# Patient Record
Sex: MALE | Race: WHITE | Employment: OTHER | URBAN - METROPOLITAN AREA
[De-identification: names, ages, dates, MRNs, and addresses within clinical notes are randomized per-mention and may not be internally consistent; named-entity substitution may affect disease eponyms.]

---

## 2018-03-13 ENCOUNTER — TELEPHONE (OUTPATIENT)
Dept: CARDIOLOGY CLINIC | Age: 73
End: 2018-03-13

## 2018-03-14 RX ORDER — METOPROLOL SUCCINATE 25 MG/1
25 TABLET, EXTENDED RELEASE ORAL DAILY
COMMUNITY
End: 2018-03-14 | Stop reason: SDUPTHER

## 2018-03-14 RX ORDER — METOPROLOL SUCCINATE 25 MG/1
25 TABLET, EXTENDED RELEASE ORAL DAILY
Qty: 30 TABLET | Refills: 11 | Status: SHIPPED | OUTPATIENT
Start: 2018-03-14 | End: 2018-03-15 | Stop reason: SDUPTHER

## 2018-03-15 ENCOUNTER — TELEPHONE (OUTPATIENT)
Dept: CARDIOLOGY CLINIC | Age: 73
End: 2018-03-15

## 2018-03-15 ENCOUNTER — OFFICE VISIT (OUTPATIENT)
Dept: CARDIOLOGY CLINIC | Age: 73
End: 2018-03-15
Payer: MEDICARE

## 2018-03-15 VITALS
RESPIRATION RATE: 16 BRPM | HEIGHT: 69 IN | HEART RATE: 74 BPM | DIASTOLIC BLOOD PRESSURE: 78 MMHG | WEIGHT: 189 LBS | BODY MASS INDEX: 27.99 KG/M2 | SYSTOLIC BLOOD PRESSURE: 142 MMHG

## 2018-03-15 DIAGNOSIS — I25.10 CORONARY ARTERY DISEASE INVOLVING NATIVE CORONARY ARTERY OF NATIVE HEART WITHOUT ANGINA PECTORIS: Primary | ICD-10-CM

## 2018-03-15 PROCEDURE — G8599 NO ASA/ANTIPLAT THER USE RNG: HCPCS | Performed by: INTERNAL MEDICINE

## 2018-03-15 PROCEDURE — 99214 OFFICE O/P EST MOD 30 MIN: CPT | Performed by: INTERNAL MEDICINE

## 2018-03-15 PROCEDURE — G8419 CALC BMI OUT NRM PARAM NOF/U: HCPCS | Performed by: INTERNAL MEDICINE

## 2018-03-15 PROCEDURE — 4040F PNEUMOC VAC/ADMIN/RCVD: CPT | Performed by: INTERNAL MEDICINE

## 2018-03-15 PROCEDURE — 1123F ACP DISCUSS/DSCN MKR DOCD: CPT | Performed by: INTERNAL MEDICINE

## 2018-03-15 PROCEDURE — 4004F PT TOBACCO SCREEN RCVD TLK: CPT | Performed by: INTERNAL MEDICINE

## 2018-03-15 PROCEDURE — G8427 DOCREV CUR MEDS BY ELIG CLIN: HCPCS | Performed by: INTERNAL MEDICINE

## 2018-03-15 PROCEDURE — 3017F COLORECTAL CA SCREEN DOC REV: CPT | Performed by: INTERNAL MEDICINE

## 2018-03-15 PROCEDURE — G8484 FLU IMMUNIZE NO ADMIN: HCPCS | Performed by: INTERNAL MEDICINE

## 2018-03-15 PROCEDURE — 93000 ELECTROCARDIOGRAM COMPLETE: CPT | Performed by: INTERNAL MEDICINE

## 2018-03-15 RX ORDER — MAG HYDROX/ALUMINUM HYD/SIMETH 400-400-40
SUSPENSION, ORAL (FINAL DOSE FORM) ORAL
COMMUNITY

## 2018-03-15 RX ORDER — METOPROLOL SUCCINATE 25 MG/1
37.5 TABLET, EXTENDED RELEASE ORAL DAILY
Qty: 135 TABLET | Refills: 3 | Status: SHIPPED | OUTPATIENT
Start: 2018-03-15 | End: 2019-04-10 | Stop reason: SDUPTHER

## 2018-03-15 NOTE — PROGRESS NOTES
Timoteo Lakhani  1945  Date of Service: 3/15/2018    Patient Active Problem List    Diagnosis Date Noted    Hypertension     Hypercholesteremia     Mitral regurgitation      Overview Note:     mild to moderate      Ischemic cardiomyopathy      Overview Note:     EF 35 - 40%    Seen by Dr. Holli Brown. Defers ICD.  Aortic regurgitation      Overview Note:     trace      CAD (coronary artery disease) 12/12/2012     Overview Note:     12-13-12 anterior STEMI  POBO LAD  CABG [place]-LAD   Free EL-diag   SVG-PLB   SVG-OM      Chest pain 12/12/2012       Social History     Social History    Marital status: Single     Spouse name: N/A    Number of children: N/A    Years of education: N/A     Social History Main Topics    Smoking status: Current Some Day Smoker     Types: Cigarettes, Pipe    Smokeless tobacco: Never Used      Comment: quit cigarettes 35yrs ago    Alcohol use No    Drug use: No    Sexual activity: Not Asked     Other Topics Concern    None     Social History Narrative    None       Current Outpatient Prescriptions   Medication Sig Dispense Refill    Saw [place] 450 MG CAPS Take by mouth      metoprolol succinate (TOPROL XL) 25 MG extended release tablet Take 1.5 tablets by mouth daily 135 tablet 3    losartan (COZAAR) 25 MG tablet take 1 tablet by mouth once daily 90 tablet 3    UNABLE TO FIND Cider vinegar      Flaxseed, Linseed, (FLAX SEED OIL) 1000 MG CAPS Take 4,800 mg by mouth 2 times daily       Multiple Vitamins-Minerals (CENTRUM SILVER ULTRA MENS PO) Take 1 tablet by mouth daily.  aspirin 81 MG chewable tablet Take 1 tablet by mouth daily. 30 tablet 0     No current facility-administered medications for this visit. No Known Allergies    Chief Complaint:  Timoteo Lakhani is here today for follow up and management/recomendations for CAD, ICM, HTN, AR, MR.      History of Present Illness: Timoteo Lakhani states that He does house work, yard work, goes up the stairs & goes shopping. He denies any chest discomfort, FOWLER, orthopnea/PND, or lower extremity edema. He denies any palpitations or presyncopal symptoms. He states that he occasionally sees floaters & spots. He states that this has been for years. REVIEW OF SYSTEMS:  As above. Patient does not complain of any fever, chills, nausea, vomiting or diarrhea. No focal, motor or neurological deficits. No changes in hearing, bowel or bladder habits. He is not known to have a history of thyroid problems. No recent nose bleeds. PHYSICAL EXAM:  Vitals:    03/15/18 0815 03/15/18 0823   BP: (!) 142/78 (!) 142/78   Pulse: 74    Resp: 16    Weight: 189 lb (85.7 kg)    Height: 5' 9\" (1.753 m)        GENERAL:  He is alert and oriented x 3, communicates well, in no distress. NECK:  No masses, trachea is mid position. Supple, full ROM, no JVD or bruits. No palpable thyromegaly or lymphadenopathy. HEART:  Regular rate and rhythm. Normal S1 and S2. There is an S4 gallop and a I/VI systolic murmur. LUNGS:  Clear to auscultation bilaterally. No use of accessory muscles. symmetrical excursion. ABDOMEN:  Soft, non-tender. Normal bowel sounds. EXTREMITIES:  Full ROM x 4. Trace to mild bilateral lower extremity edema. Good distal pulses. EYES:  Extraocular muscles intact. PERRL. Normal lids & conjunctiva. ENT:  Nares are clear & not bleeding. Moist mucosa. Normal lips formation. No external masses   NEURO: no tremors, full ROM x 4, EOMI. SKIN:  Warm, dry and intact. Normal turgor. EKG: Sinus rhythm, 74 bpm, LAFB, nonspecific ST - T wave changes. Poor r-wave progression. 3 PVC's. Assessment:   1. Coronary artery disease as outlined above. Clinically symptoms of recurring ischemia at this time. 2. Cardiomyopathy as outlined above. Clinically euvolemic and no decompensation at this time. 3. Trace AR  4. Mild to moderate MR  5. Hypertension, not well controled. 6. Hypercholesterolemia        Recommendations:  1. He is following the cholesterol with Dr. Jennifer Arellano. He still refuses a statin. 2. He continues to defer an ICD  3. I will increase his Toprol to 37.5 daily  4. I recommended to him that he does f/u with others for the visual changes      Thank you for allowing me to participate in your patient's care.       [address], [address] [facility], [place]  Interventional Cardiology

## 2018-03-15 NOTE — TELEPHONE ENCOUNTER
Per Dr. Halley Cassidy, notify PCP that patient has complaints of seeing floaters and spots. Zohaib Heath at Dr. Isidro Ramsey office notified.

## 2018-04-10 ENCOUNTER — TELEPHONE (OUTPATIENT)
Dept: CARDIOLOGY CLINIC | Age: 73
End: 2018-04-10

## 2018-09-27 ENCOUNTER — OFFICE VISIT (OUTPATIENT)
Dept: CARDIOLOGY CLINIC | Age: 73
End: 2018-09-27
Payer: MEDICARE

## 2018-09-27 VITALS
RESPIRATION RATE: 16 BRPM | WEIGHT: 177.9 LBS | BODY MASS INDEX: 26.35 KG/M2 | SYSTOLIC BLOOD PRESSURE: 128 MMHG | HEART RATE: 69 BPM | DIASTOLIC BLOOD PRESSURE: 60 MMHG | HEIGHT: 69 IN

## 2018-09-27 DIAGNOSIS — I25.119 CORONARY ARTERY DISEASE INVOLVING NATIVE HEART WITH ANGINA PECTORIS, UNSPECIFIED VESSEL OR LESION TYPE (HCC): Primary | ICD-10-CM

## 2018-09-27 PROCEDURE — G8599 NO ASA/ANTIPLAT THER USE RNG: HCPCS | Performed by: INTERNAL MEDICINE

## 2018-09-27 PROCEDURE — G8419 CALC BMI OUT NRM PARAM NOF/U: HCPCS | Performed by: INTERNAL MEDICINE

## 2018-09-27 PROCEDURE — G8427 DOCREV CUR MEDS BY ELIG CLIN: HCPCS | Performed by: INTERNAL MEDICINE

## 2018-09-27 PROCEDURE — 99213 OFFICE O/P EST LOW 20 MIN: CPT | Performed by: INTERNAL MEDICINE

## 2018-09-27 PROCEDURE — 1123F ACP DISCUSS/DSCN MKR DOCD: CPT | Performed by: INTERNAL MEDICINE

## 2018-09-27 PROCEDURE — 3017F COLORECTAL CA SCREEN DOC REV: CPT | Performed by: INTERNAL MEDICINE

## 2018-09-27 PROCEDURE — 93000 ELECTROCARDIOGRAM COMPLETE: CPT | Performed by: INTERNAL MEDICINE

## 2018-09-27 PROCEDURE — 1101F PT FALLS ASSESS-DOCD LE1/YR: CPT | Performed by: INTERNAL MEDICINE

## 2018-09-27 PROCEDURE — 4040F PNEUMOC VAC/ADMIN/RCVD: CPT | Performed by: INTERNAL MEDICINE

## 2018-09-27 PROCEDURE — 4004F PT TOBACCO SCREEN RCVD TLK: CPT | Performed by: INTERNAL MEDICINE

## 2018-09-27 NOTE — PROGRESS NOTES
Kamryn Dennison  1945  Date of Service: 9/27/2018    Patient Active Problem List    Diagnosis Date Noted    Hypertension     Hypercholesteremia     Mitral regurgitation      Overview Note:     mild to moderate      Ischemic cardiomyopathy      Overview Note:     EF 35 - 40%    Seen by Dr. Alton Burns. Defers ICD.  Aortic regurgitation      Overview Note:     trace      CAD (coronary artery disease) 12/12/2012     Overview Note:     12-13-12 anterior STEMI  POBO LAD  CABG [place]-LAD   Free EL-diag   SVG-PLB   SVG-OM      Chest pain 12/12/2012       Social History     Social History    Marital status: Single     Spouse name: N/A    Number of children: N/A    Years of education: N/A     Social History Main Topics    Smoking status: Current Some Day Smoker     Types: Cigarettes, Pipe    Smokeless tobacco: Never Used      Comment: quit cigarettes 35yrs ago    Alcohol use No    Drug use: No    Sexual activity: Not Asked     Other Topics Concern    None     Social History Narrative    None       Current Outpatient Prescriptions   Medication Sig Dispense Refill    Saw [place] 450 MG CAPS Take by mouth      metoprolol succinate (TOPROL XL) 25 MG extended release tablet Take 1.5 tablets by mouth daily (Patient taking differently: Take 37.5 mg by mouth daily ) 135 tablet 3    losartan (COZAAR) 25 MG tablet take 1 tablet by mouth once daily 90 tablet 3    UNABLE TO FIND Cider vinegar      Flaxseed, Linseed, (FLAX SEED OIL) 1000 MG CAPS Take 4,800 mg by mouth 2 times daily       Multiple Vitamins-Minerals (CENTRUM SILVER ULTRA MENS PO) Take 1 tablet by mouth daily.  aspirin 81 MG chewable tablet Take 1 tablet by mouth daily. 30 tablet 0     No current facility-administered medications for this visit. No Known Allergies    Chief Complaint:  Kamryn Dennison is here today for follow up and management/recomendations for CAD, ICM, HTN, AR, MR.      History of Present Illness: Jarad Leonardo Lisa states that He does house work, yard work, goes up the stairs & goes shopping. He denies any chest discomfort, dyspnea on exertion, orthopnea/PND, or lower extremity edema. He denies any palpitations or presyncopal symptoms. REVIEW OF SYSTEMS:  As above. Patient does not complain of any fever, chills, nausea, vomiting or diarrhea. No focal, motor or neurological deficits. No changes in hearing, bowel or bladder habits. He is not known to have a history of thyroid problems. No recent nose bleeds. PHYSICAL EXAM:  Vitals:    09/27/18 1014 09/27/18 1028   BP: 138/78 128/60   Pulse: 69    Resp: 16    Weight: 177 lb 14.4 oz (80.7 kg)    Height: 5' 9\" (1.753 m)        GENERAL:  He is alert and oriented x 3, communicates well, in no distress. NECK:  No masses, trachea is mid position. Supple, full ROM, no JVD or bruits. No palpable thyromegaly or lymphadenopathy. HEART:  Regular rate and rhythm. Normal S1 and S2. There is an S4 gallop and a I/VI systolic murmur. LUNGS:  Clear to auscultation bilaterally. No use of accessory muscles. symmetrical excursion. ABDOMEN:  Soft, non-tender. Normal bowel sounds. EXTREMITIES:  Full ROM x 4. Trace to mild bilateral lower extremity edema. Good distal pulses. EYES:  Extraocular muscles intact. PERRL. Normal lids & conjunctiva. ENT:  Nares are clear & not bleeding. Moist mucosa. Normal lips formation. No external masses   NEURO: no tremors, full ROM x 4, EOMI. SKIN:  Warm, dry and intact. Normal turgor. EKG: Sinus rhythm, 69 bpm, LAFB, nonspecific ST - T wave changes. Poor r-wave progression. Incomplete right bundle-branch block. Assessment:   1. Coronary artery disease as outlined above. Clinically symptoms of recurring ischemia at this time. 2. Cardiomyopathy as outlined above. Clinically euvolemic and no decompensation at this time. 3. Trace AR  4. Mild to moderate MR  5. Hypertension.   He states that at home his

## 2018-10-18 ENCOUNTER — APPOINTMENT (OUTPATIENT)
Dept: CT IMAGING | Age: 73
DRG: 291 | End: 2018-10-18
Payer: MEDICARE

## 2018-10-18 ENCOUNTER — HOSPITAL ENCOUNTER (OUTPATIENT)
Age: 73
Discharge: HOME | End: 2018-10-18
Payer: MEDICARE

## 2018-10-18 ENCOUNTER — APPOINTMENT (OUTPATIENT)
Dept: GENERAL RADIOLOGY | Age: 73
DRG: 291 | End: 2018-10-18
Payer: MEDICARE

## 2018-10-18 ENCOUNTER — HOSPITAL ENCOUNTER (INPATIENT)
Age: 73
LOS: 1 days | Discharge: HOME | DRG: 291 | End: 2018-10-19
Attending: FAMILY MEDICINE | Admitting: INTERNAL MEDICINE
Payer: MEDICARE

## 2018-10-18 DIAGNOSIS — I50.9 ACUTE CONGESTIVE HEART FAILURE, UNSPECIFIED HEART FAILURE TYPE (HCC): ICD-10-CM

## 2018-10-18 DIAGNOSIS — I44.7 LBBB (LEFT BUNDLE BRANCH BLOCK): ICD-10-CM

## 2018-10-18 DIAGNOSIS — R06.03 RESPIRATORY DISTRESS: ICD-10-CM

## 2018-10-18 DIAGNOSIS — J81.0 ACUTE PULMONARY EDEMA (HCC): Primary | ICD-10-CM

## 2018-10-18 PROBLEM — J96.00 ACUTE RESPIRATORY FAILURE (HCC): Status: ACTIVE | Noted: 2018-10-18

## 2018-10-18 PROBLEM — I50.23 ACUTE ON CHRONIC SYSTOLIC CONGESTIVE HEART FAILURE (HCC): Chronic | Status: ACTIVE | Noted: 2018-10-18

## 2018-10-18 PROBLEM — J96.90 RESPIRATORY FAILURE (HCC): Status: ACTIVE | Noted: 2018-10-18

## 2018-10-18 PROBLEM — E78.5 HYPERLIPIDEMIA LDL GOAL <100: Chronic | Status: ACTIVE | Noted: 2018-10-18

## 2018-10-18 PROBLEM — J96.90 RESPIRATORY FAILURE (HCC): Status: RESOLVED | Noted: 2018-10-18 | Resolved: 2018-10-18

## 2018-10-18 PROBLEM — I10 ESSENTIAL HYPERTENSION: Chronic | Status: ACTIVE | Noted: 2018-10-18

## 2018-10-18 LAB
ALBUMIN SERPL-MCNC: 4.7 G/DL (ref 3.5–5.2)
ALP BLD-CCNC: 64 U/L (ref 40–129)
ALT SERPL-CCNC: 11 U/L (ref 0–40)
ANION GAP SERPL CALCULATED.3IONS-SCNC: 18 MMOL/L (ref 7–16)
AST SERPL-CCNC: 21 U/L (ref 0–39)
BASOPHILS ABSOLUTE: 0.04 E9/L (ref 0–0.2)
BASOPHILS RELATIVE PERCENT: 0.3 % (ref 0–2)
BILIRUB SERPL-MCNC: 0.8 MG/DL (ref 0–1.2)
BUN BLDV-MCNC: 21 MG/DL (ref 8–23)
CALCIUM SERPL-MCNC: 9.3 MG/DL (ref 8.6–10.2)
CHLORIDE BLD-SCNC: 100 MMOL/L (ref 98–107)
CO2: 21 MMOL/L (ref 22–29)
CREAT SERPL-MCNC: 1 MG/DL (ref 0.7–1.2)
D DIMER: 522 NG/ML DDU
EOSINOPHILS ABSOLUTE: 0.28 E9/L (ref 0.05–0.5)
EOSINOPHILS RELATIVE PERCENT: 2.3 % (ref 0–6)
GFR AFRICAN AMERICAN: >60
GFR NON-AFRICAN AMERICAN: >60 ML/MIN/1.73
GLUCOSE BLD-MCNC: 148 MG/DL (ref 74–109)
HCT VFR BLD CALC: 41.3 % (ref 37–54)
HEMOGLOBIN: 14.1 G/DL (ref 12.5–16.5)
IMMATURE GRANULOCYTES #: 0.05 E9/L
IMMATURE GRANULOCYTES %: 0.4 % (ref 0–5)
LYMPHOCYTES ABSOLUTE: 1.79 E9/L (ref 1.5–4)
LYMPHOCYTES RELATIVE PERCENT: 14.8 % (ref 20–42)
MCH RBC QN AUTO: 34.2 PG (ref 26–35)
MCHC RBC AUTO-ENTMCNC: 34.1 % (ref 32–34.5)
MCV RBC AUTO: 100.2 FL (ref 80–99.9)
MONOCYTES ABSOLUTE: 0.86 E9/L (ref 0.1–0.95)
MONOCYTES RELATIVE PERCENT: 7.1 % (ref 2–12)
NEUTROPHILS ABSOLUTE: 9.11 E9/L (ref 1.8–7.3)
NEUTROPHILS RELATIVE PERCENT: 75.1 % (ref 43–80)
PDW BLD-RTO: 13.1 FL (ref 11.5–15)
PLATELET # BLD: 238 E9/L (ref 130–450)
PMV BLD AUTO: 10.4 FL (ref 7–12)
POTASSIUM SERPL-SCNC: 4.5 MMOL/L (ref 3.5–5)
PRO-BNP: 2656 PG/ML (ref 0–125)
RBC # BLD: 4.12 E12/L (ref 3.8–5.8)
SODIUM BLD-SCNC: 139 MMOL/L (ref 132–146)
TOTAL PROTEIN: 7.9 G/DL (ref 6.4–8.3)
TROPONIN: <0.01 NG/ML (ref 0–0.03)
URIC ACID, SERUM: 5.7 MG/DL (ref 3.4–7)
WBC # BLD: 12.1 E9/L (ref 4.5–11.5)

## 2018-10-18 PROCEDURE — 71045 X-RAY EXAM CHEST 1 VIEW: CPT

## 2018-10-18 PROCEDURE — 2060000000 HC ICU INTERMEDIATE R&B

## 2018-10-18 PROCEDURE — 84550 ASSAY OF BLOOD/URIC ACID: CPT

## 2018-10-18 PROCEDURE — 6360000002 HC RX W HCPCS: Performed by: FAMILY MEDICINE

## 2018-10-18 PROCEDURE — 6360000002 HC RX W HCPCS: Performed by: INTERNAL MEDICINE

## 2018-10-18 PROCEDURE — 94760 N-INVAS EAR/PLS OXIMETRY 1: CPT

## 2018-10-18 PROCEDURE — A0426 ALS 1: HCPCS

## 2018-10-18 PROCEDURE — 6370000000 HC RX 637 (ALT 250 FOR IP): Performed by: FAMILY MEDICINE

## 2018-10-18 PROCEDURE — 71275 CT ANGIOGRAPHY CHEST: CPT

## 2018-10-18 PROCEDURE — 85025 COMPLETE CBC W/AUTO DIFF WBC: CPT

## 2018-10-18 PROCEDURE — 36415 COLL VENOUS BLD VENIPUNCTURE: CPT

## 2018-10-18 PROCEDURE — APPSS60 APP SPLIT SHARED TIME 46-60 MINUTES: Performed by: CLINICAL NURSE SPECIALIST

## 2018-10-18 PROCEDURE — 2580000003 HC RX 258: Performed by: INTERNAL MEDICINE

## 2018-10-18 PROCEDURE — 83880 ASSAY OF NATRIURETIC PEPTIDE: CPT

## 2018-10-18 PROCEDURE — A0425 GROUND MILEAGE: HCPCS

## 2018-10-18 PROCEDURE — 6370000000 HC RX 637 (ALT 250 FOR IP): Performed by: INTERNAL MEDICINE

## 2018-10-18 PROCEDURE — 96374 THER/PROPH/DIAG INJ IV PUSH: CPT

## 2018-10-18 PROCEDURE — 99223 1ST HOSP IP/OBS HIGH 75: CPT | Performed by: INTERNAL MEDICINE

## 2018-10-18 PROCEDURE — 99285 EMERGENCY DEPT VISIT HI MDM: CPT

## 2018-10-18 PROCEDURE — 84484 ASSAY OF TROPONIN QUANT: CPT

## 2018-10-18 PROCEDURE — 80053 COMPREHEN METABOLIC PANEL: CPT

## 2018-10-18 PROCEDURE — 85378 FIBRIN DEGRADE SEMIQUANT: CPT

## 2018-10-18 PROCEDURE — 6360000004 HC RX CONTRAST MEDICATION: Performed by: RADIOLOGY

## 2018-10-18 PROCEDURE — 2700000000 HC OXYGEN THERAPY PER DAY

## 2018-10-18 RX ORDER — FUROSEMIDE 10 MG/ML
40 INJECTION INTRAMUSCULAR; INTRAVENOUS 2 TIMES DAILY
Status: DISCONTINUED | OUTPATIENT
Start: 2018-10-18 | End: 2018-10-19

## 2018-10-18 RX ORDER — METOPROLOL SUCCINATE 25 MG/1
37.5 TABLET, EXTENDED RELEASE ORAL DAILY
Status: DISCONTINUED | OUTPATIENT
Start: 2018-10-18 | End: 2018-10-19 | Stop reason: HOSPADM

## 2018-10-18 RX ORDER — IPRATROPIUM BROMIDE AND ALBUTEROL SULFATE 2.5; .5 MG/3ML; MG/3ML
1 SOLUTION RESPIRATORY (INHALATION) ONCE
Status: COMPLETED | OUTPATIENT
Start: 2018-10-18 | End: 2018-10-18

## 2018-10-18 RX ORDER — LOSARTAN POTASSIUM 25 MG/1
25 TABLET ORAL DAILY
Status: DISCONTINUED | OUTPATIENT
Start: 2018-10-18 | End: 2018-10-19 | Stop reason: HOSPADM

## 2018-10-18 RX ORDER — SODIUM CHLORIDE 0.9 % (FLUSH) 0.9 %
10 SYRINGE (ML) INJECTION PRN
Status: DISCONTINUED | OUTPATIENT
Start: 2018-10-18 | End: 2018-10-19 | Stop reason: HOSPADM

## 2018-10-18 RX ORDER — ASPIRIN 81 MG/1
81 TABLET, CHEWABLE ORAL DAILY
Status: DISCONTINUED | OUTPATIENT
Start: 2018-10-18 | End: 2018-10-19 | Stop reason: HOSPADM

## 2018-10-18 RX ORDER — FUROSEMIDE 10 MG/ML
40 INJECTION INTRAMUSCULAR; INTRAVENOUS ONCE
Status: COMPLETED | OUTPATIENT
Start: 2018-10-18 | End: 2018-10-18

## 2018-10-18 RX ORDER — ONDANSETRON 2 MG/ML
4 INJECTION INTRAMUSCULAR; INTRAVENOUS EVERY 6 HOURS PRN
Status: DISCONTINUED | OUTPATIENT
Start: 2018-10-18 | End: 2018-10-19 | Stop reason: HOSPADM

## 2018-10-18 RX ORDER — SODIUM CHLORIDE 0.9 % (FLUSH) 0.9 %
10 SYRINGE (ML) INJECTION EVERY 12 HOURS SCHEDULED
Status: DISCONTINUED | OUTPATIENT
Start: 2018-10-18 | End: 2018-10-19 | Stop reason: HOSPADM

## 2018-10-18 RX ORDER — TERBINAFINE HYDROCHLORIDE 250 MG/1
250 TABLET ORAL DAILY
COMMUNITY
End: 2019-10-02

## 2018-10-18 RX ADMIN — Medication 10 ML: at 10:42

## 2018-10-18 RX ADMIN — FUROSEMIDE 40 MG: 10 INJECTION, SOLUTION INTRAMUSCULAR; INTRAVENOUS at 05:39

## 2018-10-18 RX ADMIN — LOSARTAN POTASSIUM 25 MG: 25 TABLET, FILM COATED ORAL at 20:17

## 2018-10-18 RX ADMIN — METOPROLOL SUCCINATE 37.5 MG: 25 TABLET, EXTENDED RELEASE ORAL at 20:17

## 2018-10-18 RX ADMIN — IPRATROPIUM BROMIDE AND ALBUTEROL SULFATE 1 AMPULE: .5; 3 SOLUTION RESPIRATORY (INHALATION) at 05:35

## 2018-10-18 RX ADMIN — ASPIRIN 81 MG CHEWABLE TABLET 81 MG: 81 TABLET CHEWABLE at 10:41

## 2018-10-18 RX ADMIN — IPRATROPIUM BROMIDE AND ALBUTEROL SULFATE 1 AMPULE: .5; 3 SOLUTION RESPIRATORY (INHALATION) at 04:49

## 2018-10-18 RX ADMIN — IOPAMIDOL 100 ML: 755 INJECTION, SOLUTION INTRAVENOUS at 06:00

## 2018-10-18 RX ADMIN — FUROSEMIDE 40 MG: 10 INJECTION, SOLUTION INTRAMUSCULAR; INTRAVENOUS at 20:17

## 2018-10-18 RX ADMIN — Medication 10 ML: at 20:18

## 2018-10-18 ASSESSMENT — PAIN SCALES - GENERAL
PAINLEVEL_OUTOF10: 0

## 2018-10-18 ASSESSMENT — ENCOUNTER SYMPTOMS
SHORTNESS OF BREATH: 1
WHEEZING: 0

## 2018-10-18 NOTE — ED PROVIDER NOTES
hospital encounter of 10/18/18   Troponin   Result Value Ref Range    Troponin <0.01 0.00 - 0.03 ng/mL   CBC auto differential   Result Value Ref Range    WBC 12.1 (H) 4.5 - 11.5 E9/L    RBC 4.12 3.80 - 5.80 E12/L    Hemoglobin 14.1 12.5 - 16.5 g/dL    Hematocrit 41.3 37.0 - 54.0 %    .2 (H) 80.0 - 99.9 fL    MCH 34.2 26.0 - 35.0 pg    MCHC 34.1 32.0 - 34.5 %    RDW 13.1 11.5 - 15.0 fL    Platelets 555 033 - 467 E9/L    MPV 10.4 7.0 - 12.0 fL    Neutrophils % 75.1 43.0 - 80.0 %    Immature Granulocytes % 0.4 0.0 - 5.0 %    Lymphocytes % 14.8 (L) 20.0 - 42.0 %    Monocytes % 7.1 2.0 - 12.0 %    Eosinophils % 2.3 0.0 - 6.0 %    Basophils % 0.3 0.0 - 2.0 %    Neutrophils # 9.11 (H) 1.80 - 7.30 E9/L    Immature Granulocytes # 0.05 E9/L    Lymphocytes # 1.79 1.50 - 4.00 E9/L    Monocytes # 0.86 0.10 - 0.95 E9/L    Eosinophils # 0.28 0.05 - 0.50 E9/L    Basophils # 0.04 0.00 - 0.20 E9/L   Comprehensive metabolic panel   Result Value Ref Range    Sodium 139 132 - 146 mmol/L    Potassium 4.5 3.5 - 5.0 mmol/L    Chloride 100 98 - 107 mmol/L    CO2 21 (L) 22 - 29 mmol/L    Anion Gap 18 (H) 7 - 16 mmol/L    Glucose 148 (H) 74 - 109 mg/dL    BUN 21 8 - 23 mg/dL    CREATININE 1.0 0.7 - 1.2 mg/dL    GFR Non-African American >60 >=60 mL/min/1.73    GFR African American >60     Calcium 9.3 8.6 - 10.2 mg/dL    Total Protein 7.9 6.4 - 8.3 g/dL    Alb 4.7 3.5 - 5.2 g/dL    Total Bilirubin 0.8 0.0 - 1.2 mg/dL    Alkaline Phosphatase 64 40 - 129 U/L    ALT 11 0 - 40 U/L    AST 21 0 - 39 U/L   Uric acid   Result Value Ref Range    Uric Acid, Serum 5.7 3.4 - 7.0 mg/dL   Brain Natriuretic Peptide   Result Value Ref Range    Pro-BNP 2,656 (H) 0 - 125 pg/mL   D-Dimer, Quantitative   Result Value Ref Range    D-Dimer, Quant 522 ng/mL DDU   EKG 12 Lead   Result Value Ref Range    Ventricular Rate 90 BPM    Atrial Rate 90 BPM    P-R Interval 202 ms    QRS Duration 116 ms    Q-T Interval 408 ms    QTc Calculation (Bazett) 499 ms    P [facility] [place] admission    --------------------------------- ADDITIONAL PROVIDER NOTES ---------------------------------     1. Acute pulmonary edema (HCC)    2. LBBB (left bundle branch block)    3. Acute congestive heart failure, unspecified heart failure type ([address]    4. Respiratory distress          This patient's ED course included: a personal history and physicial examination, re-evaluation prior to disposition, IV medications, cardiac monitoring and continuous pulse oximetry    This patient has remained hemodynamically stable, improved and been closely monitored during their ED course.     Critical Care: none       Daija Hutchison DO  10/18/18 [id number]

## 2018-10-19 VITALS
DIASTOLIC BLOOD PRESSURE: 56 MMHG | RESPIRATION RATE: 23 BRPM | OXYGEN SATURATION: 95 % | HEART RATE: 56 BPM | HEIGHT: 69 IN | BODY MASS INDEX: 25.39 KG/M2 | SYSTOLIC BLOOD PRESSURE: 122 MMHG | WEIGHT: 171.4 LBS | TEMPERATURE: 97.9 F

## 2018-10-19 PROBLEM — J96.90 RESPIRATORY FAILURE (HCC): Status: RESOLVED | Noted: 2018-10-18 | Resolved: 2018-10-19

## 2018-10-19 LAB
ALBUMIN SERPL-MCNC: 4 G/DL (ref 3.5–5.2)
ALP BLD-CCNC: 60 U/L (ref 40–129)
ALT SERPL-CCNC: 15 U/L (ref 0–40)
ANION GAP SERPL CALCULATED.3IONS-SCNC: 16 MMOL/L (ref 7–16)
AST SERPL-CCNC: 22 U/L (ref 0–39)
BASOPHILS ABSOLUTE: 0.03 E9/L (ref 0–0.2)
BASOPHILS RELATIVE PERCENT: 0.4 % (ref 0–2)
BILIRUB SERPL-MCNC: 0.6 MG/DL (ref 0–1.2)
BUN BLDV-MCNC: 23 MG/DL (ref 8–23)
CALCIUM SERPL-MCNC: 8.9 MG/DL (ref 8.6–10.2)
CHLORIDE BLD-SCNC: 101 MMOL/L (ref 98–107)
CO2: 22 MMOL/L (ref 22–29)
CREAT SERPL-MCNC: 1.1 MG/DL (ref 0.7–1.2)
EOSINOPHILS ABSOLUTE: 0.38 E9/L (ref 0.05–0.5)
EOSINOPHILS RELATIVE PERCENT: 5.3 % (ref 0–6)
GFR AFRICAN AMERICAN: >60
GFR NON-AFRICAN AMERICAN: >60 ML/MIN/1.73
GLUCOSE BLD-MCNC: 98 MG/DL (ref 74–109)
HCT VFR BLD CALC: 36.6 % (ref 37–54)
HEMOGLOBIN: 13 G/DL (ref 12.5–16.5)
IMMATURE GRANULOCYTES #: 0.02 E9/L
IMMATURE GRANULOCYTES %: 0.3 % (ref 0–5)
LV EF: 25 %
LVEF MODALITY: NORMAL
LYMPHOCYTES ABSOLUTE: 2.07 E9/L (ref 1.5–4)
LYMPHOCYTES RELATIVE PERCENT: 28.8 % (ref 20–42)
MAGNESIUM: 2.2 MG/DL (ref 1.6–2.6)
MCH RBC QN AUTO: 35 PG (ref 26–35)
MCHC RBC AUTO-ENTMCNC: 35.5 % (ref 32–34.5)
MCV RBC AUTO: 98.7 FL (ref 80–99.9)
MONOCYTES ABSOLUTE: 0.82 E9/L (ref 0.1–0.95)
MONOCYTES RELATIVE PERCENT: 11.4 % (ref 2–12)
NEUTROPHILS ABSOLUTE: 3.86 E9/L (ref 1.8–7.3)
NEUTROPHILS RELATIVE PERCENT: 53.8 % (ref 43–80)
PDW BLD-RTO: 13.1 FL (ref 11.5–15)
PLATELET # BLD: 225 E9/L (ref 130–450)
PMV BLD AUTO: 11 FL (ref 7–12)
POTASSIUM SERPL-SCNC: 4 MMOL/L (ref 3.5–5)
PRO-BNP: 1922 PG/ML (ref 0–125)
RBC # BLD: 3.71 E12/L (ref 3.8–5.8)
SODIUM BLD-SCNC: 139 MMOL/L (ref 132–146)
TOTAL PROTEIN: 7 G/DL (ref 6.4–8.3)
WBC # BLD: 7.2 E9/L (ref 4.5–11.5)

## 2018-10-19 PROCEDURE — 6370000000 HC RX 637 (ALT 250 FOR IP): Performed by: INTERNAL MEDICINE

## 2018-10-19 PROCEDURE — 85025 COMPLETE CBC W/AUTO DIFF WBC: CPT

## 2018-10-19 PROCEDURE — 6360000002 HC RX W HCPCS: Performed by: INTERNAL MEDICINE

## 2018-10-19 PROCEDURE — 99232 SBSQ HOSP IP/OBS MODERATE 35: CPT | Performed by: INTERNAL MEDICINE

## 2018-10-19 PROCEDURE — 83735 ASSAY OF MAGNESIUM: CPT

## 2018-10-19 PROCEDURE — 83880 ASSAY OF NATRIURETIC PEPTIDE: CPT

## 2018-10-19 PROCEDURE — 2580000003 HC RX 258: Performed by: INTERNAL MEDICINE

## 2018-10-19 PROCEDURE — 36415 COLL VENOUS BLD VENIPUNCTURE: CPT

## 2018-10-19 PROCEDURE — 93306 TTE W/DOPPLER COMPLETE: CPT

## 2018-10-19 PROCEDURE — 80053 COMPREHEN METABOLIC PANEL: CPT

## 2018-10-19 RX ORDER — FUROSEMIDE 40 MG/1
40 TABLET ORAL DAILY
Status: DISCONTINUED | OUTPATIENT
Start: 2018-10-19 | End: 2018-10-19 | Stop reason: HOSPADM

## 2018-10-19 RX ORDER — SPIRONOLACTONE 25 MG/1
25 TABLET ORAL DAILY
Qty: 30 TABLET | Refills: 0 | Status: SHIPPED | OUTPATIENT
Start: 2018-10-19 | End: 2018-10-29

## 2018-10-19 RX ORDER — SPIRONOLACTONE 25 MG/1
25 TABLET ORAL DAILY
Status: DISCONTINUED | OUTPATIENT
Start: 2018-10-19 | End: 2018-10-19 | Stop reason: HOSPADM

## 2018-10-19 RX ORDER — FUROSEMIDE 40 MG/1
40 TABLET ORAL DAILY
Qty: 30 TABLET | Refills: 0 | Status: SHIPPED | OUTPATIENT
Start: 2018-10-20 | End: 2018-10-29 | Stop reason: SDUPTHER

## 2018-10-19 RX ADMIN — FUROSEMIDE 40 MG: 10 INJECTION, SOLUTION INTRAMUSCULAR; INTRAVENOUS at 07:44

## 2018-10-19 RX ADMIN — ASPIRIN 81 MG CHEWABLE TABLET 81 MG: 81 TABLET CHEWABLE at 07:44

## 2018-10-19 RX ADMIN — Medication 10 ML: at 07:44

## 2018-10-19 ASSESSMENT — PAIN SCALES - GENERAL
PAINLEVEL_OUTOF10: 0

## 2018-10-19 NOTE — PROGRESS NOTES
Physical Therapy    Facility/Department: [address] PICU  Initial Assessment    NAME: Jo Campos  : 1945  MRN: [id number]    Date of Service: 10/19/2018    Physical therapy orders received/treatment attempted and chart review completed. Patient observed up ad dolly on the unit with steady gait. Patient with no PT needs. Will discontinue PT orders. Thank you for the opportunity to assist in the care of this patient.     Genet Cleaning, PT, DPT  License [id number]
Subjective: The patient is awake and alert. No problems overnight. Denies chest pain, angina, and dyspnea. Denies abdominal pain. Tolerating diet. No nausea or vomiting. Objective:    BP (!) 122/56   Pulse 56   Temp 97.9 °F (36.6 °C) (Oral)   Resp 23   Ht 5' 9\" (1.753 m)   Wt 171 lb 6.4 oz (77.7 kg)   SpO2 95%   BMI 25.31 kg/m²     Current medications that patient is taking have been reviewed. Heart:  RRR, no murmurs, gallops, or rubs.   Lungs:  CTA bilaterally, no wheeze, rales or rhonchi  Abd: bowel sounds present, nontender, nondistended, no masses  Extrem:  No clubbing, cyanosis, or edema    CBC with Differential:    Lab Results   Component Value Date    WBC 7.2 10/19/2018    RBC 3.71 10/19/2018    HGB 13.0 10/19/2018    HCT 36.6 10/19/2018     10/19/2018    MCV 98.7 10/19/2018    MCH 35.0 10/19/2018    MCHC 35.5 10/19/2018    RDW 13.1 10/19/2018    SEGSPCT 77 12/15/2012    LYMPHOPCT 28.8 10/19/2018    MONOPCT 11.4 10/19/2018    BASOPCT 0.4 10/19/2018    MONOSABS 0.82 10/19/2018    LYMPHSABS 2.07 10/19/2018    EOSABS 0.38 10/19/2018    BASOSABS 0.03 10/19/2018     CMP:    Lab Results   Component Value Date     10/19/2018    K 4.0 10/19/2018     10/19/2018    CO2 22 10/19/2018    BUN 23 10/19/2018    CREATININE 1.1 10/19/2018    GFRAA >60 10/19/2018    LABGLOM >60 10/19/2018    GLUCOSE 98 10/19/2018    PROT 7.0 10/19/2018    LABALBU 4.0 10/19/2018    CALCIUM 8.9 10/19/2018    BILITOT 0.6 10/19/2018    ALKPHOS 60 10/19/2018    AST 22 10/19/2018    ALT 15 10/19/2018     BMP:    Lab Results   Component Value Date     10/19/2018    K 4.0 10/19/2018     10/19/2018    CO2 22 10/19/2018    BUN 23 10/19/2018    LABALBU 4.0 10/19/2018    CREATININE 1.1 10/19/2018    CALCIUM 8.9 10/19/2018    GFRAA >60 10/19/2018    LABGLOM >60 10/19/2018    GLUCOSE 98 10/19/2018     Magnesium:    Lab Results   Component Value Date    MG 2.2 10/19/2018     Phosphorus:  No results found
hepatosplenomegaly or splenomegaly; no abdominal bruit / pulsation  MS: Good muscle strength and tone. Not atrophy or abnormal movements. : deferred. SKIN: Warm and dry no statis dermatitis or ulcers. NEURO / PSYCH: Oriented to person, place and time. Speech clear and appropriate. Follows all commands. Pleasant affect. Monitor: SR      Lab Review       Last 3 Troponin:    Lab Results   Component Value Date    TROPONINI <0.01 10/18/2018    TROPONINI <0.01 10/18/2018    TROPONINI <0.01 10/18/2018        Recent Labs      10/18/18   0400  10/19/18   0448   WBC  12.1*  7.2   HGB  14.1  13.0   HCT  41.3  36.6*   PLT  238  225       Recent Labs      10/18/18   0400  10/19/18   0448   NA  139  139   K  4.5  4.0   CL  100  101   CO2  21*  22   BUN  21  23   CREATININE  1.0  1.1       Recent Labs      10/19/18   0448   AST  22   ALT  15   ALKPHOS  60     Cardiac history:  A. Anterior STEMI 12/12/2012 s/p balloon angioplasty to the LAD 12/2012  B. S/p CABG 12/13/2012: LIMA to LAD, Free EL to diagonal, SVG to posterolateral branch, SVG to OM  C. Echocardigoram 12/13/2012: Mild anteroseptal apical hypokineiss. Mildly reduced LV systolic function. Calculated EF 47.4%  C.  Echocardiogram 3/2013: EF 35%. LVDD 6 cm. Stage II diastolic dysfunction. Anteroseptal hypokinesis. Left atrium mildly dilated. RVSP 39 mm Hg. Mild to moderate MR. Trace AI. Trace TR.  D. MUGA scan 9/2013: EF 29%. Septal and anterior akinesis. Abnormal resting LV diastolic function. Assessment:  1. Presented with shortness of breath and lower extremity edema. Clinical picture consistent with acute on chronic systolic heart failure. An IV diuresis. Improved. Wants to go home. 2. CAD status post CABG 2012: On aspirin and beta blocker. 3. LV dysfunction from an ischemic myopathy. Last EF 35%. On Toprol and Cozaar. Will add Aldactone. Was not on PO diuretics at home.    4. Valvular heart disease: Valvular heart disease: Last echo in 2013 showed

## 2018-10-19 NOTE — CARE COORDINATION
SOCIAL WORK / DISCHARGE PLANNING:  Chart reviewed, per RN no discharge needs identified or requested.  Pt has own transport arranged after 3pm.                       Electronically signed by NIKHIL Dennis on 10/19/2018 at 3:34 PM

## 2018-10-19 NOTE — DISCHARGE SUMMARY
tolerated  Diet: cardiac diet    Follow up with dr Blue Walden in 1 week. Follow up with dr Tj Fitch in 2-3 weeks.       Note that over 30 minutes was spent in preparing discharge papers, discussing discharge with patient, medication review, etc.    Signed:  Angie Hightower MD  10/19/2018  11:33 AM

## 2018-10-22 LAB
EKG ATRIAL RATE: 90 BPM
EKG P AXIS: 67 DEGREES
EKG P-R INTERVAL: 202 MS
EKG Q-T INTERVAL: 408 MS
EKG QRS DURATION: 116 MS
EKG QTC CALCULATION (BAZETT): 499 MS
EKG R AXIS: -52 DEGREES
EKG T AXIS: 94 DEGREES
EKG VENTRICULAR RATE: 90 BPM

## 2018-10-25 ENCOUNTER — TELEPHONE (OUTPATIENT)
Dept: CARDIOLOGY CLINIC | Age: 73
End: 2018-10-25

## 2018-10-29 ENCOUNTER — OFFICE VISIT (OUTPATIENT)
Dept: CARDIOLOGY CLINIC | Age: 73
End: 2018-10-29
Payer: MEDICARE

## 2018-10-29 VITALS
DIASTOLIC BLOOD PRESSURE: 62 MMHG | HEART RATE: 55 BPM | HEIGHT: 69 IN | SYSTOLIC BLOOD PRESSURE: 114 MMHG | BODY MASS INDEX: 26.35 KG/M2 | WEIGHT: 177.9 LBS | RESPIRATION RATE: 16 BRPM

## 2018-10-29 DIAGNOSIS — I50.22 CHRONIC HFREF (HEART FAILURE WITH REDUCED EJECTION FRACTION) (HCC): ICD-10-CM

## 2018-10-29 DIAGNOSIS — I38 VHD (VALVULAR HEART DISEASE): ICD-10-CM

## 2018-10-29 DIAGNOSIS — I10 ESSENTIAL HYPERTENSION: Chronic | ICD-10-CM

## 2018-10-29 DIAGNOSIS — I25.119 CORONARY ARTERY DISEASE INVOLVING NATIVE HEART WITH ANGINA PECTORIS, UNSPECIFIED VESSEL OR LESION TYPE (HCC): Primary | ICD-10-CM

## 2018-10-29 PROCEDURE — G8427 DOCREV CUR MEDS BY ELIG CLIN: HCPCS | Performed by: PHYSICIAN ASSISTANT

## 2018-10-29 PROCEDURE — G8419 CALC BMI OUT NRM PARAM NOF/U: HCPCS | Performed by: PHYSICIAN ASSISTANT

## 2018-10-29 PROCEDURE — 1111F DSCHRG MED/CURRENT MED MERGE: CPT | Performed by: PHYSICIAN ASSISTANT

## 2018-10-29 PROCEDURE — 3017F COLORECTAL CA SCREEN DOC REV: CPT | Performed by: PHYSICIAN ASSISTANT

## 2018-10-29 PROCEDURE — 4040F PNEUMOC VAC/ADMIN/RCVD: CPT | Performed by: PHYSICIAN ASSISTANT

## 2018-10-29 PROCEDURE — G8484 FLU IMMUNIZE NO ADMIN: HCPCS | Performed by: PHYSICIAN ASSISTANT

## 2018-10-29 PROCEDURE — 1123F ACP DISCUSS/DSCN MKR DOCD: CPT | Performed by: PHYSICIAN ASSISTANT

## 2018-10-29 PROCEDURE — 4004F PT TOBACCO SCREEN RCVD TLK: CPT | Performed by: PHYSICIAN ASSISTANT

## 2018-10-29 PROCEDURE — G8598 ASA/ANTIPLAT THER USED: HCPCS | Performed by: PHYSICIAN ASSISTANT

## 2018-10-29 PROCEDURE — 93000 ELECTROCARDIOGRAM COMPLETE: CPT | Performed by: INTERNAL MEDICINE

## 2018-10-29 PROCEDURE — 1101F PT FALLS ASSESS-DOCD LE1/YR: CPT | Performed by: PHYSICIAN ASSISTANT

## 2018-10-29 PROCEDURE — 99214 OFFICE O/P EST MOD 30 MIN: CPT | Performed by: PHYSICIAN ASSISTANT

## 2018-10-29 RX ORDER — FUROSEMIDE 40 MG/1
40 TABLET ORAL DAILY
Qty: 30 TABLET | Refills: 5 | Status: SHIPPED | OUTPATIENT
Start: 2018-10-29 | End: 2019-10-02

## 2018-10-29 NOTE — PATIENT INSTRUCTIONS
by other doctors and any over-the-counter medicines, vitamins, or supplements you take. Take this list with you when you go to any doctor. · Take your medicines at the same time every day. It may help you to post a list of all the medicines you take every day and what time of day you take them. · Make taking your medicine as simple as you can. Plan times to take your medicines when you are doing other things, such as eating a meal or getting ready for bed. This will make it easier to remember to take your medicines. · Get organized. Use helpful tools, such as daily or weekly pill containers. When should you call for help? Call 911 if you have symptoms of sudden heart failure such as:    · You have severe trouble breathing.     · You cough up pink, foamy mucus.     · You have a new irregular or rapid heartbeat.    Call your doctor now or seek immediate medical care if:    · You have new or increased shortness of breath.     · You are dizzy or lightheaded, or you feel like you may faint.     · You have sudden weight gain, such as more than 2 to 3 pounds in a day or 5 pounds in a week. (Your doctor may suggest a different range of weight gain.)     · You have increased swelling in your legs, ankles, or feet.     · You are suddenly so tired or weak that you cannot do your usual activities.    Watch closely for changes in your health, and be sure to contact your doctor if you develop new symptoms. Where can you learn more? Go to https://[employer] [place]. org and sign in to your [employer] account. Enter D911 in the [facility] box to learn more about \"Avoiding Triggers With Heart Failure: Care Instructions. \"     If you do not have an account, please click on the \"Sign Up Now\" link. Current as of: December 6, 2017  Content Version: 11.7  © 9936-7953 [employer]. Care instructions adapted under license by [facility] ([facility]).  If you have questions about a medical condition or this

## 2018-10-29 NOTE — PROGRESS NOTES
[facility]        Dear Dr. Neil Webster MD,    We had the pleasure of seeing Heather Coy in the [facility] office. His primary cardiologist is Dr. Shannan Kenney. Chief Complaint: HFU for CAD, HFrEF, ICM, HTN, VHD  Chief Complaint   Patient presents with    Follow-Up from Hospital    Congestive Heart Failure    Coronary Artery Disease    Hypertension       Date of Service: 10/29/2018    HISTORY OF PRESENT ILLNESS:    Heather Coy is a pleasant 68 y.o. male with a past medical history below. Patient had hospitalization 10/18/18 for A/C HFrEF. He was not on a home diuretic at that time. Echo 10/19/18 noted EF 25%, stage 2 DD, mod MR, mild PH, mild AI. Patient improved with IV diuresis. He was initiated on Lasix 40 mg po daily and Aldactone 25 mg po daily upon discharge. Per patient, he has not taken Aldactone since discharge as he feels it is \"too much for him. \"    Since discharge, he has been compliant on his other medications. He has been able to ambulate and do ADLs without any exertional chest pain, FOWLER, palpitations, or dizziness. He denies any leg edema, orthopnea, or PND. His weights have remained stable. Echo was reviewed in detail with patient and he still declines LifeVest and ICD.        ALLERGIES:  No Known Allergies    PROBLEM LIST:   Patient Active Problem List    Diagnosis Date Noted    Acute respiratory failure ([facility] [address] 10/18/2018    Hyperlipidemia LDL goal <100 10/18/2018    Acute on chronic systolic congestive heart failure ([facility] [address] 10/18/2018    Essential hypertension 10/18/2018    Mitral regurgitation      Overview Note:     mild to moderate      Aortic regurgitation      Overview Note:     trace      CAD (coronary artery disease) 12/12/2012     Overview Note:     12-13-12 anterior STEMI  POBO LAD  CABG [place]-LAD   Free EL-diag   SVG-PLB   SVG-OM         PAST MEDICAL HISTORY:       Diagnosis Date    Acute on chronic systolic congestive heart apical   segments.   Severely reduced left ventricular systolic dysfunction.   There is doppler evidence of stage II diastolic dysfunction.   The left atrium is severely dilated.   Moderate mitral regurgitation is present.   The aortic valve appears mildly sclerotic.   Mild aortic regurgitation is noted.   Physiologic and/or trace tricuspid regurgitation.   Mild pulmonary hypertension.     EKG: SB rate 55, nonspecific st/t wave changes, QTc 450 (no acute change from EKG 10/18/18)      ASSESSMENT/PLAN:     1. HFrEF- compensated on exam presently. 2. CAD (CABG 12/12)- denies any anginal equivalent complaints  3. ICM (EF 25% on Echo 10/19/18)- he declines LifeVest and ICD despite recommendations  4. VHD (Echo 10/19/18 noting mod MR, mild AI, trace TR, mild PH)  5. HTN- controlled    - Encouraged low sodium diet, monitoring of daily weights, monitoring fluid intake  - Echocardiogram results reviewed with patient in detail. He declines ICD and LifeVest at this time. He understands risks without  - continue Cozaar, Toprol XL, Lasix  - he refuses Aldactone at this time  - continue ASA. He refuses statins due to \"swelling they cause. \"  - monitor lytes, creatinine (has labs at pcp's scheduled for 11/1/18)  - he will return to see Dr. Bridger Ji in 2 months or sooner if any issues arise               Thank you for entrusting us to participate in [facility]. If you have any questions, please don't hesitate to call us at [facility].     Sincerely,    Estevan Arvizu PA-C  [facility]

## 2019-01-07 ENCOUNTER — OFFICE VISIT (OUTPATIENT)
Dept: CARDIOLOGY CLINIC | Age: 74
End: 2019-01-07
Payer: MEDICARE

## 2019-01-07 VITALS
HEIGHT: 68 IN | DIASTOLIC BLOOD PRESSURE: 78 MMHG | RESPIRATION RATE: 16 BRPM | HEART RATE: 67 BPM | SYSTOLIC BLOOD PRESSURE: 118 MMHG | BODY MASS INDEX: 27.58 KG/M2 | WEIGHT: 182 LBS

## 2019-01-07 DIAGNOSIS — I25.119 CORONARY ARTERY DISEASE INVOLVING NATIVE HEART WITH ANGINA PECTORIS, UNSPECIFIED VESSEL OR LESION TYPE (HCC): Primary | ICD-10-CM

## 2019-01-07 PROCEDURE — 4004F PT TOBACCO SCREEN RCVD TLK: CPT | Performed by: INTERNAL MEDICINE

## 2019-01-07 PROCEDURE — 1123F ACP DISCUSS/DSCN MKR DOCD: CPT | Performed by: INTERNAL MEDICINE

## 2019-01-07 PROCEDURE — 4040F PNEUMOC VAC/ADMIN/RCVD: CPT | Performed by: INTERNAL MEDICINE

## 2019-01-07 PROCEDURE — 1101F PT FALLS ASSESS-DOCD LE1/YR: CPT | Performed by: INTERNAL MEDICINE

## 2019-01-07 PROCEDURE — 93000 ELECTROCARDIOGRAM COMPLETE: CPT | Performed by: INTERNAL MEDICINE

## 2019-01-07 PROCEDURE — G8599 NO ASA/ANTIPLAT THER USE RNG: HCPCS | Performed by: INTERNAL MEDICINE

## 2019-01-07 PROCEDURE — G8419 CALC BMI OUT NRM PARAM NOF/U: HCPCS | Performed by: INTERNAL MEDICINE

## 2019-01-07 PROCEDURE — 3017F COLORECTAL CA SCREEN DOC REV: CPT | Performed by: INTERNAL MEDICINE

## 2019-01-07 PROCEDURE — 99214 OFFICE O/P EST MOD 30 MIN: CPT | Performed by: INTERNAL MEDICINE

## 2019-01-07 PROCEDURE — G8484 FLU IMMUNIZE NO ADMIN: HCPCS | Performed by: INTERNAL MEDICINE

## 2019-01-07 PROCEDURE — G8427 DOCREV CUR MEDS BY ELIG CLIN: HCPCS | Performed by: INTERNAL MEDICINE

## 2019-01-07 RX ORDER — SPIRONOLACTONE 25 MG
12.5 TABLET ORAL DAILY
Qty: 45 TABLET | Refills: 3
Start: 2019-01-07 | End: 2019-01-08 | Stop reason: SDUPTHER

## 2019-01-08 RX ORDER — SPIRONOLACTONE 25 MG/1
12.5 TABLET ORAL DAILY
Qty: 45 TABLET | Refills: 3 | Status: SHIPPED | OUTPATIENT
Start: 2019-01-08 | End: 2019-04-29

## 2019-01-09 ENCOUNTER — HOSPITAL ENCOUNTER (OUTPATIENT)
Age: 74
Discharge: HOME | End: 2019-01-09
Payer: MEDICARE

## 2019-01-09 DIAGNOSIS — I25.119 CORONARY ARTERY DISEASE INVOLVING NATIVE HEART WITH ANGINA PECTORIS, UNSPECIFIED VESSEL OR LESION TYPE (HCC): ICD-10-CM

## 2019-01-09 LAB
ANION GAP SERPL CALCULATED.3IONS-SCNC: 14 MMOL/L (ref 7–16)
BUN BLDV-MCNC: 26 MG/DL (ref 8–23)
CALCIUM SERPL-MCNC: 9.6 MG/DL (ref 8.6–10.2)
CHLORIDE BLD-SCNC: 100 MMOL/L (ref 98–107)
CO2: 24 MMOL/L (ref 22–29)
CREAT SERPL-MCNC: 1 MG/DL (ref 0.7–1.2)
GFR AFRICAN AMERICAN: >60
GFR NON-AFRICAN AMERICAN: >60 ML/MIN/1.73
GLUCOSE BLD-MCNC: 93 MG/DL (ref 74–99)
POTASSIUM SERPL-SCNC: 4.4 MMOL/L (ref 3.5–5)
SODIUM BLD-SCNC: 138 MMOL/L (ref 132–146)

## 2019-01-09 PROCEDURE — 36415 COLL VENOUS BLD VENIPUNCTURE: CPT

## 2019-01-09 PROCEDURE — 80048 BASIC METABOLIC PNL TOTAL CA: CPT

## 2019-01-11 ENCOUNTER — HOSPITAL ENCOUNTER (OUTPATIENT)
Age: 74
Discharge: HOME | End: 2019-01-11
Payer: MEDICARE

## 2019-01-11 DIAGNOSIS — I25.119 CORONARY ARTERY DISEASE INVOLVING NATIVE HEART WITH ANGINA PECTORIS, UNSPECIFIED VESSEL OR LESION TYPE (HCC): ICD-10-CM

## 2019-01-11 LAB
ANION GAP SERPL CALCULATED.3IONS-SCNC: 13 MMOL/L (ref 7–16)
BUN BLDV-MCNC: 35 MG/DL (ref 8–23)
CALCIUM SERPL-MCNC: 9.7 MG/DL (ref 8.6–10.2)
CHLORIDE BLD-SCNC: 99 MMOL/L (ref 98–107)
CO2: 27 MMOL/L (ref 22–29)
CREAT SERPL-MCNC: 1.1 MG/DL (ref 0.7–1.2)
GFR AFRICAN AMERICAN: >60
GFR NON-AFRICAN AMERICAN: >60 ML/MIN/1.73
GLUCOSE BLD-MCNC: 95 MG/DL (ref 74–99)
POTASSIUM SERPL-SCNC: 4.4 MMOL/L (ref 3.5–5)
SODIUM BLD-SCNC: 139 MMOL/L (ref 132–146)

## 2019-01-11 PROCEDURE — 80048 BASIC METABOLIC PNL TOTAL CA: CPT

## 2019-01-11 PROCEDURE — 36415 COLL VENOUS BLD VENIPUNCTURE: CPT

## 2019-01-14 ENCOUNTER — HOSPITAL ENCOUNTER (OUTPATIENT)
Age: 74
Discharge: HOME | End: 2019-01-14
Payer: MEDICARE

## 2019-01-14 DIAGNOSIS — I25.119 CORONARY ARTERY DISEASE INVOLVING NATIVE HEART WITH ANGINA PECTORIS, UNSPECIFIED VESSEL OR LESION TYPE (HCC): ICD-10-CM

## 2019-01-14 LAB
ANION GAP SERPL CALCULATED.3IONS-SCNC: 14 MMOL/L (ref 7–16)
BUN BLDV-MCNC: 30 MG/DL (ref 8–23)
CALCIUM SERPL-MCNC: 9.5 MG/DL (ref 8.6–10.2)
CHLORIDE BLD-SCNC: 98 MMOL/L (ref 98–107)
CO2: 26 MMOL/L (ref 22–29)
CREAT SERPL-MCNC: 1.1 MG/DL (ref 0.7–1.2)
GFR AFRICAN AMERICAN: >60
GFR NON-AFRICAN AMERICAN: >60 ML/MIN/1.73
GLUCOSE BLD-MCNC: 109 MG/DL (ref 74–99)
POTASSIUM SERPL-SCNC: 4 MMOL/L (ref 3.5–5)
SODIUM BLD-SCNC: 138 MMOL/L (ref 132–146)

## 2019-01-14 PROCEDURE — 80048 BASIC METABOLIC PNL TOTAL CA: CPT

## 2019-01-14 PROCEDURE — 36415 COLL VENOUS BLD VENIPUNCTURE: CPT

## 2019-03-04 ENCOUNTER — TELEPHONE (OUTPATIENT)
Dept: CARDIOLOGY CLINIC | Age: 74
End: 2019-03-04

## 2019-04-10 RX ORDER — METOPROLOL SUCCINATE 25 MG/1
37.5 TABLET, EXTENDED RELEASE ORAL DAILY
Qty: 45 TABLET | Refills: 11 | Status: SHIPPED | OUTPATIENT
Start: 2019-04-10 | End: 2019-10-02

## 2019-04-29 ENCOUNTER — OFFICE VISIT (OUTPATIENT)
Dept: CARDIOLOGY CLINIC | Age: 74
End: 2019-04-29
Payer: MEDICARE

## 2019-04-29 VITALS
DIASTOLIC BLOOD PRESSURE: 82 MMHG | HEIGHT: 69 IN | RESPIRATION RATE: 16 BRPM | WEIGHT: 182.7 LBS | SYSTOLIC BLOOD PRESSURE: 118 MMHG | HEART RATE: 66 BPM | BODY MASS INDEX: 27.06 KG/M2

## 2019-04-29 DIAGNOSIS — I34.0 NON-RHEUMATIC MITRAL REGURGITATION: ICD-10-CM

## 2019-04-29 DIAGNOSIS — I42.8 NICM (NONISCHEMIC CARDIOMYOPATHY) (HCC): ICD-10-CM

## 2019-04-29 DIAGNOSIS — I10 ESSENTIAL HYPERTENSION: Primary | Chronic | ICD-10-CM

## 2019-04-29 PROBLEM — I50.23 ACUTE ON CHRONIC SYSTOLIC CONGESTIVE HEART FAILURE (HCC): Chronic | Status: RESOLVED | Noted: 2018-10-18 | Resolved: 2019-04-29

## 2019-04-29 PROCEDURE — G8419 CALC BMI OUT NRM PARAM NOF/U: HCPCS | Performed by: INTERNAL MEDICINE

## 2019-04-29 PROCEDURE — 1123F ACP DISCUSS/DSCN MKR DOCD: CPT | Performed by: INTERNAL MEDICINE

## 2019-04-29 PROCEDURE — 4040F PNEUMOC VAC/ADMIN/RCVD: CPT | Performed by: INTERNAL MEDICINE

## 2019-04-29 PROCEDURE — G8599 NO ASA/ANTIPLAT THER USE RNG: HCPCS | Performed by: INTERNAL MEDICINE

## 2019-04-29 PROCEDURE — 93000 ELECTROCARDIOGRAM COMPLETE: CPT | Performed by: INTERNAL MEDICINE

## 2019-04-29 PROCEDURE — G8427 DOCREV CUR MEDS BY ELIG CLIN: HCPCS | Performed by: INTERNAL MEDICINE

## 2019-04-29 PROCEDURE — 3017F COLORECTAL CA SCREEN DOC REV: CPT | Performed by: INTERNAL MEDICINE

## 2019-04-29 PROCEDURE — 4004F PT TOBACCO SCREEN RCVD TLK: CPT | Performed by: INTERNAL MEDICINE

## 2019-04-29 PROCEDURE — 99214 OFFICE O/P EST MOD 30 MIN: CPT | Performed by: INTERNAL MEDICINE

## 2019-04-29 RX ORDER — SPIRONOLACTONE 25 MG/1
25 TABLET ORAL DAILY
Qty: 90 TABLET | Refills: 3 | Status: SHIPPED
Start: 2019-04-29 | End: 2020-05-19 | Stop reason: SDUPTHER

## 2019-04-29 NOTE — PROGRESS NOTES
Gregorio Vila  1945  Date of Service: 4/29/2019    Patient Active Problem List    Diagnosis Date Noted    NICM (nonischemic cardiomyopathy) ([facility] [address] [address] 04/29/2019     Overview Note:     Echo 10-18  EF 25%    He refuses an ICD      Acute respiratory failure ([facility] [address] 10/18/2018    Hyperlipidemia LDL goal <100 10/18/2018    Essential hypertension 10/18/2018    Mitral regurgitation      Overview Note:     moderate      Aortic regurgitation      Overview Note:     trace      CAD (coronary artery disease) 12/12/2012     Overview Note:     12-13-12 anterior STEMI  POBO LAD  CABG [place]-LAD   Free EL-diag   SVG-PLB   SVG-OM         Social History     Socioeconomic History    Marital status: Single     Spouse name: None    Number of children: None    Years of education: None    Highest education level: None   Occupational History    None   Social Needs    Financial resource strain: None    Food insecurity:     Worry: None     Inability: None    Transportation needs:     Medical: None     Non-medical: None   Tobacco Use    Smoking status: Current Some Day Smoker     Types: Pipe    Smokeless tobacco: Never Used    Tobacco comment: quit cigarettes 35yrs ago   Substance and Sexual Activity    Alcohol use: No    Drug use: No    Sexual activity: None   Lifestyle    Physical activity:     Days per week: None     Minutes per session: None    Stress: None   Relationships    Social connections:     Talks on phone: None     Gets together: None     Attends [employer] service: None     Active member of club or organization: None     Attends meetings of clubs or organizations: None     Relationship status: None    Intimate partner violence:     Fear of current or ex partner: None     Emotionally abused: None     Physically abused: None     Forced sexual activity: None   Other Topics Concern    None   Social History Narrative    None       Current Outpatient Medications   Medication Sig Dispense Refill    spironolactone (ALDACTONE) 25 MG tablet Take 1 tablet by mouth daily 90 tablet 3    metoprolol succinate (TOPROL XL) 25 MG extended release tablet Take 1.5 tablets by mouth daily 45 tablet 11    losartan (COZAAR) 25 MG tablet take 1 tablet by mouth once daily 90 tablet 3    Saw Palmetto 450 MG CAPS Take by mouth      Flaxseed, Linseed, (FLAX SEED OIL) 1000 MG CAPS Take 4,800 mg by mouth 2 times daily       Multiple Vitamins-Minerals (CENTRUM SILVER ULTRA MENS PO) Take 1 tablet by mouth daily.  aspirin 81 MG chewable tablet Take 1 tablet by mouth daily. 30 tablet 0    furosemide (LASIX) 40 MG tablet Take 1 tablet by mouth daily 30 tablet 5    terbinafine (LAMISIL) 250 MG tablet Take 250 mg by mouth daily       No current facility-administered medications for this visit. No Known Allergies    Chief Complaint:  Cammie Rosas is here today for follow up and management/recomendations for CAD, ICM, HTN, AR, MR. History of Present Illness: Cammie Rosas states that He does house work, goes up the stairs & goes shopping. He denies any chest discomfort, dyspnea on exertion, orthopnea/PND, or lower extremity edema. He denies any palpitations or presyncopal symptoms. He denies orthopnea/PND. He states that he stopped his Lasix on his own because it was \"ruling his life\". He states that he has not had any recurring lower extremity edema since then. REVIEW OF SYSTEMS:  As above. Patient does not complain of any fever, chills, nausea, vomiting or diarrhea. No focal, motor or neurological deficits. No changes in hearing, bowel or bladder habits. He is not known to have a history of thyroid problems. No recent nose bleeds. PHYSICAL EXAM:  Vitals:    04/29/19 1111   BP: 118/82   Pulse: 66   Resp: 16   Weight: 182 lb 11.2 oz (82.9 kg)   Height: 5' 9\" (1.753 m)       GENERAL:  He is alert and oriented x 3, communicates well, in no distress. NECK:  No masses, trachea is mid position.   Supple, full ROM, no JVD or bruits. No palpable thyromegaly or lymphadenopathy. HEART:  Regular rate and rhythm. Normal S1 and S2. There is an S4 gallop and a I/VI systolic murmur. LUNGS:  Clear to auscultation bilaterally. No use of accessory muscles. symmetrical excursion. ABDOMEN:  Soft, non-tender. Normal bowel sounds. EXTREMITIES:  Full ROM x 4. Trace bilateral lower extremity edema. Good distal pulses. EYES:  Extraocular muscles intact. PERRL. Normal lids & conjunctiva. ENT:  Nares are clear & not bleeding. Moist mucosa. Normal lips formation. No external masses   NEURO: no tremors, full ROM x 4, EOMI. SKIN:  Warm, dry and intact. Normal turgor. EKG: Sinus rhythm, 66 bpm, LAFB, nonspecific ST - T wave changes. Poor r-wave progression. Incomplete right bundle-branch block. Assessment:   1. Coronary artery disease as outlined above. Clinically symptoms of recurring ischemia at this time. 2. Cardiomyopathy as outlined above. Well compensated today. 3. Trace AR  4. Mild to moderate MR.  moderate on echo 10-18. 5. Hypertension. 6. Hypercholesterolemia        Recommendations:  1. He is following the cholesterol with Dr. Ayaka Kat. He still refuses a statin. 2. He continues to defer an ICD  3. Increase the Aldactone to 25 mg daily. 4. BMPs QOD for 2 draws. Thank you for allowing me to participate in your patient's care.       [address] [facility]  Interventional Cardiology

## 2019-05-01 ENCOUNTER — HOSPITAL ENCOUNTER (OUTPATIENT)
Age: 74
Discharge: HOME | End: 2019-05-01
Payer: MEDICARE

## 2019-05-01 DIAGNOSIS — I10 ESSENTIAL HYPERTENSION: Chronic | ICD-10-CM

## 2019-05-01 LAB
ANION GAP SERPL CALCULATED.3IONS-SCNC: 14 MMOL/L (ref 7–16)
BUN BLDV-MCNC: 19 MG/DL (ref 8–23)
CALCIUM SERPL-MCNC: 9.3 MG/DL (ref 8.6–10.2)
CHLORIDE BLD-SCNC: 103 MMOL/L (ref 98–107)
CO2: 23 MMOL/L (ref 22–29)
CREAT SERPL-MCNC: 1 MG/DL (ref 0.7–1.2)
GFR AFRICAN AMERICAN: >60
GFR NON-AFRICAN AMERICAN: >60 ML/MIN/1.73
GLUCOSE BLD-MCNC: 96 MG/DL (ref 74–99)
POTASSIUM SERPL-SCNC: 4.3 MMOL/L (ref 3.5–5)
SODIUM BLD-SCNC: 140 MMOL/L (ref 132–146)

## 2019-05-01 PROCEDURE — 80048 BASIC METABOLIC PNL TOTAL CA: CPT

## 2019-05-01 PROCEDURE — 36415 COLL VENOUS BLD VENIPUNCTURE: CPT

## 2019-05-03 ENCOUNTER — HOSPITAL ENCOUNTER (OUTPATIENT)
Age: 74
Discharge: HOME | End: 2019-05-03
Payer: MEDICARE

## 2019-05-03 DIAGNOSIS — I10 ESSENTIAL HYPERTENSION: Chronic | ICD-10-CM

## 2019-05-03 LAB
ANION GAP SERPL CALCULATED.3IONS-SCNC: 13 MMOL/L (ref 7–16)
BUN BLDV-MCNC: 20 MG/DL (ref 8–23)
CALCIUM SERPL-MCNC: 9.2 MG/DL (ref 8.6–10.2)
CHLORIDE BLD-SCNC: 103 MMOL/L (ref 98–107)
CO2: 23 MMOL/L (ref 22–29)
CREAT SERPL-MCNC: 1 MG/DL (ref 0.7–1.2)
GFR AFRICAN AMERICAN: >60
GFR NON-AFRICAN AMERICAN: >60 ML/MIN/1.73
GLUCOSE BLD-MCNC: 103 MG/DL (ref 74–99)
POTASSIUM SERPL-SCNC: 4.3 MMOL/L (ref 3.5–5)
SODIUM BLD-SCNC: 139 MMOL/L (ref 132–146)

## 2019-05-03 PROCEDURE — 36415 COLL VENOUS BLD VENIPUNCTURE: CPT

## 2019-05-03 PROCEDURE — 80048 BASIC METABOLIC PNL TOTAL CA: CPT

## 2019-10-02 ENCOUNTER — TELEPHONE (OUTPATIENT)
Dept: CARDIOLOGY CLINIC | Age: 74
End: 2019-10-02

## 2019-10-02 ENCOUNTER — OFFICE VISIT (OUTPATIENT)
Dept: CARDIOLOGY CLINIC | Age: 74
End: 2019-10-02
Payer: MEDICARE

## 2019-10-02 ENCOUNTER — HOSPITAL ENCOUNTER (OUTPATIENT)
Age: 74
Discharge: HOME | End: 2019-10-02
Payer: MEDICARE

## 2019-10-02 VITALS
WEIGHT: 179.8 LBS | BODY MASS INDEX: 26.63 KG/M2 | SYSTOLIC BLOOD PRESSURE: 122 MMHG | HEIGHT: 69 IN | RESPIRATION RATE: 18 BRPM | HEART RATE: 63 BPM | DIASTOLIC BLOOD PRESSURE: 72 MMHG

## 2019-10-02 DIAGNOSIS — I10 ESSENTIAL HYPERTENSION: Chronic | ICD-10-CM

## 2019-10-02 DIAGNOSIS — I25.119 CORONARY ARTERY DISEASE INVOLVING NATIVE HEART WITH ANGINA PECTORIS, UNSPECIFIED VESSEL OR LESION TYPE (HCC): Primary | ICD-10-CM

## 2019-10-02 LAB
ANION GAP SERPL CALCULATED.3IONS-SCNC: 13 MMOL/L (ref 7–16)
BUN BLDV-MCNC: 25 MG/DL (ref 8–23)
CALCIUM SERPL-MCNC: 9.8 MG/DL (ref 8.6–10.2)
CHLORIDE BLD-SCNC: 101 MMOL/L (ref 98–107)
CO2: 24 MMOL/L (ref 22–29)
CREAT SERPL-MCNC: 1.1 MG/DL (ref 0.7–1.2)
GFR AFRICAN AMERICAN: >60
GFR NON-AFRICAN AMERICAN: >60 ML/MIN/1.73
GLUCOSE BLD-MCNC: 98 MG/DL (ref 74–99)
POTASSIUM SERPL-SCNC: 4.4 MMOL/L (ref 3.5–5)
SODIUM BLD-SCNC: 138 MMOL/L (ref 132–146)

## 2019-10-02 PROCEDURE — G8427 DOCREV CUR MEDS BY ELIG CLIN: HCPCS | Performed by: INTERNAL MEDICINE

## 2019-10-02 PROCEDURE — 3017F COLORECTAL CA SCREEN DOC REV: CPT | Performed by: INTERNAL MEDICINE

## 2019-10-02 PROCEDURE — 4004F PT TOBACCO SCREEN RCVD TLK: CPT | Performed by: INTERNAL MEDICINE

## 2019-10-02 PROCEDURE — 99214 OFFICE O/P EST MOD 30 MIN: CPT | Performed by: INTERNAL MEDICINE

## 2019-10-02 PROCEDURE — 4040F PNEUMOC VAC/ADMIN/RCVD: CPT | Performed by: INTERNAL MEDICINE

## 2019-10-02 PROCEDURE — 36415 COLL VENOUS BLD VENIPUNCTURE: CPT

## 2019-10-02 PROCEDURE — G8419 CALC BMI OUT NRM PARAM NOF/U: HCPCS | Performed by: INTERNAL MEDICINE

## 2019-10-02 PROCEDURE — 80048 BASIC METABOLIC PNL TOTAL CA: CPT

## 2019-10-02 PROCEDURE — 93000 ELECTROCARDIOGRAM COMPLETE: CPT | Performed by: INTERNAL MEDICINE

## 2019-10-02 PROCEDURE — G8484 FLU IMMUNIZE NO ADMIN: HCPCS | Performed by: INTERNAL MEDICINE

## 2019-10-02 PROCEDURE — G8599 NO ASA/ANTIPLAT THER USE RNG: HCPCS | Performed by: INTERNAL MEDICINE

## 2019-10-02 PROCEDURE — 1123F ACP DISCUSS/DSCN MKR DOCD: CPT | Performed by: INTERNAL MEDICINE

## 2019-10-02 RX ORDER — METOPROLOL SUCCINATE 50 MG/1
50 TABLET, EXTENDED RELEASE ORAL DAILY
Qty: 90 TABLET | Refills: 3 | Status: SHIPPED
Start: 2019-10-02 | End: 2020-11-12

## 2019-10-02 RX ORDER — LOSARTAN POTASSIUM 25 MG/1
25 TABLET ORAL DAILY
Qty: 90 TABLET | Refills: 3 | Status: SHIPPED
Start: 2019-10-02 | End: 2020-11-05 | Stop reason: DRUGHIGH

## 2019-12-26 ENCOUNTER — HOSPITAL ENCOUNTER (OUTPATIENT)
Age: 74
Discharge: HOME | End: 2019-12-28
Payer: MEDICARE

## 2019-12-26 ENCOUNTER — HOSPITAL ENCOUNTER (OUTPATIENT)
Dept: GENERAL RADIOLOGY | Age: 74
Discharge: HOME | End: 2019-12-28
Payer: MEDICARE

## 2019-12-26 DIAGNOSIS — R05.9 COUGH: ICD-10-CM

## 2019-12-26 PROCEDURE — 71046 X-RAY EXAM CHEST 2 VIEWS: CPT

## 2020-03-04 ENCOUNTER — HOSPITAL ENCOUNTER (OUTPATIENT)
Dept: GENERAL RADIOLOGY | Age: 75
Discharge: HOME | End: 2020-03-06
Payer: MEDICARE

## 2020-03-04 ENCOUNTER — HOSPITAL ENCOUNTER (OUTPATIENT)
Age: 75
Discharge: HOME | End: 2020-03-06
Payer: MEDICARE

## 2020-03-04 PROCEDURE — 71046 X-RAY EXAM CHEST 2 VIEWS: CPT

## 2020-05-19 RX ORDER — SPIRONOLACTONE 25 MG/1
25 TABLET ORAL DAILY
Qty: 90 TABLET | Refills: 3 | Status: SHIPPED
Start: 2020-05-19 | End: 2021-08-10 | Stop reason: SDUPTHER

## 2020-07-01 ENCOUNTER — APPOINTMENT (OUTPATIENT)
Dept: CT IMAGING | Age: 75
End: 2020-07-01
Payer: MEDICARE

## 2020-07-01 ENCOUNTER — APPOINTMENT (OUTPATIENT)
Dept: GENERAL RADIOLOGY | Age: 75
End: 2020-07-01
Payer: MEDICARE

## 2020-07-01 ENCOUNTER — HOSPITAL ENCOUNTER (INPATIENT)
Age: 75
LOS: 2 days | Discharge: HOME | DRG: 062 | End: 2020-07-03
Attending: INTERNAL MEDICINE | Admitting: INTERNAL MEDICINE
Payer: MEDICARE

## 2020-07-01 ENCOUNTER — HOSPITAL ENCOUNTER (EMERGENCY)
Age: 75
Discharge: ANOTHER HOSPITAL | End: 2020-07-01
Attending: EMERGENCY MEDICINE
Payer: MEDICARE

## 2020-07-01 VITALS
WEIGHT: 168 LBS | HEART RATE: 54 BPM | TEMPERATURE: 97.9 F | RESPIRATION RATE: 16 BRPM | SYSTOLIC BLOOD PRESSURE: 109 MMHG | HEIGHT: 69 IN | BODY MASS INDEX: 24.88 KG/M2 | DIASTOLIC BLOOD PRESSURE: 59 MMHG | OXYGEN SATURATION: 94 %

## 2020-07-01 PROBLEM — I63.9 ACUTE CVA (CEREBROVASCULAR ACCIDENT) (HCC): Status: ACTIVE | Noted: 2020-07-01

## 2020-07-01 LAB
ALBUMIN SERPL-MCNC: 4.6 G/DL (ref 3.5–5.2)
ALP BLD-CCNC: 56 U/L (ref 40–129)
ALT SERPL-CCNC: 15 U/L (ref 0–40)
ANION GAP SERPL CALCULATED.3IONS-SCNC: 11 MMOL/L (ref 7–16)
APTT: 28.4 SEC (ref 24.5–35.1)
AST SERPL-CCNC: 22 U/L (ref 0–39)
BACTERIA: ABNORMAL /HPF
BASOPHILS ABSOLUTE: 0.05 E9/L (ref 0–0.2)
BASOPHILS RELATIVE PERCENT: 0.7 % (ref 0–2)
BILIRUB SERPL-MCNC: 0.4 MG/DL (ref 0–1.2)
BILIRUBIN URINE: NEGATIVE
BLOOD, URINE: ABNORMAL
BUN BLDV-MCNC: 24 MG/DL (ref 8–23)
CALCIUM SERPL-MCNC: 9.3 MG/DL (ref 8.6–10.2)
CHLORIDE BLD-SCNC: 100 MMOL/L (ref 98–107)
CHP ED QC CHECK: NORMAL
CLARITY: CLEAR
CO2: 23 MMOL/L (ref 22–29)
COLOR: YELLOW
CREAT SERPL-MCNC: 1.1 MG/DL (ref 0.7–1.2)
EKG ATRIAL RATE: 53 BPM
EKG P AXIS: 52 DEGREES
EKG P-R INTERVAL: 222 MS
EKG Q-T INTERVAL: 476 MS
EKG QRS DURATION: 128 MS
EKG QTC CALCULATION (BAZETT): 446 MS
EKG R AXIS: -67 DEGREES
EKG T AXIS: 137 DEGREES
EKG VENTRICULAR RATE: 53 BPM
EOSINOPHILS ABSOLUTE: 0.51 E9/L (ref 0.05–0.5)
EOSINOPHILS RELATIVE PERCENT: 7.4 % (ref 0–6)
GFR AFRICAN AMERICAN: >60
GFR NON-AFRICAN AMERICAN: >60 ML/MIN/1.73
GLUCOSE BLD-MCNC: 116 MG/DL
GLUCOSE BLD-MCNC: 119 MG/DL (ref 74–99)
GLUCOSE URINE: NEGATIVE MG/DL
HCT VFR BLD CALC: 40.4 % (ref 37–54)
HEMOGLOBIN: 14.2 G/DL (ref 12.5–16.5)
IMMATURE GRANULOCYTES #: 0.02 E9/L
IMMATURE GRANULOCYTES %: 0.3 % (ref 0–5)
INR BLD: 1
KETONES, URINE: NEGATIVE MG/DL
LEUKOCYTE ESTERASE, URINE: NEGATIVE
LYMPHOCYTES ABSOLUTE: 2.06 E9/L (ref 1.5–4)
LYMPHOCYTES RELATIVE PERCENT: 29.9 % (ref 20–42)
MCH RBC QN AUTO: 35.8 PG (ref 26–35)
MCHC RBC AUTO-ENTMCNC: 35.1 % (ref 32–34.5)
MCV RBC AUTO: 101.8 FL (ref 80–99.9)
METER GLUCOSE: 116 MG/DL (ref 74–99)
MONOCYTES ABSOLUTE: 0.7 E9/L (ref 0.1–0.95)
MONOCYTES RELATIVE PERCENT: 10.2 % (ref 2–12)
NEUTROPHILS ABSOLUTE: 3.55 E9/L (ref 1.8–7.3)
NEUTROPHILS RELATIVE PERCENT: 51.5 % (ref 43–80)
NITRITE, URINE: NEGATIVE
PDW BLD-RTO: 12.4 FL (ref 11.5–15)
PH UA: 8 (ref 5–9)
PLATELET # BLD: 239 E9/L (ref 130–450)
PMV BLD AUTO: 10.6 FL (ref 7–12)
POTASSIUM SERPL-SCNC: 4.1 MMOL/L (ref 3.5–5)
PROTEIN UA: ABNORMAL MG/DL
PROTHROMBIN TIME: 11 SEC (ref 9.3–12.4)
RBC # BLD: 3.97 E12/L (ref 3.8–5.8)
RBC UA: ABNORMAL /HPF (ref 0–2)
SODIUM BLD-SCNC: 134 MMOL/L (ref 132–146)
SPECIFIC GRAVITY UA: 1.01 (ref 1–1.03)
TOTAL PROTEIN: 8 G/DL (ref 6.4–8.3)
TROPONIN: <0.01 NG/ML (ref 0–0.03)
UROBILINOGEN, URINE: 0.2 E.U./DL
WBC # BLD: 6.9 E9/L (ref 4.5–11.5)
WBC UA: ABNORMAL /HPF (ref 0–5)

## 2020-07-01 PROCEDURE — 6370000000 HC RX 637 (ALT 250 FOR IP): Performed by: PHYSICIAN ASSISTANT

## 2020-07-01 PROCEDURE — 85610 PROTHROMBIN TIME: CPT

## 2020-07-01 PROCEDURE — 70450 CT HEAD/BRAIN W/O DYE: CPT

## 2020-07-01 PROCEDURE — 93005 ELECTROCARDIOGRAM TRACING: CPT | Performed by: STUDENT IN AN ORGANIZED HEALTH CARE EDUCATION/TRAINING PROGRAM

## 2020-07-01 PROCEDURE — 71045 X-RAY EXAM CHEST 1 VIEW: CPT

## 2020-07-01 PROCEDURE — 70498 CT ANGIOGRAPHY NECK: CPT

## 2020-07-01 PROCEDURE — 70496 CT ANGIOGRAPHY HEAD: CPT

## 2020-07-01 PROCEDURE — 99291 CRITICAL CARE FIRST HOUR: CPT | Performed by: PSYCHIATRY & NEUROLOGY

## 2020-07-01 PROCEDURE — 84484 ASSAY OF TROPONIN QUANT: CPT

## 2020-07-01 PROCEDURE — 81001 URINALYSIS AUTO W/SCOPE: CPT

## 2020-07-01 PROCEDURE — 99291 CRITICAL CARE FIRST HOUR: CPT

## 2020-07-01 PROCEDURE — 2580000003 HC RX 258: Performed by: NURSE PRACTITIONER

## 2020-07-01 PROCEDURE — 0042T CT BRAIN PERFUSION: CPT

## 2020-07-01 PROCEDURE — 6360000004 HC RX CONTRAST MEDICATION: Performed by: RADIOLOGY

## 2020-07-01 PROCEDURE — 37195 THROMBOLYTIC THERAPY STROKE: CPT

## 2020-07-01 PROCEDURE — 2000000000 HC ICU R&B

## 2020-07-01 PROCEDURE — 80053 COMPREHEN METABOLIC PANEL: CPT

## 2020-07-01 PROCEDURE — 85730 THROMBOPLASTIN TIME PARTIAL: CPT

## 2020-07-01 PROCEDURE — 6360000002 HC RX W HCPCS: Performed by: STUDENT IN AN ORGANIZED HEALTH CARE EDUCATION/TRAINING PROGRAM

## 2020-07-01 PROCEDURE — 93010 ELECTROCARDIOGRAM REPORT: CPT | Performed by: INTERNAL MEDICINE

## 2020-07-01 PROCEDURE — 6370000000 HC RX 637 (ALT 250 FOR IP): Performed by: NURSE PRACTITIONER

## 2020-07-01 PROCEDURE — 82962 GLUCOSE BLOOD TEST: CPT

## 2020-07-01 PROCEDURE — 3E03317 INTRODUCTION OF OTHER THROMBOLYTIC INTO PERIPHERAL VEIN, PERCUTANEOUS APPROACH: ICD-10-PCS | Performed by: INTERNAL MEDICINE

## 2020-07-01 PROCEDURE — 85025 COMPLETE CBC W/AUTO DIFF WBC: CPT

## 2020-07-01 PROCEDURE — 96365 THER/PROPH/DIAG IV INF INIT: CPT

## 2020-07-01 PROCEDURE — 2580000003 HC RX 258: Performed by: STUDENT IN AN ORGANIZED HEALTH CARE EDUCATION/TRAINING PROGRAM

## 2020-07-01 RX ORDER — PROMETHAZINE HYDROCHLORIDE 25 MG/1
12.5 TABLET ORAL EVERY 6 HOURS PRN
Status: CANCELLED | OUTPATIENT
Start: 2020-07-01

## 2020-07-01 RX ORDER — ACETAMINOPHEN 325 MG/1
650 TABLET ORAL EVERY 6 HOURS PRN
Status: CANCELLED | OUTPATIENT
Start: 2020-07-01

## 2020-07-01 RX ORDER — ACETAMINOPHEN 650 MG/1
650 SUPPOSITORY RECTAL EVERY 6 HOURS PRN
Status: DISCONTINUED | OUTPATIENT
Start: 2020-07-01 | End: 2020-07-03 | Stop reason: HOSPADM

## 2020-07-01 RX ORDER — SODIUM CHLORIDE 0.9 % (FLUSH) 0.9 %
10 SYRINGE (ML) INJECTION EVERY 12 HOURS SCHEDULED
Status: DISCONTINUED | OUTPATIENT
Start: 2020-07-01 | End: 2020-07-03 | Stop reason: HOSPADM

## 2020-07-01 RX ORDER — SODIUM CHLORIDE 0.9 % (FLUSH) 0.9 %
10 SYRINGE (ML) INJECTION PRN
Status: DISCONTINUED | OUTPATIENT
Start: 2020-07-01 | End: 2020-07-01 | Stop reason: HOSPADM

## 2020-07-01 RX ORDER — SPIRONOLACTONE 25 MG/1
25 TABLET ORAL DAILY
Status: DISCONTINUED | OUTPATIENT
Start: 2020-07-01 | End: 2020-07-03 | Stop reason: HOSPADM

## 2020-07-01 RX ORDER — PROMETHAZINE HYDROCHLORIDE 25 MG/1
12.5 TABLET ORAL EVERY 6 HOURS PRN
Status: DISCONTINUED | OUTPATIENT
Start: 2020-07-01 | End: 2020-07-01

## 2020-07-01 RX ORDER — DEXTROSE MONOHYDRATE 25 G/50ML
12.5 INJECTION, SOLUTION INTRAVENOUS
Status: ACTIVE | OUTPATIENT
Start: 2020-07-01 | End: 2020-07-01

## 2020-07-01 RX ORDER — POTASSIUM CHLORIDE 7.45 MG/ML
10 INJECTION INTRAVENOUS PRN
Status: DISCONTINUED | OUTPATIENT
Start: 2020-07-01 | End: 2020-07-03 | Stop reason: HOSPADM

## 2020-07-01 RX ORDER — POLYETHYLENE GLYCOL 3350 17 G/17G
17 POWDER, FOR SOLUTION ORAL DAILY PRN
Status: DISCONTINUED | OUTPATIENT
Start: 2020-07-01 | End: 2020-07-03 | Stop reason: HOSPADM

## 2020-07-01 RX ORDER — POTASSIUM CHLORIDE 7.45 MG/ML
10 INJECTION INTRAVENOUS PRN
Status: CANCELLED | OUTPATIENT
Start: 2020-07-01

## 2020-07-01 RX ORDER — M-VIT,TX,IRON,MINS/CALC/FOLIC 27MG-0.4MG
1 TABLET ORAL DAILY
Status: DISCONTINUED | OUTPATIENT
Start: 2020-07-01 | End: 2020-07-03 | Stop reason: HOSPADM

## 2020-07-01 RX ORDER — DEXTROSE MONOHYDRATE 25 G/50ML
12.5 INJECTION, SOLUTION INTRAVENOUS
Status: DISCONTINUED | OUTPATIENT
Start: 2020-07-01 | End: 2020-07-01 | Stop reason: HOSPADM

## 2020-07-01 RX ORDER — ATORVASTATIN CALCIUM 40 MG/1
40 TABLET, FILM COATED ORAL NIGHTLY
Status: DISCONTINUED | OUTPATIENT
Start: 2020-07-01 | End: 2020-07-03 | Stop reason: HOSPADM

## 2020-07-01 RX ORDER — ASPIRIN 81 MG/1
81 TABLET, CHEWABLE ORAL DAILY
Status: CANCELLED | OUTPATIENT
Start: 2020-07-02

## 2020-07-01 RX ORDER — LABETALOL HYDROCHLORIDE 5 MG/ML
10 INJECTION, SOLUTION INTRAVENOUS EVERY 10 MIN PRN
Status: DISCONTINUED | OUTPATIENT
Start: 2020-07-01 | End: 2020-07-02

## 2020-07-01 RX ORDER — ONDANSETRON 2 MG/ML
4 INJECTION INTRAMUSCULAR; INTRAVENOUS EVERY 6 HOURS PRN
Status: CANCELLED | OUTPATIENT
Start: 2020-07-01

## 2020-07-01 RX ORDER — SODIUM CHLORIDE 0.9 % (FLUSH) 0.9 %
10 SYRINGE (ML) INJECTION EVERY 12 HOURS SCHEDULED
Status: DISCONTINUED | OUTPATIENT
Start: 2020-07-01 | End: 2020-07-01 | Stop reason: HOSPADM

## 2020-07-01 RX ORDER — SODIUM CHLORIDE 0.9 % (FLUSH) 0.9 %
10 SYRINGE (ML) INJECTION PRN
Status: DISCONTINUED | OUTPATIENT
Start: 2020-07-01 | End: 2020-07-03 | Stop reason: HOSPADM

## 2020-07-01 RX ORDER — LOSARTAN POTASSIUM 25 MG/1
25 TABLET ORAL DAILY
Status: CANCELLED | OUTPATIENT
Start: 2020-07-01

## 2020-07-01 RX ORDER — POTASSIUM CHLORIDE 20 MEQ/1
40 TABLET, EXTENDED RELEASE ORAL PRN
Status: CANCELLED | OUTPATIENT
Start: 2020-07-01

## 2020-07-01 RX ORDER — LOSARTAN POTASSIUM 25 MG/1
25 TABLET ORAL DAILY
Status: DISCONTINUED | OUTPATIENT
Start: 2020-07-01 | End: 2020-07-03 | Stop reason: HOSPADM

## 2020-07-01 RX ORDER — METOPROLOL SUCCINATE 50 MG/1
50 TABLET, EXTENDED RELEASE ORAL DAILY
Status: DISCONTINUED | OUTPATIENT
Start: 2020-07-01 | End: 2020-07-03 | Stop reason: HOSPADM

## 2020-07-01 RX ORDER — SODIUM CHLORIDE 0.9 % (FLUSH) 0.9 %
10 SYRINGE (ML) INJECTION EVERY 12 HOURS SCHEDULED
Status: CANCELLED | OUTPATIENT
Start: 2020-07-01

## 2020-07-01 RX ORDER — SODIUM CHLORIDE 0.9 % (FLUSH) 0.9 %
10 SYRINGE (ML) INJECTION PRN
Status: CANCELLED | OUTPATIENT
Start: 2020-07-01

## 2020-07-01 RX ORDER — DEXTROSE MONOHYDRATE 25 G/50ML
12.5 INJECTION, SOLUTION INTRAVENOUS
Status: CANCELLED | OUTPATIENT
Start: 2020-07-01 | End: 2020-07-01

## 2020-07-01 RX ORDER — SPIRONOLACTONE 25 MG/1
25 TABLET ORAL DAILY
Status: CANCELLED | OUTPATIENT
Start: 2020-07-01

## 2020-07-01 RX ORDER — SODIUM CHLORIDE 0.9 % (FLUSH) 0.9 %
10 SYRINGE (ML) INJECTION ONCE
Status: DISCONTINUED | OUTPATIENT
Start: 2020-07-01 | End: 2020-07-01 | Stop reason: HOSPADM

## 2020-07-01 RX ORDER — HYDRALAZINE HYDROCHLORIDE 20 MG/ML
10 INJECTION INTRAMUSCULAR; INTRAVENOUS EVERY 10 MIN PRN
Status: DISCONTINUED | OUTPATIENT
Start: 2020-07-01 | End: 2020-07-02

## 2020-07-01 RX ORDER — ONDANSETRON 2 MG/ML
4 INJECTION INTRAMUSCULAR; INTRAVENOUS EVERY 6 HOURS PRN
Status: DISCONTINUED | OUTPATIENT
Start: 2020-07-01 | End: 2020-07-03 | Stop reason: HOSPADM

## 2020-07-01 RX ORDER — ACETAMINOPHEN 650 MG/1
650 SUPPOSITORY RECTAL EVERY 6 HOURS PRN
Status: CANCELLED | OUTPATIENT
Start: 2020-07-01

## 2020-07-01 RX ORDER — 0.9 % SODIUM CHLORIDE 0.9 %
50 INTRAVENOUS SOLUTION INTRAVENOUS ONCE
Status: COMPLETED | OUTPATIENT
Start: 2020-07-01 | End: 2020-07-01

## 2020-07-01 RX ORDER — ASPIRIN 81 MG/1
81 TABLET, CHEWABLE ORAL DAILY
Status: DISCONTINUED | OUTPATIENT
Start: 2020-07-02 | End: 2020-07-03 | Stop reason: HOSPADM

## 2020-07-01 RX ORDER — METOPROLOL SUCCINATE 25 MG/1
50 TABLET, EXTENDED RELEASE ORAL DAILY
Status: CANCELLED | OUTPATIENT
Start: 2020-07-01

## 2020-07-01 RX ORDER — POTASSIUM CHLORIDE 20 MEQ/1
40 TABLET, EXTENDED RELEASE ORAL PRN
Status: DISCONTINUED | OUTPATIENT
Start: 2020-07-01 | End: 2020-07-03 | Stop reason: HOSPADM

## 2020-07-01 RX ORDER — ACETAMINOPHEN 325 MG/1
650 TABLET ORAL EVERY 6 HOURS PRN
Status: DISCONTINUED | OUTPATIENT
Start: 2020-07-01 | End: 2020-07-03 | Stop reason: HOSPADM

## 2020-07-01 RX ORDER — ATORVASTATIN CALCIUM 40 MG/1
40 TABLET, FILM COATED ORAL NIGHTLY
Status: CANCELLED | OUTPATIENT
Start: 2020-07-01

## 2020-07-01 RX ADMIN — IOPAMIDOL 100 ML: 755 INJECTION, SOLUTION INTRAVENOUS at 07:20

## 2020-07-01 RX ADMIN — ALTEPLASE 61.7 MG: KIT at 07:44

## 2020-07-01 RX ADMIN — SODIUM CHLORIDE, PRESERVATIVE FREE 10 ML: 5 INJECTION INTRAVENOUS at 20:57

## 2020-07-01 RX ADMIN — ATORVASTATIN CALCIUM 40 MG: 40 TABLET, FILM COATED ORAL at 20:56

## 2020-07-01 RX ADMIN — SODIUM CHLORIDE 50 ML: 9 INJECTION, SOLUTION INTRAVENOUS at 08:45

## 2020-07-01 RX ADMIN — MULTIPLE VITAMINS W/ MINERALS TAB 1 TABLET: TAB at 18:05

## 2020-07-01 RX ADMIN — ALTEPLASE 6.9 MG: KIT at 07:45

## 2020-07-01 ASSESSMENT — PAIN SCALES - GENERAL
PAINLEVEL_OUTOF10: 0

## 2020-07-01 NOTE — PROGRESS NOTES
[facility]  Critical Care Consult 7/1/2020      Date of Admission: 7/1    CC: RLE weakness s/p tPA    HPI: 77 yea old male with a PMH of MI, HTN, CHF, CAd presented to [facility] ED with RLE weakness and slurred speech. Workup did not demonstrate hemorrhage and patient was administered tPA, he was transferred to NSICU for further care. Problem List:   Patient Active Problem List   Diagnosis    CAD (coronary artery disease)    Aortic regurgitation    Mitral regurgitation    Acute respiratory failure (HCC)    Hyperlipidemia LDL goal <100    Essential hypertension    [facility]nonischemic [facility]) ([facility]    Acute CVA (cerebrovascular accident) [facility])       Surgical/Interventional Procedures:   7/1: tPA. 0700    PMH:    has a past medical history of Acute on chronic systolic congestive heart failure ([facility] Aortic regurgitation, Arthritis, CAD (coronary artery disease), Hypercholesteremia, Hypertension, Ischemic cardiomyopathy, Mitral regurgitation, and STEMI (ST elevation myocardial infarction) ([facility] PSH:    has a past surgical history that includes Tonsillectomy; Coronary angioplasty (12/12/12); ECHO Compl W Dop Color Flow (12/12/2012); Diagnostic Cardiac Cath Lab Procedure (12/12/12); and Coronary artery bypass graft (12/13/12). Home Medications:   Prior to Admission medications    Medication Sig Start Date End Date Taking?  Authorizing Provider   Cyanocobalamin (B-12 PO) Take by mouth    Historical Provider, MD   FOLIC ACID PO Take by mouth    Historical Provider, MD   spironolactone (ALDACTONE) 25 MG tablet Take 1 tablet by mouth daily 5/19/20   Roma Second, DO   metoprolol succinate (TOPROL XL) 50 MG extended release tablet Take 1 tablet by mouth daily 10/2/19   Roma Second, DO   losartan (COZAAR) 25 MG tablet Take 1 tablet by mouth daily 10/2/19   Roma Second, DO   Saw Palmetto 450 MG CAPS Take by mouth    Historical Provider, MD   Flaxseed Linseed, (FLAX SEED OIL) 1000 MG CAPS Take 4,800 mg by mouth 2 times daily     Historical Provider, MD   Multiple Vitamins-Minerals (CENTRUM SILVER ULTRA MENS PO) Take 1 tablet by mouth daily. Historical Provider, MD   aspirin 81 MG chewable tablet Take 1 tablet by mouth daily. 12/17/12   Amish Oviedo DO       Allergies:   No Known Allergies    Social History:  Social History     Socioeconomic History    Marital status: Single     Spouse name: Not on file    Number of children: Not on file    Years of education: Not on file    Highest education level: Not on file   Occupational History    Not on file   Social Needs    Financial resource strain: Not on file    Food insecurity     Worry: Not on file     Inability: Not on file    Transportation needs     Medical: Not on file     Non-medical: Not on file   Tobacco Use    Smoking status: Current Some Day Smoker     Types: Pipe    Smokeless tobacco: Never Used    Tobacco comment: quit cigarettes 35yrs ago   Substance and Sexual Activity    Alcohol use: No    Drug use: No    Sexual activity: Not on file   Lifestyle    Physical activity     Days per week: Not on file     Minutes per session: Not on file    Stress: Not on file   Relationships    Social connections     Talks on phone: Not on file     Gets together: Not on file     Attends Druze service: Not on file     Active member of club or organization: Not on file     Attends meetings of clubs or organizations: Not on file     Relationship status: Not on file    Intimate partner violence     Fear of current or ex partner: Not on file     Emotionally abused: Not on file     Physically abused: Not on file     Forced sexual activity: Not on file   Other Topics Concern    Not on file   Social History Narrative    Not on file       Family History:   No family history on file.     VITAL SIGNS:   BP (!) 140/73   Pulse 56   Temp 99.1 °F (37.3 °C) (Oral)   Resp 25   Wt 182 lb 12.2 oz (82.9 kg)   SpO2 96%   BMI answered for patient  Code status:  Full    Disposition:  NSICU      Electronically signed by YANETH Corea CNP on 7/1/2020 at 4:41 PM

## 2020-07-01 NOTE — PROGRESS NOTES
Called the [address] @ [phone] for update on bed assignment. Spoke with Ana, at 11:38, no bed was assigned. Placed on hold, while Main is being contacted. Jamal Velez (from [facility]) stated that waiting for some people to transfer out to get a bed for patient. Informed nurse and Dr. Katie Rahman.

## 2020-07-01 NOTE — ED NOTES
Pt ate is his lunch and stated he was happy with the meatloaf, mash potatoes and green beans. It was his 3 favorite things. Pt states he feels great.      Alessio Gipson RN  07/01/20 0229

## 2020-07-01 NOTE — ED NOTES
RN called 's CVICU spoke with Karen and gave report. Another RN called pt daughter and family notified where pt is going to. Pt is aware of this.  Pt is enroute to [facility] [address] [place] [address]  07/01/20 0641

## 2020-07-01 NOTE — ED PROVIDER NOTES
Department of Emergency Medicine   ED  Provider Note  Admit Date/RoomTime: 7/1/2020  6:16 AM  ED Room: BUD/BUD        7/1/20  6:43 AM EDT    Stroke Alert called: 643    HISTORY OF PRESENT ILLNESS:  (Nurses Notes Reviewed)    Chief Complaint:   Cerebrovascular Accident (No Hx; Pt C/O Slurred Speech; Tingling on R Side of Body)      Source of history provided by:  patient and relative(s). History/Exam Limitations: none. Mary Summers is a 77 y.o. old male presenting to the emergency department by private vehicle, with sudden onset of right lower extremity weakness and change in speech, which began 0600. Last known well time: 2300 yesterday. The episode occurred at home. Since recognized the symptoms have been persistent. He has no neurologic history. He has stroke risk factors of: hyperlipidemia, hypertension and smoking. There have been associated symptoms of none. There has been no history of recent trauma. Code Status on file: Full Code. NIH Stroke Scale at time of initial evaluation:   NIH Stroke Scale/Score at time of initial evaluation:  1A: Level of Consciousness 0 - alert; keenly responsive   1B: Ask Month and Age 0 - answers both questions correctly   1C:  Tell Patient To Open and Close Eyes, then Hand  Squeeze 0 - performs both tasks correctly   2: Test Horizontal Extraocular Movements 0 - normal   3: Test Visual Fields 0 - no visual loss   4: Test Facial Palsy 1-right lower facial droop   5A: Test Left Arm Motor Drift 0 - no drift, limb holds 90 (or 45) degrees for full 10 seconds   5B: Test Right Arm Motor Drift 0 - no drift, limb holds 90 (or 45) degrees for full 10 seconds   6A: Test Left Leg Motor Drift 0 - no drift; leg holds 30 degree position for full 5 seconds   6B: Test Right Leg Motor Drift 2 - some effort against gravity; leg falls to bed by 5 seconds but has some effort against gravity   7: Test Limb Ataxia   (FNF/Heel-Shin) 0 - absent   8: Test Sensation 0 - normal; no sensory loss   9: Test Language/Aphasia 0 - no aphasia, normal   10: Test Dysarthria 1 - mild to moderate, patient slurs at least some words and at worst, can be understood with some difficulty   11: Test Extinction/Inattention 0 - no abnormality   Total Score: 4   7/1/20 at 6:44 AM EDT. tPA Criteria*  Inclusion criteria:  - Ischemic stroke onset within 3 hours of drug administration  - Age 25 or older  - No hemorrhage or non-stroke cause of deficit on CT  - Measurable deficit on NIH Stroke Scale     Exclusion criteria: If the patient. ...  - has minor or improving symptoms  - had seizure at onset of stroke  - has had another stroke or serious head trauma within the last 3 months  - has had major surgery within the last 14 days  - has known history of intracranial hemorrhage  - has sustained systolic blood pressure >916 mmHg  - has sustained diastolic blood pressure >825 mmHg  - requires aggressive treatment is necessary to lower their blood pressure  - has symptoms suggestive of subarachnoid hemorrhage  - has had GI or urinary tract hemorrhage within the last 21 days  - has had an arterial puncture at a non-compressible site within the last 7 days  - received heparin within the last 48 hours and has an elevated PTT  - has a prothrombin time (PT) >15 seconds  - has a platelet count <143,909 uL  - serum blood glucose is <50 mg/dL or >400 mg/dL     Relative Contraindications:  - NIH Stroke score >22  - Patient's CT shows evidence of large MCA territory infarction (>1/3 the MCA territory)     *[facility] Policy Paper    Acute CVA Core Measures:      - t-PA Eligibility: IV t-PA was Administered-Total dose IV tPA is 0.9 mg/kg (maximum 90 mg). Given 10% over one minute, then remaining 90% given as infusion over 60 minutes. Patient's weight is 77kg  T-PA 10% bolus dose is 6.9mg  T-PA 90% bolus dose is 62.4mg   - The case has been discussed with Dr. Lana Nassar, they agree this patient is eligible for t-PA.            Past Medical History:  has a past medical history of Acute on chronic systolic congestive heart failure ([facility] Aortic regurgitation, Arthritis, CAD (coronary artery disease), Hypercholesteremia, Hypertension, Ischemic cardiomyopathy, Mitral regurgitation, and STEMI (ST elevation myocardial infarction) ([facility] Past Surgical History:  has a past surgical history that includes Tonsillectomy; Coronary angioplasty (12/12/12); ECHO Compl W Dop Color Flow (12/12/2012); Diagnostic Cardiac Cath Lab Procedure (12/12/12); and Coronary artery bypass graft (12/13/12). Social History:  reports that he has been smoking pipe. He has never used smokeless tobacco. He reports that he does not drink alcohol or use drugs. Prior Functional Status(Modified Polina Scale):  0=No symptoms at all    Family History: family history is not on file. The patients home medications have been reviewed. Prior to Admission medications    Medication Sig Start Date End Date Taking? Authorizing Provider   Cyanocobalamin (B-12 PO) Take by mouth   Yes Historical Provider, MD   FOLIC ACID PO Take by mouth   Yes Historical Provider, MD   spironolactone (ALDACTONE) 25 MG tablet Take 1 tablet by mouth daily 5/19/20  Yes Kevin Dinero DO   metoprolol succinate (TOPROL XL) 50 MG extended release tablet Take 1 tablet by mouth daily 10/2/19  Yes Kevin Dinero DO   losartan (COZAAR) 25 MG tablet Take 1 tablet by mouth daily 10/2/19  Yes Kevin Dinero DO   Saw [place] 450 MG CAPS Take by mouth   Yes Historical Provider, MD   Flaxseed, Linseed, (FLAX SEED OIL) 1000 MG CAPS Take 4,800 mg by mouth 2 times daily    Yes Historical Provider, MD   Multiple Vitamins-Minerals (CENTRUM SILVER ULTRA MENS PO) Take 1 tablet by mouth daily. Yes Historical Provider, MD   aspirin 81 MG chewable tablet Take 1 tablet by mouth daily. 12/17/12  Yes Alex Rodriguez DO       Allergies: Patient has no known allergies.        Review of Systems:   Pertinent positives and negatives are stated within HPI, all other systems reviewed and are negative.    ---------------------------------------------------PHYSICAL EXAM--------------------------------------    Constitutional/General: Alert and oriented x3, well appearing, non toxic in NAD  Head: Normocephalic and atraumatic  Eyes: PERRL, EOMI  Mouth: Oropharynx clear, handling secretions, no trismus. Right facial droop  Neck: Supple, full ROM, non tender to palpation in the midline, no stridor, no crepitus, no meningeal signs  Pulmonary: Lungs clear to auscultation bilaterally, no wheezes, rales, or rhonchi. Not in respiratory distress  Cardiovascular:  Regular rate. Regular rhythm. No murmurs, gallops, or rubs. 2+ distal pulses  Chest: no chest wall tenderness  Abdomen: Soft. Non tender. Non distended. +BS. No rebound, guarding, or rigidity. No pulsatile masses appreciated. Musculoskeletal: Moves all extremities x 4. Warm and well perfused, no clubbing, cyanosis, or edema. Capillary refill <3 seconds  Skin: warm and dry. No rashes. Neurologic: GCS 15, CN 2-12 grossly intact, no focal deficits, symmetric strength 5/5 in the upper and lower extremities bilaterally. Speech slightly off baseline. Normal finger to nose. Please also see NIH stroke scale.   Psych: Normal Affect      -------------------------------------------------- RESULTS -------------------------------------------------  All laboratory and imaging studies have been reviewed by myself    LABS:  Results for orders placed or performed during the hospital encounter of 07/01/20   CBC Auto Differential   Result Value Ref Range    WBC 6.9 4.5 - 11.5 E9/L    RBC 3.97 3.80 - 5.80 E12/L    Hemoglobin 14.2 12.5 - 16.5 g/dL    Hematocrit 40.4 37.0 - 54.0 %    .8 (H) 80.0 - 99.9 fL    MCH 35.8 (H) 26.0 - 35.0 pg    MCHC 35.1 (H) 32.0 - 34.5 %    RDW 12.4 11.5 - 15.0 fL    Platelets 246 660 - 807 E9/L    MPV 10.6 7.0 - 12.0 fL    Neutrophils % 51.5 43.0 - 80.0 % Immature Granulocytes % 0.3 0.0 - 5.0 %    Lymphocytes % 29.9 20.0 - 42.0 %    Monocytes % 10.2 2.0 - 12.0 %    Eosinophils % 7.4 (H) 0.0 - 6.0 %    Basophils % 0.7 0.0 - 2.0 %    Neutrophils Absolute 3.55 1.80 - 7.30 E9/L    Immature Granulocytes # 0.02 E9/L    Lymphocytes Absolute 2.06 1.50 - 4.00 E9/L    Monocytes Absolute 0.70 0.10 - 0.95 E9/L    Eosinophils Absolute 0.51 (H) 0.05 - 0.50 E9/L    Basophils Absolute 0.05 0.00 - 0.20 E9/L   Comprehensive Metabolic Panel   Result Value Ref Range    Sodium 134 132 - 146 mmol/L    Potassium 4.1 3.5 - 5.0 mmol/L    Chloride 100 98 - 107 mmol/L    CO2 23 22 - 29 mmol/L    Anion Gap 11 7 - 16 mmol/L    Glucose 119 (H) 74 - 99 mg/dL    BUN 24 (H) 8 - 23 mg/dL    CREATININE 1.1 0.7 - 1.2 mg/dL    GFR Non-African American >60 >=60 mL/min/1.73    GFR African American >60     Calcium 9.3 8.6 - 10.2 mg/dL    Total Protein 8.0 6.4 - 8.3 g/dL    Alb 4.6 3.5 - 5.2 g/dL    Total Bilirubin 0.4 0.0 - 1.2 mg/dL    Alkaline Phosphatase 56 40 - 129 U/L    ALT 15 0 - 40 U/L    AST 22 0 - 39 U/L   Troponin   Result Value Ref Range    Troponin <0.01 0.00 - 0.03 ng/mL   Protime-INR   Result Value Ref Range    Protime 11.0 9.3 - 12.4 sec    INR 1.0    APTT   Result Value Ref Range    aPTT 28.4 24.5 - 35.1 sec   Urinalysis   Result Value Ref Range    Color, UA Yellow Straw/Yellow    Clarity, UA Clear Clear    Glucose, Ur Negative Negative mg/dL    Bilirubin Urine Negative Negative    Ketones, Urine Negative Negative mg/dL    Specific Gravity, UA 1.010 1.005 - 1.030    Blood, Urine SMALL (A) Negative    pH, UA 8.0 5.0 - 9.0    Protein, UA TRACE Negative mg/dL    Urobilinogen, Urine 0.2 <2.0 E.U./dL    Nitrite, Urine Negative Negative    Leukocyte Esterase, Urine Negative Negative   Microscopic Urinalysis   Result Value Ref Range    WBC, UA 0-1 0 - 5 /HPF    RBC, UA 5-10 (A) 0 - 2 /HPF    Bacteria, UA NONE SEEN None Seen /HPF   POCT Glucose   Result Value Ref Range    Glucose 116 mg/dL    QC OK? OK    POCT Glucose   Result Value Ref Range    Meter Glucose 116 (H) 74 - 99 mg/dL   EKG 12 Lead   Result Value Ref Range    Ventricular Rate 53 BPM    Atrial Rate 53 BPM    P-R Interval 222 ms    QRS Duration 128 ms    Q-T Interval 476 ms    QTc Calculation (Bazett) 446 ms    P Axis 52 degrees    R Axis -67 degrees    T Axis 137 degrees       RADIOLOGY:  Interpreted by Radiologist.  CTA Head W Contrast   Final Result   ALERT:  THIS IS AN ABNORMAL REPORT      1. 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 .    2. Dolichoectasia of the basilar artery. 3. The perfusion study fails to demonstrate a cord infarct or ischemic   penumbra. CTA Neck W Contrast   Final Result   ALERT:  THIS IS AN ABNORMAL REPORT      1. 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 .    2. Dolichoectasia of the basilar artery. 3. The perfusion study fails to demonstrate a cord infarct or ischemic   penumbra. CT BRAIN PERFUSION   Final Result   ALERT:  THIS IS AN ABNORMAL REPORT      1. 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 .    2. Dolichoectasia of the basilar artery. 3. The perfusion study fails to demonstrate a cord infarct or ischemic   penumbra. XR CHEST PORTABLE   Final Result   No interval change               CT Head WO Contrast   Final Result   No definite acute process is identified             EKG Interpretation  Interpreted by emergency department physician.      Rhythm: sinus bradycardia and 1 degree AV block  Rate: bradycardia  Axis: left  Conduction: 1st degree AV block  ST Segments: no acute change  T Waves: non specific changes    Clinical Impression: 1st degree AV block  Comparison to Patient To Open and Close Eyes, then Hand  Squeeze 0 - performs both tasks correctly  2: Test Horizontal Extraocular Movements 0 - normal  3: Test Visual Fields 0 - no visual loss  4: Test Facial Palsy 1 - minor paralysis (flattened nasolabial fold, asymmetric on smiling)  5A: Test Left Arm Motor Drift 0 - no drift, limb holds 90 (or 45) degrees for full 10 seconds  5B: Test Right Arm Motor Drift 0 - no drift, limb holds 90 (or 45) degrees for full 10 seconds  6A: Test Left Leg Motor Drift 0 - no drift; leg holds 30 degree position for full 5 seconds  6B: Test Right Leg Motor Drift 0 - no drift; leg holds 30 degree position for full 5 seconds  7: Test Limb Ataxia   (FNF/Heel-Shin) 0 - absent  8: Test Sensation 0 - normal; no sensory loss  9: Test Language/Aphasia 0 - no aphasia, normal  10: Test Dysarthria 0 - normal  11: Test Extinction/Inattention 0 - no abnormality  Total Score: 1  7/1/20 at 9:15 AM EDT. States right hand numbness now    [WL]      ED Course User Index  [WL] Jenn Hightower DO     Patient presents to the ED for evaluation. Work-up was performed with concerns for but not limited to acute CVA. Patient was outside the typical window for TPA but per tele-stroke recommendations patient fell under the window to receive TPA. After discussing the risks and benefits with the patient the patient was agreeable and wanted TPA to be given. He did have resolution of some of his symptoms while here in the ED. Patient requires continued workup and management of their symptoms and will be transferred to [facility] further evaluation and treatment and to be evaluated by neurology. Re-Evaluations:             0725 Re-evaluation. Patients symptoms show no change, right facial droop noticed.     This patient's ED course included: a personal history and physicial examination, re-evaluation prior to disposition, multiple bedside re-evaluations, IV medications, cardiac monitoring, continuous pulse oximetry, complex medical decision making and emergency management and a personal history and physicial eaxmination    This patient has remained hemodynamically stable during their ED course. Critical Care: 30        Counseling: The emergency provider has spoken with the patient and family member and discussed todays presentation, condition, results, treatment options and risk/benefits of thrombolytic therapy, in addition to providing specific details for the plan of care and counseling regarding the diagnosis and prognosis. Questions are answered at this time and they are agreeable with the plan. Was IV tPa Delay/Not Given: Yes  IV tPA therapy given but delayed(>60 minutes from ED arrival to IV tPA administration time) due to:  uncertain diagnosis requiring additional diagnostic testing       --------------------------------- IMPRESSION AND DISPOSITION ---------------------------------    IMPRESSION  1. Stroke-like symptoms    2. First degree AV block        DISPOSITION  Disposition: Transfer to [facility]  Patient condition is serious               Waqas Najera DO  Resident  07/01/20 5053      ATTENDING PROVIDER ATTESTATION:     Kathleen Mendoza presented to the emergency department for evaluation of Cerebrovascular Accident (No Hx; Pt C/O Slurred Speech; Tingling on R Side of Body)   and was initially evaluated by the Medical Resident. See Original ED Note for H&P and ED course above. I have reviewed and discussed the case, including pertinent history (medical, surgical, family and social) and exam findings with the Medical Resident assigned to Kathleen Mendoza. I have personally performed and/or participated in the history, exam, medical decision making, and procedures and agree with all pertinent clinical information.    Please note that the withdrawal or failure to initiate urgent interventions for this patient would likely result in a life threatening deterioration or permanent disability. Accordingly this patient received 30 minutes of critical care time, excluding separately billable procedures. I have reviewed my findings and recommendations with the assigned Medical Resident, Deana Shannon and members of family present at the time of disposition. My findings/plan: The primary encounter diagnosis was Stroke-like symptoms. A diagnosis of First degree AV block was also pertinent to this visit.   Discharge Medication List as of 7/1/2020  3:39 PM        MD Onur Kumar MD  10/12/20 3724

## 2020-07-01 NOTE — PROGRESS NOTES
Called the [address] @ [phone] for update on bed assignment. Spoke with Lani Watts, still waiting on a bed. Informed charge nurse and Dr. Winter Nguyen.

## 2020-07-01 NOTE — PROGRESS NOTES
Called the [address] @ [phone] for update on return call. Spoke with Tal Steele, contacted [facility], spoke with [facility] hospitals line, waiting on call back.

## 2020-07-01 NOTE — VIRTUAL HEALTH
Consults  Patient Location:  [address] [facility] Emergency Department    Provider Location (City/State): [place], [place]    This virtual visit was conducted via interactive/real-time audio/video. [facility] [facility]rology Consult for  [address] Stroke Alert through [address] @ 6:53  7/1/2020 6:57 AM  Pt Name: Leela Guadalupe  MRN: [id number]  YOB: 1945  Date of evaluation: 7/1/2020  Primary Care Physician: Yazan Edmonds MD  Reason for Evaluation: Stroke Evaluation with Discussion with Ed or primary team with Telemedicine and stroke evaluation with Review of imaging and labs    Leela Guadalupe is a 77 y.o. male with CAD, CHF, AR, HTN on aspirin. He had an MI in 2012. Today, he woke up at around 4am, noticed that he couldn't move his right leg well and he called his daughter right away, and he noticed that his speech was slurred and he couldn't speak well. He went to bed at 11pm and was normal.    Daughter is at bedside confirmed his story. Allergies  has No Known Allergies. Medications  Prior to Admission medications    Medication Sig Start Date End Date Taking?  Authorizing Provider   Cyanocobalamin (B-12 PO) Take by mouth   Yes Historical Provider, MD   FOLIC ACID PO Take by mouth   Yes Historical Provider, MD   spironolactone (ALDACTONE) 25 MG tablet Take 1 tablet by mouth daily 5/19/20  Yes Scotty Brown, DO   metoprolol succinate (TOPROL XL) 50 MG extended release tablet Take 1 tablet by mouth daily 10/2/19  Yes Scotty Brown, DO   losartan (COZAAR) 25 MG tablet Take 1 tablet by mouth daily 10/2/19  Yes Scotyt Brown, DO   Saw [place] 450 MG CAPS Take by mouth   Yes Historical Provider, MD   Flaxseed, Linseed, (FLAX SEED OIL) 1000 MG CAPS Take 4,800 mg by mouth 2 times daily    Yes Historical Provider, MD   Multiple Vitamins-Minerals (CENTRUM SILVER ULTRA MENS PO) Take 1 tablet by mouth daily. Yes Historical Provider, MD   aspirin 81 MG chewable tablet Take 1 tablet by mouth daily. 12/17/12  Yes Kobe Bell, DO    Scheduled Meds:  Continuous Infusions:  PRN Meds:.  Past Medical History   has a past medical history of Acute on chronic systolic congestive heart failure [facility] Aortic regurgitation, Arthritis, CAD (coronary artery disease), Hypercholesteremia, Hypertension, Ischemic cardiomyopathy, Mitral regurgitation, and STEMI (ST elevation myocardial infarction) ([facility]   Social History  Social History     Socioeconomic History    Marital status: Single     Spouse name: Not on file    Number of children: Not on file    Years of education: Not on file    Highest education level: Not on file   Occupational History    Not on file   Social Needs    Financial resource strain: Not on file    Food insecurity     Worry: Not on file     Inability: Not on file    Transportation needs     Medical: Not on file     Non-medical: Not on file   Tobacco Use    Smoking status: Current Some Day Smoker     Types: Pipe    Smokeless tobacco: Never Used    Tobacco comment: quit cigarettes 35yrs ago   Substance and Sexual Activity    Alcohol use: No    Drug use: No    Sexual activity: Not on file   Lifestyle    Physical activity     Days per week: Not on file     Minutes per session: Not on file    Stress: Not on file   Relationships    Social connections     Talks on phone: Not on file     Gets together: Not on file     Attends Gnosticist service: Not on file     Active member of club or organization: Not on file     Attends meetings of clubs or organizations: Not on file     Relationship status: Not on file    Intimate partner violence     Fear of current or ex partner: Not on file     Emotionally abused: Not on file     Physically abused: Not on file     Forced sexual activity: Not on file   Other Topics Concern    Not on file   Social History Narrative    Not on file     Family and necessary medical care.   Time spent examining patient, reviewing the images personally, reviewing the chart, perform high complexity decision making and speaking with the nursing staff regarding recommendations        Miguel Echavarria MD   Stroke, Neurocritical Care And/or [address] [place] [address] Stroke [address]  Electronically signed 7/1/2020 at 6:57 AM

## 2020-07-01 NOTE — PROGRESS NOTES
Dr. Cali Yip requested for patient to be transferred to [facility] the [address] @ [phone]. Patient Diagnosis: Stroke. LKW: 0581 (6/30/20)  NIH: 4.  Called the [address] @ [phone] for update on update on return calls. Spoke with Fredis Hampton, waiting on the nurses supervisor to see who admits, waiting Dr. Perico Zimmerman to return call. Dr. Lisa Pruitt spoke with [facility] (for Dr. Debbie Bradshaw) @ 1263.

## 2020-07-01 NOTE — ED NOTES
Pt asked for lunch tray. RN spoke with doctor. RN ordered hot lunch tray for pt. Pt asked how much longer before he gets transferred. RN asked unit clerk. Unit clerk going to check on it.      Annie Jain RN  07/01/20 9018

## 2020-07-02 ENCOUNTER — APPOINTMENT (OUTPATIENT)
Dept: CT IMAGING | Age: 75
DRG: 062 | End: 2020-07-02
Attending: INTERNAL MEDICINE
Payer: MEDICARE

## 2020-07-02 ENCOUNTER — APPOINTMENT (OUTPATIENT)
Dept: MRI IMAGING | Age: 75
DRG: 062 | End: 2020-07-02
Attending: INTERNAL MEDICINE
Payer: MEDICARE

## 2020-07-02 LAB
ANION GAP SERPL CALCULATED.3IONS-SCNC: 12 MMOL/L (ref 7–16)
BUN BLDV-MCNC: 26 MG/DL (ref 8–23)
CALCIUM SERPL-MCNC: 8.9 MG/DL (ref 8.6–10.2)
CHLORIDE BLD-SCNC: 100 MMOL/L (ref 98–107)
CHOLESTEROL, TOTAL: 196 MG/DL (ref 0–199)
CO2: 24 MMOL/L (ref 22–29)
CREAT SERPL-MCNC: 1.1 MG/DL (ref 0.7–1.2)
GFR AFRICAN AMERICAN: >60
GFR NON-AFRICAN AMERICAN: >60 ML/MIN/1.73
GLUCOSE BLD-MCNC: 100 MG/DL (ref 74–99)
HBA1C MFR BLD: 6 % (ref 4–5.6)
HCT VFR BLD CALC: 39.6 % (ref 37–54)
HDLC SERPL-MCNC: 33 MG/DL
HEMOGLOBIN: 13.6 G/DL (ref 12.5–16.5)
LDL CHOLESTEROL CALCULATED: 135 MG/DL (ref 0–99)
MAGNESIUM: 2.1 MG/DL (ref 1.6–2.6)
MCH RBC QN AUTO: 35 PG (ref 26–35)
MCHC RBC AUTO-ENTMCNC: 34.3 % (ref 32–34.5)
MCV RBC AUTO: 101.8 FL (ref 80–99.9)
PDW BLD-RTO: 12.5 FL (ref 11.5–15)
PHOSPHORUS: 3.9 MG/DL (ref 2.5–4.5)
PLATELET # BLD: 209 E9/L (ref 130–450)
PMV BLD AUTO: 10.4 FL (ref 7–12)
POTASSIUM REFLEX MAGNESIUM: 4.2 MMOL/L (ref 3.5–5)
RBC # BLD: 3.89 E12/L (ref 3.8–5.8)
SODIUM BLD-SCNC: 136 MMOL/L (ref 132–146)
TRIGL SERPL-MCNC: 142 MG/DL (ref 0–149)
VLDLC SERPL CALC-MCNC: 28 MG/DL
WBC # BLD: 8.4 E9/L (ref 4.5–11.5)

## 2020-07-02 PROCEDURE — 97161 PT EVAL LOW COMPLEX 20 MIN: CPT

## 2020-07-02 PROCEDURE — 70551 MRI BRAIN STEM W/O DYE: CPT

## 2020-07-02 PROCEDURE — 70450 CT HEAD/BRAIN W/O DYE: CPT

## 2020-07-02 PROCEDURE — 2580000003 HC RX 258: Performed by: NURSE PRACTITIONER

## 2020-07-02 PROCEDURE — 99221 1ST HOSP IP/OBS SF/LOW 40: CPT | Performed by: PSYCHIATRY & NEUROLOGY

## 2020-07-02 PROCEDURE — 80048 BASIC METABOLIC PNL TOTAL CA: CPT

## 2020-07-02 PROCEDURE — 2580000003 HC RX 258: Performed by: PHYSICIAN ASSISTANT

## 2020-07-02 PROCEDURE — 6370000000 HC RX 637 (ALT 250 FOR IP): Performed by: PSYCHIATRY & NEUROLOGY

## 2020-07-02 PROCEDURE — 6370000000 HC RX 637 (ALT 250 FOR IP): Performed by: NURSE PRACTITIONER

## 2020-07-02 PROCEDURE — 83036 HEMOGLOBIN GLYCOSYLATED A1C: CPT

## 2020-07-02 PROCEDURE — 80061 LIPID PANEL: CPT

## 2020-07-02 PROCEDURE — 84100 ASSAY OF PHOSPHORUS: CPT

## 2020-07-02 PROCEDURE — 36415 COLL VENOUS BLD VENIPUNCTURE: CPT

## 2020-07-02 PROCEDURE — 97530 THERAPEUTIC ACTIVITIES: CPT

## 2020-07-02 PROCEDURE — 2060000000 HC ICU INTERMEDIATE R&B

## 2020-07-02 PROCEDURE — 83735 ASSAY OF MAGNESIUM: CPT

## 2020-07-02 PROCEDURE — 85027 COMPLETE CBC AUTOMATED: CPT

## 2020-07-02 PROCEDURE — 97165 OT EVAL LOW COMPLEX 30 MIN: CPT

## 2020-07-02 RX ORDER — CLOPIDOGREL BISULFATE 75 MG/1
75 TABLET ORAL DAILY
Status: DISCONTINUED | OUTPATIENT
Start: 2020-07-02 | End: 2020-07-03 | Stop reason: HOSPADM

## 2020-07-02 RX ADMIN — MULTIPLE VITAMINS W/ MINERALS TAB 1 TABLET: TAB at 08:39

## 2020-07-02 RX ADMIN — ATORVASTATIN CALCIUM 40 MG: 40 TABLET, FILM COATED ORAL at 20:15

## 2020-07-02 RX ADMIN — SODIUM CHLORIDE, PRESERVATIVE FREE 10 ML: 5 INJECTION INTRAVENOUS at 08:40

## 2020-07-02 RX ADMIN — CLOPIDOGREL 75 MG: 75 TABLET, FILM COATED ORAL at 13:09

## 2020-07-02 RX ADMIN — SODIUM CHLORIDE, PRESERVATIVE FREE 10 ML: 5 INJECTION INTRAVENOUS at 20:17

## 2020-07-02 RX ADMIN — SODIUM CHLORIDE, PRESERVATIVE FREE 10 ML: 5 INJECTION INTRAVENOUS at 08:39

## 2020-07-02 ASSESSMENT — PAIN SCALES - GENERAL
PAINLEVEL_OUTOF10: 0

## 2020-07-02 NOTE — PROGRESS NOTES
OCCUPATIONAL THERAPY INITIAL EVALUATION      Date:2020  Patient Name: Fatmata Lopez  MRN: [id number]  : 1945  Room: [address] [place], [place] [address]    Referring Provider: YANETH Harmon CNP    Evaluating OT: Forrest Barreto OTR/L [id number]      Modified Garfield Scale   Score     Description  0             No symptoms  1             No significant disability despite symptoms  2             Slight disability; able to look after own affairs  3             Moderate disability; able to ambulate without assist/ requires assist with ADLs  4             Moderate/Severe disability;requires assist to ambulate/assist with ADLs  5             Severe disability;bedridden/incontinent   6               Score:   2      AM-PAC Daily Activity Raw Score:     Recommended Adaptive Equipment: shower seat as needed for balance; energy conservation        Diagnosis: Ischemic CVA; s/p tPA    Reason for admission: sudden onset of R LE weakness; changes in speech     Pertinent Medical History:  CAD, HTN, STEMI    Precautions:  Falls      Home Living: Pt lives alone  in a story home with 1+1 through garage [address]   Bed/bath on first floor. Flight with rail to basement. Bathroom setup: tub/shower first floor; pt uses laundry in basement  Equipment owned:no DME    Prior Level of Function: IND with ADLs;  IND with IADLs. No device for ambulation. Driving: yes  Occupation: retired    Pain Level: pt c/o no pain  this session      Cognition: A&O: 4/4    Follows 1-2 step commands appropriately.     Memory: good   Comprehension good   Problem solving: good   Judgement/safety: fair+/good               Communication skills: WFL           Vision: WFL; reports no vision changes               Glasses:WFL                                                   Hearing: WFL     RASS: 0  CAM-ICU: (NT) Delirium    UE Assessment:  Hand Dominance: Right [x]  Left []     ROM Strength   RUE  WFL 4/5   LUE WFL 4/5     Sensation: No c/o numbness or tingling in functional transfer training including postural cues, safety  to assist with balance and fall prevention; breathing techniques, pacing, work simplification strategies & recommended bathroom DME reviewed for safety and energy conservation during self care tasks and activities of daily living. Comments: OK from RN to see patient. Upon arrival, patient supine in bed; agreeable to session. Pt reporting symptoms resolved. Pt demo fair tolerance with good understanding of education/techniques. At end of session, patient left seated up in chair. Call light within reach, all lines and tubes intact. Pt instructed on use of call light for assistance and fall prevention. Line management and environmental modifications made prior to and end of session to ensure patient safety and to increase efficiency of session. Skilled monitoring of HR, O2 saturation, blood pressure and patient's response to activity performed throughout session. Overall, pt presents with decreased activity tolerance, dynamic balance, functional mobility limiting completion of ADLs and safety. Pt can benefit from continued skilled OT to increase safety and functional independence.      Evaluation Complexity: Low    Assessment of current deficits   Functional mobility [x]  ADLs [x] Strength [x]  Cognition []  Functional transfers  [x] IADLs [x] Safety Awareness [x]  Endurance [x]  Fine Motor Coordination [] Balance [x] Vision/perception [] Sensation []   Gross Motor Coordination [] ROM [] Delirium []                  Communication []    Plan of Care: 1-5 tx/wk PRN   ADL retraining [x]   Equipment needs [x]   Neuromuscular re-education [] Energy Conservation Techniques [x]  Functional Transfer training [x] Patient and/or Family Education [x]  Functional Mobility training [x]  Environmental Modifications [x]  Cognitive re-training []   Compensatory techniques for ADLs [x]  Splinting Needs []   Positioning to improve overall function [x]   Therapeutic Activity [x]                       Therapeutic Exercise  [x]  Visual/Perceptual: []    Delirium prevention/treatment  [x]   Other:  []    Rehab Potential: Good for established goals    Patient / Family Goal: to return to PLOF    Patient and/or family were instructed/educated on diagnosis, prognosis/goals and plan of care. Pt demonstrated G understanding. [] Malnutrition indicators have been identified and nursing has been notified to ensure a dietitian consult is ordered. Low Evaluation +   Time BW:0185              Time Out: 0940                 Treatment Charges: Mins Units   Ther Ex  05629     Manual Therapy 44634     Thera Activities 09306     ADL/Home Mgt 89210     Neuro Re-ed 25442     Orthotic manage/training  46025     Non-Billable Time     Total Timed Treatment        Evaluation time includes thorough review of current medical information, gathering information on past medical history/social history and prior level of function, completion of standardized testing/informal observation of tasks, assessment of data and development of POC/Goals.      Faith Guadarrama, OTR/L [id number]

## 2020-07-02 NOTE — PROGRESS NOTES
issues  · Endocrine: No acute issues. Monitor BS. · MSK: No acute issues. PT/OT  · Heme: tPA induced coagulopathy. TPA protocol    Bowel regime: Glycolax, MOM  Pain control/Sedation: Tylenol  DVT prophylaxis: SCDs.   Start Lovenox  GI prophylaxis: Diet  Mouth/Eye care: as needed  Candelaria: None  Family update: Questions answered for patient  Code status:  Full    Disposition:  Anticipate transfer       Electronically signed by YANETH Blake CNP on 7/2/2020 at 8:06 AM

## 2020-07-02 NOTE — PROGRESS NOTES
understanding Pt demonstrated skill Pt requires further education in this area   x x x     ASSESSMENT:    Comments:  RN reported pt was stable for session. Pt was supine in bed upon arrival, agreeable to initial evaluation. Pt did not exhibit any strength or coordination benefits with testing. Pt ambulated with mild unsteadiness but no LOB noted. Pt was returned to chair with all needs met and call light in reach. Will add to gait team.     Treatment:  Patient practiced and was instructed in the following treatment:     Bed mobility training - pt given verbal cues to facilitate proper sequencing and safety during supine>sit to complete task    Sitting EOB for >5 minutes for upright tolerance, postural awareness and BLE ROM   Transfer training - pt was given verbal cues to facilitate proper hand placement, technique and safety during sit to stand and stand to sit to complete task.  Gait training- pt was given verbal cues to facilitate safety during ambulation to complete task. Pt's/ family goals   1. Return home    Patient and or family understand(s) diagnosis, prognosis, and plan of care. Yes    PLAN:    PT care will be provided in accordance with the objectives noted above. Exercises and functional mobility practice will be used as well as appropriate assistive devices or modalities to obtain goals. Patient and family education will also be administered as needed. Frequency of treatments: 2-5x/week x 1-2 weeks. Time in  0925  Time out  0940    Total Treatment Time  10 minutes     Evaluation Time includes thorough review of current medical information, gathering information on past medical history/social history and prior level of function, completion of standardized testing/informal observation of tasks, assessment of data and education on plan of care and goals.     CPT codes:  [x] Low Complexity PT evaluation 57040  [] Moderate Complexity PT evaluation 97447  [] High Complexity PT evaluation [id number]  [] PT Re-evaluation 76699  [] Gait training 78725 - minutes  [] Manual therapy 54749 - minutes  [x] Therapeutic activities 60359 10 minutes  [] Therapeutic exercises 17986 - minutes  [] Neuromuscular reeducation 91352 - minutes     Mich Marie, PT, DPT  [id number]

## 2020-07-02 NOTE — PROGRESS NOTES
Report called to [place]. Patient to transfer to [facility]. Transport requested.

## 2020-07-02 NOTE — H&P
Patrice Matos M.D. History and Physical      CHIEF COMPLAINT:  Right sided flaciddity     Reason for Admission: as above with acute ischemic stroke     History Obtained From:pt/ EMR     HISTORY OF PRESENT ILLNESS:      The patient is a 77 y.o. male of Cheri Becerra MD with significant past medical history of cad, htn , hld, CMP ,  who presents with c/o right sided weakness of upper and lower extremity as well as some speech difficulty  He came in to hospital within an hour of symptom onset. He was administered tPA and had rapid resolution of stroke symptoms. He denies any acute complaints on my eval, full strength has returned in right arm and leg. Denies any speech deficits . Able to provide full hx w/ fluent speech . 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 .           Past Medical History:        Diagnosis Date    Acute on chronic systolic congestive heart failure ([facility] [address] 10/18/2018    Aortic regurgitation     trace    Arthritis     CAD (coronary artery disease)     Hypercholesteremia     Hypertension     Ischemic cardiomyopathy     Mitral regurgitation     mild to moderate    STEMI (ST elevation myocardial infarction) ([facility] [address] 12/12/12    anteriolateral     Past Surgical History:        Procedure Laterality Date    CORONARY ANGIOPLASTY  12/12/12    Balloon only to LAD    CORONARY ARTERY BYPASS GRAFT  12/13/12    cabg x 4- Dr. Cristina Khanna LAB PROCEDURE  12/12/12    [facility] cath lab- Dr. Clementina Marie  12/12/2012         TONSILLECTOMY           Medications Prior to Admission:    Medications Prior to Admission: Cyanocobalamin (B-12 PO), Take by mouth  FOLIC ACID PO, Take by mouth  spironolactone (ALDACTONE) 25 MG tablet, Take 1 tablet by mouth daily  metoprolol succinate (TOPROL XL) 50 MG extended release tablet, Take 1 tablet by mouth daily  losartan (COZAAR) 25 MG tablet, Take 1 tablet by mouth daily  Saw [place] 450 MG CAPS, Take by mouth  Flaxseed, Linseed, (FLAX SEED OIL) 1000 MG CAPS, Take 4,800 mg by mouth 2 times daily   Multiple Vitamins-Minerals (CENTRUM SILVER ULTRA MENS PO), Take 1 tablet by mouth daily. aspirin 81 MG chewable tablet, Take 1 tablet by mouth daily. Allergies:  Patient has no known allergies. Social History:   TOBACCO:   reports that he has been smoking pipe. He has never used smokeless tobacco.  ETOH:   reports no history of alcohol use. MARITAL STATUS:    OCCUPATION:      Family History:   No family history on file. REVIEW OF SYSTEMS:    General ROS: positive for  - fatigue, right sided flaccidity   Hematological and Lymphatic ROS: negative  Endocrine ROS: negative  Respiratory ROS: no cough, shortness of breath, or wheezing  Cardiovascular ROS: no chest pain or dyspnea on exertion  Gastrointestinal ROS: no abdominal pain, change in bowel habits, or black or bloody stools  Genito-Urinary ROS: no dysuria, trouble voiding, or hematuria  Neurological ROS: no TIA or stroke symptoms  negative    Vitals:  BP (!) 132/109   Pulse 64   Temp 98.1 °F (36.7 °C) (Oral)   Resp 17   Ht 5' 9\" (1.753 m)   Wt 182 lb 12.2 oz (82.9 kg)   SpO2 99%   BMI 26.99 kg/m²     PHYSICAL EXAM:  General:  Awake, alert, oriented X 3. Well developed, well nourished, well groomed. No apparent distress. HEENT:  Normocephalic, atraumatic. Pupils equal, round, reactive to light. No scleral icterus. No conjunctival injection. Normal lips, teeth, and gums. No nasal discharge.   Neck:  Supple  Heart:  RRR, no murmurs, gallops, rubs, carotid upstroke normal, no carotid bruits  Lungs:  CTA bilaterally, bilat symmetrical expansion, no wheeze, rales, or rhonchi  Abdomen: Bowel sounds present, soft, nontender, no masses, no organomegaly, no peritoneal signs  Extremities:  No clubbing, cyanosis, or edema  Skin:  Warm and dry, no open lesions or rash  Neuro:  Cranial nerves 2-12 intact, no focal deficits  Breast: deferred  Rectal: deferred  Genitalia:  deferred      DATA:     Recent Labs     07/01/20  0710 07/02/20  0455   WBC 6.9 8.4   HGB 14.2 13.6    209     Recent Labs     07/01/20  0710 07/02/20  0455    136   K 4.1 4.2   BUN 24* 26*   CREATININE 1.1 1.1     Recent Labs     07/01/20  0710   PROT 8.0   INR 1.0     Recent Labs     07/01/20  0710   AST 22   ALT 15   ALKPHOS 56   BILITOT 0.4     No results for input(s): BNP in the last 72 hours. Recent Labs     07/01/20  0710   TROPONINI <0.01       ASSESSMENT:      Active Problems:    Acute CVA (cerebrovascular accident) [facility])  Resolved Problems:    * No resolved hospital problems.  *    htn  hld  Cad  cmp  PLAN:    Admitted for acute CVA s/p tPA   AS, Statin , bp control  Lipid control w statin   Pt eval   Neuro eval  Discussed risk factor control   Ok for dc today if ok with neurology , once able to start antiplatelet    dVT Proph  PT/OT  Dc plan          Aruna Browne MD  7/2/2020  9:08 AM

## 2020-07-02 NOTE — PLAN OF CARE
Problem: Falls - Risk of:  Goal: Will remain free from falls  Description: Will remain free from falls  7/2/2020 0736 by Tiffanie Zambrano RN  Outcome: Met This Shift  7/1/2020 2117 by Marilou Leiva RN  Outcome: Met This Shift  7/1/2020 2115 by Marilou Leiva RN  Outcome: Met This Shift  7/1/2020 1742 by Tiffanie Zambrano RN  Outcome: Met This Shift  Goal: Absence of physical injury  Description: Absence of physical injury  7/2/2020 0736 by Tiffanie Zambrano RN  Outcome: Met This Shift  7/1/2020 2117 by Marilou Leiva RN  Outcome: Met This Shift  7/1/2020 2115 by Marilou Leiva RN  Outcome: Met This Shift  7/1/2020 1742 by Tiffanie Zambrano RN  Outcome: Met This Shift     Problem: Pain:  Goal: Pain level will decrease  Description: Pain level will decrease  7/2/2020 0736 by Tiffanie Zambrano RN  Outcome: Met This Shift  7/1/2020 2117 by Marilou Leiva RN  Outcome: Met This Shift  7/1/2020 2115 by Marilou Leiva RN  Outcome: Met This Shift  Goal: Control of acute pain  Description: Control of acute pain  7/2/2020 0736 by Tiffanie Zambrano RN  Outcome: Met This Shift  7/1/2020 2117 by Marilou Leiva RN  Outcome: Met This Shift  7/1/2020 2115 by Marilou Leiva RN  Outcome: Met This Shift  Goal: Control of chronic pain  Description: Control of chronic pain  7/2/2020 0736 by Tiffanie Zambrano RN  Outcome: Met This Shift  7/1/2020 2117 by Marilou Leiva RN  Outcome: Met This Shift  7/1/2020 2115 by Marilou Leiva RN  Outcome: Met This Shift

## 2020-07-02 NOTE — PLAN OF CARE
Problem: Falls - Risk of:  Goal: Will remain free from falls  Description: Will remain free from falls  7/2/2020 1105 by Alberto Reed RN  Outcome: Met This Shift  7/2/2020 0736 by Alexis Dean RN  Outcome: Met This Shift  7/1/2020 2117 by Jenn Heath RN  Outcome: Met This Shift  7/1/2020 2115 by Jenn Heath RN  Outcome: Met This Shift  Goal: Absence of physical injury  Description: Absence of physical injury  7/2/2020 1105 by Alberto Reed RN  Outcome: Met This Shift  7/2/2020 0736 by Alexis Dean RN  Outcome: Met This Shift  7/1/2020 2117 by Jenn Heath RN  Outcome: Met This Shift  7/1/2020 2115 by Jenn Heath RN  Outcome: Met This Shift     Problem: Pain:  Description: Pain management should include both nonpharmacologic and pharmacologic interventions.   Goal: Pain level will decrease  Description: Pain level will decrease  7/2/2020 1105 by Alberto Reed RN  Outcome: Met This Shift  7/2/2020 0736 by Alexis Dean RN  Outcome: Met This Shift  7/1/2020 2117 by Jenn Heath RN  Outcome: Met This Shift  7/1/2020 2115 by Jenn Heath RN  Outcome: Met This Shift  Goal: Control of acute pain  Description: Control of acute pain  7/2/2020 1105 by Alberto Reed RN  Outcome: Met This Shift  7/2/2020 0736 by Alexis Dean RN  Outcome: Met This Shift  7/1/2020 2117 by Jenn Heath RN  Outcome: Met This Shift  7/1/2020 2115 by Jenn Heath RN  Outcome: Met This Shift  Goal: Control of chronic pain  Description: Control of chronic pain  7/2/2020 1105 by Alberto Reed RN  Outcome: Met This Shift  7/2/2020 0736 by Alexis Dean RN  Outcome: Met This Shift  7/1/2020 2117 by Jenn Heath RN  Outcome: Met This Shift  7/1/2020 2115 by Jenn Heath RN  Outcome: Met This Shift     Problem: Skin Integrity:  Goal: Will show no infection signs and symptoms  Description: Will show no infection

## 2020-07-02 NOTE — PROGRESS NOTES
Patient transferred to [facility] with one bag of belongings, on telemetry pack and room air with transporter x2.

## 2020-07-02 NOTE — PROGRESS NOTES
Spoke with RN, Helga Meza, and informed her that we will be performing pt's ultrasound tomorrow (7/2).

## 2020-07-02 NOTE — CONSULTS
Department of Internal Medicine  Neurology Consult Note      Reason for Consult:  Ischemic stroke  Requesting Physician:  YANETH Hyde    CHIEF COMPLAINT:  Sudden onset weakness of right upper and lower extremity and difficulty in speech, beginning at 0600 7/1/2020     History Obtained From:  patient    HISTORY OF PRESENT ILLNESS:  Briefly Stephany Krishnan is 77 y.o. male with history of HTN, HLD, smoker, STEMI, ischemic cardiomyopathy, HFrEF, on aspirin. Was admitted on 7/1/2020 with above complaints. Per EMR patient woke up at around 4am 7/1/2020 and found that his right leg didn't feel normal; he went to bathroom but then couldn't move his right leg well and slumped on his recliner. He called his daughter right away, and he noticed that his speech was slurred and he couldn't speak well. His last well known time was 11pm 6/30/2020. In the ED NIH stroke scale score of 4 for right lower facial droop, dysarthria, right leg weakness. CT head negative for hemorrhage. CT brain perfusion / CTA head/neck showed:   1. 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 .   2. Dolichoectasia of the basilar artery. 3. The perfusion study fails to demonstrate a cord infarct or ischemic penumbra. Telestroke was consulted. Patient given full dose alteplase at 0744 morning of 7/1/2020. CT head wo constrast repeated this morning 7/2/2020 showed no hemorrhagic conversion.               Past Medical History:        Diagnosis Date    Acute on chronic systolic congestive heart failure ([address] 10/18/2018    Aortic regurgitation     trace    Arthritis     CAD (coronary artery disease)     Hypercholesteremia     Hypertension     Ischemic cardiomyopathy     Mitral regurgitation     mild to moderate    STEMI (ST elevation myocardial infarction) ([address] 12/12/12    anteriolateral     Past Surgical History:        Procedure Laterality Date    CORONARY ANGIOPLASTY  12/12/12    Balloon only to LAD    CORONARY ARTERY BYPASS GRAFT  12/13/12    cabg x 4- Dr. Lorri Shi LAB PROCEDURE  12/12/12    [facility] cath lab- Dr. Carreon Bounds  12/12/2012         TONSILLECTOMY       Current Medications:    Current Facility-Administered Medications: acetaminophen (TYLENOL) tablet 650 mg, 650 mg, Oral, Q6H PRN **OR** acetaminophen (TYLENOL) suppository 650 mg, 650 mg, Rectal, Q6H PRN  magnesium hydroxide (MILK OF MAGNESIA) 400 MG/5ML suspension 30 mL, 30 mL, Oral, Daily PRN  potassium chloride (KLOR-CON M) extended release tablet 40 mEq, 40 mEq, Oral, PRN **OR** potassium bicarb-citric acid (EFFER-K) effervescent tablet 40 mEq, 40 mEq, Oral, PRN **OR** potassium chloride 10 mEq/100 mL IVPB (Peripheral Line), 10 mEq, Intravenous, PRN  sodium chloride flush 0.9 % injection 10 mL, 10 mL, Intravenous, 2 times per day  sodium chloride flush 0.9 % injection 10 mL, 10 mL, Intravenous, PRN  atorvastatin (LIPITOR) tablet 40 mg, 40 mg, Oral, Nightly  [Held by provider] enoxaparin (LOVENOX) injection 40 mg, 40 mg, Subcutaneous, Daily  [DISCONTINUED] promethazine (PHENERGAN) tablet 12.5 mg, 12.5 mg, Oral, Q6H PRN **OR** ondansetron (ZOFRAN) injection 4 mg, 4 mg, Intravenous, Q6H PRN  [Held by provider] aspirin chewable tablet 81 mg, 81 mg, Oral, Daily  [Held by provider] losartan (COZAAR) tablet 25 mg, 25 mg, Oral, Daily  metoprolol succinate (TOPROL XL) extended release tablet 50 mg, 50 mg, Oral, Daily  perflutren lipid microspheres (DEFINITY) injection 1.65 mg, 1.5 mL, Intravenous, ONCE PRN  [Held by provider] spironolactone (ALDACTONE) tablet 25 mg, 25 mg, Oral, Daily  sodium chloride flush 0.9 % injection 10 mL, 10 mL, Intravenous, 2 times per day  sodium chloride flush 0.9 % injection 10 mL, 10 mL, Intravenous, PRN  polyethylene glycol (GLYCOLAX) packet 17 g, 17 g, Oral, Daily PRN  hydrALAZINE (APRESOLINE) injection 10 mg, 10 mg, Intravenous, Q10 Min PRN  labetalol (NORMODYNE;TRANDATE) injection 10 mg, 10 mg, Intravenous, Q10 Min PRN  therapeutic multivitamin-minerals 1 tablet, 1 tablet, Oral, Daily  Allergies:  Patient has no known allergies. Social History:    Social History     Socioeconomic History    Marital status: Single     Spouse name: Not on file    Number of children: Not on file    Years of education: Not on file    Highest education level: Not on file   Occupational History    Not on file   Social Needs    Financial resource strain: Not on file    Food insecurity     Worry: Not on file     Inability: Not on file    Transportation needs     Medical: Not on file     Non-medical: Not on file   Tobacco Use    Smoking status: Current Some Day Smoker     Types: Pipe    Smokeless tobacco: Never Used    Tobacco comment: quit cigarettes 35yrs ago   Substance and Sexual Activity    Alcohol use: No    Drug use: No    Sexual activity: Not on file   Lifestyle    Physical activity     Days per week: Not on file     Minutes per session: Not on file    Stress: Not on file   Relationships    Social connections     Talks on phone: Not on file     Gets together: Not on file     Attends Restoration service: Not on file     Active member of club or organization: Not on file     Attends meetings of clubs or organizations: Not on file     Relationship status: Not on file    Intimate partner violence     Fear of current or ex partner: Not on file     Emotionally abused: Not on file     Physically abused: Not on file     Forced sexual activity: Not on file   Other Topics Concern    Not on file   Social History Narrative    Not on file       Family History:   No family history on file.     REVIEW OF SYSTEMS:    CONSTITUTIONAL:  negative  EYES:  negative  HEENT:  negative  RESPIRATORY:  negative  CARDIOVASCULAR:  negative  GASTROINTESTINAL:  negative  GENITOURINARY:  negative  INTEGUMENT/BREAST: Results   Component Value Date     07/02/2020    K 4.2 07/02/2020     07/02/2020    CO2 24 07/02/2020    BUN 26 07/02/2020     HFP:    Lab Results   Component Value Date    PROT 8.0 07/01/2020     FLP:    Lab Results   Component Value Date    CHOL 196 07/02/2020    TRIG 142 07/02/2020    HDL 33 07/02/2020         EKG: normal sinus rhythm, unchanged from previous tracings. RADIOLOGY REVIEW:       Ct Head Without Contrast  Result Date: 7/2/2020  No interval change     Ct Head Wo Contrast  Result Date: 7/1/2020  No definite acute process is identified     Xr Chest Portable  Result Date: 7/1/2020  No interval change     Cta Neck W Contrast  Result Date: 7/1/2020  1. 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 . 2. Dolichoectasia of the basilar artery. 3. The perfusion study fails to demonstrate a cord infarct or ischemic penumbra. Ct Brain Perfusion  Result Date: 7/1/2020  1. 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 . 2. Dolichoectasia of the basilar artery. 3. The perfusion study fails to demonstrate a cord infarct or ischemic penumbra. Cta Head W Contrast  Result Date: 7/1/2020   1. 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 .   2. Dolichoectasia of the basilar artery. 3. The perfusion study fails to demonstrate a cord infarct or ischemic penumbra. IMPRESSION/RECOMMENDATIONS:      1. Ischemic stroke, thrombotic vs ischemic, s/p tPA, deficits resolved  2. Hx of CAD s/p CABG on Aspirin  3.  Hx of HTN, HLD      PLAN:    · MRI wo contrast  · Start Clopidogrel 75mg OD  · Risk factor control   -Continue atorvastatin 40mg HS  -continue metoprolol 50mg OD          To discus with      Electronically signed by Ilsa Jacinto MD on 7/2/2020 at 9:43 AM

## 2020-07-03 VITALS
HEART RATE: 86 BPM | RESPIRATION RATE: 18 BRPM | HEIGHT: 69 IN | TEMPERATURE: 97.2 F | DIASTOLIC BLOOD PRESSURE: 70 MMHG | WEIGHT: 182 LBS | BODY MASS INDEX: 26.96 KG/M2 | SYSTOLIC BLOOD PRESSURE: 149 MMHG | OXYGEN SATURATION: 97 %

## 2020-07-03 LAB
ANION GAP SERPL CALCULATED.3IONS-SCNC: 13 MMOL/L (ref 7–16)
BUN BLDV-MCNC: 27 MG/DL (ref 8–23)
CALCIUM SERPL-MCNC: 9.2 MG/DL (ref 8.6–10.2)
CHLORIDE BLD-SCNC: 101 MMOL/L (ref 98–107)
CO2: 23 MMOL/L (ref 22–29)
CREAT SERPL-MCNC: 1 MG/DL (ref 0.7–1.2)
GFR AFRICAN AMERICAN: >60
GFR NON-AFRICAN AMERICAN: >60 ML/MIN/1.73
GLUCOSE BLD-MCNC: 100 MG/DL (ref 74–99)
MAGNESIUM: 2 MG/DL (ref 1.6–2.6)
PHOSPHORUS: 3.6 MG/DL (ref 2.5–4.5)
POTASSIUM REFLEX MAGNESIUM: 4.3 MMOL/L (ref 3.5–5)
SODIUM BLD-SCNC: 137 MMOL/L (ref 132–146)

## 2020-07-03 PROCEDURE — 80048 BASIC METABOLIC PNL TOTAL CA: CPT

## 2020-07-03 PROCEDURE — 2580000003 HC RX 258: Performed by: NURSE PRACTITIONER

## 2020-07-03 PROCEDURE — 36415 COLL VENOUS BLD VENIPUNCTURE: CPT

## 2020-07-03 PROCEDURE — 6370000000 HC RX 637 (ALT 250 FOR IP): Performed by: NURSE PRACTITIONER

## 2020-07-03 PROCEDURE — 6370000000 HC RX 637 (ALT 250 FOR IP): Performed by: PSYCHIATRY & NEUROLOGY

## 2020-07-03 PROCEDURE — 83735 ASSAY OF MAGNESIUM: CPT

## 2020-07-03 PROCEDURE — 84100 ASSAY OF PHOSPHORUS: CPT

## 2020-07-03 PROCEDURE — 99232 SBSQ HOSP IP/OBS MODERATE 35: CPT | Performed by: NURSE PRACTITIONER

## 2020-07-03 RX ORDER — ATORVASTATIN CALCIUM 40 MG/1
40 TABLET, FILM COATED ORAL NIGHTLY
Qty: 30 TABLET | Refills: 3 | Status: SHIPPED | OUTPATIENT
Start: 2020-07-03

## 2020-07-03 RX ORDER — ASPIRIN 81 MG/1
81 TABLET, CHEWABLE ORAL DAILY
Qty: 30 TABLET | Refills: 3 | Status: SHIPPED | OUTPATIENT
Start: 2020-07-03

## 2020-07-03 RX ORDER — CLOPIDOGREL BISULFATE 75 MG/1
75 TABLET ORAL DAILY
Qty: 30 TABLET | Refills: 0 | Status: SHIPPED | OUTPATIENT
Start: 2020-07-04 | End: 2020-07-31 | Stop reason: ALTCHOICE

## 2020-07-03 RX ADMIN — MULTIPLE VITAMINS W/ MINERALS TAB 1 TABLET: TAB at 08:26

## 2020-07-03 RX ADMIN — CLOPIDOGREL 75 MG: 75 TABLET, FILM COATED ORAL at 08:26

## 2020-07-03 RX ADMIN — SODIUM CHLORIDE, PRESERVATIVE FREE 10 ML: 5 INJECTION INTRAVENOUS at 08:27

## 2020-07-03 RX ADMIN — METOPROLOL SUCCINATE 50 MG: 50 TABLET, EXTENDED RELEASE ORAL at 08:31

## 2020-07-03 RX ADMIN — SODIUM CHLORIDE, PRESERVATIVE FREE 10 ML: 5 INJECTION INTRAVENOUS at 08:26

## 2020-07-03 ASSESSMENT — PAIN SCALES - GENERAL: PAINLEVEL_OUTOF10: 0

## 2020-07-03 NOTE — DISCHARGE SUMMARY
Physician Discharge Summary     Patient ID:  Kathleen Mendoza  [id number]  68 y.o.  1945    Admit date: 7/1/2020    Discharge date and time: 7/3/2020    Admission Diagnoses:   Patient Active Problem List   Diagnosis    CAD (coronary artery disease)    Aortic regurgitation    Mitral regurgitation    Acute respiratory failure (HCC)    Hyperlipidemia LDL goal <100    Essential hypertension    NICM [facility]ischemic cardiomyopathy) ([facility]    Acute CVA (cerebrovascular accident) [facility])       Discharge Diagnoses: Stroke symptoms    Consults: Neurology,    Procedures: IV TPA    Hospital Course:    The patient is a 77 y.o. male of Fang Louise MD with significant past medical history of cad, htn , hld, CMP ,  who presents with c/o right sided weakness of upper and lower extremity as well as some speech difficulty  He came in to hospital within an hour of symptom onset. He was administered tPA and had rapid resolution of stroke symptoms. He denies any acute complaints on my eval, full strength has returned in right arm and leg. Denies any speech deficits . Able to provide full hx w/ fluent speech . 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 .      Patient is essentially back to his baseline.   No acute complaints  Aspirin/Plavix at the discretion of neurology  High intensity statin  Blood pressure control-resume BP meds as at home  Okay for discharge from medicine  Follow-up with PCP in 1 week and neurology as indicated by the  Recent Labs     07/01/20  0710 07/02/20  0455   WBC 6.9 8.4   HGB 14.2 13.6   HCT 40.4 39.6    209       Recent Labs     07/01/20  0710 07/02/20  0455 07/03/20  0541    136 137   K 4.1 4.2 4.3    100 101   CO2 23 24 23   BUN 24* 26* 27*   CREATININE 1.1 1.1 1.0   CALCIUM 9.3 8.9 9.2       Ct Head Without Contrast    Result Date: 2020  Patient MRN:  [id number] : 1945 Age: 77 years Gender: Male Order Date:  2020 7:00 AM EXAM: CT HEAD WO CONTRAST Technique: Low-dose CT  acquisition technique included one of following options; 1 . Automated exposure control, 2. Adjustment of MA and or KV according to patient's size. 3. Use of interactive reconstruction. Dosage: 1230 mGy-cm. INDICATION:  tpa Obtain 24 hours after administration of tPA COMPARISON: 2020 FINDINGS:  There is a small old area of infarction in the right cerebellar hemisphere. There is mild generalized atrophy. There is mild chronic ischemic/degenerative changes in the white matter. There is no mass effect, edema or hemorrhage. No definite area of acute infarct is noted. No interval change     Ct Head Wo Contrast    Result Date: 2020  Patient MRN:  [id number] : 1945 Age: 77 years Gender: Male Order Date:  2020 6:45 AM EXAM: CT HEAD WO CONTRAST Technique: Low-dose CT  acquisition technique included one of following options; 1 . Automated exposure control, 2. Adjustment of MA and or KV according to patient's size. 3. Use of interactive reconstruction. Dosage: 823 mGy-cm. INDICATION:  stroke stroke COMPARISON: None FINDINGS:  There is mild chronic ischemic/degenerative changes in the white matter. There is no mass effect, edema or hemorrhage. There is mild generalized atrophy. Skull and paranasal sinuses are normal.    No definite acute process is identified     Xr Chest Portable    Result Date: 2020  Patient MRN:  [id number] : 1945 Age: 77 years Gender: Male Order Date:  2020 6:45 AM EXAM: XR CHEST PORTABLE INDICATION:  weakness, Possible Stroke weakness, Possible Stroke COMPARISON: 3/4/2020 FINDINGS:  Heart size is borderline prominent. There is tortuosity aorta. There has been sternotomy.  There are no acute infiltrates or effusions. No interval change     Cta Neck W Contrast    Result Date: 2020  Patient MRN:  [id number] : 1945 Age: 77 years Gender: Male Order Date:  2020 6:45 AM EXAM: CTA NECK W CONTRAST, CTA HEAD W CONTRAST, CT BRAIN PERFUSION NUMBER OF IMAGES:  [id number] INDICATION: Acute right sided weakness and slurred speech. Suspected stroke COMPARISON: None Technique: Low-dose CT  acquisition technique included one of following options; 1 . Automated exposure control, 2. Adjustment of MA and or KV according to patient's size or 3. Use of iterative reconstruction. Contiguous spiral images were obtained in the axial plane, prior to and following the administration of intravenous contrast using CT angiographic protocol. Sagittal and coronal images were reconstructed from the axial plane acquisition. Additional 3-D and MIP reconstructions were presented to aid in the interpretation of this study. Images were obtained from the aortic arch cranially including the brain. A cerebral perfusion study was performed as well. Contrast dose: 100 ml. Isovue-370 intravenously injected. Findings:\  Cerebral perfusion study: No evidence of core infarct or ischemic penumbra. CTA NECK: Mild calcific plaque at the aortic arch and great vessels. There is approximately 60% narrowing at the origin of the right vertebral artery. The left vertebral artery is occluded just above the skull base, distal to the origin of the left posterior inferior cerebellar artery. . The right common carotid artery is widely patent. There is calcified plaque at the right carotid bifurcation with less than 50% narrowing of the right internal carotid artery. There is wide patency of the left common carotid artery with mild plaque at the left carotid bifurcation and less than 50% narrowing of the left internal carotid artery. There is emphysema at the lung apices. The larynx and thyroid gland are unremarkable.  No clearly pathological cervical adenopathy is present. CTA brain: There is dolichoectasia of the basilar artery. The anterior, middle and posterior cerebral arteries appear widely patent with no evidence of large vessel occlusion. The posterior inferior and superior cerebellar arteries both appear widely patent. There is no evidence of vascular malformation. ALERT:  THIS IS AN ABNORMAL REPORT 1. 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 . 2. Dolichoectasia of the basilar artery. 3. The perfusion study fails to demonstrate a cord infarct or ischemic penumbra. Ct Brain Perfusion    Result Date: 2020  Patient MRN:  [id number] : 1945 Age: 77 years Gender: Male Order Date:  2020 6:45 AM EXAM: CTA NECK W CONTRAST, CTA HEAD W CONTRAST, CT BRAIN PERFUSION NUMBER OF IMAGES:  [id number] INDICATION: Acute right sided weakness and slurred speech. Suspected stroke COMPARISON: None Technique: Low-dose CT  acquisition technique included one of following options; 1 . Automated exposure control, 2. Adjustment of MA and or KV according to patient's size or 3. Use of iterative reconstruction. Contiguous spiral images were obtained in the axial plane, prior to and following the administration of intravenous contrast using CT angiographic protocol. Sagittal and coronal images were reconstructed from the axial plane acquisition. Additional 3-D and MIP reconstructions were presented to aid in the interpretation of this study. Images were obtained from the aortic arch cranially including the brain. A cerebral perfusion study was performed as well. Contrast dose: 100 ml. Isovue-370 intravenously injected. Findings:\  Cerebral perfusion study: No evidence of core infarct or ischemic penumbra. CTA NECK: Mild calcific plaque at the aortic arch and great vessels. There is approximately 60% narrowing at the origin of the right vertebral artery.  The left vertebral artery is occluded just above the skull base, distal to the origin of the left posterior inferior cerebellar artery. . The right common carotid artery is widely patent. There is calcified plaque at the right carotid bifurcation with less than 50% narrowing of the right internal carotid artery. There is wide patency of the left common carotid artery with mild plaque at the left carotid bifurcation and less than 50% narrowing of the left internal carotid artery. There is emphysema at the lung apices. The larynx and thyroid gland are unremarkable. No clearly pathological cervical adenopathy is present. CTA brain: There is dolichoectasia of the basilar artery. The anterior, middle and posterior cerebral arteries appear widely patent with no evidence of large vessel occlusion. The posterior inferior and superior cerebellar arteries both appear widely patent. There is no evidence of vascular malformation. ALERT:  THIS IS AN ABNORMAL REPORT 1. 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 . 2. Dolichoectasia of the basilar artery. 3. The perfusion study fails to demonstrate a cord infarct or ischemic penumbra. Cta Head W Contrast    Result Date: 2020  Patient MRN:  [id number] : 1945 Age: 77 years Gender: Male Order Date:  2020 6:45 AM EXAM: CTA NECK W CONTRAST, CTA HEAD W CONTRAST, CT BRAIN PERFUSION NUMBER OF IMAGES:  [id number] INDICATION: Acute right sided weakness and slurred speech. Suspected stroke COMPARISON: None Technique: Low-dose CT  acquisition technique included one of following options; 1 . Automated exposure control, 2. Adjustment of MA and or KV according to patient's size or 3. Use of iterative reconstruction. Contiguous spiral images were obtained in the axial plane, prior to and following the administration of intravenous contrast using CT angiographic protocol.  Sagittal and coronal images were reconstructed from the axial plane acquisition. Additional 3-D and MIP reconstructions were presented to aid in the interpretation of this study. Images were obtained from the aortic arch cranially including the brain. A cerebral perfusion study was performed as well. Contrast dose: 100 ml. Isovue-370 intravenously injected. Findings:\  Cerebral perfusion study: No evidence of core infarct or ischemic penumbra. CTA NECK: Mild calcific plaque at the aortic arch and great vessels. There is approximately 60% narrowing at the origin of the right vertebral artery. The left vertebral artery is occluded just above the skull base, distal to the origin of the left posterior inferior cerebellar artery. . The right common carotid artery is widely patent. There is calcified plaque at the right carotid bifurcation with less than 50% narrowing of the right internal carotid artery. There is wide patency of the left common carotid artery with mild plaque at the left carotid bifurcation and less than 50% narrowing of the left internal carotid artery. There is emphysema at the lung apices. The larynx and thyroid gland are unremarkable. No clearly pathological cervical adenopathy is present. CTA brain: There is dolichoectasia of the basilar artery. The anterior, middle and posterior cerebral arteries appear widely patent with no evidence of large vessel occlusion. The posterior inferior and superior cerebellar arteries both appear widely patent. There is no evidence of vascular malformation. ALERT:  THIS IS AN ABNORMAL REPORT 1. 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 . 2. Dolichoectasia of the basilar artery. 3. The perfusion study fails to demonstrate a cord infarct or ischemic penumbra.      Mri Brain Wo Contrast    Result Date: 2020  Patient MRN:  [id number] : 1945 Age: 77 years Gender: Male Order Date:  2020 4:15 PM EXAM: MRI BRAIN WO CONTRAST NUMBER OF IMAGES:  [id number] INDICATION:  cva cva COMPARISON: CT scan dated 7/2/2020 Total sequences obtained: 10 The ventricles are prominent. The gyri and sulci appear  prominent. The white matter appears  prominent. There is no evidence for hemorrhage. There is no mass effect identified. There is no mass identified. There is evidence for a lacunar infarct likely old. Diffuse atrophy likely age related Findings compatible with small vessel ischemic changes. Findings compatible with a lacunar infarct, likely old. Discharge Exam:    HEENT: NCAT,  PERRLA, No JVD  Heart:  RRR, no murmurs, gallops, or rubs.   Lungs:  CTA bilaterally, no wheeze, rales or rhonchi  Abd: bowel sounds present, nontender, nondistended, no masses  Extrem:  No clubbing, cyanosis, or edema    Disposition: home     Patient Condition at Discharge: Stable    Patient Instructions:      Medication List      START taking these medications    atorvastatin 40 MG tablet  Commonly known as:  LIPITOR  Take 1 tablet by mouth nightly        CONTINUE taking these medications    B-12 PO     CENTRUM SILVER ULTRA MENS PO     Flax Seed Oil 7963 MG Caps     FOLIC ACID PO     losartan 25 MG tablet  Commonly known as:  COZAAR  Take 1 tablet by mouth daily     metoprolol succinate 50 MG extended release tablet  Commonly known as:  TOPROL XL  Take 1 tablet by mouth daily     Saw Palmetto 450 MG Caps     spironolactone 25 MG tablet  Commonly known as:  Aldactone  Take 1 tablet by mouth daily        STOP taking these medications    aspirin 81 MG chewable tablet           Where to Get Your Medications      These medications were sent to [address] [facility] [address]    Phone:  [phone]   · atorvastatin 40 MG tablet       Activity: activity as tolerated  Diet: low fat, low cholesterol diet    Pt has been advised

## 2020-07-03 NOTE — PROGRESS NOTES
Sg Myers is a 77 y.o. 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 Discussed MRI brain results and ECHO needed. Pt adamant to want to go home and will follow up with his cardiologist regarding ECHO. He was advised he will need to follow up with [facility] as well. He has no complaints and is back to his baseline with no deficits. No chest pain or palpitations  No coughing or wheezing    No vertigo, lightheadedness or loss of consciousness  No falls, tripping or stumbling  No incontinence of bowels or bladder  No itching or bruising appreciated  No numbness, tingling or focal arm/leg weakness    ROS otherwise negative      Objective:     BP (!) 149/70   Pulse 86   Temp 97.2 °F (36.2 °C) (Tympanic)   Resp 18   Ht 5' 9\" (1.753 m)   Wt 182 lb (82.6 kg)   SpO2 97%   BMI 26.88 kg/m²     General appearance: alert, appears stated age, cooperative and no distress  Head: normocephalic, without obvious abnormality, atraumatic  Eyes: conjunctivae/corneas clear  Neck: no adenopathy, no carotid bruit, supple, symmetrical, trachea midline and thyroid not enlarged, symmetric  Lungs: clear to auscultation bilaterally  Heart: regular rate and rhythm, S1, S2 normal   Abdomen: soft, non-tender; bowel sounds normal  Extremities:  normal, atraumatic, no cyanosis or edema  Pulses: 2+ and symmetric  Skin: color, texture, turgor normal---no rashes or lesions      Mental Status: Alert and oriented to self, time and place.   Follows all commands    Appropriate attention/concentration  Intact fundus of knowledge  Repetition intact  Intact memories      Speech: No dysarthria  Language: No aphasia    Cranial Nerves:  I: smell NA   II: visual acuity  NA   II: visual fields Full to confrontation   II: pupils KALEN   III,VII: ptosis None   III,IV,VI: extraocular muscles  Full ROM   V: mastication Normal   V: facial light touch sensation  Normal   V,VII: corneal reflex     VII: facial muscle function - upper  Normal   VII: facial muscle function - lower Normal   VIII: hearing Normal   IX: soft palate elevation  Normal   IX,X: gag reflex    XI: trapezius strength  5/5   XI: sternocleidomastoid strength 5/5   XI: neck extension strength  5/5   XII: tongue strength  Normal     Motor:  5/5 throughout  Normal bulk and tone  No drift   No abnormal movements    Sensory:  LT normal    Coordination:   FN, FFM and ANGELA normal  HS normal    Gait:  Normal    DTR:   1+ throughout     No Babinskis  No Davidson's    No other pathological reflexes  Laboratory/Radiology:     CBC with Differential:    Lab Results   Component Value Date    WBC 8.4 07/02/2020    RBC 3.89 07/02/2020    HGB 13.6 07/02/2020    HCT 39.6 07/02/2020     07/02/2020    .8 07/02/2020    MCH 35.0 07/02/2020    MCHC 34.3 07/02/2020    RDW 12.5 07/02/2020    SEGSPCT 77 12/15/2012    LYMPHOPCT 29.9 07/01/2020    MONOPCT 10.2 07/01/2020    BASOPCT 0.7 07/01/2020    MONOSABS 0.70 07/01/2020    LYMPHSABS 2.06 07/01/2020    EOSABS 0.51 07/01/2020    BASOSABS 0.05 07/01/2020     CMP:    Lab Results   Component Value Date     07/03/2020    K 4.3 07/03/2020     07/03/2020    CO2 23 07/03/2020    BUN 27 07/03/2020    CREATININE 1.0 07/03/2020    GFRAA >60 07/03/2020    LABGLOM >60 07/03/2020    GLUCOSE 100 07/03/2020    PROT 8.0 07/01/2020    LABALBU 4.6 07/01/2020    CALCIUM 9.2 07/03/2020    BILITOT 0.4 07/01/2020    ALKPHOS 56 07/01/2020    AST 22 07/01/2020    ALT 15 07/01/2020     Hepatic Function Panel:

## 2020-07-03 NOTE — PLAN OF CARE
Problem: Falls - Risk of:  Goal: Will remain free from falls  Description: Will remain free from falls  7/2/2020 2328 by Mary Gonzalez RN  Outcome: Met This Shift     Problem: Falls - Risk of:  Goal: Absence of physical injury  Description: Absence of physical injury  7/2/2020 2328 by Mary Gonzalez RN  Outcome: Met This Shift     Problem: Skin Integrity:  Goal: Will show no infection signs and symptoms  Description: Will show no infection signs and symptoms  7/2/2020 2328 by Mary Gonzalez RN  Outcome: Met This Shift     Problem: Skin Integrity:  Goal: Absence of new skin breakdown  Description: Absence of new skin breakdown  7/2/2020 2328 by Mary Gonzalez RN  Outcome: Met This Shift

## 2020-07-05 ENCOUNTER — APPOINTMENT (OUTPATIENT)
Dept: GENERAL RADIOLOGY | Age: 75
End: 2020-07-05
Payer: MEDICARE

## 2020-07-05 ENCOUNTER — HOSPITAL ENCOUNTER (OUTPATIENT)
Age: 75
Setting detail: OBSERVATION
Discharge: HOME | End: 2020-07-06
Attending: EMERGENCY MEDICINE | Admitting: INTERNAL MEDICINE
Payer: MEDICARE

## 2020-07-05 ENCOUNTER — APPOINTMENT (OUTPATIENT)
Dept: CT IMAGING | Age: 75
End: 2020-07-05
Payer: MEDICARE

## 2020-07-05 PROBLEM — Z91.14 NONCOMPLIANCE WITH MEDICATIONS: Status: ACTIVE | Noted: 2020-07-05

## 2020-07-05 PROBLEM — G45.9 TIA (TRANSIENT ISCHEMIC ATTACK): Status: ACTIVE | Noted: 2020-07-05

## 2020-07-05 PROBLEM — Z91.148 NONCOMPLIANCE WITH MEDICATIONS: Status: ACTIVE | Noted: 2020-07-05

## 2020-07-05 LAB
ALBUMIN SERPL-MCNC: 4.4 G/DL (ref 3.5–5.2)
ALP BLD-CCNC: 51 U/L (ref 40–129)
ALT SERPL-CCNC: 21 U/L (ref 0–40)
ANION GAP SERPL CALCULATED.3IONS-SCNC: 14 MMOL/L (ref 7–16)
AST SERPL-CCNC: 42 U/L (ref 0–39)
BASOPHILS ABSOLUTE: 0.03 E9/L (ref 0–0.2)
BASOPHILS RELATIVE PERCENT: 0.4 % (ref 0–2)
BILIRUB SERPL-MCNC: 0.6 MG/DL (ref 0–1.2)
BUN BLDV-MCNC: 21 MG/DL (ref 8–23)
CALCIUM SERPL-MCNC: 9.2 MG/DL (ref 8.6–10.2)
CHLORIDE BLD-SCNC: 98 MMOL/L (ref 98–107)
CO2: 21 MMOL/L (ref 22–29)
CREAT SERPL-MCNC: 1 MG/DL (ref 0.7–1.2)
EKG ATRIAL RATE: 56 BPM
EKG P AXIS: 6 DEGREES
EKG P-R INTERVAL: 218 MS
EKG Q-T INTERVAL: 456 MS
EKG QRS DURATION: 114 MS
EKG QTC CALCULATION (BAZETT): 440 MS
EKG R AXIS: -71 DEGREES
EKG T AXIS: 113 DEGREES
EKG VENTRICULAR RATE: 56 BPM
EOSINOPHILS ABSOLUTE: 0.34 E9/L (ref 0.05–0.5)
EOSINOPHILS RELATIVE PERCENT: 4.8 % (ref 0–6)
GFR AFRICAN AMERICAN: >60
GFR NON-AFRICAN AMERICAN: >60 ML/MIN/1.73
GLUCOSE BLD-MCNC: 102 MG/DL (ref 74–99)
HCT VFR BLD CALC: 39.1 % (ref 37–54)
HEMOGLOBIN: 13.5 G/DL (ref 12.5–16.5)
IMMATURE GRANULOCYTES #: 0.02 E9/L
IMMATURE GRANULOCYTES %: 0.3 % (ref 0–5)
INR BLD: 1
LYMPHOCYTES ABSOLUTE: 1.64 E9/L (ref 1.5–4)
LYMPHOCYTES RELATIVE PERCENT: 23.1 % (ref 20–42)
MCH RBC QN AUTO: 35.1 PG (ref 26–35)
MCHC RBC AUTO-ENTMCNC: 34.5 % (ref 32–34.5)
MCV RBC AUTO: 101.6 FL (ref 80–99.9)
METER GLUCOSE: 109 MG/DL (ref 74–99)
MONOCYTES ABSOLUTE: 0.56 E9/L (ref 0.1–0.95)
MONOCYTES RELATIVE PERCENT: 7.9 % (ref 2–12)
NEUTROPHILS ABSOLUTE: 4.51 E9/L (ref 1.8–7.3)
NEUTROPHILS RELATIVE PERCENT: 63.5 % (ref 43–80)
PDW BLD-RTO: 12.3 FL (ref 11.5–15)
PLATELET # BLD: 228 E9/L (ref 130–450)
PMV BLD AUTO: 10.6 FL (ref 7–12)
POTASSIUM REFLEX MAGNESIUM: 5.6 MMOL/L (ref 3.5–5)
PROTHROMBIN TIME: 11.8 SEC (ref 9.3–12.4)
RBC # BLD: 3.85 E12/L (ref 3.8–5.8)
SODIUM BLD-SCNC: 133 MMOL/L (ref 132–146)
TOTAL PROTEIN: 7.9 G/DL (ref 6.4–8.3)
WBC # BLD: 7.1 E9/L (ref 4.5–11.5)

## 2020-07-05 PROCEDURE — 85610 PROTHROMBIN TIME: CPT

## 2020-07-05 PROCEDURE — 71045 X-RAY EXAM CHEST 1 VIEW: CPT

## 2020-07-05 PROCEDURE — 82962 GLUCOSE BLOOD TEST: CPT

## 2020-07-05 PROCEDURE — 6370000000 HC RX 637 (ALT 250 FOR IP): Performed by: INTERNAL MEDICINE

## 2020-07-05 PROCEDURE — 6370000000 HC RX 637 (ALT 250 FOR IP): Performed by: STUDENT IN AN ORGANIZED HEALTH CARE EDUCATION/TRAINING PROGRAM

## 2020-07-05 PROCEDURE — 99285 EMERGENCY DEPT VISIT HI MDM: CPT

## 2020-07-05 PROCEDURE — G0378 HOSPITAL OBSERVATION PER HR: HCPCS

## 2020-07-05 PROCEDURE — 85025 COMPLETE CBC W/AUTO DIFF WBC: CPT

## 2020-07-05 PROCEDURE — 80053 COMPREHEN METABOLIC PANEL: CPT

## 2020-07-05 PROCEDURE — 93010 ELECTROCARDIOGRAM REPORT: CPT | Performed by: INTERNAL MEDICINE

## 2020-07-05 PROCEDURE — 2580000003 HC RX 258: Performed by: INTERNAL MEDICINE

## 2020-07-05 PROCEDURE — 70450 CT HEAD/BRAIN W/O DYE: CPT

## 2020-07-05 PROCEDURE — 93005 ELECTROCARDIOGRAM TRACING: CPT | Performed by: STUDENT IN AN ORGANIZED HEALTH CARE EDUCATION/TRAINING PROGRAM

## 2020-07-05 RX ORDER — ATORVASTATIN CALCIUM 40 MG/1
40 TABLET, FILM COATED ORAL NIGHTLY
Status: DISCONTINUED | OUTPATIENT
Start: 2020-07-05 | End: 2020-07-06 | Stop reason: HOSPADM

## 2020-07-05 RX ORDER — PROMETHAZINE HYDROCHLORIDE 25 MG/1
12.5 TABLET ORAL EVERY 6 HOURS PRN
Status: DISCONTINUED | OUTPATIENT
Start: 2020-07-05 | End: 2020-07-06 | Stop reason: HOSPADM

## 2020-07-05 RX ORDER — DIMENHYDRINATE 50 MG
4800 TABLET ORAL 2 TIMES DAILY
Status: DISCONTINUED | OUTPATIENT
Start: 2020-07-05 | End: 2020-07-05 | Stop reason: CLARIF

## 2020-07-05 RX ORDER — ONDANSETRON 2 MG/ML
4 INJECTION INTRAMUSCULAR; INTRAVENOUS EVERY 6 HOURS PRN
Status: DISCONTINUED | OUTPATIENT
Start: 2020-07-05 | End: 2020-07-06 | Stop reason: HOSPADM

## 2020-07-05 RX ORDER — CLOPIDOGREL BISULFATE 75 MG/1
75 TABLET ORAL DAILY
Status: DISCONTINUED | OUTPATIENT
Start: 2020-07-05 | End: 2020-07-06 | Stop reason: HOSPADM

## 2020-07-05 RX ORDER — SODIUM CHLORIDE 0.9 % (FLUSH) 0.9 %
10 SYRINGE (ML) INJECTION EVERY 12 HOURS SCHEDULED
Status: DISCONTINUED | OUTPATIENT
Start: 2020-07-05 | End: 2020-07-06 | Stop reason: HOSPADM

## 2020-07-05 RX ORDER — ASPIRIN 81 MG/1
81 TABLET ORAL DAILY
Status: DISCONTINUED | OUTPATIENT
Start: 2020-07-06 | End: 2020-07-06 | Stop reason: HOSPADM

## 2020-07-05 RX ORDER — SPIRONOLACTONE 25 MG/1
25 TABLET ORAL DAILY
Status: DISCONTINUED | OUTPATIENT
Start: 2020-07-05 | End: 2020-07-06 | Stop reason: HOSPADM

## 2020-07-05 RX ORDER — ASPIRIN 300 MG/1
300 SUPPOSITORY RECTAL DAILY
Status: DISCONTINUED | OUTPATIENT
Start: 2020-07-06 | End: 2020-07-06 | Stop reason: HOSPADM

## 2020-07-05 RX ORDER — SODIUM CHLORIDE 0.9 % (FLUSH) 0.9 %
10 SYRINGE (ML) INJECTION PRN
Status: DISCONTINUED | OUTPATIENT
Start: 2020-07-05 | End: 2020-07-06 | Stop reason: HOSPADM

## 2020-07-05 RX ORDER — CLOPIDOGREL BISULFATE 75 MG/1
75 TABLET ORAL ONCE
Status: COMPLETED | OUTPATIENT
Start: 2020-07-05 | End: 2020-07-05

## 2020-07-05 RX ORDER — ASPIRIN 81 MG/1
324 TABLET, CHEWABLE ORAL ONCE
Status: COMPLETED | OUTPATIENT
Start: 2020-07-05 | End: 2020-07-05

## 2020-07-05 RX ORDER — METOPROLOL SUCCINATE 50 MG/1
50 TABLET, EXTENDED RELEASE ORAL DAILY
Status: DISCONTINUED | OUTPATIENT
Start: 2020-07-05 | End: 2020-07-06 | Stop reason: HOSPADM

## 2020-07-05 RX ORDER — POLYETHYLENE GLYCOL 3350 17 G/17G
17 POWDER, FOR SOLUTION ORAL DAILY PRN
Status: DISCONTINUED | OUTPATIENT
Start: 2020-07-05 | End: 2020-07-06 | Stop reason: HOSPADM

## 2020-07-05 RX ORDER — LOSARTAN POTASSIUM 25 MG/1
25 TABLET ORAL DAILY
Status: DISCONTINUED | OUTPATIENT
Start: 2020-07-05 | End: 2020-07-06 | Stop reason: HOSPADM

## 2020-07-05 RX ADMIN — SODIUM CHLORIDE, PRESERVATIVE FREE 10 ML: 5 INJECTION INTRAVENOUS at 21:14

## 2020-07-05 RX ADMIN — ASPIRIN 324 MG: 81 TABLET, CHEWABLE ORAL at 13:51

## 2020-07-05 RX ADMIN — ATORVASTATIN CALCIUM 40 MG: 40 TABLET, FILM COATED ORAL at 21:14

## 2020-07-05 RX ADMIN — CLOPIDOGREL BISULFATE 75 MG: 75 TABLET ORAL at 14:14

## 2020-07-05 ASSESSMENT — ENCOUNTER SYMPTOMS
ABDOMINAL PAIN: 0
CONSTIPATION: 0
BACK PAIN: 0
COUGH: 0
NAUSEA: 0
DIARRHEA: 0
VOMITING: 0
SORE THROAT: 0
BLOOD IN STOOL: 0
RHINORRHEA: 0
SHORTNESS OF BREATH: 0

## 2020-07-05 ASSESSMENT — PAIN SCALES - GENERAL
PAINLEVEL_OUTOF10: 0
PAINLEVEL_OUTOF10: 0

## 2020-07-05 NOTE — PROGRESS NOTES
Pharmacy Note    Terry Thomas was ordered flax seed oil capsules. As per the [address] [place] [address], herbals and certain dietary supplements will be discontinued.   The herbal or dietary supplement may be continued after discharge from the hospital.    Noe Wooten, PharmD  07/05/20 3:53 PM

## 2020-07-05 NOTE — ED PROVIDER NOTES
Ashley Best is a 77 y.o. male with a PMHx significant for HLD, CAD, HTN, CVA given tPA 7 days ago who presents for evaluation of right arm and leg tingling, beginning prior to arrival.  The complaint has been persistent, moderate in severity, and worsened by nothing. The patient states that this morning at 0930 he started to cook when he was noticing tingling in his right arm and leg. Patient notes this then caused him to have some weakness and concerned him. Patient notes that since he was discharged he did not get his medications filled as he wanted to talk to his PCP first.     The history is provided by the patient and medical records. Review of Systems   Constitutional: Negative for chills and fever. HENT: Negative for postnasal drip, rhinorrhea and sore throat. Eyes: Negative for visual disturbance. Respiratory: Negative for cough and shortness of breath. Cardiovascular: Negative for chest pain. Gastrointestinal: Negative for abdominal pain, blood in stool, constipation, diarrhea, nausea and vomiting. Genitourinary: Negative for difficulty urinating, dysuria and urgency. Musculoskeletal: Negative for back pain. Skin: Negative for rash. Neurological: Positive for weakness. Negative for dizziness, numbness and headaches. Tingling          Physical Exam  Vitals signs and nursing note reviewed. Constitutional:       General: He is not in acute distress. Appearance: Normal appearance. He is well-developed. He is not ill-appearing. HENT:      Head: Normocephalic and atraumatic. Right Ear: External ear normal.      Left Ear: External ear normal.   Eyes:      General:         Right eye: No discharge. Left eye: No discharge. Extraocular Movements: Extraocular movements intact. Conjunctiva/sclera: Conjunctivae normal.   Neck:      Musculoskeletal: Normal range of motion and neck supple.    Cardiovascular:      Rate and Rhythm: Normal rate and regular rhythm. Heart sounds: Normal heart sounds. No murmur. Pulmonary:      Effort: Pulmonary effort is normal. No respiratory distress. Breath sounds: Normal breath sounds. No stridor. No wheezing. Abdominal:      General: Bowel sounds are normal. There is no distension. Palpations: Abdomen is soft. There is no mass. Tenderness: There is no abdominal tenderness. Skin:     General: Skin is warm and dry. Neurological:      General: No focal deficit present. Mental Status: He is alert and oriented to person, place, and time. Cranial Nerves: No cranial nerve deficit. Motor: No weakness. Coordination: Coordination normal.          Procedures     [facility]     ED Course as of Jul 05 1529   [place] Jul 05, 2020   1351 Went to reevaluate the patient. He is lying in bed in no acute distress. States that he is at this point time feeling symptomatic once again. [BB]   1528 Spoke with Dr. Tyree Lei, discussed the patient. He will admit the patient for observation. [BB]      ED Course User Index  [BB] Mittie Litten, DO      NIH Stroke Scale/Score at time of initial evaluation:  1A: Level of Consciousness 0 - alert; keenly responsive   1B: Ask Month and Age 0 - answers both questions correctly   1C:  Tell Patient To Open and Close Eyes, then Hand  Squeeze 0 - performs both tasks correctly   2: Test Horizontal Extraocular Movements 0 - normal   3: Test Visual Fields 0 - no visual loss   4: Test Facial Palsy 0 - normal symmetric movement   5A: Test Left Arm Motor Drift 0 - no drift, limb holds 90 (or 45) degrees for full 10 seconds   5B: Test Right Arm Motor Drift 0 - no drift, limb holds 90 (or 45) degrees for full 10 seconds   6A: Test Left Leg Motor Drift 0 - no drift; leg holds 30 degree position for full 5 seconds   6B: Test Right Leg Motor Drift 0 - no drift; leg holds 30 degree position for full 5 seconds   7: Test Limb Ataxia   (FNF/Heel-Shin) 0 - absent   8: Test Sensation 0 - normal; no sensory loss   9: Test Language/Aphasia 0 - no aphasia, normal   10: Test Dysarthria 0 - normal   11: Test Extinction/Inattention 0 - no abnormality   Total 0     Leela Guadalupe presents to the ED for evaluation of right UE and LE tingling/numbness. Patient with recent diagnosis of CVA and was given tPA 4 days ago. Differential diagnoses included but not limited to stroke, intracranial bleed, electrolyte abnormality. Workup in the ED revealed negative CT head, labs near normal limits. Patient symptomatic again on repeat examination. He had not filled his medications as he was told to. Patient was given aspirin and plavix for their symptoms. Patient requires continued workup and management of their symptoms and will be admitted to the hospital for further evaluation and treatment.      --------------------------------------------- PAST HISTORY ---------------------------------------------  Past Medical History:  has a past medical history of Acute on chronic systolic congestive heart failure ([facility] Aortic regurgitation, Arthritis, CAD (coronary artery disease), Hypercholesteremia, Hypertension, Ischemic cardiomyopathy, Mitral regurgitation, and STEMI (ST elevation myocardial infarction) ([facility] Past Surgical History:  has a past surgical history that includes Tonsillectomy; Coronary angioplasty (12/12/12); ECHO Compl W Dop Color Flow (12/12/2012); Diagnostic Cardiac Cath Lab Procedure (12/12/12); and Coronary artery bypass graft (12/13/12). Social History:  reports that he has been smoking pipe. He has never used smokeless tobacco. He reports that he does not drink alcohol or use drugs. Family History: family history is not on file. The patients home medications have been reviewed. Allergies: Patient has no known allergies.     -------------------------------------------------- RESULTS -------------------------------------------------    LABS:  Results for orders placed or performed during the hospital encounter of 07/05/20   CBC Auto Differential   Result Value Ref Range    WBC 7.1 4.5 - 11.5 E9/L    RBC 3.85 3.80 - 5.80 E12/L    Hemoglobin 13.5 12.5 - 16.5 g/dL    Hematocrit 39.1 37.0 - 54.0 %    .6 (H) 80.0 - 99.9 fL    MCH 35.1 (H) 26.0 - 35.0 pg    MCHC 34.5 32.0 - 34.5 %    RDW 12.3 11.5 - 15.0 fL    Platelets 562 485 - 818 E9/L    MPV 10.6 7.0 - 12.0 fL    Neutrophils % 63.5 43.0 - 80.0 %    Immature Granulocytes % 0.3 0.0 - 5.0 %    Lymphocytes % 23.1 20.0 - 42.0 %    Monocytes % 7.9 2.0 - 12.0 %    Eosinophils % 4.8 0.0 - 6.0 %    Basophils % 0.4 0.0 - 2.0 %    Neutrophils Absolute 4.51 1.80 - 7.30 E9/L    Immature Granulocytes # 0.02 E9/L    Lymphocytes Absolute 1.64 1.50 - 4.00 E9/L    Monocytes Absolute 0.56 0.10 - 0.95 E9/L    Eosinophils Absolute 0.34 0.05 - 0.50 E9/L    Basophils Absolute 0.03 0.00 - 0.20 E9/L   Comprehensive Metabolic Panel w/ Reflex to MG   Result Value Ref Range    Sodium 133 132 - 146 mmol/L    Potassium reflex Magnesium 5.6 (H) 3.5 - 5.0 mmol/L    Chloride 98 98 - 107 mmol/L    CO2 21 (L) 22 - 29 mmol/L    Anion Gap 14 7 - 16 mmol/L    Glucose 102 (H) 74 - 99 mg/dL    BUN 21 8 - 23 mg/dL    CREATININE 1.0 0.7 - 1.2 mg/dL    GFR Non-African American >60 >=60 mL/min/1.73    GFR African American >60     Calcium 9.2 8.6 - 10.2 mg/dL    Total Protein 7.9 6.4 - 8.3 g/dL    Alb 4.4 3.5 - 5.2 g/dL    Total Bilirubin 0.6 0.0 - 1.2 mg/dL    Alkaline Phosphatase 51 40 - 129 U/L    ALT 21 0 - 40 U/L    AST 42 (H) 0 - 39 U/L   Protime-INR   Result Value Ref Range    Protime 11.8 9.3 - 12.4 sec    INR 1.0    POCT Glucose   Result Value Ref Range    Meter Glucose 109 (H) 74 - 99 mg/dL   EKG 12 Lead   Result Value Ref Range    Ventricular Rate 56 BPM    Atrial Rate 56 BPM    P-R Interval 218 ms    QRS Duration 114 ms    Q-T Interval 456 ms    QTc Calculation (Bazett) 440 ms    P Axis 6 degrees    R Axis -71 degrees    T Axis 113 degrees       RADIOLOGY:  CT Head WO Contrast Final Result      No acute intracranial abnormality. XR CHEST PORTABLE   Final Result   Mild CHF without edema or focal infiltrates. EKG:  This EKG is signed and interpreted by me. Rate: 56  Rhythm: Sinus  Interpretation: no acute changes and non-specific EKG; No ST-Elevations or Depressions  Comparison: non-specific changes compared to previous EKG 07/01/2020    ------------------------- NURSING NOTES AND VITALS REVIEWED ---------------------------  Date / Time Roomed:  7/5/2020 11:00 AM  ED Bed Assignment:  01/01    The nursing notes within the ED encounter and vital signs as below have been reviewed. Patient Vitals for the past 24 hrs:   BP Temp Temp src Pulse Resp SpO2 Height Weight   07/05/20 1416 131/73 97.1 °F (36.2 °C) -- 52 24 99 % -- --   07/05/20 1103 (!) 140/66 97.1 °F (36.2 °C) -- 60 17 98 % 5' 9\" (1.753 m) 166 lb (75.3 kg)   07/05/20 1059 -- 97.1 °F (36.2 °C) Tympanic -- -- -- -- --       Oxygen Saturation Interpretation: Normal    ------------------------------------------ PROGRESS NOTES ------------------------------------------  Counseling:  I have spoken with the patient and discussed todays results, in addition to providing specific details for the plan of care and counseling regarding the diagnosis and prognosis. Their questions are answered at this time and they are agreeable with the plan of admission.    --------------------------------- ADDITIONAL PROVIDER NOTES ---------------------------------  Consultations:  Spoke with Dr. Ese Goodman. Discussed case. They will admit the patient. This patient's ED course included: a personal history and physicial examination, re-evaluation prior to disposition, multiple bedside re-evaluations, continuous pulse oximetry and complex medical decision making and emergency management    This patient has remained hemodynamically stable during their ED course. Diagnosis:  1. Numbness    2.  History of CVA (cerebrovascular accident) Disposition:  Patient's disposition: Admit to telemetry  Patient's condition is stable.              Trudy Strong DO  Resident  07/05/20 7194

## 2020-07-06 VITALS
HEIGHT: 69 IN | DIASTOLIC BLOOD PRESSURE: 63 MMHG | OXYGEN SATURATION: 96 % | BODY MASS INDEX: 25.77 KG/M2 | HEART RATE: 58 BPM | RESPIRATION RATE: 16 BRPM | TEMPERATURE: 97.2 F | SYSTOLIC BLOOD PRESSURE: 116 MMHG | WEIGHT: 174 LBS

## 2020-07-06 LAB
ANION GAP SERPL CALCULATED.3IONS-SCNC: 12 MMOL/L (ref 7–16)
BUN BLDV-MCNC: 21 MG/DL (ref 8–23)
CALCIUM SERPL-MCNC: 9 MG/DL (ref 8.6–10.2)
CHLORIDE BLD-SCNC: 102 MMOL/L (ref 98–107)
CHOLESTEROL, TOTAL: 169 MG/DL (ref 0–199)
CO2: 23 MMOL/L (ref 22–29)
CREAT SERPL-MCNC: 1 MG/DL (ref 0.7–1.2)
GFR AFRICAN AMERICAN: >60
GFR NON-AFRICAN AMERICAN: >60 ML/MIN/1.73
GLUCOSE BLD-MCNC: 93 MG/DL (ref 74–99)
HBA1C MFR BLD: 5.8 % (ref 4–5.6)
HCT VFR BLD CALC: 39.8 % (ref 37–54)
HDLC SERPL-MCNC: 32 MG/DL
HEMOGLOBIN: 13.8 G/DL (ref 12.5–16.5)
LDL CHOLESTEROL CALCULATED: 115 MG/DL (ref 0–99)
MCH RBC QN AUTO: 35.6 PG (ref 26–35)
MCHC RBC AUTO-ENTMCNC: 34.7 % (ref 32–34.5)
MCV RBC AUTO: 102.6 FL (ref 80–99.9)
PDW BLD-RTO: 12.4 FL (ref 11.5–15)
PLATELET # BLD: 217 E9/L (ref 130–450)
PMV BLD AUTO: 10.8 FL (ref 7–12)
POTASSIUM REFLEX MAGNESIUM: 4 MMOL/L (ref 3.5–5)
RBC # BLD: 3.88 E12/L (ref 3.8–5.8)
SODIUM BLD-SCNC: 137 MMOL/L (ref 132–146)
TRIGL SERPL-MCNC: 111 MG/DL (ref 0–149)
VLDLC SERPL CALC-MCNC: 22 MG/DL
WBC # BLD: 7 E9/L (ref 4.5–11.5)

## 2020-07-06 PROCEDURE — 97165 OT EVAL LOW COMPLEX 30 MIN: CPT

## 2020-07-06 PROCEDURE — G0378 HOSPITAL OBSERVATION PER HR: HCPCS

## 2020-07-06 PROCEDURE — 80048 BASIC METABOLIC PNL TOTAL CA: CPT

## 2020-07-06 PROCEDURE — 6370000000 HC RX 637 (ALT 250 FOR IP): Performed by: INTERNAL MEDICINE

## 2020-07-06 PROCEDURE — 2580000003 HC RX 258: Performed by: INTERNAL MEDICINE

## 2020-07-06 PROCEDURE — 97162 PT EVAL MOD COMPLEX 30 MIN: CPT

## 2020-07-06 PROCEDURE — 85027 COMPLETE CBC AUTOMATED: CPT

## 2020-07-06 PROCEDURE — 97530 THERAPEUTIC ACTIVITIES: CPT

## 2020-07-06 PROCEDURE — 83036 HEMOGLOBIN GLYCOSYLATED A1C: CPT

## 2020-07-06 PROCEDURE — 80061 LIPID PANEL: CPT

## 2020-07-06 PROCEDURE — 36415 COLL VENOUS BLD VENIPUNCTURE: CPT

## 2020-07-06 RX ADMIN — METOPROLOL SUCCINATE 50 MG: 50 TABLET, EXTENDED RELEASE ORAL at 09:28

## 2020-07-06 RX ADMIN — SODIUM CHLORIDE, PRESERVATIVE FREE 10 ML: 5 INJECTION INTRAVENOUS at 09:30

## 2020-07-06 RX ADMIN — LOSARTAN POTASSIUM 25 MG: 25 TABLET, FILM COATED ORAL at 09:28

## 2020-07-06 RX ADMIN — SPIRONOLACTONE 25 MG: 25 TABLET ORAL at 09:28

## 2020-07-06 RX ADMIN — ASPIRIN 81 MG: 81 TABLET, COATED ORAL at 09:28

## 2020-07-06 RX ADMIN — ATORVASTATIN CALCIUM 40 MG: 40 TABLET, FILM COATED ORAL at 20:22

## 2020-07-06 RX ADMIN — CLOPIDOGREL BISULFATE 75 MG: 75 TABLET ORAL at 09:28

## 2020-07-06 NOTE — H&P
Adolfo Forbes M.D. History and Physical      CHIEF COMPLAINT:  ***    Reason for Admission:  ***    History Obtained From:  {HISTORY SOURCE:[id number]::\"patient\"}    HISTORY OF PRESENT ILLNESS:      The patient is a 77 y.o. male of Yareli Rivera MD with significant past medical history of *** who presents with ***    Past Medical History:        Diagnosis Date    Acute on chronic systolic congestive heart failure ([facility] 10/18/2018    Aortic regurgitation     trace    Arthritis     CAD (coronary artery disease)     Hypercholesteremia     Hypertension     Ischemic cardiomyopathy     Mitral regurgitation     mild to moderate    STEMI (ST elevation myocardial infarction) ([facility] 12/12/12    anteriolateral     Past Surgical History:        Procedure Laterality Date    CORONARY ANGIOPLASTY  12/12/12    Balloon only to LAD    CORONARY ARTERY BYPASS GRAFT  12/13/12    cabg x 4- Dr. Greg Steinberg CATH LAB PROCEDURE  12/12/12    [facility] cath lab- Dr. Gillian Whitley  12/12/2012         TONSILLECTOMY           Medications Prior to Admission:    Medications Prior to Admission: atorvastatin (LIPITOR) 40 MG tablet, Take 1 tablet by mouth nightly  aspirin 81 MG chewable tablet, Take 1 tablet by mouth daily  clopidogrel (PLAVIX) 75 MG tablet, Take 1 tablet by mouth daily  Cyanocobalamin (B-12 PO), Take by mouth  FOLIC ACID PO, Take by mouth  spironolactone (ALDACTONE) 25 MG tablet, Take 1 tablet by mouth daily  metoprolol succinate (TOPROL XL) 50 MG extended release tablet, Take 1 tablet by mouth daily  losartan (COZAAR) 25 MG tablet, Take 1 tablet by mouth daily  Saw [place] 450 MG CAPS, Take by mouth  Flaxseed, Linseed, (FLAX SEED OIL) 1000 MG CAPS, Take 4,800 mg by mouth 2 times daily   Multiple Vitamins-Minerals (CENTRUM SILVER ULTRA MENS PO), Take 1 tablet by mouth daily. Allergies:  Patient has no known allergies.     Social History: TOBACCO:   reports that he has been smoking pipe. He has never used smokeless tobacco.  ETOH:   reports no history of alcohol use. MARITAL STATUS:    OCCUPATION:      Family History:   No family history on file. REVIEW OF SYSTEMS:    General ROS: {rosgen:[id number]}  Hematological and Lymphatic ROS: negative  Endocrine ROS: negative  Respiratory ROS: no cough, shortness of breath, or wheezing  Cardiovascular ROS: no chest pain or dyspnea on exertion  Gastrointestinal ROS: no abdominal pain, change in bowel habits, or black or bloody stools  Genito-Urinary ROS: no dysuria, trouble voiding, or hematuria  Neurological ROS: no TIA or stroke symptoms  negative    Vitals:  /64   Pulse 55   Temp 98.5 °F (36.9 °C) (Temporal)   Resp 16   Ht 5' 9\" (1.753 m)   Wt 174 lb (78.9 kg)   SpO2 98%   BMI 25.70 kg/m²     PHYSICAL EXAM:  General:  Awake, alert, oriented X 3. Well developed, well nourished, well groomed. No apparent distress. HEENT:  Normocephalic, atraumatic. Pupils equal, round, reactive to light. No scleral icterus. No conjunctival injection. Normal lips, teeth, and gums. No nasal discharge. Neck:  Supple  Heart:  RRR, no murmurs, gallops, rubs, carotid upstroke normal, no carotid bruits  Lungs:  CTA bilaterally, bilat symmetrical expansion, no wheeze, rales, or rhonchi  Abdomen:   Bowel sounds present, soft, nontender, no masses, no organomegaly, no peritoneal signs  Extremities:  No clubbing, cyanosis, or edema  Skin:  Warm and dry, no open lesions or rash  Neuro:  Cranial nerves 2-12 intact, no focal deficits  Breast: deferred  Rectal: deferred  Genitalia:  deferred      DATA:     Recent Labs     07/05/20  1213 07/06/20  0357   WBC 7.1 7.0   HGB 13.5 13.8    217     Recent Labs     07/05/20  1213 07/06/20  0357    137   K 5.6* 4.0   BUN 21 21   CREATININE 1.0 1.0     Recent Labs     07/05/20  1213   PROT 7.9   INR 1.0     Recent Labs     07/05/20  1213   AST 42*   ALT 21

## 2020-07-06 NOTE — PROGRESS NOTES
Pt demonstrated skill Pt requires further education in this area   Yes  With cueing Yes     ASSESSMENT:    Comments:   Pt was supine in bed upon room entry, agreeable to PT evaluation. Pt ambulates with normal gait speed and mild unsteadiness. Pt reported he had difficulty clearing R foot when ambulating. Pt negotiated stairs with reciprocal step pattern when descending and ascending. Pt tripped when ascending stairs when R toes did not clear step. Pt was instructed to use nonreciprocal step pattern in the future. Pt ambulated back to room and returned to bed. Pt was left supine in bed with all needs met at conclusion of session. Treatment:  Patient practiced and was instructed in the following treatment:     Therapeutic activities: Pt completed all therapeutic activities noted above. Pt was cued for safety when ambulating in hallway. Pt was cued to use nonreciprocal step pattern on stairs. Pt's/family goals:   1. To return home. Patient and or family understand(s) diagnosis, prognosis, and plan of care. Yes. PLAN:    PT care will be provided in accordance with the objectives noted above. Exercises and functional mobility practice will be used as well as appropriate assistive devices or modalities to obtain goals. Patient and family education will also be administered as needed. Frequency of treatments: 2-5x/week x 2-3 days. Time in: 1615  Time out: 1635    Total Treatment Time 10 minutes     Evaluation Time includes thorough review of current medical information, gathering information on past medical history/social history and prior level of function, completion of standardized testing/informal observation of tasks, assessment of data and education on plan of care and goals.     CPT codes:  [] Low Complexity PT evaluation 48764  [x] Moderate Complexity PT evaluation 96516  [] High Complexity PT evaluation 02341  [] PT Re-evaluation 43684  [] Gait training 76943 0 minutes  [] Manual therapy [id number] 0 minutes  [x] Therapeutic activities 03974 10 minutes  [] Therapeutic exercises 23025 0 minutes  [] Neuromuscular reeducation 80158 0 minutes     Mendel Pinto, PT, DPT  [id number]

## 2020-07-06 NOTE — H&P
Subjective:  Full h/p deferred as pt was just discharged 48 hours ago and now presents again due to noncompliance with medications. He indicates he was not able to get his aspirin Plavix and statin after he got home and was doing great. He then started having numbness and tingling's of right upper extremity right lower extremity and right side of the face. He therefore came back into the ER. The patient is awake and alert. No acute events overnight. Denies chest pain, angina, SOB     Objective:    /65   Pulse 65   Temp 98.4 °F (36.9 °C) (Temporal)   Resp 16   Ht 5' 9\" (1.753 m)   Wt 174 lb (78.9 kg)   SpO2 96%   BMI 25.70 kg/m²     In: 720 [P.O.:720]  Out: 250     HEENT: NCAT,  PERRLA, No JVD  Heart:  RRR, no murmurs, gallops, or rubs.   Lungs:  CTA bilaterally, no wheeze, rales or rhonchi  Abd: bowel sounds present, nontender, nondistended, no masses  Extrem:  No clubbing, cyanosis, or edema  Neurological exam- no motor neurological deficits intact strength bilateral upper and lower extremities, cranial nerves II through XII intact  Recent Labs     07/05/20  1213 07/06/20  0357   WBC 7.1 7.0   HGB 13.5 13.8   HCT 39.1 39.8    217       Recent Labs     07/05/20  1213 07/06/20  0357    137   K 5.6* 4.0   CL 98 102   CO2 21* 23   BUN 21 21   CREATININE 1.0 1.0   CALCIUM 9.2 9.0       Assessment:    Patient Active Problem List   Diagnosis    CAD (coronary artery disease)    Aortic regurgitation    Mitral regurgitation    Acute respiratory failure (HCC)    Hyperlipidemia LDL goal <100    Essential hypertension    [facility]ischemic cardiomyopathy) ([facility]    Acute CVA (cerebrovascular accident) ([facility]    Noncompliance with medications    TIA (transient ischemic attack)       Plan:    Admitted to telemetry for evaluation of recurrent symptoms secondary to not taking medications as ordered on previous admission  Discussed the importance of medication compliance in order to prevent an ischemic event  He voices understanding  Resume aspirin, Plavix, statin  Await PT evaluation recommendations on home versus rehab    Okay for discharge from medicine standpoint  DVT Prophylaxis   PT/OT  Discharge planning       All consultants notes reviewed    Nikky Ames MD  9:43 AM  7/6/2020

## 2020-07-06 NOTE — CARE COORDINATION
7/6 Transition of Care: Patient is alert and oriented. He lives alone in a one story home with some steps from the garage. He also has steps to the basement. There are rails. The bathroom are on the first floor. He does not use assistive devices. He does drive the car. He as support from his ex wife and children. He states he did not fill his scripts from his prior admission as he wanted to see his pcp first due to the cost of the plavix. He states he was able to get the script thru [facility] and it is being filled now. His pcp is Dr Cyril Licona and his pharmacy is [facility] in [place]. Will follow for further information.   [phone]

## 2020-07-06 NOTE — DISCHARGE SUMMARY
Admitted less than 48 hrs   See h/p   Encouraged pt to  his meds and take them   Risk factor mods discussed

## 2020-07-06 NOTE — PROGRESS NOTES
by Assist   Independent    Bathing Stand by Assist  Independent    Toileting Stand by Assist   Independent    Bed Mobility  Supine to sit: Supervision  Sit to supine: supervision   Supine to sit: Independent   Sit to supine: Independent    Functional Transfers Stand by Assist   Independent    Functional Mobility Stand by Assist w/ no AD (mildly unsteady)  Modified [place]    Balance Sitting:     Static: Sup. Dynamic:SBA  Standing: w/ no AD    Static: SBA    Dynamic: SBA     Activity Tolerance Fair+  good   Visual/  Perceptual Glasses: readers    Pt reports no c/o with vision                  Hand dominance: R     Strength ROM Additional Info:    RUE  4/5  WFL good  and wfl FMC/dexterity noted during ADL tasks       LUE 4/5  WFL good  and wfl FMC/dexterity noted during ADL tasks         Hearing: [employer] [facility]   Sensation:  Pt c/o numbness/tingling in R arm/leg (sensation intact)  Tone: WFL   Edema: none noted            Treatment: Upon arrival, patient lying in bed and agreeable to OT session at this time. RN approved. Therapist facilitated bed mobility, functional transfers (EOB, sit<>stand, commode, sit<>stand), standing tolerance tasks and functional mobility task with no AD(to/from bathroom). Therapist facilitated self-care retraining: UB/LB self-care tasks(educated pt on cross-over leg technique to increase independence during ADLs/safety), simulated toileting task(in bathroom) and standing grooming task(washed face/hands at sink) while educating pt on modified techniques, posture, safety and energy conservation techniques. Skilled monitoring of HR, O2 sats and pts response to treatment. At end of session, patient lying in bed per pt's request with call light and phone within reach, all lines and tubes intact. Comments:  Overall pt demonstrated mild decreased independence and safety during completion of ADL/functional transfers/mobility tasks.  Pt demonstrating fair+ understanding of education/techniques, requiring additional training / education. Pt would benefit from continued skilled OT to increase functional independence and quality of life. Eval Complexity: Low    (Evaluation time includes thorough review of current medical information, gathering information on past medical history/social history and prior level of function, completion of standardized testing/informal observation of tasks, assessment of data, and development of POC/Goals.)      Assessment of current deficits   Functional mobility [x]  ADLs [x] Strength [x]  Cognition []  Functional transfers  [x] IADLs [x] Safety Awareness [x]  Endurance [x]  Fine Motor Coordination [] Balance [x] Vision/perception [] Sensation []   Gross Motor Coordination [] ROM [] Delirium []                  Motor Control []    Plan of Care (5-7 days):   ADL retraining [x]   Equipment needs [x]   Neuromuscular re-education [x] Energy Conservation Techniques [x]  Functional Transfer training [x] Patient and/or Family Education [x]  Functional Mobility training [x]  Environmental Modifications [x]  Cognitive re-training []   Compensatory techniques for ADLs [x]  Splinting Needs []   Positioning to improve overall function [x]   Therapeutic Activity [x]  Therapeutic Exercise  [x]  Visual/Perceptual: []    Delirium prevention/treatment  []   Other:  []    Rehab Potential: Good for established goals     Patient / Family Goal: Not stated     Patient and/or family were instructed diagnosis, prognosis/goals and plan of care. Demonstrated good understanding.       Low Evaluation +   Treatment Time In: 9:10            Treatment Time Out: 9:15              Treatment Charges: Mins Units   Ther Ex  13924     Manual Therapy [id number]     Thera Activities 16148     ADL/Home Mgt [id number]    Neuro Re-ed 64742     Group Therapy      Orthotic manage/training  18552     Non-Billable Time     Total Timed Treatment [address], OTR/L #[id number]

## 2020-07-09 ENCOUNTER — TELEPHONE (OUTPATIENT)
Dept: NEUROLOGY | Age: 75
End: 2020-07-09

## 2020-07-09 NOTE — TELEPHONE ENCOUNTER
Referral for [facility] faxed to office. Lm for patient to call office to schedule an appointment.

## 2020-07-15 ENCOUNTER — HOSPITAL ENCOUNTER (OUTPATIENT)
Age: 75
Discharge: HOME | End: 2020-07-15
Payer: MEDICARE

## 2020-07-31 ENCOUNTER — OFFICE VISIT (OUTPATIENT)
Dept: NEUROLOGY | Age: 75
End: 2020-07-31
Payer: MEDICARE

## 2020-07-31 VITALS
SYSTOLIC BLOOD PRESSURE: 119 MMHG | HEIGHT: 69 IN | RESPIRATION RATE: 18 BRPM | HEART RATE: 69 BPM | DIASTOLIC BLOOD PRESSURE: 59 MMHG | WEIGHT: 166 LBS | BODY MASS INDEX: 24.59 KG/M2 | TEMPERATURE: 97.5 F | OXYGEN SATURATION: 98 %

## 2020-07-31 PROCEDURE — G8427 DOCREV CUR MEDS BY ELIG CLIN: HCPCS | Performed by: NURSE PRACTITIONER

## 2020-07-31 PROCEDURE — 4040F PNEUMOC VAC/ADMIN/RCVD: CPT | Performed by: NURSE PRACTITIONER

## 2020-07-31 PROCEDURE — 3017F COLORECTAL CA SCREEN DOC REV: CPT | Performed by: NURSE PRACTITIONER

## 2020-07-31 PROCEDURE — 4004F PT TOBACCO SCREEN RCVD TLK: CPT | Performed by: NURSE PRACTITIONER

## 2020-07-31 PROCEDURE — G8420 CALC BMI NORM PARAMETERS: HCPCS | Performed by: NURSE PRACTITIONER

## 2020-07-31 PROCEDURE — 99215 OFFICE O/P EST HI 40 MIN: CPT | Performed by: NURSE PRACTITIONER

## 2020-07-31 PROCEDURE — 1111F DSCHRG MED/CURRENT MED MERGE: CPT | Performed by: NURSE PRACTITIONER

## 2020-07-31 PROCEDURE — 1123F ACP DISCUSS/DSCN MKR DOCD: CPT | Performed by: NURSE PRACTITIONER

## 2020-07-31 NOTE — PROGRESS NOTES
difficulties    ROS otherwise negative    Objective:     BP (!) 119/59 (Site: Left Upper Arm, Position: Sitting, Cuff Size: Medium Adult)   Pulse 69   Temp 97.5 °F (36.4 °C) (Oral)   Resp 18   Ht 5' 9\" (1.753 m)   Wt 166 lb (75.3 kg)   SpO2 98%   BMI 24.51 kg/m²     General Appearance: alert, cooperative, no distress, appears stated age    Head: normocephalic, without obvious abnormality, atraumatic    Eyes: conjunctiva/corneas clear    Neck: supple, symmetrical, trachea midline, no carotid bruit    Lungs: clear to auscultation bilaterally, respirations unlabored    Heart: regular rate and rhythm, S1 and S2 normal   Extremities: normal, atraumatic, no cyanosis or edema   Pulses: 2+ and symmetric all extremities   Skin: color, texture and turgor normal, no rashes or lesions     Mental Status: alert and oriented, thought content appropriate  Speech: no dysarthria  Language: no aphasia    Cranial Nerves:  I: smell NA   II: visual acuity  NA   II: visual fields Full    II: pupils Equal and reactive    III,VII: ptosis None   III,IV,VI: extraocular muscles  Full ROM without nystagmus   V: mastication Normal   V: facial light touch sensation  Normal   V,VII: corneal reflex     VII: facial muscle function - upper  Normal   VII: facial muscle function - lower Normal   VIII: hearing Normal   IX: soft palate elevation  Normal   IX,X: gag reflex    XI: trapezius strength  5/5   XI: sternocleidomastoid strength 5/5   XI: neck extension strength  5/5   XII: tongue strength  Normal     Motor:  5/5 throughout  No abnormal movements  No drift   Normal bulk and tone     Sensory:  LT and PP normal   Vibration normal    Coordination:   FN, FFM and ANGELA symmetric  HS normal    Gait:  Normal    DTR:   1 + throughout  No Davidson's  No other pathological reflexes      Laboratory/Radiology:     CBC:   Lab Results   Component Value Date    WBC 7.0 07/06/2020    RBC 3.88 07/06/2020    HGB 13.8 07/06/2020    HCT 39.8 07/06/2020    MCV 102.6 07/06/2020    MCH 35.6 07/06/2020    MCHC 34.7 07/06/2020    RDW 12.4 07/06/2020     07/06/2020    MPV 10.8 07/06/2020     CMP:    Lab Results   Component Value Date     07/06/2020    K 4.0 07/06/2020     07/06/2020    CO2 23 07/06/2020    BUN 21 07/06/2020    CREATININE 1.0 07/06/2020    GFRAA >60 07/06/2020    LABGLOM >60 07/06/2020    GLUCOSE 93 07/06/2020    PROT 7.9 07/05/2020    LABALBU 4.4 07/05/2020    CALCIUM 9.0 07/06/2020    BILITOT 0.6 07/05/2020    ALKPHOS 51 07/05/2020    AST 42 07/05/2020    ALT 21 07/05/2020     HgBA1c:    Lab Results   Component Value Date    LABA1C 5.8 07/06/2020     FLP:    Lab Results   Component Value Date    TRIG 111 07/06/2020    HDL 32 07/06/2020    LDLCALC 115 07/06/2020    LABVLDL 22 07/06/2020     CTA head/neck\",\"MRA head/neck\", \"Carotid ultrasound\")  1.  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 .    2. Dolichoectasia of the basilar artery. 3. The perfusion study fails to demonstrate a cord infarct or ischemic    penumbra.              MRI brain:  Diffuse atrophy likely age related    Findings compatible with small vessel ischemic changes. Findings compatible with a lacunar infarct, likely old. * Images and labs personally reviewed at the time of this office visit    Assessment/Plan:     Acute onset of slurred speech and right leg weakness with resolution after TPA administration. MRI of the brain without evidence of acute infarct. Possible stroke aborted by TPA in a gentleman with significant vascular risk factors including coronary artery disease, hypertension, and hyperlipidemia. He remains on baby aspirin daily for secondary stroke prevention. Recommend further risk stratification with echocardiogram.  He has recently changed his flaxseed regimen to statin therapy for his hyperlipidemia.   He was strongly encouraged to quit smoking and reducing his tobacco use. Follow up with neurology in 4 to 6 months  Follow up with PCP as planned    Advised patient that you can reduce your risk for stroke/TIA by modifying/controlling the risk factors that you have. Patient advised to take the medications as prescribed, which will be detailed in the discharge instructions, and to not stop taking them without consulting their physician. In addition, pt. advised to maintain a healthy diet, exercise regularly and to not smoke. Chris Koroma MSN, APRN, FNP-C  9:44 AM  7/31/20    I spent 45 minutes with this patient obtaining the HPI and discussing test results and exam with 50% of the visit counseling the patient and/or family on diagnosis, risk factor modification and our plan of action. All questions were answered prior to leaving my office.

## 2020-08-06 ENCOUNTER — TELEPHONE (OUTPATIENT)
Dept: NEUROLOGY | Age: 75
End: 2020-08-06

## 2020-08-11 ENCOUNTER — TELEPHONE (OUTPATIENT)
Dept: CARDIOLOGY | Age: 75
End: 2020-08-11

## 2020-08-12 ENCOUNTER — HOSPITAL ENCOUNTER (OUTPATIENT)
Dept: CARDIOLOGY | Age: 75
Discharge: HOME | End: 2020-08-12
Payer: MEDICARE

## 2020-08-12 LAB
LV EF: 30 %
LVEF MODALITY: NORMAL

## 2020-08-12 PROCEDURE — 2580000003 HC RX 258: Performed by: INTERNAL MEDICINE

## 2020-08-12 PROCEDURE — 93306 TTE W/DOPPLER COMPLETE: CPT

## 2020-08-12 PROCEDURE — 6360000004 HC RX CONTRAST MEDICATION: Performed by: INTERNAL MEDICINE

## 2020-08-12 RX ORDER — SODIUM CHLORIDE 0.9 % (FLUSH) 0.9 %
10 SYRINGE (ML) INJECTION PRN
Status: DISCONTINUED | OUTPATIENT
Start: 2020-08-12 | End: 2020-08-13 | Stop reason: HOSPADM

## 2020-08-12 RX ADMIN — SODIUM CHLORIDE, PRESERVATIVE FREE 10 ML: 5 INJECTION INTRAVENOUS at 14:57

## 2020-08-12 RX ADMIN — PERFLUTREN 1.1 MG: 6.52 INJECTION, SUSPENSION INTRAVENOUS at 15:03

## 2020-08-12 RX ADMIN — SODIUM CHLORIDE, PRESERVATIVE FREE 10 ML: 5 INJECTION INTRAVENOUS at 15:03

## 2020-08-20 ENCOUNTER — TELEPHONE (OUTPATIENT)
Dept: NEUROLOGY | Age: 75
End: 2020-08-20

## 2020-08-20 NOTE — TELEPHONE ENCOUNTER
Calling for results of Echo done on 8/12/2020.   Electronically signed by Florie Krabbe on 8/20/20 at 11:34 AM EDT

## 2020-08-26 NOTE — TELEPHONE ENCOUNTER
His echocardiogram is stable compared to the one he had completed in 10/2018. He follows with Dr. Yonathan Benson as his cardiologist. He may benefit from extended cardiac monitoring with a Holter monitor or loop recorder for look for any potential afib--but will defer this decision to his cardiologist.     This can be further discussed at his office visit. He last saw Klaudia Reyes in stroke clinic-- please check if he has a f/u appt with OP neuro office. Thank you.

## 2020-10-07 ENCOUNTER — OFFICE VISIT (OUTPATIENT)
Dept: CARDIOLOGY CLINIC | Age: 75
End: 2020-10-07
Payer: MEDICARE

## 2020-10-07 VITALS
DIASTOLIC BLOOD PRESSURE: 60 MMHG | RESPIRATION RATE: 16 BRPM | HEIGHT: 69 IN | SYSTOLIC BLOOD PRESSURE: 124 MMHG | HEART RATE: 80 BPM | WEIGHT: 175.9 LBS | BODY MASS INDEX: 26.05 KG/M2

## 2020-10-07 PROCEDURE — 3017F COLORECTAL CA SCREEN DOC REV: CPT | Performed by: INTERNAL MEDICINE

## 2020-10-07 PROCEDURE — 1123F ACP DISCUSS/DSCN MKR DOCD: CPT | Performed by: INTERNAL MEDICINE

## 2020-10-07 PROCEDURE — 99214 OFFICE O/P EST MOD 30 MIN: CPT | Performed by: INTERNAL MEDICINE

## 2020-10-07 PROCEDURE — 4004F PT TOBACCO SCREEN RCVD TLK: CPT | Performed by: INTERNAL MEDICINE

## 2020-10-07 PROCEDURE — 93000 ELECTROCARDIOGRAM COMPLETE: CPT | Performed by: INTERNAL MEDICINE

## 2020-10-07 PROCEDURE — G8484 FLU IMMUNIZE NO ADMIN: HCPCS | Performed by: INTERNAL MEDICINE

## 2020-10-07 PROCEDURE — 4040F PNEUMOC VAC/ADMIN/RCVD: CPT | Performed by: INTERNAL MEDICINE

## 2020-10-07 PROCEDURE — G8427 DOCREV CUR MEDS BY ELIG CLIN: HCPCS | Performed by: INTERNAL MEDICINE

## 2020-10-07 PROCEDURE — G8417 CALC BMI ABV UP PARAM F/U: HCPCS | Performed by: INTERNAL MEDICINE

## 2020-10-07 NOTE — PROGRESS NOTES
Jose Mya  1945  Date of Service: 10/7/2020    Patient Active Problem List    Diagnosis Date Noted    Noncompliance with medications 07/05/2020    TIA (transient ischemic attack) 07/05/2020    [facility]A [facility]cerebrovascular accident) ([facility] 07/01/2020    NICM (nonischemic cardiomyopathy) ([facility] 04/29/2019     Overview Note:     Echo 10-18  EF 25%    He refuses an ICD      Acute respiratory failure ([facility] 10/18/2018    Hyperlipidemia LDL goal <100 10/18/2018    Essential hypertension 10/18/2018    Mitral regurgitation      Overview Note:     moderate      Aortic regurgitation      Overview Note:     trace      CAD (coronary artery disease) 12/12/2012     Overview Note:     12-13-12 anterior STEMI  POBO LAD  CABG [place]-LAD   Free EL-diag   SVG-PLB   SVG-OM         Social History     Socioeconomic History    Marital status: Single     Spouse name: None    Number of children: None    Years of education: None    Highest education level: None   Occupational History    None   Social Needs    Financial resource strain: None    Food insecurity     Worry: None     Inability: None    Transportation needs     Medical: None     Non-medical: None   Tobacco Use    Smoking status: Current Some Day Smoker     Types: Pipe    Smokeless tobacco: Never Used    Tobacco comment: quit cigarettes 35yrs ago   Substance and Sexual Activity    Alcohol use: No    Drug use: No    Sexual activity: None   Lifestyle    Physical activity     Days per week: None     Minutes per session: None    Stress: None   Relationships    Social connections     Talks on phone: None     Gets together: None     Attends Holiness service: None     Active member of club or organization: None     Attends meetings of clubs or organizations: None     Relationship status: None    Intimate partner violence     Fear of current or ex partner: None     Emotionally abused: None     Physically abused: None     Forced sexual activity: None Other Topics Concern    None   Social History Narrative    None       Current Outpatient Medications   Medication Sig Dispense Refill    atorvastatin (LIPITOR) 40 MG tablet Take 1 tablet by mouth nightly 30 tablet 3    aspirin 81 MG chewable tablet Take 1 tablet by mouth daily 30 tablet 3    Cyanocobalamin (B-12 PO) Take by mouth      FOLIC ACID PO Take by mouth      spironolactone (ALDACTONE) 25 MG tablet Take 1 tablet by mouth daily 90 tablet 3    metoprolol succinate (TOPROL XL) 50 MG extended release tablet Take 1 tablet by mouth daily 90 tablet 3    losartan (COZAAR) 25 MG tablet Take 1 tablet by mouth daily 90 tablet 3    Saw [place] 450 MG CAPS Take by mouth      Multiple Vitamins-Minerals (CENTRUM SILVER ULTRA MENS PO) Take 1 tablet by mouth daily. No current facility-administered medications for this visit. No Known Allergies    Chief Complaint:  Reinaldo Walters is here today for follow up and management/recomendations for CAD, ICM, HTN, AR, MR. History of Present Illness: Reinaldo Walters states that He does house work, goes up the stairs, does light yard work, & goes shopping. He denies any chest discomfort, dyspnea on exertion, orthopnea/PND, or lower extremity edema. He denies any palpitations or presyncopal symptoms. He denies orthopnea/PND. REVIEW OF SYSTEMS:  As above. Patient does not complain of any fever, chills, nausea, vomiting or diarrhea. No focal, motor or neurological deficits. No changes in hearing, bowel or bladder habits. He is not known to have a history of thyroid problems. No recent nose bleeds. PHYSICAL EXAM:  Vitals:    10/07/20 0910   BP: 124/60   Pulse: 80   Resp: 16   Weight: 175 lb 14.4 oz (79.8 kg)   Height: 5' 9\" (1.753 m)       GENERAL:  He is alert and oriented x 3, communicates well, in no distress. NECK:  No masses, trachea is mid position. Supple, full ROM, no JVD or bruits. No palpable thyromegaly or lymphadenopathy. HEART:  Regular rate and rhythm. Normal S1 and S2. There is an S4 gallop and a I/VI systolic murmur. Mildly distant to auscultation. LUNGS:  Clear to auscultation bilaterally. No use of accessory muscles. symmetrical excursion. Mildly decreased air movement. ABDOMEN:  Soft, non-tender. Normal bowel sounds. EXTREMITIES:  Full ROM x 4. No bilateral lower extremity edema. Good distal pulses. EYES:  Extraocular muscles intact. PERRL. Normal lids & conjunctiva. ENT:  Nares are clear & not bleeding. Moist mucosa. Normal lips formation. No external masses   NEURO: no tremors, full ROM x 4, EOMI. SKIN:  Warm, dry and intact. Normal turgor. EKG: Sinus rhythm, 80 bpm, LAFB, nonspecific ST - T wave changes. Poor r-wave progression. right bundle-branch block. Assessment:   1. Coronary artery disease as outlined above. No symptoms of recurring ischemia today. 2. Cardiomyopathy as outlined above. Well compensated at this time. 3. Trace AR  4. Mild to moderate MR.  moderate on echo 10-18. 5. Hypertension. 6. Hypercholesterolemia        Recommendations:  1. He is following the cholesterol with Dr. Obrien Collingsworth. He still refuses a statin. 2. He continues to defer an ICD  3. Increase the losartan to 37.5 mg daily. 4. BMP in 1 week. Thank you for allowing me to participate in your patient's care.       [id number], [address] [facility]  Interventional Cardiology

## 2020-10-12 ENCOUNTER — TELEPHONE (OUTPATIENT)
Dept: CARDIOLOGY CLINIC | Age: 75
End: 2020-10-12

## 2020-10-12 NOTE — TELEPHONE ENCOUNTER
Patient states he can't break his losartan to equal the 37. 5. he states it breaks into pieces. Please advise.

## 2020-11-05 ENCOUNTER — TELEPHONE (OUTPATIENT)
Dept: CARDIOLOGY CLINIC | Age: 75
End: 2020-11-05

## 2020-11-05 RX ORDER — LOSARTAN POTASSIUM 25 MG/1
25 TABLET ORAL 2 TIMES DAILY
COMMUNITY
End: 2020-11-11

## 2020-11-12 RX ORDER — LOSARTAN POTASSIUM 25 MG/1
25 TABLET ORAL 2 TIMES DAILY
Qty: 180 TABLET | Refills: 3 | Status: SHIPPED
Start: 2020-11-12 | End: 2021-10-20

## 2020-11-12 RX ORDER — METOPROLOL SUCCINATE 50 MG/1
TABLET, EXTENDED RELEASE ORAL
Qty: 90 TABLET | Refills: 3 | Status: SHIPPED
Start: 2020-11-12 | End: 2021-10-20 | Stop reason: SDUPTHER

## 2020-12-02 DIAGNOSIS — I10 ESSENTIAL HYPERTENSION: ICD-10-CM

## 2021-06-14 ENCOUNTER — HOSPITAL ENCOUNTER (OUTPATIENT)
Age: 76
Discharge: HOME | End: 2021-06-16
Payer: MEDICARE

## 2021-06-14 ENCOUNTER — HOSPITAL ENCOUNTER (OUTPATIENT)
Dept: GENERAL RADIOLOGY | Age: 76
Discharge: HOME | End: 2021-06-16
Payer: MEDICARE

## 2021-06-14 DIAGNOSIS — R06.02 SOB (SHORTNESS OF BREATH): ICD-10-CM

## 2021-06-14 PROCEDURE — 71046 X-RAY EXAM CHEST 2 VIEWS: CPT

## 2021-08-10 RX ORDER — SPIRONOLACTONE 25 MG/1
25 TABLET ORAL DAILY
Qty: 90 TABLET | Refills: 3 | Status: SHIPPED
Start: 2021-08-10 | End: 2021-10-20 | Stop reason: SDUPTHER

## 2021-10-20 ENCOUNTER — OFFICE VISIT (OUTPATIENT)
Dept: CARDIOLOGY CLINIC | Age: 76
End: 2021-10-20
Payer: MEDICARE

## 2021-10-20 VITALS
WEIGHT: 178.6 LBS | SYSTOLIC BLOOD PRESSURE: 124 MMHG | RESPIRATION RATE: 18 BRPM | HEIGHT: 69 IN | DIASTOLIC BLOOD PRESSURE: 70 MMHG | BODY MASS INDEX: 26.45 KG/M2 | HEART RATE: 70 BPM

## 2021-10-20 DIAGNOSIS — I10 PRIMARY HYPERTENSION: ICD-10-CM

## 2021-10-20 DIAGNOSIS — I25.119 CORONARY ARTERY DISEASE INVOLVING NATIVE HEART WITH ANGINA PECTORIS, UNSPECIFIED VESSEL OR LESION TYPE (HCC): Primary | ICD-10-CM

## 2021-10-20 PROCEDURE — 99214 OFFICE O/P EST MOD 30 MIN: CPT | Performed by: INTERNAL MEDICINE

## 2021-10-20 PROCEDURE — 4004F PT TOBACCO SCREEN RCVD TLK: CPT | Performed by: INTERNAL MEDICINE

## 2021-10-20 PROCEDURE — 3017F COLORECTAL CA SCREEN DOC REV: CPT | Performed by: INTERNAL MEDICINE

## 2021-10-20 PROCEDURE — G8417 CALC BMI ABV UP PARAM F/U: HCPCS | Performed by: INTERNAL MEDICINE

## 2021-10-20 PROCEDURE — 1123F ACP DISCUSS/DSCN MKR DOCD: CPT | Performed by: INTERNAL MEDICINE

## 2021-10-20 PROCEDURE — 93000 ELECTROCARDIOGRAM COMPLETE: CPT | Performed by: INTERNAL MEDICINE

## 2021-10-20 PROCEDURE — G8427 DOCREV CUR MEDS BY ELIG CLIN: HCPCS | Performed by: INTERNAL MEDICINE

## 2021-10-20 PROCEDURE — 4040F PNEUMOC VAC/ADMIN/RCVD: CPT | Performed by: INTERNAL MEDICINE

## 2021-10-20 PROCEDURE — G8484 FLU IMMUNIZE NO ADMIN: HCPCS | Performed by: INTERNAL MEDICINE

## 2021-10-20 RX ORDER — METOPROLOL SUCCINATE 50 MG/1
50 TABLET, EXTENDED RELEASE ORAL DAILY
Qty: 90 TABLET | Refills: 3 | Status: SHIPPED | OUTPATIENT
Start: 2021-10-20

## 2021-10-20 RX ORDER — LOSARTAN POTASSIUM 25 MG/1
TABLET ORAL
Qty: 270 TABLET | Refills: 3 | Status: SHIPPED
Start: 2021-10-20 | End: 2022-11-02

## 2021-10-20 RX ORDER — SPIRONOLACTONE 25 MG/1
25 TABLET ORAL DAILY
Qty: 90 TABLET | Refills: 3 | Status: SHIPPED | OUTPATIENT
Start: 2021-10-20

## 2021-10-20 NOTE — PROGRESS NOTES
Belinda Linder  1945  Date of Service: 10/20/2021    Patient Active Problem List    Diagnosis Date Noted    Noncompliance with medications 07/05/2020    TIA (transient ischemic attack) 07/05/2020    Acute [facility]A (cerebrovascular accident) ([facility] 07/01/2020    NICM (nonischemic cardiomyopathy) ([facility] 04/29/2019     Overview Note:     Echo 10-18  EF 25%    He refuses an ICD      Acute respiratory failure ([facility] 10/18/2018    Hyperlipidemia LDL goal <100 10/18/2018    Essential hypertension 10/18/2018    Mitral regurgitation      Overview Note:     moderate      Aortic regurgitation      Overview Note:     trace      CAD (coronary artery disease) 12/12/2012     Overview Note:     12-13-12 anterior STEMI  POBO LAD  CABG [place]-LAD   Free EL-diag   SVG-PLB   SVG-OM         Social History     Socioeconomic History    Marital status: Single     Spouse name: None    Number of children: None    Years of education: None    Highest education level: None   Occupational History    None   Tobacco Use    Smoking status: Current Some Day Smoker     Types: Pipe    Smokeless tobacco: Never Used    Tobacco comment: quit cigarettes 35yrs ago   Vaping Use    Vaping Use: Never used   Substance and Sexual Activity    Alcohol use: No    Drug use: No    Sexual activity: None   Other Topics Concern    None   Social History Narrative    None     Social Determinants of Health     Financial Resource Strain:     Difficulty of Paying Living Expenses:    Food Insecurity:     Worried About Running Out of Food in the Last Year:     Ran Out of Food in the Last Year:    Transportation Needs:     Lack of Transportation (Medical):      Lack of Transportation (Non-Medical):    Physical Activity:     Days of Exercise per Week:     Minutes of Exercise per Session:    Stress:     Feeling of Stress :    Social Connections:     Frequency of Communication with Friends and Family:     Frequency of Social Gatherings with Friends and Family:     Attends Rastafarian Services:     Active Member of Clubs or Organizations:     Attends Club or Organization Meetings:     Marital Status:    Intimate Partner Violence:     Fear of Current or Ex-Partner:     Emotionally Abused:     Physically Abused:     Sexually Abused:        Current Outpatient Medications   Medication Sig Dispense Refill    spironolactone (ALDACTONE) 25 MG tablet Take 1 tablet by mouth daily 90 tablet 3    metoprolol succinate (TOPROL XL) 50 MG extended release tablet take 1 tablet by mouth once daily 90 tablet 3    losartan (COZAAR) 25 MG tablet Take 1 tablet by mouth 2 times daily 180 tablet 3    atorvastatin (LIPITOR) 40 MG tablet Take 1 tablet by mouth nightly 30 tablet 3    aspirin 81 MG chewable tablet Take 1 tablet by mouth daily 30 tablet 3    Cyanocobalamin (B-12 PO) Take by mouth      FOLIC ACID PO Take by mouth      Saw [place] 450 MG CAPS Take by mouth      Multiple Vitamins-Minerals (CENTRUM SILVER ULTRA MENS PO) Take 1 tablet by mouth daily. No current facility-administered medications for this visit. Allergies   Allergen Reactions    Prednisone Nausea Only       Chief Complaint:  Caitlyn Feliciano is here today for follow up and management/recomendations for CAD, ICM, HTN, AR, MR. History of Present Illness: Caitlyn Feliciano states that He does house work, goes up the stairs, does light yard work but with a push lawnmower, & goes shopping. He denies any chest discomfort, dyspnea on exertion, orthopnea/PND, or lower extremity edema. He denies any palpitations or presyncopal symptoms. He denies orthopnea/PND. REVIEW OF SYSTEMS:  As above. Patient does not complain of any fever, chills, nausea, vomiting or diarrhea. No focal, motor or neurological deficits. No changes in hearing, bowel or bladder habits. He is not known to have a history of thyroid problems. No recent nose bleeds.     PHYSICAL EXAM:  Vitals:    10/20/21 1045   BP: 124/70   Pulse: 70   Resp: 18   Weight: 178 lb 9.6 oz (81 kg)   Height: 5' 9\" (1.753 m)       GENERAL:  He is alert and oriented x 3, communicates well, in no distress. NECK:  No masses, trachea is mid position. Supple, full ROM, no JVD or bruits. No palpable thyromegaly or lymphadenopathy. HEART: Distant to auscultation. Regular rate and rhythm. Normal S1 and S2. There is an S4 gallop and a I/VI systolic murmur. LUNGS:  Clear to auscultation bilaterally. No use of accessory muscles. symmetrical excursion. ABDOMEN:  Soft, non-tender. Normal bowel sounds. EXTREMITIES:  Full ROM x 4. Trace bilateral lower extremity edema. Good distal pulses. EYES:  Extraocular muscles intact. PERRL. Normal lids & conjunctiva. ENT:  Nares are clear & not bleeding. Moist mucosa. Normal lips formation. No external masses   NEURO: no tremors, full ROM x 4, EOMI. SKIN:  Warm, dry and intact. Normal turgor. EKG: Sinus rhythm, 70 bpm, LAFB, nonspecific ST - T wave changes. Poor r-wave progression. right bundle-branch block. Assessment:   1. Coronary artery disease as outlined above. No symptoms of recurring ischemia today. 2. Cardiomyopathy as outlined above. Well compensated at this time. 3. Trace to mild AR  4. Mild to moderate MR.  moderate on echo 10-18. Mild to moderate on echo 8/12/2020.  5. Hypertension. 6. Hypercholesterolemia        Recommendations:  1. He is following the cholesterol with Dr. Magy Barnett. He still refuses a statin. 2. He continues to defer an ICD  3. Increase the losartan to 50 mg in the morning and 25 mg in the evening. 4. BMP 1 week after increasing the losartan. 5. I discussed with him changing the losartan to [facility]. He states that we have discussed this before. He looked into the cost and it is too expensive and he defers again. Thank you for allowing me to participate in your patient's care.       [address], [facility]  Interventional Cardiology

## 2021-11-30 ENCOUNTER — HOSPITAL ENCOUNTER (OUTPATIENT)
Age: 76
Discharge: HOME | End: 2021-11-30
Payer: MEDICARE

## 2021-11-30 DIAGNOSIS — I10 PRIMARY HYPERTENSION: ICD-10-CM

## 2021-11-30 LAB
ANION GAP SERPL CALCULATED.3IONS-SCNC: 11 MMOL/L (ref 7–16)
BUN BLDV-MCNC: 27 MG/DL (ref 6–23)
CALCIUM SERPL-MCNC: 9.6 MG/DL (ref 8.6–10.2)
CHLORIDE BLD-SCNC: 101 MMOL/L (ref 98–107)
CO2: 25 MMOL/L (ref 22–29)
CREAT SERPL-MCNC: 1.1 MG/DL (ref 0.7–1.2)
GFR AFRICAN AMERICAN: >60
GFR NON-AFRICAN AMERICAN: >60 ML/MIN/1.73
GLUCOSE BLD-MCNC: 98 MG/DL (ref 74–99)
POTASSIUM SERPL-SCNC: 4.3 MMOL/L (ref 3.5–5)
SODIUM BLD-SCNC: 137 MMOL/L (ref 132–146)

## 2021-11-30 PROCEDURE — 80048 BASIC METABOLIC PNL TOTAL CA: CPT

## 2021-11-30 PROCEDURE — 36415 COLL VENOUS BLD VENIPUNCTURE: CPT

## 2022-10-19 ENCOUNTER — HOSPITAL ENCOUNTER (OUTPATIENT)
Dept: GENERAL RADIOLOGY | Age: 77
Discharge: HOME | End: 2022-10-21
Payer: MEDICARE

## 2022-10-19 ENCOUNTER — HOSPITAL ENCOUNTER (OUTPATIENT)
Age: 77
Discharge: HOME | End: 2022-10-21
Payer: MEDICARE

## 2022-10-19 DIAGNOSIS — M25.512 LEFT SHOULDER PAIN, UNSPECIFIED CHRONICITY: ICD-10-CM

## 2022-10-19 PROCEDURE — 73030 X-RAY EXAM OF SHOULDER: CPT

## 2022-11-02 ENCOUNTER — OFFICE VISIT (OUTPATIENT)
Dept: CARDIOLOGY CLINIC | Age: 77
End: 2022-11-02
Payer: MEDICARE

## 2022-11-02 VITALS
HEART RATE: 70 BPM | BODY MASS INDEX: 25.74 KG/M2 | SYSTOLIC BLOOD PRESSURE: 120 MMHG | DIASTOLIC BLOOD PRESSURE: 60 MMHG | HEIGHT: 69 IN | WEIGHT: 173.8 LBS | RESPIRATION RATE: 18 BRPM

## 2022-11-02 DIAGNOSIS — I35.1 NONRHEUMATIC AORTIC VALVE INSUFFICIENCY: ICD-10-CM

## 2022-11-02 DIAGNOSIS — I34.0 NONRHEUMATIC MITRAL VALVE REGURGITATION: ICD-10-CM

## 2022-11-02 DIAGNOSIS — I25.119 CORONARY ARTERY DISEASE INVOLVING NATIVE HEART WITH ANGINA PECTORIS, UNSPECIFIED VESSEL OR LESION TYPE (HCC): Primary | ICD-10-CM

## 2022-11-02 PROCEDURE — 1123F ACP DISCUSS/DSCN MKR DOCD: CPT | Performed by: INTERNAL MEDICINE

## 2022-11-02 PROCEDURE — 3074F SYST BP LT 130 MM HG: CPT | Performed by: INTERNAL MEDICINE

## 2022-11-02 PROCEDURE — 3078F DIAST BP <80 MM HG: CPT | Performed by: INTERNAL MEDICINE

## 2022-11-02 PROCEDURE — 93000 ELECTROCARDIOGRAM COMPLETE: CPT | Performed by: INTERNAL MEDICINE

## 2022-11-02 PROCEDURE — G8427 DOCREV CUR MEDS BY ELIG CLIN: HCPCS | Performed by: INTERNAL MEDICINE

## 2022-11-02 PROCEDURE — 4004F PT TOBACCO SCREEN RCVD TLK: CPT | Performed by: INTERNAL MEDICINE

## 2022-11-02 PROCEDURE — G8417 CALC BMI ABV UP PARAM F/U: HCPCS | Performed by: INTERNAL MEDICINE

## 2022-11-02 PROCEDURE — G8484 FLU IMMUNIZE NO ADMIN: HCPCS | Performed by: INTERNAL MEDICINE

## 2022-11-02 PROCEDURE — 99214 OFFICE O/P EST MOD 30 MIN: CPT | Performed by: INTERNAL MEDICINE

## 2022-11-02 RX ORDER — LOSARTAN POTASSIUM 25 MG/1
25 TABLET ORAL 2 TIMES DAILY
COMMUNITY
End: 2022-11-11 | Stop reason: SDUPTHER

## 2022-11-02 NOTE — PROGRESS NOTES
Shayla Hernandez  1945  Date of Service: 11/2/2022    Patient Active Problem List    Diagnosis Date Noted    Noncompliance with medications 07/05/2020    TIA (transient ischemic attack) 07/05/2020    Acute CVA (cerebrovascular accident) ([facility] 07/01/2020    NICM (nonischemic cardiomyopathy) ([facility] 04/29/2019     Overview Note:     Echo 10-18  EF 25%    He refuses an ICD      Acute respiratory failure ([facility] 10/18/2018    Hyperlipidemia LDL goal <100 10/18/2018    Essential hypertension 10/18/2018    Mitral regurgitation      Overview Note:     moderate      Aortic regurgitation      Overview Note:     trace      CAD (coronary artery disease) 12/12/2012     Overview Note:     12-13-12 anterior STEMI  POBO LAD  CABG [place]-LAD   Free EL-diag   SVG-PLB   SVG-OM         Social History     Socioeconomic History    Marital status: Single     Spouse name: None    Number of children: None    Years of education: None    Highest education level: None   Tobacco Use    Smoking status: Some Days     Types: Pipe    Smokeless tobacco: Never    Tobacco comments:     quit cigarettes 35yrs ago   Vaping Use    Vaping Use: Never used   Substance and Sexual Activity    Alcohol use: No    Drug use: No       Current Outpatient Medications   Medication Sig Dispense Refill    losartan (COZAAR) 25 MG tablet Take 25 mg by mouth in the morning and at bedtime      metoprolol succinate (TOPROL XL) 50 MG extended release tablet Take 1 tablet by mouth daily 90 tablet 3    spironolactone (ALDACTONE) 25 MG tablet Take 1 tablet by mouth daily 90 tablet 3    atorvastatin (LIPITOR) 40 MG tablet Take 1 tablet by mouth nightly 30 tablet 3    aspirin 81 MG chewable tablet Take 1 tablet by mouth daily 30 tablet 3    Cyanocobalamin (B-12 PO) Take by mouth      FOLIC ACID PO Take by mouth      S[place] 450 MG CAPS Take by mouth      Multiple Vitamins-Minerals (CENTRUM SILVER ULTRA MENS PO) Take 1 tablet by mouth daily.        No current facility-administered medications for this visit. Allergies   Allergen Reactions    Prednisone Nausea Only       Chief Complaint:  Elif Norman is here today for follow up and management/recomendations for CAD, ICM, HTN, AR, MR. History of Present Illness: Elif Norman states that He does house work, goes up the stairs, does yard work but with a push lawnmower, & goes shopping. He denies any chest discomfort or dyspnea on exertion with any of these activities. He denies orthopnea/PND, or lower extremity edema. He denies any palpitations or presyncopal symptoms. He denies orthopnea/PND. REVIEW OF SYSTEMS:  As above. Patient does not complain of any fever, chills, nausea, vomiting or diarrhea. No focal, motor or neurological deficits. No changes in hearing, bowel or bladder habits. He is not known to have a history of thyroid problems. No recent nose bleeds. PHYSICAL EXAM:  Vitals:    11/02/22 0947   BP: 120/60   Pulse: 70   Resp: 18   Weight: 173 lb 12.8 oz (78.8 kg)   Height: 5' 9\" (1.753 m)       GENERAL:  He is alert and oriented x 3, communicates well, in no distress. NECK:  No masses, trachea is mid position. Supple, full ROM, no JVD or bruits. No palpable thyromegaly or lymphadenopathy. HEART: Distant. Regular rate and rhythm. Normal S1 and S2. There is an S4 gallop and a I/VI systolic murmur. LUNGS:  Clear to auscultation bilaterally. No use of accessory muscles. symmetrical excursion. Good air movement. ABDOMEN:  Soft, non-tender. Normal bowel sounds. EXTREMITIES:  Full ROM x 4. Trace bilateral lower extremity edema on exam.  Good distal pulses. EYES:  Extraocular muscles intact. PERRL. Normal lids & conjunctiva. ENT:  Nares are clear & not bleeding. Moist mucosa. Normal lips formation. No external masses   NEURO: no tremors, full ROM x 4, EOMI. SKIN:  Warm, dry and intact. Normal turgor.         EKG: Sinus rhythm, 70 bpm, LAFB, nonspecific ST - T wave changes. Poor r-wave progression. Interventricular conduction delay. Assessment:   Coronary artery disease as outlined above. No symptoms of recurring ischemia today. Cardiomyopathy as outlined above. Well compensated at this time. Mild AR  Mild to moderate MR.  moderate on echo 10-18. Mild to moderate on echo 8/12/2020. Hypertension. Hypercholesterolemia        Recommendations:  He is following the cholesterol with Dr. Sabra Cobb. He still refuses a statin. He continues to defer an ICD again today. I tried to increase his losartan further previously and he did not tolerate this. However, Previously he deferred changing to [facility] because of the cost.  However, now he wishes to reconsider this. I gave him information and cards for the [facility]. He will research the cost and discussed this with his pharmacist.  He will let us know if he changes his mind. Echocardiogram.    Thank you for allowing me to participate in your patient's care.       [address], [address] [facility]  Interventional Cardiology

## 2022-11-10 ENCOUNTER — HOSPITAL ENCOUNTER (OUTPATIENT)
Dept: CARDIOLOGY | Age: 77
Discharge: HOME | End: 2022-11-10
Payer: MEDICARE

## 2022-11-10 DIAGNOSIS — I34.0 NONRHEUMATIC MITRAL VALVE REGURGITATION: ICD-10-CM

## 2022-11-10 DIAGNOSIS — I35.1 NONRHEUMATIC AORTIC VALVE INSUFFICIENCY: ICD-10-CM

## 2022-11-10 LAB
LV EF: 30 %
LVEF MODALITY: NORMAL

## 2022-11-10 PROCEDURE — 2580000003 HC RX 258: Performed by: INTERNAL MEDICINE

## 2022-11-10 PROCEDURE — 6360000004 HC RX CONTRAST MEDICATION: Performed by: INTERNAL MEDICINE

## 2022-11-10 PROCEDURE — C8929 TTE W OR WO FOL WCON,DOPPLER: HCPCS | Performed by: PSYCHIATRY & NEUROLOGY

## 2022-11-10 RX ORDER — SODIUM CHLORIDE 0.9 % (FLUSH) 0.9 %
10 SYRINGE (ML) INJECTION PRN
Status: DISCONTINUED | OUTPATIENT
Start: 2022-11-10 | End: 2022-11-11 | Stop reason: HOSPADM

## 2022-11-10 RX ADMIN — SODIUM CHLORIDE, PRESERVATIVE FREE 10 ML: 5 INJECTION INTRAVENOUS at 15:32

## 2022-11-10 RX ADMIN — SODIUM CHLORIDE, PRESERVATIVE FREE 10 ML: 5 INJECTION INTRAVENOUS at 15:28

## 2022-11-10 RX ADMIN — PERFLUTREN 1.1 MG: 6.52 INJECTION, SUSPENSION INTRAVENOUS at 15:28

## 2022-11-11 RX ORDER — METOPROLOL SUCCINATE 50 MG/1
50 TABLET, EXTENDED RELEASE ORAL DAILY
Qty: 90 TABLET | Refills: 3 | Status: SHIPPED | OUTPATIENT
Start: 2022-11-11

## 2022-11-11 RX ORDER — SPIRONOLACTONE 25 MG/1
25 TABLET ORAL DAILY
Qty: 90 TABLET | Refills: 3 | Status: SHIPPED | OUTPATIENT
Start: 2022-11-11

## 2022-11-11 RX ORDER — LOSARTAN POTASSIUM 25 MG/1
25 TABLET ORAL 2 TIMES DAILY
Qty: 180 TABLET | Refills: 3 | Status: SHIPPED | OUTPATIENT
Start: 2022-11-11

## 2022-11-17 ENCOUNTER — TELEPHONE (OUTPATIENT)
Dept: CARDIOLOGY CLINIC | Age: 77
End: 2022-11-17

## 2022-11-17 NOTE — TELEPHONE ENCOUNTER
I called him with his echo results. I recommended a referral for a MitraClip or mitral valve repair. He does not wish to proceed with this at this point. He may reconsider after the first of the year. He will let me know. He also wants to stop by tomorrow to  a copy of his echo report. Please have him follow-up in 3 months with me.

## 2022-11-17 NOTE — TELEPHONE ENCOUNTER
----- Message from Giovani Barillas DO sent at 11/17/2022 12:13 PM EST -----  I tried to call him. There was no answer. I will have to try again. Please send this back to me.

## 2022-12-14 ENCOUNTER — TELEPHONE (OUTPATIENT)
Dept: CARDIOLOGY CLINIC | Age: 77
End: 2022-12-14

## 2022-12-14 NOTE — TELEPHONE ENCOUNTER
Patient called stating he is scheduled for a bone marrow biopsy and has been recommended to hold Aspirin. Please advise.

## 2022-12-15 NOTE — TELEPHONE ENCOUNTER
He has a history of coronary artery disease therefore, I would recommend trying to proceed with procedures on aspirin if possible. If this is an absolute contraindication to this particular procedure I would have to defer that to the doctor performing the procedure. If this is the case then I will hold it for the fewest possible days.

## 2022-12-21 ENCOUNTER — HOSPITAL ENCOUNTER (OUTPATIENT)
Dept: CT IMAGING | Age: 77
Discharge: HOME | End: 2022-12-23
Payer: MEDICARE

## 2022-12-21 ENCOUNTER — HOSPITAL ENCOUNTER (OUTPATIENT)
Age: 77
Discharge: HOME | End: 2022-12-21
Payer: MEDICARE

## 2022-12-21 VITALS
OXYGEN SATURATION: 95 % | DIASTOLIC BLOOD PRESSURE: 51 MMHG | TEMPERATURE: 97.4 F | RESPIRATION RATE: 16 BRPM | HEART RATE: 60 BPM | SYSTOLIC BLOOD PRESSURE: 105 MMHG

## 2022-12-21 DIAGNOSIS — D53.9 ANEMIA ASSOCIATED WITH NUTRITIONAL DEFICIENCY: ICD-10-CM

## 2022-12-21 DIAGNOSIS — R53.83 OTHER FATIGUE: ICD-10-CM

## 2022-12-21 DIAGNOSIS — D47.2 MONOCLONAL PARAPROTEINEMIA: ICD-10-CM

## 2022-12-21 LAB
ANION GAP SERPL CALCULATED.3IONS-SCNC: 9 MMOL/L (ref 7–16)
BASOPHILS ABSOLUTE: 0.02 E9/L (ref 0–0.2)
BASOPHILS RELATIVE PERCENT: 0.3 % (ref 0–2)
BUN BLDV-MCNC: 27 MG/DL (ref 6–23)
CALCIUM SERPL-MCNC: 9.2 MG/DL (ref 8.6–10.2)
CHLORIDE BLD-SCNC: 105 MMOL/L (ref 98–107)
CO2: 25 MMOL/L (ref 22–29)
CREAT SERPL-MCNC: 1.1 MG/DL (ref 0.7–1.2)
EOSINOPHILS ABSOLUTE: 0.41 E9/L (ref 0.05–0.5)
EOSINOPHILS RELATIVE PERCENT: 5.2 % (ref 0–6)
GFR SERPL CREATININE-BSD FRML MDRD: >60 ML/MIN/1.73
GLUCOSE BLD-MCNC: 113 MG/DL (ref 74–99)
HCT VFR BLD CALC: 36.7 % (ref 37–54)
HEMOGLOBIN: 12.6 G/DL (ref 12.5–16.5)
IMMATURE GRANULOCYTES #: 0.02 E9/L
IMMATURE GRANULOCYTES %: 0.3 % (ref 0–5)
INR BLD: 1.1
LYMPHOCYTES ABSOLUTE: 1.8 E9/L (ref 1.5–4)
LYMPHOCYTES RELATIVE PERCENT: 23 % (ref 20–42)
MCH RBC QN AUTO: 35.9 PG (ref 26–35)
MCHC RBC AUTO-ENTMCNC: 34.3 % (ref 32–34.5)
MCV RBC AUTO: 104.6 FL (ref 80–99.9)
MONOCYTES ABSOLUTE: 0.73 E9/L (ref 0.1–0.95)
MONOCYTES RELATIVE PERCENT: 9.3 % (ref 2–12)
NEUTROPHILS ABSOLUTE: 4.83 E9/L (ref 1.8–7.3)
NEUTROPHILS RELATIVE PERCENT: 61.9 % (ref 43–80)
PDW BLD-RTO: 13.2 FL (ref 11.5–15)
PLATELET # BLD: 231 E9/L (ref 130–450)
PMV BLD AUTO: 10.5 FL (ref 7–12)
POTASSIUM SERPL-SCNC: 4.4 MMOL/L (ref 3.5–5)
PROTHROMBIN TIME: 12.3 SEC (ref 9.3–12.4)
RBC # BLD: 3.51 E12/L (ref 3.8–5.8)
SODIUM BLD-SCNC: 139 MMOL/L (ref 132–146)
WBC # BLD: 7.8 E9/L (ref 4.5–11.5)

## 2022-12-21 PROCEDURE — 2709999900 CT BIOPSY BONE MARROW

## 2022-12-21 PROCEDURE — 36415 COLL VENOUS BLD VENIPUNCTURE: CPT

## 2022-12-21 PROCEDURE — 85610 PROTHROMBIN TIME: CPT

## 2022-12-21 PROCEDURE — 7100000011 HC PHASE II RECOVERY - ADDTL 15 MIN

## 2022-12-21 PROCEDURE — 2500000003 HC RX 250 WO HCPCS: Performed by: RADIOLOGY

## 2022-12-21 PROCEDURE — 85025 COMPLETE CBC W/AUTO DIFF WBC: CPT

## 2022-12-21 PROCEDURE — 80048 BASIC METABOLIC PNL TOTAL CA: CPT

## 2022-12-21 PROCEDURE — 6360000002 HC RX W HCPCS: Performed by: RADIOLOGY

## 2022-12-21 PROCEDURE — 7100000010 HC PHASE II RECOVERY - FIRST 15 MIN

## 2022-12-21 PROCEDURE — 77012 CT SCAN FOR NEEDLE BIOPSY: CPT

## 2022-12-21 RX ORDER — MIDAZOLAM HYDROCHLORIDE 5 MG/ML
INJECTION INTRAMUSCULAR; INTRAVENOUS
Status: COMPLETED | OUTPATIENT
Start: 2022-12-21 | End: 2022-12-21

## 2022-12-21 RX ORDER — LIDOCAINE HYDROCHLORIDE 20 MG/ML
INJECTION, SOLUTION INFILTRATION; PERINEURAL
Status: COMPLETED | OUTPATIENT
Start: 2022-12-21 | End: 2022-12-21

## 2022-12-21 RX ORDER — FENTANYL CITRATE 50 UG/ML
INJECTION, SOLUTION INTRAMUSCULAR; INTRAVENOUS
Status: COMPLETED | OUTPATIENT
Start: 2022-12-21 | End: 2022-12-21

## 2022-12-21 RX ADMIN — LIDOCAINE HYDROCHLORIDE 5 ML: 20 INJECTION, SOLUTION INFILTRATION; PERINEURAL at 08:50

## 2022-12-21 RX ADMIN — MIDAZOLAM HYDROCHLORIDE 0.5 MG: 5 INJECTION INTRAMUSCULAR; INTRAVENOUS at 08:45

## 2022-12-21 RX ADMIN — FENTANYL CITRATE 25 MCG: 50 INJECTION, SOLUTION INTRAMUSCULAR; INTRAVENOUS at 08:45

## 2022-12-21 ASSESSMENT — PAIN SCALES - GENERAL
PAINLEVEL_OUTOF10: 0

## 2022-12-21 NOTE — OR NURSING
Pt brought to CT, and is alert and oriented, states pain is a 0/10 prior to procedure. Procedure is explained to patient by Dr. Henrik Mauro, including possible risks. Pt verbalizes understanding and consent form signed. Pt placed on ct table in prone position, pt connected to cardiac monitor, oxygen for constant monitoring during procedure. Procedure done under sterile technique and guidance of ct imaging. 2% Lidocaine also used for comfort measures. Samples obtained and sent to lab. Site sutured with 4-0 vicryl and an op-site dressing applied to site with no bleeding noted. Report called to  PACU, spoke with , Jeremias Mccoy RN. Pt transferred to PACU via transport.

## 2022-12-21 NOTE — PRE SEDATION
Sedation Pre-Procedure Note    Patient Name: Rima Basurto   YOB: 1945  Room/Bed: Room/bed info not found  Medical Record Number: [id number]  Date: 12/21/2022   Time: 8:41 AM       Indication:  Bone marrow biopsy    Consent: I have discussed with the patient and/or the patient representative the indication, alternatives, and the possible risks and/or complications of the planned procedure and the anesthesia methods. The patient and/or patient representative appear to understand and agree to proceed. Vital Signs:   Vitals:    12/21/22 0835   BP: 125/73   Pulse: 98   Resp: 19   SpO2: 100%       Past Medical History:   has a past medical history of Acute on chronic systolic congestive heart failure ([facility] [address] Aortic regurgitation, Arthritis, CAD (coronary artery disease), Hypercholesteremia, Hypertension, Ischemic cardiomyopathy, Mitral regurgitation, and STEMI (ST elevation myocardial infarction) ([facility] [address] Past Surgical History:   has a past surgical history that includes Tonsillectomy; Coronary angioplasty (12/12/12); ECHO Compl W Dop Color Flow (12/12/2012); Diagnostic Cardiac Cath Lab Procedure (12/12/12); and Coronary artery bypass graft (12/13/12). Medications:   Scheduled Meds:   Continuous Infusions:   PRN Meds:   Home Meds:   Prior to Admission medications    Medication Sig Start Date End Date Taking?  Authorizing Provider   metoprolol succinate (TOPROL XL) 50 MG extended release tablet Take 1 tablet by mouth daily 11/11/22   Jose Staples, DO   spironolactone (ALDACTONE) 25 MG tablet Take 1 tablet by mouth daily 11/11/22   Jose Staples, DO   losartan (COZAAR) 25 MG tablet Take 1 tablet by mouth in the morning and at bedtime 11/11/22   Jose Staples, DO   atorvastatin (LIPITOR) 40 MG tablet Take 1 tablet by mouth nightly 7/3/20   Rachna Yun MD   aspirin 81 MG chewable tablet Take 1 tablet by mouth daily 7/3/20   YANETH Vincent - CNP   Cyanocobalamin (B-12 PO) Take by mouth Historical Provider, MD   FOLIC ACID PO Take by mouth    Historical ProviderMD [place] 450 MG CAPS Take by mouth    Historical Provider, MD   Multiple Vitamins-Minerals (CENTRUM SILVER ULTRA MENS PO) Take 1 tablet by mouth daily. Historical Provider, MD     Coumadin Use Last 7 Days:  no  Antiplatelet drug therapy use last 7 days: no  Other anticoagulant use last 7 days: no       Pre-Sedation Documentation and Exam:   I have personally completed a history, physical exam & review of systems for this patient (see notes).     Mallampati Airway Assessment:  Mallampati Class II - (soft palate, fauces & uvula are visible)    Prior History of Anesthesia Complications:   none    ASA Classification:  Class 2 - A normal healthy patient with mild systemic disease    Sedation/ Anesthesia Plan:   intravenous sedation    Medications Planned:   midazolam (Versed)  intravenously and fentanyl intravenously    Patient is an appropriate candidate for plan of sedation: yes    Electronically signed by Bridger Ruiz MD on 12/21/2022 at 8:41 AM

## 2022-12-21 NOTE — PROGRESS NOTES
Received from radiology. Nourishment provided. Call light within reach. Friend here and waiting in the waiting room. dressing right lower back intact and dry. No hematoma noted.

## 2022-12-21 NOTE — H&P
Subjective:     Patient is a 69 y.o.  male presents for bone marrow biopsy. Past history includes the following:    Patient Active Problem List    Diagnosis Date Noted    Noncompliance with medications 07/05/2020    TIA (transient ischemic attack) 07/05/2020    Acute CVA (cerebrovascular accident) ([address] 07/01/2020    NICM (nonischemic cardiomyopathy) ([address] 04/29/2019    Acute respiratory failure ([address] 10/18/2018    Hyperlipidemia LDL goal <100 10/18/2018    Essential hypertension 10/18/2018    Mitral regurgitation     Aortic regurgitation     CAD (coronary artery disease) 12/12/2012     Past Medical History:   Diagnosis Date    Acute on chronic systolic congestive heart failure ([address] 10/18/2018    Aortic regurgitation     trace    Arthritis     CAD (coronary artery disease)     Hypercholesteremia     Hypertension     Ischemic cardiomyopathy     Mitral regurgitation     mild to moderate    STEMI (ST elevation myocardial infarction) ([facility] [address] 12/12/12    anteriolateral      Past Surgical History:   Procedure Laterality Date    CORONARY ANGIOPLASTY  12/12/12    Balloon only to LAD    CORONARY ARTERY BYPASS GRAFT  12/13/12    cabg x 4- Dr. Mariel Osborne CATH LAB PROCEDURE  12/12/12    [facility] cath lab- Dr. Cynthia Kirk  12/12/2012         TONSILLECTOMY        Not in a hospital admission. Allergies   Allergen Reactions    Prednisone Nausea Only      Social History     Tobacco Use    Smoking status: Some Days     Types: Pipe    Smokeless tobacco: Never    Tobacco comments:     quit cigarettes 35yrs ago   Substance Use Topics    Alcohol use: No      History reviewed. No pertinent family history. Review of Systems:  Cardiovascular: WNL    Pulmonary/Chest: WNL   Abdominal: Soft.  WNL   Musculoskeletal:  WNL      Objective:     Patient Vitals for the past 8 hrs:   BP Pulse Resp SpO2   12/21/22 0835 125/73 98 19 100 %   12/21/22 0830 -- -- -- 100 %   12/21/22 0800 125/73 89 18 98 % /73   Pulse 98   Resp 19   SpO2 100%     General Appearance:    Alert, cooperative, no distress, appears stated age   Head:    Normocephalic, without obvious abnormality, atraumatic                                                                               Data Review :  Platelets:     Lab Results   Component Value Date/Time     12/21/2022 07:03 AM     PT/INR:  No results found for: PTT  CR:    Lab Results   Component Value Date/Time    CREATININE 1.1 12/21/2022 07:03 AM       Assessment:     OK to proceed with bone marrow biopsy     Plan:     CT guided bone marrow biopsy    Alicia Archuleta MD 12/21/2022 8:39 AM

## 2023-01-04 ENCOUNTER — HOSPITAL ENCOUNTER (EMERGENCY)
Age: 78
Discharge: HOME | End: 2023-01-04
Attending: EMERGENCY MEDICINE
Payer: MEDICARE

## 2023-01-04 ENCOUNTER — APPOINTMENT (OUTPATIENT)
Dept: CT IMAGING | Age: 78
End: 2023-01-04
Payer: MEDICARE

## 2023-01-04 VITALS
TEMPERATURE: 98.9 F | OXYGEN SATURATION: 94 % | SYSTOLIC BLOOD PRESSURE: 121 MMHG | RESPIRATION RATE: 16 BRPM | WEIGHT: 164 LBS | HEART RATE: 86 BPM | HEIGHT: 69 IN | DIASTOLIC BLOOD PRESSURE: 62 MMHG | BODY MASS INDEX: 24.29 KG/M2

## 2023-01-04 DIAGNOSIS — R42 LIGHTHEADEDNESS: Primary | ICD-10-CM

## 2023-01-04 DIAGNOSIS — R11.0 NAUSEA: ICD-10-CM

## 2023-01-04 LAB
ALBUMIN SERPL-MCNC: 4.4 G/DL (ref 3.5–5.2)
ALP BLD-CCNC: 70 U/L (ref 40–129)
ALT SERPL-CCNC: 13 U/L (ref 0–40)
ANION GAP SERPL CALCULATED.3IONS-SCNC: 11 MMOL/L (ref 7–16)
AST SERPL-CCNC: 19 U/L (ref 0–39)
BASOPHILS ABSOLUTE: 0.02 E9/L (ref 0–0.2)
BASOPHILS RELATIVE PERCENT: 0.2 % (ref 0–2)
BILIRUB SERPL-MCNC: 0.7 MG/DL (ref 0–1.2)
BUN BLDV-MCNC: 22 MG/DL (ref 6–23)
CALCIUM SERPL-MCNC: 9.7 MG/DL (ref 8.6–10.2)
CHLORIDE BLD-SCNC: 101 MMOL/L (ref 98–107)
CO2: 25 MMOL/L (ref 22–29)
CREAT SERPL-MCNC: 1 MG/DL (ref 0.7–1.2)
EKG ATRIAL RATE: 69 BPM
EKG P AXIS: 45 DEGREES
EKG P-R INTERVAL: 214 MS
EKG Q-T INTERVAL: 440 MS
EKG QRS DURATION: 162 MS
EKG QTC CALCULATION (BAZETT): 471 MS
EKG R AXIS: -60 DEGREES
EKG T AXIS: 99 DEGREES
EKG VENTRICULAR RATE: 69 BPM
EOSINOPHILS ABSOLUTE: 0.15 E9/L (ref 0.05–0.5)
EOSINOPHILS RELATIVE PERCENT: 1.6 % (ref 0–6)
GFR SERPL CREATININE-BSD FRML MDRD: >60 ML/MIN/1.73
GLUCOSE BLD-MCNC: 117 MG/DL (ref 74–99)
HCT VFR BLD CALC: 35 % (ref 37–54)
HEMOGLOBIN: 12.1 G/DL (ref 12.5–16.5)
IMMATURE GRANULOCYTES #: 0.04 E9/L
IMMATURE GRANULOCYTES %: 0.4 % (ref 0–5)
INFLUENZA A BY PCR: NOT DETECTED
INFLUENZA B BY PCR: NOT DETECTED
LYMPHOCYTES ABSOLUTE: 1.25 E9/L (ref 1.5–4)
LYMPHOCYTES RELATIVE PERCENT: 13.4 % (ref 20–42)
MCH RBC QN AUTO: 35.5 PG (ref 26–35)
MCHC RBC AUTO-ENTMCNC: 34.6 % (ref 32–34.5)
MCV RBC AUTO: 102.6 FL (ref 80–99.9)
MONOCYTES ABSOLUTE: 0.55 E9/L (ref 0.1–0.95)
MONOCYTES RELATIVE PERCENT: 5.9 % (ref 2–12)
NEUTROPHILS ABSOLUTE: 7.29 E9/L (ref 1.8–7.3)
NEUTROPHILS RELATIVE PERCENT: 78.5 % (ref 43–80)
PDW BLD-RTO: 13.2 FL (ref 11.5–15)
PLATELET # BLD: 268 E9/L (ref 130–450)
PMV BLD AUTO: 10.2 FL (ref 7–12)
POTASSIUM SERPL-SCNC: 4.4 MMOL/L (ref 3.5–5)
RBC # BLD: 3.41 E12/L (ref 3.8–5.8)
SARS-COV-2, NAAT: NOT DETECTED
SODIUM BLD-SCNC: 137 MMOL/L (ref 132–146)
TOTAL PROTEIN: 7.9 G/DL (ref 6.4–8.3)
TROPONIN, HIGH SENSITIVITY: 14 NG/L (ref 0–11)
TROPONIN, HIGH SENSITIVITY: 15 NG/L (ref 0–11)
WBC # BLD: 9.3 E9/L (ref 4.5–11.5)

## 2023-01-04 PROCEDURE — 70450 CT HEAD/BRAIN W/O DYE: CPT

## 2023-01-04 PROCEDURE — 87635 SARS-COV-2 COVID-19 AMP PRB: CPT

## 2023-01-04 PROCEDURE — 85025 COMPLETE CBC W/AUTO DIFF WBC: CPT

## 2023-01-04 PROCEDURE — 80053 COMPREHEN METABOLIC PANEL: CPT

## 2023-01-04 PROCEDURE — 36415 COLL VENOUS BLD VENIPUNCTURE: CPT

## 2023-01-04 PROCEDURE — 93005 ELECTROCARDIOGRAM TRACING: CPT

## 2023-01-04 PROCEDURE — 96374 THER/PROPH/DIAG INJ IV PUSH: CPT

## 2023-01-04 PROCEDURE — 93010 ELECTROCARDIOGRAM REPORT: CPT | Performed by: INTERNAL MEDICINE

## 2023-01-04 PROCEDURE — 84484 ASSAY OF TROPONIN QUANT: CPT

## 2023-01-04 PROCEDURE — 6360000002 HC RX W HCPCS

## 2023-01-04 PROCEDURE — 87502 INFLUENZA DNA AMP PROBE: CPT

## 2023-01-04 PROCEDURE — 2580000003 HC RX 258

## 2023-01-04 PROCEDURE — 99284 EMERGENCY DEPT VISIT MOD MDM: CPT

## 2023-01-04 RX ORDER — ONDANSETRON 2 MG/ML
4 INJECTION INTRAMUSCULAR; INTRAVENOUS ONCE
Status: COMPLETED | OUTPATIENT
Start: 2023-01-04 | End: 2023-01-04

## 2023-01-04 RX ORDER — 0.9 % SODIUM CHLORIDE 0.9 %
1000 INTRAVENOUS SOLUTION INTRAVENOUS ONCE
Status: COMPLETED | OUTPATIENT
Start: 2023-01-04 | End: 2023-01-04

## 2023-01-04 RX ORDER — ONDANSETRON 4 MG/1
4 TABLET, ORALLY DISINTEGRATING ORAL ONCE
Status: DISCONTINUED | OUTPATIENT
Start: 2023-01-04 | End: 2023-01-04

## 2023-01-04 RX ORDER — ONDANSETRON 4 MG/1
4 TABLET, ORALLY DISINTEGRATING ORAL 3 TIMES DAILY PRN
Qty: 21 TABLET | Refills: 0 | Status: SHIPPED | OUTPATIENT
Start: 2023-01-04

## 2023-01-04 RX ADMIN — ONDANSETRON 4 MG: 2 INJECTION INTRAMUSCULAR; INTRAVENOUS at 14:51

## 2023-01-04 RX ADMIN — SODIUM CHLORIDE 1000 ML: 9 INJECTION, SOLUTION INTRAVENOUS at 16:57

## 2023-01-04 ASSESSMENT — PAIN - FUNCTIONAL ASSESSMENT: PAIN_FUNCTIONAL_ASSESSMENT: NONE - DENIES PAIN

## 2023-01-04 NOTE — ED PROVIDER NOTES
[address] ENCOUNTER        Pt Name: Jim Villeda  MRN: [id number]  [place] 1945  Date of evaluation: 1/4/2023  Provider: Becky Sparks MD  PCP: Hilton Bailey MD  Note Started: 4:51 PM EST 1/4/23    CHIEF COMPLAINT       Chief Complaint   Patient presents with    Dizziness     Intermittent dizziness for 2 days with nausea. Pt with h/o anemia, recent bone marrow done awaiting results    Emesis     Vomited today x1       HISTORY OF PRESENT ILLNESS: 1 or more Elements   History From: Patient     Limitations to history : None    Jim Villeda is a 69 y.o. male who presents with lightheadedness and nausea. States that his symptoms have been ongoing for the past 2 days. States that his lightheadedness did is worsening when he stands up, and subsides while resting. Describes it as an imbalance feeling that is intermittent. Associated with nausea patient had 1 episode of vomiting today nonbilious nonbloody. Describes symptoms moderate in severity with no alleviating or exacerbating factors. Denies any fever, chills,headache, vision changes, neck tenderness or stiffness, weakness, cp, palpitations, leg swelling/tenderness, sob, cough, abd pain, dysuria, hematuria, diarrhea, constipation. States he had a decrease and fluid intake as he is not overly thirsty. States that he is currently being worked up for anemia at the [facility] had a full work-up recently.     Nursing Notes were all reviewed and agreed with or any disagreements were addressed in the HPI.    ROS:   Pertinent positives and negatives are stated within HPI, all other systems reviewed and are negative.    --------------------------------------------- PAST HISTORY ---------------------------------------------  Past Medical History:  has a past medical history of Acute on chronic systolic congestive heart failure ([address] Aortic regurgitation, Arthritis, CAD (coronary artery disease), Hypercholesteremia, Hypertension, Ischemic cardiomyopathy, Mitral regurgitation, and STEMI (ST elevation myocardial infarction) ([facility] [address] Past Surgical History:  has a past surgical history that includes Tonsillectomy; Coronary angioplasty (12/12/12); ECHO Compl W Dop Color Flow (12/12/2012); Diagnostic Cardiac Cath Lab Procedure (12/12/12); Coronary artery bypass graft (12/13/12); and CT BIOPSY BONE MARROW (12/21/2022). Social History:  reports that he has been smoking pipe. He has never used smokeless tobacco. He reports that he does not drink alcohol and does not use drugs. Family History: family history is not on file. The patients home medications have been reviewed. Allergies: Prednisone    ---------------------------------------------------PHYSICAL EXAM--------------------------------------      Constitutional/General: Alert and oriented x3, well appearing, non toxic in NAD  Head: Normocephalic and atraumatic  Mouth: Oropharynx clear, handling secretions, no trismus  Neck: Supple, full ROM,  Pulmonary: Lungs clear to auscultation bilaterally, no wheezes, rales, or rhonchi. Not in respiratory distress  Cardiovascular:  Regular rate. Regular rhythm. No murmurs  Chest: no chest wall tenderness  Abdomen: Soft. Non tender. Non distended. No rebound, guarding, or rigidity. No pulsatile masses appreciated. Musculoskeletal: Moves all extremities x 4. Warm and well perfused, no clubbing, cyanosis, or edema. Compartments are soft and compressible. Capillary refill <3 seconds. Skin: warm and dry. No rashes. Neurologic: GCS 15, no gross focal neurologic deficits  Psych: Normal Affect    -------------------------------------------------- RESULTS -------------------------------------------------  I have personally reviewed all laboratory and imaging results for this patient. Results are listed below.      LABS:  Results for orders placed or performed during the hospital encounter of 01/04/23   COVID-19, Rapid    Specimen: Nasopharyngeal Swab   Result Value Ref Range    SARS-CoV-2, NAAT Not Detected Not Detected   Rapid influenza A/B antigens    Specimen: Nasopharyngeal   Result Value Ref Range    Influenza A by PCR Not Detected Not Detected    Influenza B by PCR Not Detected Not Detected   CBC with Auto Differential   Result Value Ref Range    WBC 9.3 4.5 - 11.5 E9/L    RBC 3.41 (L) 3.80 - 5.80 E12/L    Hemoglobin 12.1 (L) 12.5 - 16.5 g/dL    Hematocrit 35.0 (L) 37.0 - 54.0 %    .6 (H) 80.0 - 99.9 fL    MCH 35.5 (H) 26.0 - 35.0 pg    MCHC 34.6 (H) 32.0 - 34.5 %    RDW 13.2 11.5 - 15.0 fL    Platelets 186 572 - 132 E9/L    MPV 10.2 7.0 - 12.0 fL    Neutrophils % 78.5 43.0 - 80.0 %    Immature Granulocytes % 0.4 0.0 - 5.0 %    Lymphocytes % 13.4 (L) 20.0 - 42.0 %    Monocytes % 5.9 2.0 - 12.0 %    Eosinophils % 1.6 0.0 - 6.0 %    Basophils % 0.2 0.0 - 2.0 %    Neutrophils Absolute 7.29 1.80 - 7.30 E9/L    Immature Granulocytes # 0.04 E9/L    Lymphocytes Absolute 1.25 (L) 1.50 - 4.00 E9/L    Monocytes Absolute 0.55 0.10 - 0.95 E9/L    Eosinophils Absolute 0.15 0.05 - 0.50 E9/L    Basophils Absolute 0.02 0.00 - 0.20 E9/L   CMP   Result Value Ref Range    Sodium 137 132 - 146 mmol/L    Potassium 4.4 3.5 - 5.0 mmol/L    Chloride 101 98 - 107 mmol/L    CO2 25 22 - 29 mmol/L    Anion Gap 11 7 - 16 mmol/L    Glucose 117 (H) 74 - 99 mg/dL    BUN 22 6 - 23 mg/dL    Creatinine 1.0 0.7 - 1.2 mg/dL    Est, Glom Filt Rate >60 >=60 mL/min/1.73    Calcium 9.7 8.6 - 10.2 mg/dL    Total Protein 7.9 6.4 - 8.3 g/dL    Albumin 4.4 3.5 - 5.2 g/dL    Total Bilirubin 0.7 0.0 - 1.2 mg/dL    Alkaline Phosphatase 70 40 - 129 U/L    ALT 13 0 - 40 U/L    AST 19 0 - 39 U/L   Troponin   Result Value Ref Range    Troponin, High Sensitivity 15 (H) 0 - 11 ng/L   Troponin   Result Value Ref Range    Troponin, High Sensitivity 14 (H) 0 - 11 ng/L   EKG 12 Lead   Result Value Ref Range    Ventricular Rate 69 BPM Atrial Rate 69 BPM    P-R Interval 214 ms    QRS Duration 162 ms    Q-T Interval 440 ms    QTc Calculation (Bazett) 471 ms    P Axis 45 degrees    R Axis -60 degrees    T Axis 99 degrees       RADIOLOGY:   Non-plain film images such as CT, Ultrasound and MRI are read by the radiologist. Plain radiographic images are visualized and preliminarily interpreted by the ED Provider with the below findings:    Interpretation per the Radiologist below, if available at the time of this note:    CT HEAD WO CONTRAST   Final Result   1. There is no acute intracranial abnormality. Specifically, there is no   intracranial hemorrhage. 2. Atrophy and periventricular leukomalacia,   . No results found. No results found. EKG: This EKG is signed and interpreted by me. Rate: 69  Qtc:  471ms  Rhythm: Sinus and with first degree AV block and occasional PVCs. Interpretation: Sinus and withfirst degree AV block and occasional PVCs. Patient has left axis deviation with bundle branch block. Comparison: stable as compared to patient's most recent EKG  Interpreted by me and ED attending      ------------------------- NURSING NOTES AND VITALS REVIEWED ---------------------------   The nursing notes within the ED encounter and vital signs as below have been reviewed by myself and ED attending. /62   Pulse 86   Temp 98.9 °F (37.2 °C)   Resp 16   Ht 5' 9\" (1.753 m)   Wt 164 lb (74.4 kg)   SpO2 94%   BMI 24.22 kg/m²   Oxygen Saturation Interpretation: Normal    The patients available past medical records and past encounters were reviewed.         ------------------------------ ED COURSE/MEDICAL DECISION MAKING----------------------  Medications   ondansetron (ZOFRAN) injection 4 mg (4 mg IntraVENous Given 1/4/23 9362)   0.9 % sodium chloride bolus (0 mLs IntraVENous Stopped 1/4/23 7000)       Medical Decision Making/Differential Diagnosis:    CC/HPI Summary, Pertinent Physical Exam Findings, Social Determinants of health, Records Reviewed, DDx, testing done/not done, ED Course, Reassessment, disposition considerations/shared decision making with patient, consults, disposition:        Medical Decision Making:     History From: Patient    Limitations to history : None     69 y.o. male who presents with lightheadedness and nausea. States that his symptoms have been ongoing for the past 2 days. Vital signs upon arrival /62   Pulse 86   Temp 98.9 °F (37.2 °C)   Resp 16   Ht 5' 9\" (1.753 m)   Wt 164 lb (74.4 kg)   SpO2 94%   BMI 24.22 kg/m²  While in the ED patient was hemodynamically stable, orthostatics negative,  afebrile, nontoxic-appearing, in no respiratory distress. Physical exam unremarkable. Working diagnoses includes dehydration, anemia, ACS, viral infection. Labs remarkable for macrocytic anemia with stable H&H, elevated troponin with a delta troponin of -1. COVID and flu negative. EKG sinus 1st degree AV block with occasional premature ventricular complexes and premature atrial complexes. Ct head negative for any acute pathologies. Patient received 1L IV fluids and zofran IV 4mg with significant symptomatic relief. Patient feels comfortable going home and will follow up outpatient with PCP. Patient understands to return to the ED in case of worsening symptoms. Discussion with Other Profesionals : None    Social Determinants : None    Records Reviewed : None    Chronic conditions: CAD, TIA, HTN, HLD    CONSULTS: none      Disposition:   Appropriate for outpatient management      Pt will be d/c and will follow up with his PCP . He is educated on signs and symptoms that require emergent evaluation. Pt is advised to return to the ED if his symptoms change or worsen. If his pain persists, pt may need further evaluation. Pt is agreeable to plan and all questions have been answered at this time. 1. Lightheadedness    2.  Nausea                  This patient's ED course included: History, physical examination, reevaluation prior to disposition    This patient has remained hemodynamically stable during their ED course. Counseling: The emergency provider has spoken with the patient and discussed todays results, in addition to providing specific details for the plan of care and counseling regarding the diagnosis and prognosis. Questions are answered at this time and they are agreeable with the plan.       --------------------------------- IMPRESSION AND DISPOSITION ---------------------------------    IMPRESSION  1. Lightheadedness    2. Nausea        DISPOSITION  Disposition: Discharge to home  Patient condition is stable        NOTE: This report was transcribed using voice recognition software.  Every effort was made to ensure accuracy; however, inadvertent computerized transcription errors may be present        Rosana Aguila MD  Resident  01/04/23 9611

## 2023-01-06 ENCOUNTER — HOSPITAL ENCOUNTER (OUTPATIENT)
Dept: GENERAL RADIOLOGY | Age: 78
End: 2023-01-06
Payer: MEDICARE

## 2023-01-06 ENCOUNTER — HOSPITAL ENCOUNTER (OUTPATIENT)
Age: 78
Discharge: HOME | End: 2023-01-06
Payer: MEDICARE

## 2023-01-06 DIAGNOSIS — R07.9 CHEST PAIN, UNSPECIFIED TYPE: ICD-10-CM

## 2023-01-06 PROCEDURE — 71046 X-RAY EXAM CHEST 2 VIEWS: CPT

## 2023-01-08 ENCOUNTER — APPOINTMENT (OUTPATIENT)
Dept: GENERAL RADIOLOGY | Age: 78
End: 2023-01-08
Payer: MEDICARE

## 2023-01-08 ENCOUNTER — HOSPITAL ENCOUNTER (EMERGENCY)
Age: 78
Discharge: HOME | End: 2023-01-08
Attending: EMERGENCY MEDICINE
Payer: MEDICARE

## 2023-01-08 VITALS
RESPIRATION RATE: 18 BRPM | SYSTOLIC BLOOD PRESSURE: 125 MMHG | DIASTOLIC BLOOD PRESSURE: 80 MMHG | OXYGEN SATURATION: 97 % | TEMPERATURE: 97.9 F | HEART RATE: 75 BPM

## 2023-01-08 DIAGNOSIS — I50.43 ACUTE ON CHRONIC COMBINED SYSTOLIC AND DIASTOLIC CONGESTIVE HEART FAILURE (HCC): Primary | ICD-10-CM

## 2023-01-08 DIAGNOSIS — R42 LIGHTHEADEDNESS: ICD-10-CM

## 2023-01-08 LAB
ALBUMIN SERPL-MCNC: 4.4 G/DL (ref 3.5–5.2)
ALP BLD-CCNC: 75 U/L (ref 40–129)
ALT SERPL-CCNC: 16 U/L (ref 0–40)
ANION GAP SERPL CALCULATED.3IONS-SCNC: 14 MMOL/L (ref 7–16)
AST SERPL-CCNC: 19 U/L (ref 0–39)
BASOPHILS ABSOLUTE: 0.02 E9/L (ref 0–0.2)
BASOPHILS RELATIVE PERCENT: 0.2 % (ref 0–2)
BILIRUB SERPL-MCNC: 0.6 MG/DL (ref 0–1.2)
BUN BLDV-MCNC: 18 MG/DL (ref 6–23)
CALCIUM SERPL-MCNC: 9.8 MG/DL (ref 8.6–10.2)
CHLORIDE BLD-SCNC: 103 MMOL/L (ref 98–107)
CO2: 25 MMOL/L (ref 22–29)
CREAT SERPL-MCNC: 1.1 MG/DL (ref 0.7–1.2)
EOSINOPHILS ABSOLUTE: 0.19 E9/L (ref 0.05–0.5)
EOSINOPHILS RELATIVE PERCENT: 2.4 % (ref 0–6)
GFR SERPL CREATININE-BSD FRML MDRD: >60 ML/MIN/1.73
GLUCOSE BLD-MCNC: 97 MG/DL (ref 74–99)
HCT VFR BLD CALC: 35.2 % (ref 37–54)
HEMOGLOBIN: 12.4 G/DL (ref 12.5–16.5)
IMMATURE GRANULOCYTES #: 0.02 E9/L
IMMATURE GRANULOCYTES %: 0.2 % (ref 0–5)
LYMPHOCYTES ABSOLUTE: 1.42 E9/L (ref 1.5–4)
LYMPHOCYTES RELATIVE PERCENT: 17.7 % (ref 20–42)
MAGNESIUM: 2.1 MG/DL (ref 1.6–2.6)
MCH RBC QN AUTO: 34.9 PG (ref 26–35)
MCHC RBC AUTO-ENTMCNC: 35.2 % (ref 32–34.5)
MCV RBC AUTO: 99.2 FL (ref 80–99.9)
MONOCYTES ABSOLUTE: 0.8 E9/L (ref 0.1–0.95)
MONOCYTES RELATIVE PERCENT: 10 % (ref 2–12)
NEUTROPHILS ABSOLUTE: 5.56 E9/L (ref 1.8–7.3)
NEUTROPHILS RELATIVE PERCENT: 69.5 % (ref 43–80)
PDW BLD-RTO: 13.2 FL (ref 11.5–15)
PLATELET # BLD: 288 E9/L (ref 130–450)
PMV BLD AUTO: 9.9 FL (ref 7–12)
POTASSIUM SERPL-SCNC: 3.7 MMOL/L (ref 3.5–5)
PRO-BNP: 1232 PG/ML (ref 0–450)
RBC # BLD: 3.55 E12/L (ref 3.8–5.8)
SODIUM BLD-SCNC: 142 MMOL/L (ref 132–146)
TOTAL PROTEIN: 7.9 G/DL (ref 6.4–8.3)
TROPONIN, HIGH SENSITIVITY: 20 NG/L (ref 0–11)
TROPONIN, HIGH SENSITIVITY: 21 NG/L (ref 0–11)
WBC # BLD: 8 E9/L (ref 4.5–11.5)

## 2023-01-08 PROCEDURE — 93005 ELECTROCARDIOGRAM TRACING: CPT

## 2023-01-08 PROCEDURE — 71045 X-RAY EXAM CHEST 1 VIEW: CPT

## 2023-01-08 PROCEDURE — 6360000002 HC RX W HCPCS

## 2023-01-08 PROCEDURE — 96374 THER/PROPH/DIAG INJ IV PUSH: CPT

## 2023-01-08 PROCEDURE — 80053 COMPREHEN METABOLIC PANEL: CPT

## 2023-01-08 PROCEDURE — 84484 ASSAY OF TROPONIN QUANT: CPT

## 2023-01-08 PROCEDURE — 99285 EMERGENCY DEPT VISIT HI MDM: CPT

## 2023-01-08 PROCEDURE — 85025 COMPLETE CBC W/AUTO DIFF WBC: CPT

## 2023-01-08 PROCEDURE — 83735 ASSAY OF MAGNESIUM: CPT

## 2023-01-08 PROCEDURE — 83880 ASSAY OF NATRIURETIC PEPTIDE: CPT

## 2023-01-08 RX ORDER — FUROSEMIDE 10 MG/ML
20 INJECTION INTRAMUSCULAR; INTRAVENOUS ONCE
Status: COMPLETED | OUTPATIENT
Start: 2023-01-08 | End: 2023-01-08

## 2023-01-08 RX ADMIN — FUROSEMIDE 20 MG: 10 INJECTION, SOLUTION INTRAMUSCULAR; INTRAVENOUS at 13:59

## 2023-01-08 ASSESSMENT — PAIN - FUNCTIONAL ASSESSMENT
PAIN_FUNCTIONAL_ASSESSMENT: NONE - DENIES PAIN

## 2023-01-08 NOTE — ED PROVIDER NOTES
50-year-old male with history of congestive heart failure with last known ejection fraction at 30%. He comes to the emergency department with concerns of lightheadedness. He was worked up for this same symptoms on Wednesday at [facility] who did extensive work-up on him. He had blood work and his head CT at the time which showed no acute concerns. He was discharged home with follow-up with his primary care doctor. He saw his primary care doctor on January 6. He was started on Lasix and given a chest x-ray that showed CHF and pulm pleural effusions. He is taking Lasix 20 mg once a day. He comes in today because he is feeling lightheadedness and he has having increased ankle swelling of his right lower extremity. He also reports shortness of breath while exertion. He currently does not have any symptoms. He denies the following: Chest pain, headaches, vision changes, abdominal pain, nausea, vomiting, GI bleed, urinary symptoms, weight gain and orthopnea and cough. Chief Complaint   Patient presents with    Dizziness     Patient c/o dizziness and generalized edema. Hx of CHF and started lasix recently states he has not been feeling any better after starting lasix       Review of Systems   Stated in HPI above  Physical Exam  Constitutional:       Appearance: Normal appearance. HENT:      Head: Normocephalic and atraumatic. Mouth/Throat:      Mouth: Mucous membranes are moist.   Eyes:      Extraocular Movements: Extraocular movements intact. Conjunctiva/sclera: Conjunctivae normal.   Cardiovascular:      Rate and Rhythm: Normal rate and regular rhythm. Heart sounds: No murmur heard. Pulmonary:      Effort: Pulmonary effort is normal.      Breath sounds: Rales present. Abdominal:      Palpations: Abdomen is soft. Tenderness: There is no abdominal tenderness. Musculoskeletal:         General: Swelling present. No tenderness, deformity or signs of injury.       Cervical back: Normal range of motion and neck supple. Skin:     General: Skin is warm. Capillary Refill: Capillary refill takes less than 2 seconds. Neurological:      General: No focal deficit present. Mental Status: He is alert and oriented to person, place, and time. Mental status is at baseline. Cranial Nerves: No cranial nerve deficit. Psychiatric:         Mood and Affect: Mood normal.         Behavior: Behavior normal.      Pertinent review of systems stated that MDM below    Procedures     EKG: This EKG is signed by emergency department physician. Rate: 65  Rhythm: Sinus  AXIS: left  ST Changes: No ST elevation  Interpretation: Sinus rhythm with left bundle branch block  Comparison: No concerning acute changes. MDM     Amount and/or Complexity of Data Reviewed  Decide to obtain previous medical records or to obtain history from someone other than the patient: yes     60-year-old male with history of congestive heart failure with last known ejection fraction at 30%. He comes to the emergency department with concerns of lightheadedness. He was worked up for this same symptoms on Wednesday at [facility] who did extensive work-up on him. He had blood work and his head CT at the time which showed no acute concerns. He was discharged home with follow-up with his primary care doctor. He saw his primary care doctor on January 6. He was started on Lasix and given a chest x-ray that showed CHF and pulm pleural effusions. He is taking Lasix 20 mg once a day. He comes in today because he is feeling lightheadedness and he has having increased ankle swelling of his right lower extremity. He also reports shortness of breath while exertion. He currently does not have any symptoms. He denies the following: Chest pain, headaches, vision changes, abdominal pain, nausea, vomiting, GI bleed, urinary symptoms, weight gain and orthopnea and cough.     Upon entering room the patient was hemodynamically stable under no acute distress. He is 94% on room air he is not tachycardic and is not afebrile. Heart is regular rate and rhythm I did not appreciate any S3 gallop or murmurs. His lungs had diffuse crackles/rales most notably in the bilateral lung bases. There is notable ankle swelling of the right foot none pitting edema patient is abdomen is soft nontender nondistended. I do not appreciate any JVD or hepatojugular reflux on physical exam.  No cranial nerves II through XII deficits. No nystagmus. Finger-nose is normal.  Heel-to-shin is normal.  No truncal ataxia. He is able to ambulate without any difficulty. Repeat chest x-ray was done to see if there is any progression of his CHF. The new chest x-ray showed there was mild improvement in his effusion and his interstitial changes of his lungs. Also his BNP is although elevated but better than his baseline. EKG and troponin were unremarkable for acute ischemic changes. Labs were unremarkable for infectious count or low hemoglobin there are no electrolyte abnormalities. Labs and imaging were discussed with the patient. Impression is patient's lightheadedness due to the fact that he is in congestive heart failure and fluid overload that is causing his heart to pump and efficiently. He is given IV Lasix 20 mg, which markedly improved his symptoms. Upon reevaluation his lungs have less crackles and rales, patient stated that his right ankle swelling is gone and there is normal and same to his left ankle. On ambulation he did not have shortness of breath did not have lightheadedness and he remained above 90% without tachycardia. He is to continue taking his Lasix and to follow-up with his primary care physician. Please see ED course below. Medications   furosemide (LASIX) injection 20 mg (20 mg IntraVENous Given 1/8/23 7427)             ED Course as of 01/08/23 2004   Sun Jan 08, 2023   1042 I reviewed the patient's chart.     ECHO performed by Cardiology on 11/2/22:     Severly dilated left ventricle. Severely reduced left ventricular systolic function. Mildly dilated left atrium. Mildly reduced right ventricle systolic function. Moderate to Severe mitral regurgitation. Mild aortic regurgitation. Mild aortic stenosis. RVSP is 52 mmHg. [JA]   1102 EKG: This EKG is signed and interpreted by me, Annette Moody MD.    Rate: 65  Rhythm: Sinus  Interpretation: Sinus rhythm, 1st degree heart block, PVCs, left axis deviation, left bundle branch block, normal Qtc, no acute ST or T wave changes  Comparison: stable as compared to patient's most recent EKG   [JA]   1121 Patient is a 61-year-old male presenting with dizziness which she describes as a lightheadedness feeling which is worsened with standing and better when resting. He currently does not have the symptoms. He denies any headache, falls, focal neurologic deficits, dysarthria, or aphasia. He denies any chest pain, shortness of breath, syncope, nausea, vomiting, diarrhea, or black or bloody stools. He states he has had similar symptoms in the past when he was having an exacerbation of his heart failure. He follows with Dr. Kandy Avelar. I reviewed the patient's chart. He was seen in the ED in [facility] on Wednesday for similar complaints. He had blood work and a head CT at that time which showed no acute abnormalities. He was discharged home. He followed up with his primary care doctor on Friday for continued symptoms. I was able to review the chest x-ray that was performed as an outpatient on 1/6/2023, and results showed mild CHF and pleural effusion. Patient was started on 20 mg of Lasix once daily which she has been taking. He states that he feels like his leg swelling is improving somewhat but he is still feeling lightheaded. Reviewed most recent echocardiogram performed as an outpatient by cardiology. Please see results below. EF was 30% at that time.     PHYSICAL EXAM:  Vitals Reviewed  Constitutional/General: Alert and oriented x3, well appearing, non toxic in NAD  Head: Normocephalic and atraumatic  Eyes: PERRL, EOMI  Mouth: Oropharynx clear, handling secretions, no trismus  Neck: Supple, full ROM,   Pulmonary: Lungs crackles at lung bases bilaterally. Not in respiratory distress  Cardiovascular:  Regular rate and rhythm, no murmurs, gallops, or rubs. Abdomen: Soft, non tender, non distended,   Extremities: Moves all extremities x 4. Warm and well perfused. 1+ edema to bilateral lower extremities, pedal pulses intact, soft and easily compressible compartments  Skin: warm and dry without rash  Neurologic: GCS 15, see NIH below     [JA]   1124 NIH Stroke Scale/Score at time of initial evaluation:  1A: Level of Consciousness 0 - alert; keenly responsive  1B: Ask Month and Age 0 - answers both questions correctly  1C: Tell Patient To Open and Close Eyes, then Hand  Squeeze 0 - performs both tasks correctly  2: Test Horizontal Extraocular Movements 0 - normal  3: Test Visual Fields 0 - no visual loss  4: Test Facial Palsy 0 - normal symmetric movement  5A: Test Left Arm Motor Drift 0 - no drift, limb holds 90 (or 45) degrees for full 10 seconds  5B: Test Right Arm Motor Drift 0 - no drift, limb holds 90 (or 45) degrees for full 10 seconds  6A: Test Left Leg Motor Drift 0 - no drift; leg holds 30 degree position for full 5 seconds  6B: Test Right Leg Motor Drift 0 - no drift; leg holds 30 degree position for full 5 seconds  7: Test Limb Ataxia   (FNF/Heel-Shin) 0 - absent  8: Test Sensation 0 - normal; no sensory loss  9: Test Language/Aphasia 0 - no aphasia, normal  10: Test Dysarthria 0 - normal  11: Test Extinction/Inattention 0 - no abnormality  Total 0     [JA]   1124 I reviewed the patient's chart. Patient underwent bone biopsy as an outpatient on 12/21/2022.   Patient states that he underwent the biopsy for anemia but he has not yet received the results [JA]   1204 Chest xray, interpreted by me.  Interpretation: Mild pulmonary congestion, right pleural effusion appears improved when compared to most recent EKG.  Read confirmed by radiologist [JA]   1224 Repeat chest x-ray ordered to evaluate for consolidation or effusion.  Chest x-ray was compared to previous chest x-ray done on January 6 no acute changes noted.   [MN]   1232 Orthostatic vitals negative. [JA]   1627 Upon reevaluation the patient ankle swelling has noticeably decreased.  Patient feels much better and less lightheaded and shortness of breath has improved.  I had the patient sit up and walk with the pulse ox he remained above 90% he is not tachycardic no respiratory distress and he was feeling fine throughout the ambulation.  I discussed the labs and imaging with the patient and he agreed to continue taking his Lasix medication as prescribed allowing it to be in the system.  He agreed to be discharged from the hospital and to come back if he is having worsening of symptoms.  He will follow-up with his primary care physician. [MN]      ED Course User Index  [JA] Blanca Goss MD  [MN] Rex Booker, DO       --------------------------------------------- PAST HISTORY ---------------------------------------------  Past Medical History:  has a past medical history of Acute on chronic systolic congestive heart failure (HCC), Aortic regurgitation, Arthritis, CAD (coronary artery disease), Hypercholesteremia, Hypertension, Ischemic cardiomyopathy, Mitral regurgitation, and STEMI (ST elevation myocardial infarction) ([facility]).    Past Surgical History:  has a past surgical history that includes Tonsillectomy; Coronary angioplasty (12/12/12); ECHO Compl W Dop Color Flow (12/12/2012); Diagnostic Cardiac Cath Lab Procedure (12/12/12); Coronary artery bypass graft (12/13/12); and CT BIOPSY BONE MARROW (12/21/2022).    Social History:  reports that he has been smoking pipe. He has never used smokeless tobacco. He reports that he  does not drink alcohol and does not use drugs. Family History: family history is not on file. The patients home medications have been reviewed.     Allergies: Prednisone    -------------------------------------------------- RESULTS -------------------------------------------------  Labs:  Results for orders placed or performed during the hospital encounter of 01/08/23   CBC with Auto Differential   Result Value Ref Range    WBC 8.0 4.5 - 11.5 E9/L    RBC 3.55 (L) 3.80 - 5.80 E12/L    Hemoglobin 12.4 (L) 12.5 - 16.5 g/dL    Hematocrit 35.2 (L) 37.0 - 54.0 %    MCV 99.2 80.0 - 99.9 fL    MCH 34.9 26.0 - 35.0 pg    MCHC 35.2 (H) 32.0 - 34.5 %    RDW 13.2 11.5 - 15.0 fL    Platelets 120 975 - 107 E9/L    MPV 9.9 7.0 - 12.0 fL    Neutrophils % 69.5 43.0 - 80.0 %    Immature Granulocytes % 0.2 0.0 - 5.0 %    Lymphocytes % 17.7 (L) 20.0 - 42.0 %    Monocytes % 10.0 2.0 - 12.0 %    Eosinophils % 2.4 0.0 - 6.0 %    Basophils % 0.2 0.0 - 2.0 %    Neutrophils Absolute 5.56 1.80 - 7.30 E9/L    Immature Granulocytes # 0.02 E9/L    Lymphocytes Absolute 1.42 (L) 1.50 - 4.00 E9/L    Monocytes Absolute 0.80 0.10 - 0.95 E9/L    Eosinophils Absolute 0.19 0.05 - 0.50 E9/L    Basophils Absolute 0.02 0.00 - 0.20 E9/L   Comprehensive Metabolic Panel   Result Value Ref Range    Sodium 142 132 - 146 mmol/L    Potassium 3.7 3.5 - 5.0 mmol/L    Chloride 103 98 - 107 mmol/L    CO2 25 22 - 29 mmol/L    Anion Gap 14 7 - 16 mmol/L    Glucose 97 74 - 99 mg/dL    BUN 18 6 - 23 mg/dL    Creatinine 1.1 0.7 - 1.2 mg/dL    Est, Glom Filt Rate >60 >=60 mL/min/1.73    Calcium 9.8 8.6 - 10.2 mg/dL    Total Protein 7.9 6.4 - 8.3 g/dL    Albumin 4.4 3.5 - 5.2 g/dL    Total Bilirubin 0.6 0.0 - 1.2 mg/dL    Alkaline Phosphatase 75 40 - 129 U/L    ALT 16 0 - 40 U/L    AST 19 0 - 39 U/L   Magnesium   Result Value Ref Range    Magnesium 2.1 1.6 - 2.6 mg/dL   Brain Natriuretic Peptide   Result Value Ref Range    Pro-BNP 1,232 (H) 0 - 450 pg/mL   Troponin Result Value Ref Range    Troponin, High Sensitivity 21 (H) 0 - 11 ng/L   Troponin   Result Value Ref Range    Troponin, High Sensitivity 20 (H) 0 - 11 ng/L   EKG 12 Lead   Result Value Ref Range    Ventricular Rate 65 BPM    Atrial Rate 65 BPM    P-R Interval 210 ms    QRS Duration 162 ms    Q-T Interval 444 ms    QTc Calculation (Bazett) 461 ms    P Axis 57 degrees    R Axis -58 degrees    T Axis 105 degrees       Radiology:  XR CHEST PORTABLE   Final Result   Enlarged cardiac silhouette and pulmonary interstitial changes suspicious for   edema. ------------------------- NURSING NOTES AND VITALS REVIEWED ---------------------------  Date / Time Roomed:  1/8/2023 10:06 AM  ED Bed Assignment:  18A/18A-18    The nursing notes within the ED encounter and vital signs as below have been reviewed. /80   Pulse 75   Temp 97.9 °F (36.6 °C)   Resp 18   SpO2 97%   Oxygen Saturation Interpretation: Normal      ------------------------------------------ PROGRESS NOTES ------------------------------------------  I have spoken with the patient and discussed todays results, in addition to providing specific details for the plan of care and counseling regarding the diagnosis and prognosis. Their questions are answered at this time and they are agreeable with the plan. I discussed at length with them reasons for immediate return here for re evaluation. They will followup with primary care by calling their office tomorrow. --------------------------------- ADDITIONAL PROVIDER NOTES ---------------------------------  At this time the patient is without objective evidence of an acute process requiring hospitalization or inpatient management. They have remained hemodynamically stable throughout their entire ED visit and are stable for discharge with outpatient follow-up.      The plan has been discussed in detail and they are aware of the specific conditions for emergent return, as well as the importance of follow-up. Discharge Medication List as of 1/8/2023  4:29 PM          Diagnosis:  1. Lightheadedness    2. History of CHF (congestive heart failure)        Disposition:  Patient's disposition: Discharge to home  Patient's condition is stable. Attending was present and available throughout encounter including all critical portions;  See Attending Note/Attestation for Final [address]   Resident  01/08/23 2006

## 2023-01-08 NOTE — ED NOTES
D/c papers given. VSS. Pt advised on proper way to take medication. Follow up with his PCP. Return back to ED immediately should symptoms change/worsen in any way. Pt walked to ED waiting room where he is waiting for his nephew to pick him up. His gait was steady. No c/o's of dizziness/lightheadedness.       Fiona Zapata RN  01/08/23 [id number]

## 2023-01-08 NOTE — ED NOTES
Pt c/o intermittent dizziness/lightheadedness since Wednesday. Pt states he went to [facility] on Wednesday for his symptoms. He was worked up for stroke symptoms and cleared/discharged home same day. States he felt fine on Thursday and Friday morning. States on his drive to his Doctors office, the dizziness returned. He states by the time he arrived at the doctors office, he called the office to have them come and get a wheelchair to assist him into his appointment. States he had a CXR performed on Friday concerning for fluid overload and was prescribed 20mg lasix prn by his doctor. Started taking it Saturday at noon and reports urinating every 30-45mins yesterday. States the symptoms are not improving today and that is why he called 911 to bring him to the ER. Pt also has 1+ pitting edema in his right foot. States this has been present since Wednesday, but states it has improved. He states no CP and very little SOB. Pt states for the past couple of months he has had to sleep in a recliner chair, he states when lying flat his sinuses drain too much mucous and he cannot tolerate it. Pt denies f/c's, abd pain, N/V/D. He is A&Ox4. Neuro intact.       Efren Block RN  01/08/23 [id number]

## 2023-01-09 LAB
EKG ATRIAL RATE: 65 BPM
EKG P AXIS: 57 DEGREES
EKG P-R INTERVAL: 210 MS
EKG Q-T INTERVAL: 444 MS
EKG QRS DURATION: 162 MS
EKG QTC CALCULATION (BAZETT): 461 MS
EKG R AXIS: -58 DEGREES
EKG T AXIS: 105 DEGREES
EKG VENTRICULAR RATE: 65 BPM

## 2023-01-09 PROCEDURE — 93010 ELECTROCARDIOGRAM REPORT: CPT | Performed by: INTERNAL MEDICINE

## 2023-01-13 ENCOUNTER — TELEPHONE (OUTPATIENT)
Dept: CARDIOLOGY CLINIC | Age: 78
End: 2023-01-13

## 2023-01-13 DIAGNOSIS — R42 DIZZINESS: Primary | ICD-10-CM

## 2023-01-13 NOTE — TELEPHONE ENCOUNTER
Patient notified of Dr. Kassy Brown recommendations. He has not changed his mind about the defibrillator. He is scheduled for 1/17/23 and we will check orthostatics and put on Zio AT monitor at that time.

## 2023-01-13 NOTE — TELEPHONE ENCOUNTER
Ask him if he has changed his mind about the defibrillator now. If he has please refer him to EP. He just had an echo in November. 14-day ZIO. Have him come in for orthostatics. I have any openings next week. If not then he may need to add on at the end of the day.

## 2023-01-13 NOTE — TELEPHONE ENCOUNTER
Patient called to schedule follow-up from the ED. He still has complaints of dizziness. He is scheduled on 1/17/23. Please advise regarding any additional recommendations, if any.

## 2023-01-17 ENCOUNTER — OFFICE VISIT (OUTPATIENT)
Dept: CARDIOLOGY CLINIC | Age: 78
End: 2023-01-17

## 2023-01-17 VITALS
HEART RATE: 74 BPM | WEIGHT: 170.5 LBS | HEIGHT: 69 IN | SYSTOLIC BLOOD PRESSURE: 116 MMHG | BODY MASS INDEX: 25.25 KG/M2 | RESPIRATION RATE: 16 BRPM | DIASTOLIC BLOOD PRESSURE: 60 MMHG

## 2023-01-17 DIAGNOSIS — I25.119 CORONARY ARTERY DISEASE INVOLVING NATIVE HEART WITH ANGINA PECTORIS, UNSPECIFIED VESSEL OR LESION TYPE (HCC): Primary | ICD-10-CM

## 2023-01-17 DIAGNOSIS — R55 NEAR SYNCOPE: Primary | ICD-10-CM

## 2023-01-17 NOTE — PROGRESS NOTES
Anna Najera  1945  Date of Service: 1/17/2023    Patient Active Problem List    Diagnosis Date Noted    Noncompliance with medications 07/05/2020    TIA (transient ischemic attack) 07/05/2020    Acute CVA (cerebrovascular accident) ([facility] 07/01/2020    NICM (nonischemic cardiomyopathy) ([facility] 04/29/2019     Overview Note:     Echo 10-18  EF 25%    He refuses an ICD      Acute respiratory failure ([facility] 10/18/2018    Hyperlipidemia LDL goal <100 10/18/2018    Essential hypertension 10/18/2018    Mitral regurgitation      Overview Note:     moderate      Aortic regurgitation      Overview Note:     trace      CAD (coronary artery disease) 12/12/2012     Overview Note:     12-13-12 anterior STEMI  POBO LAD  CABG [place]-LAD   Free EL-diag   SVG-PLB   SVG-OM         Social History     Socioeconomic History    Marital status: Single     Spouse name: None    Number of children: None    Years of education: None    Highest education level: None   Tobacco Use    Smoking status: Some Days     Types: Pipe     Passive exposure: Current    Smokeless tobacco: Never    Tobacco comments:     quit cigarettes 35yrs ago   Vaping Use    Vaping Use: Never used   Substance and Sexual Activity    Alcohol use: No    Drug use: No       Current Outpatient Medications   Medication Sig Dispense Refill    metoprolol succinate (TOPROL XL) 50 MG extended release tablet Take 1 tablet by mouth daily 90 tablet 3    spironolactone (ALDACTONE) 25 MG tablet Take 1 tablet by mouth daily 90 tablet 3    losartan (COZAAR) 25 MG tablet Take 1 tablet by mouth in the morning and at bedtime 180 tablet 3    atorvastatin (LIPITOR) 40 MG tablet Take 1 tablet by mouth nightly 30 tablet 3    aspirin 81 MG chewable tablet Take 1 tablet by mouth daily 30 tablet 3    Cyanocobalamin (B-12 PO) Take by mouth      FOLIC ACID PO Take by mouth      Saw Palmetto 450 MG CAPS Take by mouth      Multiple Vitamins-Minerals (CENTRUM SILVER ULTRA MENS PO) Take 1 tablet by mouth daily. No current facility-administered medications for this visit. Allergies   Allergen Reactions    Prednisone Nausea Only       Chief Complaint:  Jose Grullon is here today for follow up and management/recomendations for CAD, ICM, HTN, AR, MR. History of Present Illness: Jose Grullon is being seen today for initial hospital follow-up. He is somewhat of a difficult historian. However, he had an episode of shortness of breath and lightheadedness. He therefore presented to the [facility] emergency room. His work-up for CVA and his ECG were negative and he was discharged. He then had another episode where he went to Dr. Jakob Velez office and he felt so dizzy and lightheaded that he could not get out of the car. He states that he eventually was able to go into the office and an ECG and a chest x-ray were performed. He was ordered some Lasix. However, 2 days later he became very lightheaded again but he did not have as much shortness of breath. He went to the emergency room again on 1/8/2023. He states that his ECG was negative and he was given more Lasix and discharged home. He has not had any recurring symptoms since his discharge. He denies orthopnea/PND. He states that he did have some lower extremity edema that has improved with the Lasix. He states that he now has Lasix ordered as needed. However, he states that he has not taken any Lasix since then. REVIEW OF SYSTEMS:  As above. Patient does not complain of any fever, chills, nausea, vomiting or diarrhea. No focal, motor or neurological deficits. No changes in hearing, bowel or bladder habits. He is not known to have a history of thyroid problems. No recent nose bleeds.     PHYSICAL EXAM:  Vitals:    01/17/23 0811 01/17/23 0822 01/17/23 0824   BP: 122/68 118/68 116/60   Position: Supine Sitting Standing   Pulse: 68 68 74   Resp: 16     Weight: 170 lb 8 oz (77.3 kg)     Height: 5' 9\" (1.753 m)         GENERAL: He is alert and oriented x 3, communicates well, in no distress.   NECK:  No masses, trachea is mid position.  Supple, full ROM, no JVD or bruits on exam.  No palpable thyromegaly or lymphadenopathy.   HEART: Distant to auscultation.  Regular rate and rhythm. Normal S1 and S2. There is an S4 gallop and a I/VI holosystolic murmur.    LUNGS:  Clear to auscultation bilaterally.  No use of accessory muscles.  symmetrical excursion.    ABDOMEN:  Soft, non-tender.  Normal bowel sounds.    EXTREMITIES:  Full ROM x 4.  No bilateral lower extremity edema on exam.  Good distal pulses.   EYES:  Extraocular muscles intact.  PERRL.  Normal lids & conjunctiva.  ENT:  Nares are clear & not bleeding.  Moist mucosa.  Normal lips formation.  No external masses   NEURO: no tremors, full ROM x 4, EOMI.  SKIN:  Warm, dry and intact.  Normal turgor.        EKG: Sinus rhythm, 74 bpm, LAFB, nonspecific ST - T wave changes.  Poor r-wave progression.  Right bundle branch block      Assessment:   Coronary artery disease as outlined above.  No symptoms of recurring ischemia today.  Severe cardiomyopathy as outlined above.  Well compensated at this time.   Mild AR  Moderately severe mitral regurgitation  Hypertension.    Hypercholesterolemia  2 episodes of near syncope as described above.  2 episodes of decompensated systolic heart failure as described above.  Have not recurred.  Right bundle branch block.        Recommendations:  He is following the cholesterol with Dr. Akers.  He still refuses a statin.  He continues to defer an ICD again today.  I tried to increase his losartan further previously and he did not tolerate this.  Discussed trying to do this again and he refuses due to the above.  We discussed the Entresto once again.  Once again, he states that he cannot afford this.  We reviewed the echo results once again.  We discussed a referral for possible intervention on the mitral valve again.  He states that he did research this  however, he thought it was the aortic valve. Therefore, he still does not wish to be referred for a mitral valve procedure until he can research this again. I recommended at least discussing this with someone who does this procedure in [place]. He will let me know if he changes his mind. He has refused an ICD in the past.  We discussed this again especially with now him having 2 near syncopal events. I will place a ZIO monitor on him. However, I will at least refer him to electrophysiology and they may consider an implantable Linq recorder and he can discuss the merits of an ICD and possible resynchronization with them. He does agree to that. Thank you for allowing me to participate in your patient's care.       [id number], [address] [facility]  Interventional Cardiology

## 2023-01-17 NOTE — PROGRESS NOTES
Patient was seen in the office per  for a BP/P check. Lying BP: 122/68    Lying P: 78      Sitting BP: 118/64    Sitting P: 68      Standing BP: 116/60    Standing P: 74    Patient was seen today and a Zio At 14 day  monitor was placed. Monitor was ordered by Dr. Faith Woodson. The monitor was applied, instructions were given to the patient. Patient stated understanding and gave verbalize readback.    Monitor company: [employer]  Serial TEIFEU:[id number]       [address] [place] [address] [Feeling Fatigued] : feeling fatigued [Chest Discomfort] : chest discomfort [Joint Pain] : joint pain [Negative] : Heme/Lymph [FreeTextEntry3] : glasses [FreeTextEntry5] : see history of present illness

## 2023-02-10 DIAGNOSIS — R42 DIZZINESS: ICD-10-CM

## 2023-02-13 ENCOUNTER — TELEPHONE (OUTPATIENT)
Dept: CARDIOLOGY CLINIC | Age: 78
End: 2023-02-13

## 2023-02-13 NOTE — TELEPHONE ENCOUNTER
Patient notified of monitor results and Dr. Lina Mike recommendation. He is scheduled to see Dr. Azalea Cabrera on 2/20/23, monitor results routed.

## 2023-02-13 NOTE — TELEPHONE ENCOUNTER
----- Message from Jason Sofia DO sent at 2/10/2023  4:06 PM EST -----  Rare PSVT. Occasional runs of NSVT. Please let him know that his heart is going out of rhythm and probably with making him almost pass out. This is why I sent him to see electrophysiology. He should have an appointment with EP. Please forward these strips to EP as well.

## 2023-02-17 NOTE — PROGRESS NOTES
[address] [facility]  CARDIAC ELECTROPHYSIOLOGY DEPARTMENT/DIVISION OF CARDIOLOGY  Outpatient Consultation Report  PATIENT: America Dinh RECORD NUMBER: [id number]  DATE OF SERVICE:  2023  ATTENDING ELECTROPHYSIOLOGIST:  Dixie Saeed D.O.  REFERRING PHYSICIAN: Walt Adrian DO and Gris Castro MD  CHIEF COMPLAINT: syncope    HPI: Delfina Saini is a 69 y.o. male with a history of NYHA class II HFrEF-ischemic sp CABG x4 (12: LIMA-LAD, EL-D1, SVG-PLB, SVG-OM - Dr Rachel Dean), moderate-severe MR, HTN, CVA (), and noncompliance. He is managed by Dr Andrzej Shen with selfobf12 mg daily, atorvastatin 40 mg daily, furosemide 20 mg PRN, losartan 25 mg daily, metoprolol XL 50 mg daily, and spironolactone 25 mg daily. In 2012, patient was diagnosed with multivessel CAD, which was treated with PTCA of LAD, then CABG x 4. Since that time, his LVEF has remained </= 35% despite > 3 months of GDMT. 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 In , he was evaluated by EP (Dr Claudetta London), who recommend primary prevention ICD, but patient declined. In , he was noted to have moderate-severe MR, as well as IVCD (~150 msec) and 1* AV block. In 2023, an event monitor reported frequent brief episodes of SVT, as well as episodes of NSVT. He presents today, 2023; as a referral to my office for evaluation and management of NSVT in the setting of NYHA Class II HFrEF-ischemic. In the past, patient reportedly declined ICD. He complains of episodes of LE edema, SOB, and lightheadedness. He denies any other complains at this time.     Prior cardiac testin day event monitor (23: sinus with 1* AV block and IVCD (morphology change observed) at 48 - 114 bpm (mean: 71 bpm), 2 patient events during SR with SVE and VE, SVE burden = 1.6%, 1565 brief episodes of SVT (longest: 22.9 sec at 107 bpm), VE burden = 3.8%, 4 episodes NSVT (longest: 4.2 sec at 122 bpm), and no AF, AV block, or significant pauses. TTE (11/10/22): LVEF = 30%, severe LV dilation, mild RVSD, mild LAE, moderate-severe MR, mild AI, and mild AS. TTE (8/12/20): LVEF = 30%, severe LV dilation, mild - moderate MR, mild AI,   TTE (10/19/18): LVEF = 25% with basal inferior and distal anterior/anteroseptal/anterolateral/apical hypokinesis, moderate LV dilation, stage II LVDD, severe LAE, moderate MR, mild pulm HTN (RVSP ~40 mmHg), mild AI. MUGA (9/11/13): LVEF = 29%, mild LV dilation. TTE (3/11/13): LVEF = 35% with anteroseptal akinesis, moderate LV dilation, stage II LVDD, mild ADRY, mild-moderate MR, and mild PI. LHC (12/17/12): LVEF = 40% with anterior hypokinesis, RCA dominant circulation, occlusion proxima LAD with heavy calcification treated with PTCA, 90% ostial D1 stenosis, 75% ostial-proximal RI stenosis (2 mm vessel), and 80% ostial OM-2 stenosis, 40-40% mid RCA stenosis, 80-90% proximal PDA stenosis, 80-90% proximal PLB stenosis. TTE (12/12/12): LVEF = 47% with apical hypokinesis,     Past Medical History:   Diagnosis Date    Acute on chronic systolic congestive heart failure ([address] 10/18/2018    Aortic regurgitation     trace    Arthritis     CAD (coronary artery disease)     Hypercholesteremia     Hypertension     Ischemic cardiomyopathy     Mitral regurgitation     mild to moderate    STEMI (ST elevation myocardial infarction) ([address] 12/12/12    anteriolateral     Past Surgical History:   Procedure Laterality Date    CORONARY ANGIOPLASTY  12/12/12    Balloon only to LAD    CORONARY ARTERY BYPASS GRAFT  12/13/12    cabg x 4- Dr. Rachel Dean    CT BONE MARROW BIOPSY  12/21/2022    CT BONE MARROW BIOPSY 12/21/2022 [facility]    DIAGNOSTIC CARDIAC CATH LAB PROCEDURE  12/12/12    [place]- Dr. Gildardo Camarena  12/12/2012         TONSILLECTOMY        History reviewed. No pertinent family history.   There is no family history of sudden cardiac arrest    Social History     Tobacco Use    Smoking status: Some Days     Types: Pipe     Passive exposure: Current    Smokeless tobacco: Never    Tobacco comments:     quit cigarettes 35yrs ago   Substance Use Topics    Alcohol use: No       Current Outpatient Medications   Medication Sig Dispense Refill    furosemide (LASIX) 20 MG tablet take 1/2 to 1 tablet by mouth once daily if needed      niacin 500 MG extended release capsule Take 500 mg by mouth nightly      Calcium Carb-Cholecalciferol (CALCIUM 1000 + D) 1000-20 MG-MCG TABS Take by mouth daily Take 2-3 times daily      Zinc 25 MG TABS Take 25 mg by mouth daily      metoprolol succinate (TOPROL XL) 50 MG extended release tablet Take 1 tablet by mouth daily 90 tablet 3    spironolactone (ALDACTONE) 25 MG tablet Take 1 tablet by mouth daily 90 tablet 3    losartan (COZAAR) 25 MG tablet Take 1 tablet by mouth in the morning and at bedtime 180 tablet 3    atorvastatin (LIPITOR) 40 MG tablet Take 1 tablet by mouth nightly 30 tablet 3    aspirin 81 MG chewable tablet Take 1 tablet by mouth daily 30 tablet 3    Cyanocobalamin (B-12 PO) Take by mouth      FOLIC ACID PO Take by mouth      Saw Palmetto 450 MG CAPS Take by mouth      Multiple Vitamins-Minerals (CENTRUM SILVER ULTRA MENS PO) Take 1 tablet by mouth daily. No current facility-administered medications for this visit. Allergies   Allergen Reactions    Prednisone Nausea Only       ROS:   Constitutional: Negative for fever, activity change and appetite change. HENT: Negative for epistaxis. Eyes: Negative for diploplia, blurred vision. Respiratory: Negative for cough, chest tightness, shortness of breath and wheezing. Cardiovascular: pertinent positives in HPI  Gastrointestinal: Negative for abdominal pain and blood in stool. Genitourinary: Negative for hematuria and difficulty urinating. Musculoskeletal: Negative for myalgias and gait problem. Skin: Negative for color change and rash.    Neurological: Negative for syncope and light-headedness. Psychiatric/Behavioral: Negative for confusion and agitation. The patient is not nervous/anxious. Heme: no bleeding disorders, no melena or hematochezia  All other review of systems are negative     PHYSICAL EXAM:  Constitutional   Vitals:    23 1146 23 1152 23 1153 23 1154   BP:  120/64 122/64 126/68   Site:  Left Upper Arm Left Upper Arm Left Upper Arm   Position:  Supine Sitting Standing   Cuff Size:  Medium Adult Medium Adult Medium Adult   Pulse: 69 54 57 69   Resp: 18      Weight: 176 lb (79.8 kg)      Height: 5' 9\" (1.753 m)       Well-developed, no acute distress, well groomed  Eyes: conjunctivae normal, no xanthelasma   Ears, Nose, Throat: oral mucosa moist, no cyanosis   Neck: supple, no JVD, no bruits, no thyromegaly   CV: normal rate, regular rhythm,  no murmurs, rubs, or gallops. PMI is nondisplaced, Peripheral pulses normal including carotid auscultation, no noted aortic bruit, bilateral femoral and pedal pulses are normal in quality  Lungs: clear to auscultation bilaterally, normal respiratory effort without used of accessory muscles, no wheezes  Abdomen: soft, non-tender, bowel sounds present, no masses or hepatomegaly   Extremities: no digital clubbing, no edema   Skin: warm, no rashes   Neuro/Psych: A&O x 3, normal mood and affect    Cardiac testing done today:   EC23: sinus at 69 bpm, atypical LBBB (QRSd = 156 ms)     Assessment/plan:  NYHA class II HFrEF-ischemic sp CABG x4 (12: LIMA-LAD, EL-D1, SVG-PLB, SVG-OM - Dr Rashmi Campos)  -  In 2012, patient was diagnosed with multivessel CAD, which was treated with PTCA of LAD, then CABG x 4. Since that time, his LVEF has remained </= 35% despite > 3 months of GDMT. 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 In , he was evaluated by EP (Dr Larry Dee), who recommend primary prevention ICD, but patient declined.  In , he was noted to have moderate-severe MR, as well as IVCD (~150 msec) and 1* AV block.   - Recommend Medtronic CRT-D as he is in sinus rhythm with LVEF < 35% and atypical LBBB with QRSd > 150 msec with NYHA Class II-IV. I reviewed indications, material risks, and benefits (including ~30% of CRT patient do not improve after implant). Additionally, I provided a copy of the [place] Shared Decision Making Tool to the patient, which I reviewed with the patient. - Patient will contact my office in ~2 days when he has made a decision.  - Follow-up with my office in ~3-6 months.    moderate-severe MR  - Management per Dr Laureen Saucedo. Patients being evaluated at [facility] for MitraClip. SVT  - Brief episodes. - Continue metoprolol. NSVT  - Continue metoprolol. I spent a total of 80 minutes reviewing previous notes, test results, and face to face with the patient discussing the diagnosis and importance of compliance with the treatment plan as well as documenting on the day of the visit. Time of the day of service includes:  Preparing to see the patient (eg. Review of the medical record, such as tests). Obtaining and/or reviewing separately obtained history. Ordering prescription medications, tests, and/or procedures. Communicating results to the patient/family/caregiver. Counseling/educating the patient/family/caregiver. Documenting clinical information in the patients electronic record. Coordination of care for the patient. Performing a medical appropriate exam and/or evaluation. Thank you for allowing me to participate in your patient's care. Please call me if there are any questions. Coreen Allred D.O.   Cardiac Electrophysiology  [facility]  [facility]) Physicians    CC: Faith Costa MD

## 2023-02-20 ENCOUNTER — TELEPHONE (OUTPATIENT)
Dept: NON INVASIVE DIAGNOSTICS | Age: 78
End: 2023-02-20

## 2023-02-20 ENCOUNTER — OFFICE VISIT (OUTPATIENT)
Dept: NON INVASIVE DIAGNOSTICS | Age: 78
End: 2023-02-20
Payer: MEDICARE

## 2023-02-20 VITALS
WEIGHT: 176 LBS | DIASTOLIC BLOOD PRESSURE: 68 MMHG | HEART RATE: 69 BPM | SYSTOLIC BLOOD PRESSURE: 126 MMHG | BODY MASS INDEX: 26.07 KG/M2 | RESPIRATION RATE: 18 BRPM | HEIGHT: 69 IN

## 2023-02-20 DIAGNOSIS — R55 SYNCOPE, UNSPECIFIED SYNCOPE TYPE: Primary | ICD-10-CM

## 2023-02-20 PROCEDURE — 93000 ELECTROCARDIOGRAM COMPLETE: CPT | Performed by: STUDENT IN AN ORGANIZED HEALTH CARE EDUCATION/TRAINING PROGRAM

## 2023-02-20 PROCEDURE — G8417 CALC BMI ABV UP PARAM F/U: HCPCS | Performed by: STUDENT IN AN ORGANIZED HEALTH CARE EDUCATION/TRAINING PROGRAM

## 2023-02-20 PROCEDURE — 99205 OFFICE O/P NEW HI 60 MIN: CPT | Performed by: STUDENT IN AN ORGANIZED HEALTH CARE EDUCATION/TRAINING PROGRAM

## 2023-02-20 PROCEDURE — 3078F DIAST BP <80 MM HG: CPT | Performed by: STUDENT IN AN ORGANIZED HEALTH CARE EDUCATION/TRAINING PROGRAM

## 2023-02-20 PROCEDURE — 3074F SYST BP LT 130 MM HG: CPT | Performed by: STUDENT IN AN ORGANIZED HEALTH CARE EDUCATION/TRAINING PROGRAM

## 2023-02-20 PROCEDURE — G8484 FLU IMMUNIZE NO ADMIN: HCPCS | Performed by: STUDENT IN AN ORGANIZED HEALTH CARE EDUCATION/TRAINING PROGRAM

## 2023-02-20 PROCEDURE — 1123F ACP DISCUSS/DSCN MKR DOCD: CPT | Performed by: STUDENT IN AN ORGANIZED HEALTH CARE EDUCATION/TRAINING PROGRAM

## 2023-02-20 PROCEDURE — 4004F PT TOBACCO SCREEN RCVD TLK: CPT | Performed by: STUDENT IN AN ORGANIZED HEALTH CARE EDUCATION/TRAINING PROGRAM

## 2023-02-20 PROCEDURE — G8427 DOCREV CUR MEDS BY ELIG CLIN: HCPCS | Performed by: STUDENT IN AN ORGANIZED HEALTH CARE EDUCATION/TRAINING PROGRAM

## 2023-02-20 RX ORDER — ZINC 25 MG
25 TABLET ORAL DAILY
COMMUNITY

## 2023-02-20 RX ORDER — FUROSEMIDE 20 MG/1
TABLET ORAL
COMMUNITY
Start: 2023-01-06

## 2023-02-20 RX ORDER — PSYLLIUM HUSK 0.4 G
CAPSULE ORAL DAILY
COMMUNITY

## 2023-02-20 RX ORDER — LANOLIN ALCOHOL/MO/W.PET/CERES
500 CREAM (GRAM) TOPICAL NIGHTLY
COMMUNITY

## 2023-02-20 NOTE — PATIENT INSTRUCTIONS
No medication changes at this time. Contact Dr Gavin Meng office when you decide on pacemaker or defibrillator implant. [place] [address] Shared Decision Making Tool was reviewed and provided to you. Follow-up with Dr Gavin Meng office in 3-6 months.

## 2023-02-20 NOTE — TELEPHONE ENCOUNTER
----- Message from Jackie Tillman DO sent at 2/13/2023 11:12 PM EST -----  Appears to have LVEF </= 35% since 2013 at time of ACS treated with PCI LAD then CABG x 4. 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 He has been on GDMT > 3 months. Apparently in the past patient has declined ICD. Per Dr Tiago Delarosa patient is having syncope. Recent event monitor with brief episodes of SVT and NSVT. I am scheduled to see on 2/20/23. I will attempt to move up appointment to 2/16/23 during [facility].     I will discuss CRT-D implant at time of appointment.    -Jackie Tillman DO     ----- Message -----  From: Kolby Jon  Sent: 2/13/2023   1:03 PM EST  To: Jackie Tillman DO    New patient to you on 2/20/23

## 2023-03-01 DIAGNOSIS — R55 SYNCOPE, UNSPECIFIED SYNCOPE TYPE: Primary | ICD-10-CM

## 2023-04-12 ENCOUNTER — TELEPHONE (OUTPATIENT)
Dept: NON INVASIVE DIAGNOSTICS | Age: 78
End: 2023-04-12

## 2023-04-14 ENCOUNTER — ANESTHESIA (OUTPATIENT)
Dept: CARDIAC CATH/INVASIVE PROCEDURES | Age: 78
End: 2023-04-14
Payer: MEDICARE

## 2023-04-14 ENCOUNTER — ANESTHESIA EVENT (OUTPATIENT)
Dept: CARDIAC CATH/INVASIVE PROCEDURES | Age: 78
End: 2023-04-14
Payer: MEDICARE

## 2023-04-14 ENCOUNTER — HOSPITAL ENCOUNTER (OUTPATIENT)
Dept: CARDIAC CATH/INVASIVE PROCEDURES | Age: 78
Discharge: HOME | End: 2023-04-14
Attending: STUDENT IN AN ORGANIZED HEALTH CARE EDUCATION/TRAINING PROGRAM | Admitting: STUDENT IN AN ORGANIZED HEALTH CARE EDUCATION/TRAINING PROGRAM
Payer: MEDICARE

## 2023-04-14 ENCOUNTER — APPOINTMENT (OUTPATIENT)
Dept: GENERAL RADIOLOGY | Age: 78
End: 2023-04-14
Attending: STUDENT IN AN ORGANIZED HEALTH CARE EDUCATION/TRAINING PROGRAM
Payer: MEDICARE

## 2023-04-14 VITALS
RESPIRATION RATE: 18 BRPM | DIASTOLIC BLOOD PRESSURE: 73 MMHG | HEART RATE: 78 BPM | HEIGHT: 69 IN | BODY MASS INDEX: 24.59 KG/M2 | SYSTOLIC BLOOD PRESSURE: 112 MMHG | WEIGHT: 166 LBS | OXYGEN SATURATION: 95 % | TEMPERATURE: 97.3 F

## 2023-04-14 DIAGNOSIS — I50.20 HFREF (HEART FAILURE WITH REDUCED EJECTION FRACTION) (HCC): Primary | ICD-10-CM

## 2023-04-14 PROBLEM — Z95.810 S/P IMPLANTATION OF AUTOMATIC CARDIOVERTER/DEFIBRILLATOR (AICD): Status: ACTIVE | Noted: 2023-04-14

## 2023-04-14 LAB
ANION GAP SERPL CALCULATED.3IONS-SCNC: 11 MMOL/L (ref 7–16)
BASOPHILS # BLD: 0.03 E9/L (ref 0–0.2)
BASOPHILS NFR BLD: 0.4 % (ref 0–2)
BUN SERPL-MCNC: 24 MG/DL (ref 6–23)
CALCIUM SERPL-MCNC: 9.6 MG/DL (ref 8.6–10.2)
CHLORIDE SERPL-SCNC: 95 MMOL/L (ref 98–107)
CO2 SERPL-SCNC: 24 MMOL/L (ref 22–29)
CREAT SERPL-MCNC: 1.1 MG/DL (ref 0.7–1.2)
EOSINOPHIL # BLD: 0.19 E9/L (ref 0.05–0.5)
EOSINOPHIL NFR BLD: 2.7 % (ref 0–6)
ERYTHROCYTE [DISTWIDTH] IN BLOOD BY AUTOMATED COUNT: 13.8 FL (ref 11.5–15)
GLUCOSE SERPL-MCNC: 117 MG/DL (ref 74–99)
HCT VFR BLD AUTO: 36.2 % (ref 37–54)
HGB BLD-MCNC: 12 G/DL (ref 12.5–16.5)
IMM GRANULOCYTES # BLD: 0.02 E9/L
IMM GRANULOCYTES NFR BLD: 0.3 % (ref 0–5)
LYMPHOCYTES # BLD: 1.42 E9/L (ref 1.5–4)
LYMPHOCYTES NFR BLD: 20.2 % (ref 20–42)
MAGNESIUM SERPL-MCNC: 2.1 MG/DL (ref 1.6–2.6)
MCH RBC QN AUTO: 35.1 PG (ref 26–35)
MCHC RBC AUTO-ENTMCNC: 33.1 % (ref 32–34.5)
MCV RBC AUTO: 105.8 FL (ref 80–99.9)
MONOCYTES # BLD: 0.57 E9/L (ref 0.1–0.95)
MONOCYTES NFR BLD: 8.1 % (ref 2–12)
NEUTROPHILS # BLD: 4.81 E9/L (ref 1.8–7.3)
NEUTS SEG NFR BLD: 68.3 % (ref 43–80)
PLATELET # BLD AUTO: 257 E9/L (ref 130–450)
PMV BLD AUTO: 9.8 FL (ref 7–12)
POTASSIUM SERPL-SCNC: 3.9 MMOL/L (ref 3.5–5)
RBC # BLD AUTO: 3.42 E12/L (ref 3.8–5.8)
SODIUM SERPL-SCNC: 130 MMOL/L (ref 132–146)
WBC # BLD: 7 E9/L (ref 4.5–11.5)

## 2023-04-14 PROCEDURE — 83735 ASSAY OF MAGNESIUM: CPT

## 2023-04-14 PROCEDURE — 36415 COLL VENOUS BLD VENIPUNCTURE: CPT

## 2023-04-14 PROCEDURE — 2709999900 HC NON-CHARGEABLE SUPPLY

## 2023-04-14 PROCEDURE — 85025 COMPLETE CBC W/AUTO DIFF WBC: CPT

## 2023-04-14 PROCEDURE — C1892 INTRO/SHEATH,FIXED,PEEL-AWAY: HCPCS

## 2023-04-14 PROCEDURE — 6360000002 HC RX W HCPCS: Performed by: NURSE ANESTHETIST, CERTIFIED REGISTERED

## 2023-04-14 PROCEDURE — C1882 AICD, OTHER THAN SING/DUAL: HCPCS

## 2023-04-14 PROCEDURE — 33225 L VENTRIC PACING LEAD ADD-ON: CPT | Performed by: STUDENT IN AN ORGANIZED HEALTH CARE EDUCATION/TRAINING PROGRAM

## 2023-04-14 PROCEDURE — C1900 LEAD, CORONARY VENOUS: HCPCS

## 2023-04-14 PROCEDURE — 2580000003 HC RX 258

## 2023-04-14 PROCEDURE — 93005 ELECTROCARDIOGRAM TRACING: CPT | Performed by: STUDENT IN AN ORGANIZED HEALTH CARE EDUCATION/TRAINING PROGRAM

## 2023-04-14 PROCEDURE — 2500000003 HC RX 250 WO HCPCS: Performed by: NURSE ANESTHETIST, CERTIFIED REGISTERED

## 2023-04-14 PROCEDURE — 80048 BASIC METABOLIC PNL TOTAL CA: CPT

## 2023-04-14 PROCEDURE — 71045 X-RAY EXAM CHEST 1 VIEW: CPT

## 2023-04-14 PROCEDURE — 6360000002 HC RX W HCPCS

## 2023-04-14 PROCEDURE — C1777 LEAD, AICD, ENDO SINGLE COIL: HCPCS

## 2023-04-14 PROCEDURE — 33225 L VENTRIC PACING LEAD ADD-ON: CPT

## 2023-04-14 PROCEDURE — C1889 IMPLANT/INSERT DEVICE, NOC: HCPCS

## 2023-04-14 PROCEDURE — 33249 INSJ/RPLCMT DEFIB W/LEAD(S): CPT | Performed by: STUDENT IN AN ORGANIZED HEALTH CARE EDUCATION/TRAINING PROGRAM

## 2023-04-14 PROCEDURE — 3700000000 HC ANESTHESIA ATTENDED CARE

## 2023-04-14 PROCEDURE — C1769 GUIDE WIRE: HCPCS

## 2023-04-14 PROCEDURE — C1876 STENT, NON-COA/NON-COV W/DEL: HCPCS

## 2023-04-14 PROCEDURE — 2580000003 HC RX 258: Performed by: STUDENT IN AN ORGANIZED HEALTH CARE EDUCATION/TRAINING PROGRAM

## 2023-04-14 PROCEDURE — 2580000003 HC RX 258: Performed by: NURSE ANESTHETIST, CERTIFIED REGISTERED

## 2023-04-14 PROCEDURE — 33249 INSJ/RPLCMT DEFIB W/LEAD(S): CPT

## 2023-04-14 PROCEDURE — C1894 INTRO/SHEATH, NON-LASER: HCPCS

## 2023-04-14 PROCEDURE — 3700000001 HC ADD 15 MINUTES (ANESTHESIA)

## 2023-04-14 PROCEDURE — 2500000003 HC RX 250 WO HCPCS

## 2023-04-14 PROCEDURE — C1898 LEAD, PMKR, OTHER THAN TRANS: HCPCS

## 2023-04-14 RX ORDER — SODIUM CHLORIDE 9 MG/ML
INJECTION, SOLUTION INTRAVENOUS PRN
Status: DISCONTINUED | OUTPATIENT
Start: 2023-04-14 | End: 2023-04-15 | Stop reason: HOSPADM

## 2023-04-14 RX ORDER — SODIUM CHLORIDE 9 MG/ML
INJECTION, SOLUTION INTRAVENOUS ONCE
Status: COMPLETED | OUTPATIENT
Start: 2023-04-14 | End: 2023-04-14

## 2023-04-14 RX ORDER — SODIUM CHLORIDE 9 MG/ML
INJECTION, SOLUTION INTRAVENOUS CONTINUOUS PRN
Status: DISCONTINUED | OUTPATIENT
Start: 2023-04-14 | End: 2023-04-14 | Stop reason: SDUPTHER

## 2023-04-14 RX ORDER — PROPOFOL 10 MG/ML
INJECTION, EMULSION INTRAVENOUS CONTINUOUS PRN
Status: DISCONTINUED | OUTPATIENT
Start: 2023-04-14 | End: 2023-04-14 | Stop reason: SDUPTHER

## 2023-04-14 RX ORDER — CEFAZOLIN SODIUM 1 G/3ML
INJECTION, POWDER, FOR SOLUTION INTRAMUSCULAR; INTRAVENOUS PRN
Status: DISCONTINUED | OUTPATIENT
Start: 2023-04-14 | End: 2023-04-14 | Stop reason: SDUPTHER

## 2023-04-14 RX ORDER — GLYCOPYRROLATE 0.2 MG/ML
INJECTION INTRAMUSCULAR; INTRAVENOUS PRN
Status: DISCONTINUED | OUTPATIENT
Start: 2023-04-14 | End: 2023-04-14 | Stop reason: SDUPTHER

## 2023-04-14 RX ORDER — MIDAZOLAM HYDROCHLORIDE 1 MG/ML
INJECTION INTRAMUSCULAR; INTRAVENOUS PRN
Status: DISCONTINUED | OUTPATIENT
Start: 2023-04-14 | End: 2023-04-14 | Stop reason: SDUPTHER

## 2023-04-14 RX ORDER — ACETAMINOPHEN 325 MG/1
650 TABLET ORAL EVERY 4 HOURS PRN
Status: DISCONTINUED | OUTPATIENT
Start: 2023-04-14 | End: 2023-04-15 | Stop reason: HOSPADM

## 2023-04-14 RX ORDER — SODIUM CHLORIDE 0.9 % (FLUSH) 0.9 %
5-40 SYRINGE (ML) INJECTION EVERY 12 HOURS SCHEDULED
Status: DISCONTINUED | OUTPATIENT
Start: 2023-04-14 | End: 2023-04-15 | Stop reason: HOSPADM

## 2023-04-14 RX ORDER — SODIUM CHLORIDE 0.9 % (FLUSH) 0.9 %
5-40 SYRINGE (ML) INJECTION PRN
Status: DISCONTINUED | OUTPATIENT
Start: 2023-04-14 | End: 2023-04-15 | Stop reason: HOSPADM

## 2023-04-14 RX ORDER — FENTANYL CITRATE 50 UG/ML
INJECTION, SOLUTION INTRAMUSCULAR; INTRAVENOUS PRN
Status: DISCONTINUED | OUTPATIENT
Start: 2023-04-14 | End: 2023-04-14 | Stop reason: SDUPTHER

## 2023-04-14 RX ORDER — PHENYLEPHRINE HCL IN 0.9% NACL 1 MG/10 ML
SYRINGE (ML) INTRAVENOUS PRN
Status: DISCONTINUED | OUTPATIENT
Start: 2023-04-14 | End: 2023-04-14 | Stop reason: SDUPTHER

## 2023-04-14 RX ADMIN — PROPOFOL 20 MG: 10 INJECTION, EMULSION INTRAVENOUS at 15:28

## 2023-04-14 RX ADMIN — CEFAZOLIN 2 G: 1 INJECTION, POWDER, FOR SOLUTION INTRAMUSCULAR; INTRAVENOUS at 12:55

## 2023-04-14 RX ADMIN — FENTANYL CITRATE 25 MCG: 0.05 INJECTION, SOLUTION INTRAMUSCULAR; INTRAVENOUS at 12:45

## 2023-04-14 RX ADMIN — FENTANYL CITRATE 50 MCG: 0.05 INJECTION, SOLUTION INTRAMUSCULAR; INTRAVENOUS at 14:49

## 2023-04-14 RX ADMIN — PROPOFOL 40 MG: 10 INJECTION, EMULSION INTRAVENOUS at 15:05

## 2023-04-14 RX ADMIN — PROPOFOL 20 MG: 10 INJECTION, EMULSION INTRAVENOUS at 13:15

## 2023-04-14 RX ADMIN — Medication 100 MCG: at 13:40

## 2023-04-14 RX ADMIN — GLYCOPYRROLATE 0.2 MG: 0.2 INJECTION, SOLUTION INTRAMUSCULAR; INTRAVENOUS at 12:45

## 2023-04-14 RX ADMIN — PROPOFOL 30 MG: 10 INJECTION, EMULSION INTRAVENOUS at 15:00

## 2023-04-14 RX ADMIN — Medication 100 MCG: at 13:57

## 2023-04-14 RX ADMIN — FENTANYL CITRATE 25 MCG: 0.05 INJECTION, SOLUTION INTRAMUSCULAR; INTRAVENOUS at 12:55

## 2023-04-14 RX ADMIN — PROPOFOL 15 MCG/KG/MIN: 10 INJECTION, EMULSION INTRAVENOUS at 12:55

## 2023-04-14 RX ADMIN — MIDAZOLAM 1 MG: 1 INJECTION INTRAMUSCULAR; INTRAVENOUS at 12:55

## 2023-04-14 RX ADMIN — SODIUM CHLORIDE: 9 INJECTION, SOLUTION INTRAVENOUS at 12:45

## 2023-04-14 RX ADMIN — FENTANYL CITRATE 50 MCG: 0.05 INJECTION, SOLUTION INTRAMUSCULAR; INTRAVENOUS at 15:44

## 2023-04-14 RX ADMIN — GLYCOPYRROLATE 0.2 MG: 0.2 INJECTION, SOLUTION INTRAMUSCULAR; INTRAVENOUS at 14:49

## 2023-04-14 RX ADMIN — MIDAZOLAM 1 MG: 1 INJECTION INTRAMUSCULAR; INTRAVENOUS at 12:45

## 2023-04-14 RX ADMIN — FENTANYL CITRATE 50 MCG: 0.05 INJECTION, SOLUTION INTRAMUSCULAR; INTRAVENOUS at 13:16

## 2023-04-14 RX ADMIN — SODIUM CHLORIDE 20 ML/HR: 9 INJECTION, SOLUTION INTRAVENOUS at 09:40

## 2023-04-14 ASSESSMENT — LIFESTYLE VARIABLES: SMOKING_STATUS: 1

## 2023-04-14 NOTE — H&P
FLOW  12/12/2012         TONSILLECTOMY        No family history on file. There is no family history of sudden cardiac arrest    Social History     Tobacco Use    Smoking status: Some Days     Types: Pipe     Passive exposure: Current    Smokeless tobacco: Never    Tobacco comments:     quit cigarettes 35yrs ago   Substance Use Topics    Alcohol use: No       Current Outpatient Medications   Medication Sig Dispense Refill    furosemide (LASIX) 20 MG tablet take 1/2 to 1 tablet by mouth once daily if needed      niacin 500 MG extended release capsule Take 500 mg by mouth nightly      Calcium Carb-Cholecalciferol (CALCIUM 1000 + D) 1000-20 MG-MCG TABS Take by mouth daily Take 2-3 times daily      Zinc 25 MG TABS Take 25 mg by mouth daily      metoprolol succinate (TOPROL XL) 50 MG extended release tablet Take 1 tablet by mouth daily 90 tablet 3    spironolactone (ALDACTONE) 25 MG tablet Take 1 tablet by mouth daily 90 tablet 3    losartan (COZAAR) 25 MG tablet Take 1 tablet by mouth in the morning and at bedtime 180 tablet 3    atorvastatin (LIPITOR) 40 MG tablet Take 1 tablet by mouth nightly 30 tablet 3    aspirin 81 MG chewable tablet Take 1 tablet by mouth daily 30 tablet 3    Cyanocobalamin (B-12 PO) Take by mouth      FOLIC ACID PO Take by mouth      Saw Palmetto 450 MG CAPS Take by mouth      Multiple Vitamins-Minerals (CENTRUM SILVER ULTRA MENS PO) Take 1 tablet by mouth daily. No current facility-administered medications for this encounter. Allergies   Allergen Reactions    Prednisone Nausea Only       ROS:   Constitutional: Negative for fever, activity change and appetite change. HENT: Negative for epistaxis. Eyes: Negative for diploplia, blurred vision. Respiratory: Negative for cough, chest tightness, shortness of breath and wheezing. Cardiovascular: pertinent positives in HPI  Gastrointestinal: Negative for abdominal pain and blood in stool.    Genitourinary: Negative for hematuria and

## 2023-04-14 NOTE — ANESTHESIA PRE PROCEDURE
sternotomy. The cardiac silhouette is enlarged. The lungs demonstrate diffuse interstitial changes suspicious for edema. No   segmental or lobar pulmonary consolidation or collapse is identified. No   pneumothorax or definite pleural effusion is seen. Impression   Enlarged cardiac silhouette and pulmonary interstitial changes suspicious for   edema. 1.  There is no acute intracranial abnormality. Specifically, there is no intracranial hemorrhage. 2. Atrophy and periventricular leukomalacia, . Anesthesia Plan      MAC     ASA 4       Induction: intravenous. Anesthetic plan and risks discussed with patient. Use of blood products discussed with patient whom. Plan discussed with attending.                     YANETH Baires - CRNA   4/14/2023

## 2023-04-14 NOTE — DISCHARGE INSTRUCTIONS
[facility] ICD Discharge Instructions      Medications: Resume all home medications    Compression stockings: Dr Adrian Trujillo office will fax order for compression stockings to [facility] on [address], 4/17/23. Incision Care:  Barb Dumont (Aquacel) bandage: Located under white bandage. This is a waterproof bandage. Remove in 1 week Friday, 4/21/23. Surgical glue: Located underneath the brown bandage. Do NOT pick at, scratch or remove the surgical glue from your incision. This will fall off on its own over the next several weeks. It is there to prevent infection. Bathing: Keep the incision dry. You may shower tomorrow evening, but do NOT spray the water directly at the site or scrub at the site. Pat dry only. No soaking in a bathtub, hot tub, or pool for 6 weeks. Daily monitoring: Check the area daily. Notify the office at [phone] if you develop any redness, swelling, drainage, warmth, or fever greater than 100 degrees. Activity:  You may continue regular activity; but limit strenuous movements of the arm closest to your ICD. Wear the arm immobilizer at all times for 48 hours and then at night for 4 weeks. The immobilizer should be loose, not tight in order to avoid restriction of blood flow. Avoid pulling yourself up with that arm. Do not raise elbow higher than shoulder height, do not lift anything weighing more than 3 pounds with your arm, and do not performing any stretching motions with your arm for 6 weeks. Do not do any activities such as golfing, vacuuming, or mowing the lawn for 6 weeks. Prevent any hard blows to the ICD. Driving: No driving until cleared at your 2 week follow up device clinic appointment. Possible Defibrillator Interferences:  Avoid high frequency ham radios, arc welders, battery powered tools, and strong electromagnetic fields. Avoid any device that may electrically stimulate your body; such as electric muscle stimulators or TENS units.

## 2023-04-15 LAB
EKG ATRIAL RATE: 67 BPM
EKG P AXIS: 72 DEGREES
EKG P-R INTERVAL: 184 MS
EKG Q-T INTERVAL: 442 MS
EKG QRS DURATION: 116 MS
EKG QTC CALCULATION (BAZETT): 467 MS
EKG R AXIS: 126 DEGREES
EKG T AXIS: -42 DEGREES
EKG VENTRICULAR RATE: 67 BPM

## 2023-04-15 PROCEDURE — 93010 ELECTROCARDIOGRAM REPORT: CPT | Performed by: INTERNAL MEDICINE

## 2023-04-15 NOTE — OP NOTE
Operative Note      Patient: Kristen Campos  YOB: 1945  MRN: [id number]    Date of Procedure: 4/14/2023    Patient History: Kristen Campos is a 69 y.o. male with a history of NYHA class II HFrEF-ischemic sp CABG x4 (12/13/12: LIMA-LAD, EL-D1, SVG-PLB, SVG-OM - Dr Lars Barillas), IVCD (156 msec), moderate-severe MR, HTN, CVA (2020), and noncompliance. He is managed by Dr Lauren Drummond with aspirin 81 mg daily, atorvastatin 40 mg daily, furosemide 20-40 mg PRN, losartan 25 mg BID, metoprolol XL 50 mg daily, and spironolactone 25 mg daily. In 12/2012, patient was diagnosed with multivessel CAD, which was treated with PTCA of LAD, then CABG x 4. Since that time, his LVEF has remained </= 35% despite > 3 months of GDMT. 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 In 2013, he was evaluated by EP (Dr Azul Treadwell), who recommend primary prevention ICD, but patient declined. In 2022, he was noted to have moderate-severe MR, as well as IVCD (156 msec) and 1* AV block. In 1/2023, an event monitor reported frequent brief episodes of SVT, as well as episodes of NSVT. In 2/2023, he was referred to my office. I recommended MDT CRT-D. After review of [place] Shared Decision Making Tool for Primary Prevention ICD, as well as indications, material risks, benefits, and alternatives he was agreeable to proceed. He presents today, 4/14/2023; for outpatient Medtronic CRT-D implant in the setting of NYHA Class II HFrEF-ischemic, NSVT, and IVCD > 150 msec. He denies any other complains at this time.     Pre-Op Diagnosis: NYHA Class II HFrEF-ischemic, Intraventricular Conduction Delay with QRS duration of 156 msec    Post-Op Diagnosis: Same          Procedure: Medtronic CRT-D implant (CPT code: 65239 + 96503)     Surgeon: Deshaun Curran DO     Assistant: None     Anesthesia: see separate Anesthesia report     Estimated Blood Loss (mL): 10 mL     Complications: none     Detailed Description of Procedure:   Verbal and written informed consent

## 2023-04-17 DIAGNOSIS — I42.8 NICM (NONISCHEMIC CARDIOMYOPATHY) (HCC): ICD-10-CM

## 2023-04-17 DIAGNOSIS — I25.119 CORONARY ARTERY DISEASE INVOLVING NATIVE HEART WITH ANGINA PECTORIS, UNSPECIFIED VESSEL OR LESION TYPE (HCC): Primary | ICD-10-CM

## 2023-04-17 LAB
EKG ATRIAL RATE: 87 BPM
EKG P AXIS: 30 DEGREES
EKG P-R INTERVAL: 146 MS
EKG Q-T INTERVAL: 462 MS
EKG QRS DURATION: 162 MS
EKG QTC CALCULATION (BAZETT): 555 MS
EKG R AXIS: 128 DEGREES
EKG T AXIS: -52 DEGREES
EKG VENTRICULAR RATE: 87 BPM

## 2023-04-17 PROCEDURE — 93010 ELECTROCARDIOGRAM REPORT: CPT | Performed by: INTERNAL MEDICINE

## 2023-04-17 NOTE — ANESTHESIA POSTPROCEDURE EVALUATION
Department of Anesthesiology  Postprocedure Note    Patient: Ann Gracia  MRN: [id number]  YOB: 1945  Date of evaluation: 4/17/2023      Procedure Summary     Date: 04/14/23 [place] [facility] [place]    Anesthesia Start: 8927 Anesthesia Stop: 6359    Procedure: BI-V PERM PACEMAKER W ANES Diagnosis:       Syncope and collapse      S/P implantation of automatic cardioverter/defibrillator (AICD)    Scheduled Providers: Junior Davalos MD; YANETH Beaulieu - CRNA Responsible Provider: Junior Davalos MD    Anesthesia Type: MAC ASA Status: 4          Anesthesia Type: MAC    Lambert Phase I:      Lambert Phase II:        Anesthesia Post Evaluation    Patient location during evaluation: PACU  Patient participation: complete - patient participated  Level of consciousness: awake  Pain score: 0  Airway patency: patent  Nausea & Vomiting: no nausea  Complications: no  Cardiovascular status: hemodynamically stable  Respiratory status: acceptable  Hydration status: stable  Multimodal analgesia pain management approach

## 2023-04-21 ENCOUNTER — TELEPHONE (OUTPATIENT)
Dept: NON INVASIVE DIAGNOSTICS | Age: 78
End: 2023-04-21

## 2023-04-21 DIAGNOSIS — I35.1 NONRHEUMATIC AORTIC (VALVE) INSUFFICIENCY: Primary | ICD-10-CM

## 2023-04-21 NOTE — TELEPHONE ENCOUNTER
Put in the order for echo. The patient will get the information at his wound check on 04/28/2023 here in office.

## 2023-04-21 NOTE — TELEPHONE ENCOUNTER
----- Message from Solitario Sarah sent at 4/20/2023  3:26 PM EDT -----  Regarding: FW: echo, appt    ----- Message -----  From: Stella Ty DO  Sent: 4/14/2023   7:59 PM EDT  To: Tonya Parmar MA, Miguel Lan RN  Subject: echo, appt                                       Transthoracic echo in 3 months.     Appt with me 1-2 weeks AFTER echocardiogram.    -Stella Ty DO

## 2023-04-24 ENCOUNTER — TELEPHONE (OUTPATIENT)
Dept: CARDIAC CATH/INVASIVE PROCEDURES | Age: 78
End: 2023-04-24

## 2023-04-24 NOTE — TELEPHONE ENCOUNTER
I called Mr. Gonzalez to see how he's doing since his ICD insertion on 4/14/23. He states he's doing fine & that the aquacel dressing was removed. He denies any fevers, redness, bleeding, drainage, however does admit to being very itchy at the incision site. He says he has been applying hydrocortisone cream. I told him we recommend he doesn't use any creams or lotions  he says the cream is making the itching bearable. I emphasized that he should call and report any signs of infection that we reviewed. His skin glue remains intact. I reminded him to continue to wear his sling at night for 4 weeks; along with not abducting the L arm above the shoulder. He's also aware of his follow up appt at the [facility] [address] on 4/28/23 at 10:00 am  He offers no other complaints & is thankful for the phone call.

## 2023-04-28 ENCOUNTER — NURSE ONLY (OUTPATIENT)
Dept: NON INVASIVE DIAGNOSTICS | Age: 78
End: 2023-04-28

## 2023-04-28 ENCOUNTER — TELEPHONE (OUTPATIENT)
Dept: NON INVASIVE DIAGNOSTICS | Age: 78
End: 2023-04-28

## 2023-04-28 DIAGNOSIS — I47.20 VENTRICULAR TACHYCARDIA (HCC): ICD-10-CM

## 2023-04-28 DIAGNOSIS — I50.20 NYHA CLASS 2 HEART FAILURE WITH REDUCED EJECTION FRACTION (HCC): ICD-10-CM

## 2023-04-28 DIAGNOSIS — Z79.899 ENCOUNTER FOR MONITORING AMIODARONE THERAPY: ICD-10-CM

## 2023-04-28 DIAGNOSIS — Z51.81 ENCOUNTER FOR MONITORING AMIODARONE THERAPY: ICD-10-CM

## 2023-04-28 DIAGNOSIS — Z91.148 NONCOMPLIANCE WITH MEDICATIONS: Primary | ICD-10-CM

## 2023-04-28 DIAGNOSIS — I25.5 ISCHEMIC CARDIOMYOPATHY: Primary | ICD-10-CM

## 2023-04-28 DIAGNOSIS — Z95.810 PRESENCE OF AUTOMATIC CARDIOVERTER/DEFIBRILLATOR (AICD): ICD-10-CM

## 2023-04-28 DIAGNOSIS — I42.8 NICM (NONISCHEMIC CARDIOMYOPATHY) (HCC): ICD-10-CM

## 2023-04-28 LAB
ALBUMIN SERPL-MCNC: 4.3 G/DL (ref 3.5–5.2)
ALP SERPL-CCNC: 64 U/L (ref 40–129)
ALT SERPL-CCNC: 17 U/L (ref 0–40)
ANION GAP SERPL CALCULATED.3IONS-SCNC: 16 MMOL/L (ref 7–16)
AST SERPL-CCNC: 27 U/L (ref 0–39)
BILIRUB SERPL-MCNC: 0.4 MG/DL (ref 0–1.2)
BUN SERPL-MCNC: 28 MG/DL (ref 6–23)
CALCIUM SERPL-MCNC: 9.3 MG/DL (ref 8.6–10.2)
CHLORIDE SERPL-SCNC: 100 MMOL/L (ref 98–107)
CO2 SERPL-SCNC: 21 MMOL/L (ref 22–29)
CREAT SERPL-MCNC: 1 MG/DL (ref 0.7–1.2)
ERYTHROCYTE [DISTWIDTH] IN BLOOD BY AUTOMATED COUNT: 14.1 FL (ref 11.5–15)
GLUCOSE SERPL-MCNC: 90 MG/DL (ref 74–99)
HCT VFR BLD AUTO: 35.6 % (ref 37–54)
HGB BLD-MCNC: 11.6 G/DL (ref 12.5–16.5)
MAGNESIUM SERPL-MCNC: 2.2 MG/DL (ref 1.6–2.6)
MCH RBC QN AUTO: 35.6 PG (ref 26–35)
MCHC RBC AUTO-ENTMCNC: 32.6 % (ref 32–34.5)
MCV RBC AUTO: 109.2 FL (ref 80–99.9)
PLATELET # BLD AUTO: 238 E9/L (ref 130–450)
PMV BLD AUTO: 10.5 FL (ref 7–12)
POTASSIUM SERPL-SCNC: 4.5 MMOL/L (ref 3.5–5)
PROT SERPL-MCNC: 7.8 G/DL (ref 6.4–8.3)
RBC # BLD AUTO: 3.26 E12/L (ref 3.8–5.8)
SODIUM SERPL-SCNC: 137 MMOL/L (ref 132–146)
T4 FREE SERPL-MCNC: 1.26 NG/DL (ref 0.93–1.7)
TSH SERPL-MCNC: 2.57 UIU/ML (ref 0.27–4.2)
WBC # BLD: 8 E9/L (ref 4.5–11.5)

## 2023-04-28 RX ORDER — AMIODARONE HYDROCHLORIDE 200 MG/1
200 TABLET ORAL
Qty: 45 TABLET | Refills: 3 | Status: SHIPPED | OUTPATIENT
Start: 2023-04-28

## 2023-04-28 RX ORDER — DOXYCYCLINE HYCLATE 100 MG
100 TABLET ORAL 2 TIMES DAILY
Qty: 14 TABLET | Refills: 0 | Status: SHIPPED | OUTPATIENT
Start: 2023-04-28 | End: 2023-05-05

## 2023-04-28 NOTE — PATIENT INSTRUCTIONS
Continue arm restrictions until 5/13/23  You may shower starting now    Call if any signs or symptoms of infection [phone] ext: [phone]  Fevers, chills, redness, swelling or drainage. Hook up home  Monitor  [employer] Stay connected: [phone]    If you receive a shock    1 shock and you feel fine send a home transmission and call the office    [phone]   1 shock and you feel poorly after the shock go to the emergency room and do   not drive. Multiple shocks report to the emergency room and do no drive. Start Amiodarone 200 mg three times a day for 14 days, then once daily  We will call you about the antibiotic    Do not put any medications-Cortisone or triple antibiotic ointment over left chest incision.  Use ice pack (wrap ice pack in towel do not put directly on skin) for 20 minutes at a time to help with itchiness

## 2023-04-28 NOTE — PROGRESS NOTES
See Murj report. Patient complained of dizziness yesterday afternoon when he came out of the shower. Remote showed 19 minutes of fast ventricular rates 178-180 lasting for 19 minutes. VT zone was programmed at 182 at implant. I discussed the above with Dr. Neto Branch. He wanted the VT zone changed from 182 to 154, VT monitor zone changed from 150-140. Patient had an EKG and lab work done in the office. Patient to start on Amiodarone 200 mg three times a day with food x14 days, then 200 mg daily. YANETH Hernandez ordered Doxycycline 100 mg twice daily for 7 days.      Yfn Edge RN, BSN  [facility]

## 2023-05-01 ENCOUNTER — HOSPITAL ENCOUNTER (INPATIENT)
Age: 78
LOS: 4 days | Discharge: NURSING, REHAB OR LONG-TERM CARE | End: 2023-05-05
Attending: EMERGENCY MEDICINE | Admitting: INTERNAL MEDICINE
Payer: MEDICARE

## 2023-05-01 ENCOUNTER — APPOINTMENT (OUTPATIENT)
Dept: GENERAL RADIOLOGY | Age: 78
End: 2023-05-01
Payer: MEDICARE

## 2023-05-01 DIAGNOSIS — S72.001A CLOSED RIGHT HIP FRACTURE, INITIAL ENCOUNTER (HCC): Primary | ICD-10-CM

## 2023-05-01 DIAGNOSIS — G89.18 POST-OP PAIN: ICD-10-CM
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
## 2023-05-01 LAB
ABO + RH BLD: NORMAL
ALBUMIN SERPL-MCNC: 4 G/DL (ref 3.5–5.2)
ALP SERPL-CCNC: 69 U/L (ref 40–129)
ALT SERPL-CCNC: 21 U/L (ref 0–40)
ANION GAP SERPL CALCULATED.3IONS-SCNC: 10 MMOL/L (ref 7–16)
APTT BLD: 27.1 SEC (ref 24.5–35.1)
AST SERPL-CCNC: 22 U/L (ref 0–39)
BASOPHILS # BLD: 0.02 E9/L (ref 0–0.2)
BASOPHILS NFR BLD: 0.2 % (ref 0–2)
BILIRUB SERPL-MCNC: 0.6 MG/DL (ref 0–1.2)
BLD GP AB SCN SERPL QL: NORMAL
BUN SERPL-MCNC: 26 MG/DL (ref 6–23)
CALCIUM SERPL-MCNC: 9.1 MG/DL (ref 8.6–10.2)
CHLORIDE SERPL-SCNC: 103 MMOL/L (ref 98–107)
CO2 SERPL-SCNC: 24 MMOL/L (ref 22–29)
CREAT SERPL-MCNC: 1 MG/DL (ref 0.7–1.2)
EKG ATRIAL RATE: 63 BPM
EKG P AXIS: 81 DEGREES
EKG P-R INTERVAL: 164 MS
EKG Q-T INTERVAL: 494 MS
EKG QRS DURATION: 130 MS
EKG QTC CALCULATION (BAZETT): 505 MS
EKG R AXIS: 131 DEGREES
EKG T AXIS: -24 DEGREES
EKG VENTRICULAR RATE: 63 BPM
EOSINOPHIL # BLD: 0.15 E9/L (ref 0.05–0.5)
EOSINOPHIL NFR BLD: 1.5 % (ref 0–6)
ERYTHROCYTE [DISTWIDTH] IN BLOOD BY AUTOMATED COUNT: 13.9 FL (ref 11.5–15)
GLUCOSE SERPL-MCNC: 117 MG/DL (ref 74–99)
HCT VFR BLD AUTO: 31.1 % (ref 37–54)
HGB BLD-MCNC: 10.4 G/DL (ref 12.5–16.5)
IMM GRANULOCYTES # BLD: 0.06 E9/L
IMM GRANULOCYTES NFR BLD: 0.6 % (ref 0–5)
INR BLD: 1.3
LYMPHOCYTES # BLD: 1.22 E9/L (ref 1.5–4)
LYMPHOCYTES NFR BLD: 12.1 % (ref 20–42)
MCH RBC QN AUTO: 35.3 PG (ref 26–35)
MCHC RBC AUTO-ENTMCNC: 33.4 % (ref 32–34.5)
MCV RBC AUTO: 105.4 FL (ref 80–99.9)
MONOCYTES # BLD: 0.72 E9/L (ref 0.1–0.95)
MONOCYTES NFR BLD: 7.1 % (ref 2–12)
NEUTROPHILS # BLD: 7.91 E9/L (ref 1.8–7.3)
NEUTS SEG NFR BLD: 78.5 % (ref 43–80)
PLATELET # BLD AUTO: 206 E9/L (ref 130–450)
PMV BLD AUTO: 9.9 FL (ref 7–12)
POTASSIUM SERPL-SCNC: 4.7 MMOL/L (ref 3.5–5)
PROT SERPL-MCNC: 7 G/DL (ref 6.4–8.3)
PROTHROMBIN TIME: 14.4 SEC (ref 9.3–12.4)
RBC # BLD AUTO: 2.95 E12/L (ref 3.8–5.8)
SODIUM SERPL-SCNC: 137 MMOL/L (ref 132–146)
WBC # BLD: 10.1 E9/L (ref 4.5–11.5)

## 2023-05-01 PROCEDURE — 86900 BLOOD TYPING SEROLOGIC ABO: CPT

## 2023-05-01 PROCEDURE — 71045 X-RAY EXAM CHEST 1 VIEW: CPT

## 2023-05-01 PROCEDURE — 2580000003 HC RX 258: Performed by: NURSE PRACTITIONER

## 2023-05-01 PROCEDURE — 85730 THROMBOPLASTIN TIME PARTIAL: CPT

## 2023-05-01 PROCEDURE — 6360000002 HC RX W HCPCS

## 2023-05-01 PROCEDURE — 1200000000 HC SEMI PRIVATE

## 2023-05-01 PROCEDURE — 85610 PROTHROMBIN TIME: CPT

## 2023-05-01 PROCEDURE — 96374 THER/PROPH/DIAG INJ IV PUSH: CPT

## 2023-05-01 PROCEDURE — 99285 EMERGENCY DEPT VISIT HI MDM: CPT

## 2023-05-01 PROCEDURE — 93005 ELECTROCARDIOGRAM TRACING: CPT

## 2023-05-01 PROCEDURE — 73552 X-RAY EXAM OF FEMUR 2/>: CPT

## 2023-05-01 PROCEDURE — 80053 COMPREHEN METABOLIC PANEL: CPT

## 2023-05-01 PROCEDURE — 93010 ELECTROCARDIOGRAM REPORT: CPT | Performed by: INTERNAL MEDICINE

## 2023-05-01 PROCEDURE — 73521 X-RAY EXAM HIPS BI 2 VIEWS: CPT

## 2023-05-01 PROCEDURE — 85025 COMPLETE CBC W/AUTO DIFF WBC: CPT

## 2023-05-01 PROCEDURE — 86850 RBC ANTIBODY SCREEN: CPT

## 2023-05-01 PROCEDURE — 86901 BLOOD TYPING SEROLOGIC RH(D): CPT

## 2023-05-01 PROCEDURE — 73562 X-RAY EXAM OF KNEE 3: CPT

## 2023-05-01 PROCEDURE — 6370000000 HC RX 637 (ALT 250 FOR IP): Performed by: NURSE PRACTITIONER

## 2023-05-01 RX ORDER — CHOLECALCIFEROL (VITAMIN D3) 125 MCG
500 CAPSULE ORAL DAILY
Status: ON HOLD | COMMUNITY
End: 2023-05-05 | Stop reason: HOSPADM

## 2023-05-01 RX ORDER — SPIRONOLACTONE 25 MG/1
25 TABLET ORAL NIGHTLY
Status: ON HOLD | COMMUNITY
End: 2023-05-05 | Stop reason: HOSPADM

## 2023-05-01 RX ORDER — DOXYCYCLINE HYCLATE 100 MG
100 TABLET ORAL 2 TIMES DAILY
Status: ON HOLD | COMMUNITY
Start: 2023-04-28 | End: 2023-05-05 | Stop reason: HOSPADM

## 2023-05-01 RX ORDER — SODIUM CHLORIDE 0.9 % (FLUSH) 0.9 %
10 SYRINGE (ML) INJECTION PRN
Status: DISCONTINUED | OUTPATIENT
Start: 2023-05-01 | End: 2023-05-05 | Stop reason: HOSPADM

## 2023-05-01 RX ORDER — ACETAMINOPHEN 650 MG/1
650 SUPPOSITORY RECTAL EVERY 6 HOURS PRN
Status: DISCONTINUED | OUTPATIENT
Start: 2023-05-01 | End: 2023-05-02

## 2023-05-01 RX ORDER — SODIUM CHLORIDE 9 MG/ML
INJECTION, SOLUTION INTRAVENOUS PRN
Status: DISCONTINUED | OUTPATIENT
Start: 2023-05-01 | End: 2023-05-05 | Stop reason: HOSPADM

## 2023-05-01 RX ORDER — POLYETHYLENE GLYCOL 3350 17 G/17G
17 POWDER, FOR SOLUTION ORAL DAILY PRN
Status: DISCONTINUED | OUTPATIENT
Start: 2023-05-01 | End: 2023-05-02

## 2023-05-01 RX ORDER — ATORVASTATIN CALCIUM 40 MG/1
40 TABLET, FILM COATED ORAL NIGHTLY
Status: DISCONTINUED | OUTPATIENT
Start: 2023-05-01 | End: 2023-05-05 | Stop reason: HOSPADM

## 2023-05-01 RX ORDER — MORPHINE SULFATE 4 MG/ML
4 INJECTION, SOLUTION INTRAMUSCULAR; INTRAVENOUS EVERY 4 HOURS PRN
Status: DISCONTINUED | OUTPATIENT
Start: 2023-05-01 | End: 2023-05-01

## 2023-05-01 RX ORDER — AMIODARONE HYDROCHLORIDE 200 MG/1
200 TABLET ORAL
Status: DISCONTINUED | OUTPATIENT
Start: 2023-05-02 | End: 2023-05-05 | Stop reason: HOSPADM

## 2023-05-01 RX ORDER — ONDANSETRON 2 MG/ML
4 INJECTION INTRAMUSCULAR; INTRAVENOUS EVERY 6 HOURS PRN
Status: DISCONTINUED | OUTPATIENT
Start: 2023-05-01 | End: 2023-05-01

## 2023-05-01 RX ORDER — ONDANSETRON 4 MG/1
4 TABLET, ORALLY DISINTEGRATING ORAL EVERY 8 HOURS PRN
Status: DISCONTINUED | OUTPATIENT
Start: 2023-05-01 | End: 2023-05-01

## 2023-05-01 RX ORDER — METOPROLOL SUCCINATE 50 MG/1
50 TABLET, EXTENDED RELEASE ORAL DAILY
Status: DISCONTINUED | OUTPATIENT
Start: 2023-05-01 | End: 2023-05-03

## 2023-05-01 RX ORDER — MORPHINE SULFATE 2 MG/ML
2 INJECTION, SOLUTION INTRAMUSCULAR; INTRAVENOUS EVERY 4 HOURS PRN
Status: DISCONTINUED | OUTPATIENT
Start: 2023-05-01 | End: 2023-05-02

## 2023-05-01 RX ORDER — ASPIRIN 81 MG/1
81 TABLET, CHEWABLE ORAL DAILY
Status: DISCONTINUED | OUTPATIENT
Start: 2023-05-01 | End: 2023-05-02

## 2023-05-01 RX ORDER — SODIUM CHLORIDE 0.9 % (FLUSH) 0.9 %
5-40 SYRINGE (ML) INJECTION EVERY 12 HOURS SCHEDULED
Status: DISCONTINUED | OUTPATIENT
Start: 2023-05-01 | End: 2023-05-05 | Stop reason: HOSPADM

## 2023-05-01 RX ORDER — M-VIT,TX,IRON,MINS/CALC/FOLIC 27MG-0.4MG
1 TABLET ORAL DAILY
Status: ON HOLD | COMMUNITY
End: 2023-05-05 | Stop reason: HOSPADM

## 2023-05-01 RX ORDER — FUROSEMIDE 20 MG/1
10 TABLET ORAL DAILY PRN
Status: ON HOLD | COMMUNITY
End: 2023-05-05 | Stop reason: HOSPADM

## 2023-05-01 RX ORDER — SPIRONOLACTONE 25 MG/1
25 TABLET ORAL DAILY
Status: DISCONTINUED | OUTPATIENT
Start: 2023-05-01 | End: 2023-05-05 | Stop reason: HOSPADM

## 2023-05-01 RX ORDER — LOSARTAN POTASSIUM 25 MG/1
25 TABLET ORAL 2 TIMES DAILY
Status: DISCONTINUED | OUTPATIENT
Start: 2023-05-01 | End: 2023-05-03

## 2023-05-01 RX ORDER — ACETAMINOPHEN 325 MG/1
650 TABLET ORAL EVERY 6 HOURS PRN
Status: DISCONTINUED | OUTPATIENT
Start: 2023-05-01 | End: 2023-05-02

## 2023-05-01 RX ORDER — MORPHINE SULFATE 4 MG/ML
4 INJECTION, SOLUTION INTRAMUSCULAR; INTRAVENOUS
Status: DISCONTINUED | OUTPATIENT
Start: 2023-05-01 | End: 2023-05-02

## 2023-05-01 RX ORDER — BISACODYL 10 MG
10 SUPPOSITORY, RECTAL RECTAL ONCE
Status: DISCONTINUED | OUTPATIENT
Start: 2023-05-01 | End: 2023-05-05 | Stop reason: HOSPADM

## 2023-05-01 RX ADMIN — LOSARTAN POTASSIUM 25 MG: 50 TABLET, FILM COATED ORAL at 20:01

## 2023-05-01 RX ADMIN — MORPHINE SULFATE 4 MG: 4 INJECTION, SOLUTION INTRAMUSCULAR; INTRAVENOUS at 11:29

## 2023-05-01 RX ADMIN — METOPROLOL SUCCINATE 50 MG: 50 TABLET, EXTENDED RELEASE ORAL at 16:58

## 2023-05-01 RX ADMIN — SPIRONOLACTONE 25 MG: 25 TABLET ORAL at 16:58

## 2023-05-01 RX ADMIN — MORPHINE SULFATE 4 MG: 4 INJECTION, SOLUTION INTRAMUSCULAR; INTRAVENOUS at 13:42

## 2023-05-01 RX ADMIN — ATORVASTATIN CALCIUM 40 MG: 40 TABLET, FILM COATED ORAL at 20:01

## 2023-05-01 RX ADMIN — MORPHINE SULFATE 4 MG: 4 INJECTION, SOLUTION INTRAMUSCULAR; INTRAVENOUS at 20:02

## 2023-05-01 RX ADMIN — SODIUM CHLORIDE, PRESERVATIVE FREE 10 ML: 5 INJECTION INTRAVENOUS at 20:01

## 2023-05-01 RX ADMIN — MORPHINE SULFATE 4 MG: 4 INJECTION, SOLUTION INTRAMUSCULAR; INTRAVENOUS at 23:39

## 2023-05-01 ASSESSMENT — PAIN SCALES - GENERAL
PAINLEVEL_OUTOF10: 10
PAINLEVEL_OUTOF10: 6
PAINLEVEL_OUTOF10: 10
PAINLEVEL_OUTOF10: 7

## 2023-05-01 ASSESSMENT — PAIN - FUNCTIONAL ASSESSMENT: PAIN_FUNCTIONAL_ASSESSMENT: PREVENTS OR INTERFERES SOME ACTIVE ACTIVITIES AND ADLS

## 2023-05-01 ASSESSMENT — ENCOUNTER SYMPTOMS
CHEST TIGHTNESS: 0
ABDOMINAL PAIN: 0
SHORTNESS OF BREATH: 0
BLOOD IN STOOL: 0

## 2023-05-01 ASSESSMENT — PAIN DESCRIPTION - DESCRIPTORS
DESCRIPTORS: ACHING
DESCRIPTORS: ACHING;CRUSHING
DESCRIPTORS: ACHING;SHOOTING;SHARP

## 2023-05-01 ASSESSMENT — PAIN DESCRIPTION - ORIENTATION
ORIENTATION: RIGHT

## 2023-05-01 ASSESSMENT — PAIN DESCRIPTION - LOCATION
LOCATION: HIP;KNEE;LEG;BACK
LOCATION: HIP
LOCATION: KNEE;HIP
LOCATION: PELVIS;KNEE

## 2023-05-01 NOTE — ED PROVIDER NOTES
Department of Emergency Medicine   ED Provider Note  Admit Date/RoomTime: 5/1/2023 10:50 AM  ED Room: [facility]          History of Present Illness:  5/1/23, Time: 11:14 AM EDT       Jayla Diehl is a 69 y.o. male presenting to the ED for mechanical fall. Patient slipped the last step on his ladder while fixing his chimes and fell approximately 3 steps and landed on the garage floor. Did not lose consciousness before or after the event. Patients states that he did not hit his head. Patient did not lose bowel or bladder function after the fall. Patient currently does not take any blood thinners. Patient denies any chest pain, shortness of breath, lightheadedness, vertigo, dizziness. Patient has chronic leg swelling 2/2 to CHF that has not worsened since the fall. Additional history obtained from [facility]    Review of Systems:   Review of Systems   Constitutional:  Negative for fatigue and fever. Respiratory:  Negative for chest tightness and shortness of breath. Cardiovascular:  Negative for chest pain. Gastrointestinal:  Negative for abdominal pain and blood in stool. Genitourinary:  Negative for difficulty urinating and flank pain. Musculoskeletal:  Positive for arthralgias and joint swelling. Negative for neck pain and neck stiffness. Pertinent positives and negatives are stated within HPI.      --------------------------------------------- PAST HISTORY ---------------------------------------------  Past Medical History:  has a past medical history of Acute on chronic systolic congestive heart failure ([address] Aortic regurgitation, Arthritis, CAD (coronary artery disease), Hypercholesteremia, Hypertension, Ischemic cardiomyopathy, Mitral regurgitation, and STEMI (ST elevation myocardial infarction) ([address] Past Surgical History:  has a past surgical history that includes Tonsillectomy; Coronary angioplasty (12/12/2012); ECHO Compl W Dop Color Flow (12/12/2012);  Diagnostic Cardiac Cath Lab

## 2023-05-02 ENCOUNTER — ANESTHESIA (OUTPATIENT)
Dept: OPERATING ROOM | Age: 78
End: 2023-05-02
Payer: MEDICARE

## 2023-05-02 ENCOUNTER — APPOINTMENT (OUTPATIENT)
Dept: GENERAL RADIOLOGY | Age: 78
End: 2023-05-02
Payer: MEDICARE

## 2023-05-02 ENCOUNTER — ANESTHESIA EVENT (OUTPATIENT)
Dept: OPERATING ROOM | Age: 78
End: 2023-05-02
Payer: MEDICARE

## 2023-05-02 PROBLEM — S72.001A CLOSED RIGHT HIP FRACTURE, INITIAL ENCOUNTER (HCC): Status: ACTIVE | Noted: 2023-05-02

## 2023-05-02 LAB
ANION GAP SERPL CALCULATED.3IONS-SCNC: 11 MMOL/L (ref 7–16)
BASOPHILS # BLD: 0.03 E9/L (ref 0–0.2)
BASOPHILS NFR BLD: 0.3 % (ref 0–2)
BUN SERPL-MCNC: 26 MG/DL (ref 6–23)
CALCIUM SERPL-MCNC: 8.6 MG/DL (ref 8.6–10.2)
CHLORIDE SERPL-SCNC: 103 MMOL/L (ref 98–107)
CO2 SERPL-SCNC: 21 MMOL/L (ref 22–29)
CREAT SERPL-MCNC: 1.1 MG/DL (ref 0.7–1.2)
EOSINOPHIL # BLD: 0.13 E9/L (ref 0.05–0.5)
EOSINOPHIL NFR BLD: 1.1 % (ref 0–6)
ERYTHROCYTE [DISTWIDTH] IN BLOOD BY AUTOMATED COUNT: 13.6 FL (ref 11.5–15)
GLUCOSE SERPL-MCNC: 111 MG/DL (ref 74–99)
HCT VFR BLD AUTO: 29.9 % (ref 37–54)
HGB BLD-MCNC: 10.1 G/DL (ref 12.5–16.5)
IMM GRANULOCYTES # BLD: 0.05 E9/L
IMM GRANULOCYTES NFR BLD: 0.4 % (ref 0–5)
LYMPHOCYTES # BLD: 1.97 E9/L (ref 1.5–4)
LYMPHOCYTES NFR BLD: 17.2 % (ref 20–42)
MCH RBC QN AUTO: 35.1 PG (ref 26–35)
MCHC RBC AUTO-ENTMCNC: 33.8 % (ref 32–34.5)
MCV RBC AUTO: 103.8 FL (ref 80–99.9)
MONOCYTES # BLD: 1.18 E9/L (ref 0.1–0.95)
MONOCYTES NFR BLD: 10.3 % (ref 2–12)
NEUTROPHILS # BLD: 8.07 E9/L (ref 1.8–7.3)
NEUTS SEG NFR BLD: 70.7 % (ref 43–80)
PLATELET # BLD AUTO: 184 E9/L (ref 130–450)
PMV BLD AUTO: 10.2 FL (ref 7–12)
POTASSIUM SERPL-SCNC: 4.3 MMOL/L (ref 3.5–5)
RBC # BLD AUTO: 2.88 E12/L (ref 3.8–5.8)
SODIUM SERPL-SCNC: 135 MMOL/L (ref 132–146)
WBC # BLD: 11.4 E9/L (ref 4.5–11.5)

## 2023-05-02 PROCEDURE — 3700000000 HC ANESTHESIA ATTENDED CARE: Performed by: STUDENT IN AN ORGANIZED HEALTH CARE EDUCATION/TRAINING PROGRAM

## 2023-05-02 PROCEDURE — 7100000000 HC PACU RECOVERY - FIRST 15 MIN: Performed by: STUDENT IN AN ORGANIZED HEALTH CARE EDUCATION/TRAINING PROGRAM

## 2023-05-02 PROCEDURE — APPSS60 APP SPLIT SHARED TIME 46-60 MINUTES

## 2023-05-02 PROCEDURE — 2580000003 HC RX 258: Performed by: ORTHOPAEDIC SURGERY

## 2023-05-02 PROCEDURE — 7100000001 HC PACU RECOVERY - ADDTL 15 MIN: Performed by: STUDENT IN AN ORGANIZED HEALTH CARE EDUCATION/TRAINING PROGRAM

## 2023-05-02 PROCEDURE — 2709999900 HC NON-CHARGEABLE SUPPLY: Performed by: STUDENT IN AN ORGANIZED HEALTH CARE EDUCATION/TRAINING PROGRAM

## 2023-05-02 PROCEDURE — 3209999900 FLUORO FOR SURGICAL PROCEDURES

## 2023-05-02 PROCEDURE — 2500000003 HC RX 250 WO HCPCS

## 2023-05-02 PROCEDURE — 3700000001 HC ADD 15 MINUTES (ANESTHESIA): Performed by: STUDENT IN AN ORGANIZED HEALTH CARE EDUCATION/TRAINING PROGRAM

## 2023-05-02 PROCEDURE — 85025 COMPLETE CBC W/AUTO DIFF WBC: CPT

## 2023-05-02 PROCEDURE — 1200000000 HC SEMI PRIVATE

## 2023-05-02 PROCEDURE — 36415 COLL VENOUS BLD VENIPUNCTURE: CPT

## 2023-05-02 PROCEDURE — 2580000003 HC RX 258

## 2023-05-02 PROCEDURE — 80048 BASIC METABOLIC PNL TOTAL CA: CPT

## 2023-05-02 PROCEDURE — 6370000000 HC RX 637 (ALT 250 FOR IP): Performed by: NURSE PRACTITIONER

## 2023-05-02 PROCEDURE — 51798 US URINE CAPACITY MEASURE: CPT

## 2023-05-02 PROCEDURE — 3600000005 HC SURGERY LEVEL 5 BASE: Performed by: STUDENT IN AN ORGANIZED HEALTH CARE EDUCATION/TRAINING PROGRAM

## 2023-05-02 PROCEDURE — 0QS606Z REPOSITION RIGHT UPPER FEMUR WITH INTRAMEDULLARY INTERNAL FIXATION DEVICE, OPEN APPROACH: ICD-10-PCS | Performed by: STUDENT IN AN ORGANIZED HEALTH CARE EDUCATION/TRAINING PROGRAM

## 2023-05-02 PROCEDURE — C1769 GUIDE WIRE: HCPCS | Performed by: STUDENT IN AN ORGANIZED HEALTH CARE EDUCATION/TRAINING PROGRAM

## 2023-05-02 PROCEDURE — 2720000010 HC SURG SUPPLY STERILE: Performed by: STUDENT IN AN ORGANIZED HEALTH CARE EDUCATION/TRAINING PROGRAM

## 2023-05-02 PROCEDURE — 99223 1ST HOSP IP/OBS HIGH 75: CPT | Performed by: INTERNAL MEDICINE

## 2023-05-02 PROCEDURE — 6360000002 HC RX W HCPCS

## 2023-05-02 PROCEDURE — C1713 ANCHOR/SCREW BN/BN,TIS/BN: HCPCS | Performed by: STUDENT IN AN ORGANIZED HEALTH CARE EDUCATION/TRAINING PROGRAM

## 2023-05-02 PROCEDURE — 2580000003 HC RX 258: Performed by: NURSE PRACTITIONER

## 2023-05-02 PROCEDURE — 3600000015 HC SURGERY LEVEL 5 ADDTL 15MIN: Performed by: STUDENT IN AN ORGANIZED HEALTH CARE EDUCATION/TRAINING PROGRAM

## 2023-05-02 PROCEDURE — 36620 INSERTION CATHETER ARTERY: CPT | Performed by: ANESTHESIOLOGY

## 2023-05-02 DEVICE — IMPLANTABLE DEVICE: Type: IMPLANTABLE DEVICE | Site: HIP | Status: FUNCTIONAL

## 2023-05-02 DEVICE — SCREW BNE L50MM DIA5MM COR DIA4.3MM TIB LT GRN TI ALLY ST: Type: IMPLANTABLE DEVICE | Site: HIP | Status: FUNCTIONAL

## 2023-05-02 DEVICE — NAIL IM L400MM DIA11MM 125DEG LNG GRN R PROX FEM TI: Type: IMPLANTABLE DEVICE | Site: HIP | Status: FUNCTIONAL

## 2023-05-02 RX ORDER — HYDROMORPHONE HYDROCHLORIDE 1 MG/ML
0.5 INJECTION, SOLUTION INTRAMUSCULAR; INTRAVENOUS; SUBCUTANEOUS EVERY 5 MIN PRN
Status: DISCONTINUED | OUTPATIENT
Start: 2023-05-02 | End: 2023-05-02 | Stop reason: HOSPADM

## 2023-05-02 RX ORDER — ONDANSETRON 2 MG/ML
4 INJECTION INTRAMUSCULAR; INTRAVENOUS
Status: DISCONTINUED | OUTPATIENT
Start: 2023-05-02 | End: 2023-05-02 | Stop reason: HOSPADM

## 2023-05-02 RX ORDER — HYDRALAZINE HYDROCHLORIDE 20 MG/ML
10 INJECTION INTRAMUSCULAR; INTRAVENOUS
Status: DISCONTINUED | OUTPATIENT
Start: 2023-05-02 | End: 2023-05-02 | Stop reason: HOSPADM

## 2023-05-02 RX ORDER — SODIUM CHLORIDE, SODIUM LACTATE, POTASSIUM CHLORIDE, CALCIUM CHLORIDE 600; 310; 30; 20 MG/100ML; MG/100ML; MG/100ML; MG/100ML
INJECTION, SOLUTION INTRAVENOUS CONTINUOUS PRN
Status: DISCONTINUED | OUTPATIENT
Start: 2023-05-02 | End: 2023-05-02 | Stop reason: SDUPTHER

## 2023-05-02 RX ORDER — CEFAZOLIN SODIUM 1 G/3ML
INJECTION, POWDER, FOR SOLUTION INTRAMUSCULAR; INTRAVENOUS PRN
Status: DISCONTINUED | OUTPATIENT
Start: 2023-05-02 | End: 2023-05-02 | Stop reason: SDUPTHER

## 2023-05-02 RX ORDER — ACETAMINOPHEN 325 MG/1
650 TABLET ORAL EVERY 6 HOURS PRN
Status: DISCONTINUED | OUTPATIENT
Start: 2023-05-02 | End: 2023-05-05 | Stop reason: HOSPADM

## 2023-05-02 RX ORDER — SODIUM CHLORIDE 9 MG/ML
INJECTION, SOLUTION INTRAVENOUS PRN
Status: DISCONTINUED | OUTPATIENT
Start: 2023-05-02 | End: 2023-05-05 | Stop reason: HOSPADM

## 2023-05-02 RX ORDER — ETOMIDATE 2 MG/ML
INJECTION INTRAVENOUS PRN
Status: DISCONTINUED | OUTPATIENT
Start: 2023-05-02 | End: 2023-05-02 | Stop reason: SDUPTHER

## 2023-05-02 RX ORDER — ONDANSETRON 4 MG/1
4 TABLET, ORALLY DISINTEGRATING ORAL EVERY 8 HOURS PRN
Status: DISCONTINUED | OUTPATIENT
Start: 2023-05-02 | End: 2023-05-03

## 2023-05-02 RX ORDER — ONDANSETRON 2 MG/ML
INJECTION INTRAMUSCULAR; INTRAVENOUS PRN
Status: DISCONTINUED | OUTPATIENT
Start: 2023-05-02 | End: 2023-05-02 | Stop reason: SDUPTHER

## 2023-05-02 RX ORDER — MORPHINE SULFATE 2 MG/ML
2 INJECTION, SOLUTION INTRAMUSCULAR; INTRAVENOUS EVERY 4 HOURS PRN
Status: DISCONTINUED | OUTPATIENT
Start: 2023-05-02 | End: 2023-05-05 | Stop reason: HOSPADM

## 2023-05-02 RX ORDER — PROCHLORPERAZINE EDISYLATE 5 MG/ML
5 INJECTION INTRAMUSCULAR; INTRAVENOUS
Status: DISCONTINUED | OUTPATIENT
Start: 2023-05-02 | End: 2023-05-02 | Stop reason: HOSPADM

## 2023-05-02 RX ORDER — LABETALOL HYDROCHLORIDE 5 MG/ML
10 INJECTION, SOLUTION INTRAVENOUS
Status: DISCONTINUED | OUTPATIENT
Start: 2023-05-02 | End: 2023-05-02 | Stop reason: HOSPADM

## 2023-05-02 RX ORDER — LIDOCAINE HYDROCHLORIDE 20 MG/ML
INJECTION, SOLUTION INTRAVENOUS PRN
Status: DISCONTINUED | OUTPATIENT
Start: 2023-05-02 | End: 2023-05-02 | Stop reason: SDUPTHER

## 2023-05-02 RX ORDER — OXYCODONE HYDROCHLORIDE 5 MG/1
5 TABLET ORAL EVERY 6 HOURS PRN
Qty: 28 TABLET | Refills: 0 | Status: SHIPPED | OUTPATIENT
Start: 2023-05-02 | End: 2023-05-05 | Stop reason: SDUPTHER

## 2023-05-02 RX ORDER — POLYETHYLENE GLYCOL 3350 17 G/17G
17 POWDER, FOR SOLUTION ORAL DAILY PRN
Status: DISCONTINUED | OUTPATIENT
Start: 2023-05-02 | End: 2023-05-05 | Stop reason: HOSPADM

## 2023-05-02 RX ORDER — SODIUM CHLORIDE 0.9 % (FLUSH) 0.9 %
5-40 SYRINGE (ML) INJECTION PRN
Status: DISCONTINUED | OUTPATIENT
Start: 2023-05-02 | End: 2023-05-05 | Stop reason: HOSPADM

## 2023-05-02 RX ORDER — DEXAMETHASONE SODIUM PHOSPHATE 10 MG/ML
INJECTION INTRAMUSCULAR; INTRAVENOUS PRN
Status: DISCONTINUED | OUTPATIENT
Start: 2023-05-02 | End: 2023-05-02 | Stop reason: SDUPTHER

## 2023-05-02 RX ORDER — FENTANYL CITRATE 50 UG/ML
INJECTION, SOLUTION INTRAMUSCULAR; INTRAVENOUS PRN
Status: DISCONTINUED | OUTPATIENT
Start: 2023-05-02 | End: 2023-05-02 | Stop reason: SDUPTHER

## 2023-05-02 RX ORDER — DIPHENHYDRAMINE HYDROCHLORIDE 50 MG/ML
12.5 INJECTION INTRAMUSCULAR; INTRAVENOUS
Status: DISCONTINUED | OUTPATIENT
Start: 2023-05-02 | End: 2023-05-02 | Stop reason: HOSPADM

## 2023-05-02 RX ORDER — KETOROLAC TROMETHAMINE 30 MG/ML
15 INJECTION, SOLUTION INTRAMUSCULAR; INTRAVENOUS
Status: DISCONTINUED | OUTPATIENT
Start: 2023-05-02 | End: 2023-05-02 | Stop reason: HOSPADM

## 2023-05-02 RX ORDER — MORPHINE SULFATE 4 MG/ML
4 INJECTION, SOLUTION INTRAMUSCULAR; INTRAVENOUS EVERY 4 HOURS PRN
Status: DISCONTINUED | OUTPATIENT
Start: 2023-05-02 | End: 2023-05-05 | Stop reason: HOSPADM

## 2023-05-02 RX ORDER — OXYCODONE HYDROCHLORIDE 5 MG/1
5 TABLET ORAL EVERY 6 HOURS PRN
Status: DISCONTINUED | OUTPATIENT
Start: 2023-05-02 | End: 2023-05-05 | Stop reason: HOSPADM

## 2023-05-02 RX ORDER — IPRATROPIUM BROMIDE AND ALBUTEROL SULFATE 2.5; .5 MG/3ML; MG/3ML
1 SOLUTION RESPIRATORY (INHALATION)
Status: DISCONTINUED | OUTPATIENT
Start: 2023-05-02 | End: 2023-05-02 | Stop reason: HOSPADM

## 2023-05-02 RX ORDER — SODIUM CHLORIDE 0.9 % (FLUSH) 0.9 %
5-40 SYRINGE (ML) INJECTION EVERY 12 HOURS SCHEDULED
Status: DISCONTINUED | OUTPATIENT
Start: 2023-05-02 | End: 2023-05-05 | Stop reason: HOSPADM

## 2023-05-02 RX ORDER — ROCURONIUM BROMIDE 10 MG/ML
INJECTION, SOLUTION INTRAVENOUS PRN
Status: DISCONTINUED | OUTPATIENT
Start: 2023-05-02 | End: 2023-05-02 | Stop reason: SDUPTHER

## 2023-05-02 RX ORDER — SODIUM CHLORIDE 9 MG/ML
INJECTION, SOLUTION INTRAVENOUS CONTINUOUS
Status: ACTIVE | OUTPATIENT
Start: 2023-05-02 | End: 2023-05-03

## 2023-05-02 RX ORDER — MORPHINE SULFATE 2 MG/ML
2 INJECTION, SOLUTION INTRAMUSCULAR; INTRAVENOUS EVERY 5 MIN PRN
Status: DISCONTINUED | OUTPATIENT
Start: 2023-05-02 | End: 2023-05-02 | Stop reason: HOSPADM

## 2023-05-02 RX ORDER — MIDAZOLAM HYDROCHLORIDE 1 MG/ML
INJECTION INTRAMUSCULAR; INTRAVENOUS PRN
Status: DISCONTINUED | OUTPATIENT
Start: 2023-05-02 | End: 2023-05-02 | Stop reason: SDUPTHER

## 2023-05-02 RX ORDER — OXYCODONE HYDROCHLORIDE 10 MG/1
10 TABLET ORAL EVERY 6 HOURS PRN
Status: DISCONTINUED | OUTPATIENT
Start: 2023-05-02 | End: 2023-05-05 | Stop reason: HOSPADM

## 2023-05-02 RX ORDER — MIDAZOLAM HYDROCHLORIDE 2 MG/2ML
2 INJECTION, SOLUTION INTRAMUSCULAR; INTRAVENOUS
Status: DISCONTINUED | OUTPATIENT
Start: 2023-05-02 | End: 2023-05-02 | Stop reason: HOSPADM

## 2023-05-02 RX ORDER — ONDANSETRON 2 MG/ML
4 INJECTION INTRAMUSCULAR; INTRAVENOUS EVERY 6 HOURS PRN
Status: DISCONTINUED | OUTPATIENT
Start: 2023-05-02 | End: 2023-05-03

## 2023-05-02 RX ADMIN — SODIUM CHLORIDE, PRESERVATIVE FREE 10 ML: 5 INJECTION INTRAVENOUS at 21:23

## 2023-05-02 RX ADMIN — MORPHINE SULFATE 4 MG: 4 INJECTION, SOLUTION INTRAMUSCULAR; INTRAVENOUS at 16:56

## 2023-05-02 RX ADMIN — ROCURONIUM BROMIDE 50 MG: 50 INJECTION INTRAVENOUS at 18:17

## 2023-05-02 RX ADMIN — ATORVASTATIN CALCIUM 40 MG: 40 TABLET, FILM COATED ORAL at 21:23

## 2023-05-02 RX ADMIN — SUGAMMADEX 200 MG: 100 INJECTION, SOLUTION INTRAVENOUS at 19:18

## 2023-05-02 RX ADMIN — ETOMIDATE 16 MG: 20 INJECTION, SOLUTION INTRAVENOUS at 18:17

## 2023-05-02 RX ADMIN — FENTANYL CITRATE 50 MCG: 50 INJECTION, SOLUTION INTRAMUSCULAR; INTRAVENOUS at 19:20

## 2023-05-02 RX ADMIN — LIDOCAINE HYDROCHLORIDE 100 MG: 20 INJECTION, SOLUTION INTRAVENOUS at 18:17

## 2023-05-02 RX ADMIN — MIDAZOLAM 1 MG: 1 INJECTION INTRAMUSCULAR; INTRAVENOUS at 18:08

## 2023-05-02 RX ADMIN — ONDANSETRON 4 MG: 2 INJECTION INTRAMUSCULAR; INTRAVENOUS at 18:21

## 2023-05-02 RX ADMIN — METOPROLOL SUCCINATE 50 MG: 50 TABLET, EXTENDED RELEASE ORAL at 09:09

## 2023-05-02 RX ADMIN — SODIUM CHLORIDE: 9 INJECTION, SOLUTION INTRAVENOUS at 21:25

## 2023-05-02 RX ADMIN — CEFAZOLIN 2 G: 1 INJECTION, POWDER, FOR SOLUTION INTRAMUSCULAR; INTRAVENOUS at 18:25

## 2023-05-02 RX ADMIN — MORPHINE SULFATE 4 MG: 4 INJECTION, SOLUTION INTRAMUSCULAR; INTRAVENOUS at 03:00

## 2023-05-02 RX ADMIN — FENTANYL CITRATE 100 MCG: 50 INJECTION, SOLUTION INTRAMUSCULAR; INTRAVENOUS at 18:17

## 2023-05-02 RX ADMIN — MORPHINE SULFATE 4 MG: 4 INJECTION, SOLUTION INTRAMUSCULAR; INTRAVENOUS at 11:09

## 2023-05-02 RX ADMIN — LOSARTAN POTASSIUM 25 MG: 50 TABLET, FILM COATED ORAL at 09:08

## 2023-05-02 RX ADMIN — PHENYLEPHRINE HYDROCHLORIDE 100 MCG: 10 INJECTION INTRAVENOUS at 18:41

## 2023-05-02 RX ADMIN — SODIUM CHLORIDE, POTASSIUM CHLORIDE, SODIUM LACTATE AND CALCIUM CHLORIDE: 600; 310; 30; 20 INJECTION, SOLUTION INTRAVENOUS at 18:08

## 2023-05-02 RX ADMIN — DEXAMETHASONE SODIUM PHOSPHATE 10 MG: 10 INJECTION INTRAMUSCULAR; INTRAVENOUS at 18:21

## 2023-05-02 RX ADMIN — SODIUM CHLORIDE, PRESERVATIVE FREE 10 ML: 5 INJECTION INTRAVENOUS at 09:09

## 2023-05-02 ASSESSMENT — PAIN DESCRIPTION - LOCATION
LOCATION: HIP
LOCATION: HIP

## 2023-05-02 ASSESSMENT — PAIN DESCRIPTION - ORIENTATION
ORIENTATION: RIGHT
ORIENTATION: RIGHT

## 2023-05-02 ASSESSMENT — PAIN DESCRIPTION - FREQUENCY: FREQUENCY: INTERMITTENT

## 2023-05-02 ASSESSMENT — PAIN - FUNCTIONAL ASSESSMENT
PAIN_FUNCTIONAL_ASSESSMENT: PREVENTS OR INTERFERES WITH MANY ACTIVE NOT PASSIVE ACTIVITIES
PAIN_FUNCTIONAL_ASSESSMENT: PREVENTS OR INTERFERES SOME ACTIVE ACTIVITIES AND ADLS

## 2023-05-02 ASSESSMENT — PAIN SCALES - GENERAL
PAINLEVEL_OUTOF10: 4
PAINLEVEL_OUTOF10: 9
PAINLEVEL_OUTOF10: 9
PAINLEVEL_OUTOF10: 0
PAINLEVEL_OUTOF10: 8

## 2023-05-02 ASSESSMENT — PAIN DESCRIPTION - DESCRIPTORS
DESCRIPTORS: ACHING;POUNDING;STABBING
DESCRIPTORS: ACHING;DISCOMFORT;SHARP;SHOOTING

## 2023-05-02 ASSESSMENT — LIFESTYLE VARIABLES: SMOKING_STATUS: 1

## 2023-05-02 ASSESSMENT — PAIN DESCRIPTION - PAIN TYPE: TYPE: ACUTE PAIN

## 2023-05-02 ASSESSMENT — PAIN DESCRIPTION - ONSET: ONSET: GRADUAL

## 2023-05-02 NOTE — BRIEF OP NOTE
Brief Postoperative Note      Patient: Delicia Johnson  YOB: 1945  MRN: [id number]    Date of Procedure: 5/2/2023    Pre-Op Diagnosis Codes:  Closed four-part intertrochanteric femur fracture on the right    Post-Op Diagnosis: Same       Procedure(s):  RIGHT HIP OPEN REDUCTION INTERNAL FIXATION cephalomedullary nail    Surgeon(s):  Maciej Zhang DO    Assistant:  * No surgical staff found *    Anesthesia: General    Estimated Blood Loss (mL): less than 916     Complications: None    Specimens:   * No specimens in log *    Implants:  Implant Name Type Inv.  Item Serial No.  Lot No. LRB No. Used Action   NAIL IM L400MM [id number] 125DEG LNG GRN R PROX FEM TI - [id number]  NAIL IM L400MM [id number] 125DEG LNG GRN R PROX FEM TI  [id number]WD [id number] Right 1 Implanted   BLADE IM L110MM DIA10.35MM PROX FEM G TI ROMINA FEN SAMIR FOR - [id number]  BLADE IM L110MM DIA10.35MM PROX FEM G TI ROMINA FEN SAMIR FOR  DEPUY SYNTHES [place]-WD [id number] Right 1 Implanted         Drains:   Urinary Catheter 05/02/23 (Active)   Catheter Indications Urinary retention (acute or chronic), continuous bladder irrigation or bladder outlet obstruction 05/02/23 1455   Urine Color Bloody 05/02/23 1455   Urine Appearance Clear 05/02/23 1455   Collection Container Standard 05/02/23 1455   Catheter Care  Soap and water 05/02/23 0730   Catheter Best Practices  Bag below bladder;Bag not on floor 05/02/23 1455   Status Draining;Patent 05/02/23 1455   Output (mL) 400 mL 05/02/23 1455       Findings: Open reduction internal fixation right four-part intertrochanteric femur fracture with cephalomedullary nail      Electronically signed by Zelalem Espinal DO on 5/2/2023 at 7:14 PM

## 2023-05-02 NOTE — ANESTHESIA PROCEDURE NOTES
Arterial Line:    An arterial line was placed using surface landmarks, in the procedure area for the following indication(s): continuous blood pressure monitoring and blood sampling needed. A 20 gauge (size), 1 and 3/4 inch (length), Arrow (type) catheter was placed, Seldinger technique used, into the left radial artery, secured by tape and Tegaderm. Anesthesia type: Local  Local infiltration: Injection    Events:  greater than 3 attempts.   Anesthesiologist: Messi Muñoz MD  Resident/CRNA: YANETH Miller - CRNA  Other anesthesia staff: Naida Garcia RN  Performed: Resident/CRNA and Other anesthesia staff   Preanesthetic Checklist  Completed: patient identified, IV checked, site marked, risks and benefits discussed, surgical/procedural consents, equipment checked, pre-op evaluation, timeout performed, anesthesia consent given, oxygen available, monitors applied/VS acknowledged, fire risk safety assessment completed and verbalized and blood product R/B/A discussed and consented

## 2023-05-02 NOTE — PLAN OF CARE
INFORMED DR Araceli Whitney OF HEMATURIA AND SHE DID OBSERVE. SH EEXPLAINED TO PT IT COULD BE BECAUSE OF TRAUMA FROM CATH INSERTION. SHE EXPLAINED TO PT THAT  SHE WILL MONITOR LABS ETC.SEE HER NOTE FROM THIS MORNING.

## 2023-05-02 NOTE — ED NOTES
Taken to [facility] in stable condition with all personal belongings     Alysa Merrill, [place]  05/01/23 0395

## 2023-05-02 NOTE — CARE COORDINATION
5/2. Met with the pt at the bedside to discuss transition of care. The pt lives alone an dis independent. He is aware that he will need rehab before he returns home. He has chosen [facility]. Referral made. Gregorio Freedman RN  Case Management Assessment  Initial Evaluation    Date/Time of Evaluation: 5/2/2023 3:29 PM  Assessment Completed by: Gregorio Freedman RN    If patient is discharged prior to next notation, then this note serves as note for discharge by case management. Patient Name: Allie Verma                   YOB: 1945  Diagnosis: Closed right hip fracture, initial encounter ([facility] [address] [S72.001A]  Falls, initial encounter [W19. XXXA]                   Date / Time: 5/1/2023 10:50 AM    Patient Admission Status: Inpatient   Readmission Risk (Low < 19, Mod (19-27), High > 27): Readmission Risk Score: 14.2    Current PCP: Mert Nielsen MD  PCP verified by CM? Yes    Chart Reviewed: Yes      History Provided by: Patient  Patient Orientation: Alert and Oriented    Patient Cognition: Alert    Hospitalization in the last 30 days (Readmission):  No    If yes, Readmission Assessment in CM Navigator will be completed. Advance Directives:      Code Status: Full Code   Patient's Primary Decision Maker is: Legal Next of Kin      Discharge Planning:    Patient lives with: Alone Type of Home: House  Primary Care Giver: Self  Patient Support Systems include: Children   Current Financial resources:    Current community resources:    Current services prior to admission: None            Current DME:              Type of Home Care services:  None    ADLS  Prior functional level: Independent in ADLs/IADLs  Current functional level: Assistance with the following:, Mobility, Shopping    PT AM-PAC:   /24  OT AM-PAC:   /24    Family can provide assistance at DC:  Yes  Would you like Case Management to discuss the discharge plan with any other family members/significant others, and if so, who?

## 2023-05-02 NOTE — OP NOTE
Operative Note      Patient: Ric Valles  YOB: 1945  MRN: [id number]    Date of Procedure: 5/2/2023    Pre-Op Diagnosis Codes:  Closed four-part intertrochanteric femur fracture on the right    Post-Op Diagnosis: Same       Procedure(s):  RIGHT HIP OPEN REDUCTION INTERNAL FIXATION with cephalomedullary nail    Surgeon(s):  Faith Barber DO    Assistant:   * No surgical staff found *    Anesthesia: General    Estimated Blood Loss (mL): less than 264     Complications: None    Specimens:   * No specimens in log *    Implants:  Implant Name Type Inv. Item Serial No.  Lot No. LRB No. Used Action   NAIL IM L400MM [id number] 125DEG LNG GRN R PROX FEM TI - [id number]  NAIL IM L400MM [id number] 125DEG LNG GRN R PROX FEM TI  DEPUY [employer]ES USA-WD [id number] Right 1 Implanted   BLADE IM L110MM DIA10.35MM PROX FEM G TI Discera SAMIR FOR - [id number]  BLADE IM L110MM DIA10.35MM PROX FEM G TI TabUp FEN SAMIR FOR  DEPUY SYNTHES USA-WD [id number] Right 1 Implanted         Drains:   Urinary Catheter 05/02/23 (Active)   Catheter Indications Urinary retention (acute or chronic), continuous bladder irrigation or bladder outlet obstruction 05/02/23 1455   Urine Color Bloody 05/02/23 1455   Urine Appearance Clear 05/02/23 1455   Collection Container Standard 05/02/23 1455   Catheter Care  Soap and water 05/02/23 0730   Catheter Best Practices  Bag below bladder;Bag not on floor 05/02/23 1455   Status Draining;Patent 05/02/23 1455   Output (mL) 400 mL 05/02/23 1455       Findings: See operative report below      Detailed Description of Procedure: This is a 31-year-old male with a past cardiac medical history of the fall of the stool yesterday sustaining a closed four-part right intertrochanteric femur fracture. This is an operative fracture for early mobilization and weightbearing. Risks, benefits, and alternatives were discussed with the patient and their family.  Risks include but are not limited to infection, blood

## 2023-05-02 NOTE — DISCHARGE INSTRUCTIONS
[facility]) Department of Orthopedic Surgery  [address]    Dr. Elder Kent, DO    Orthopaedics Discharge Instructions   Weight bearing Status - Weight bearing as tolerated - on right lower Extremity  Pain medication Per Prescriptions  Contact Office for Medication Refill- [phone]  Office can refill pain med every 7 days  If patient discharging to facility then pain control will be continued per facility physician  Ice to operative/injured site for 15-30 minutes of each hour for next 5 days    Recommend that you continue to ice the area 2-3 times per day after this   Continue DVT Prophylaxis (blood clot prevention) as Prescribed: aspirin  Wound care -please keep dressings clean and dry    Follow Up in Office in 2 weeks. Call the office at [phone] for directions or with any questions. Watch for these significant complications. Call physician if they or any other problems occur:  Fever over 101°, redness, swelling or warmth at the operative site  Unrelieved nausea    Foul smelling or cloudy drainage at the operative site   Unrelieved pain    Blood soaked dressing. (Some oozing may be normal)     Numb, pale, blue, cold or tingling extremity        It is the Department of Orthopaedic Trauma's standard of practice that providers will de-escalate(wean) all patients from narcotic(opioid) medications during the post-operative period. We provide multimodal pain control but opioid medications are tapered in all of our patients. If patient requires referral to pain management for prolonged taper off of opioid pain medication we will facilitate this process.

## 2023-05-02 NOTE — H&P
elevation myocardial infarction) ([facility] [address] 12/12/2012    anteriolateral         Past Surgical History:   Procedure Laterality Date    CARDIAC DEFIBRILLATOR PLACEMENT Left 04/14/2023    Medtronic Dual ICD  Dr. Aye Ibarra  12/12/2012    Balloon only to LAD    CORONARY ARTERY BYPASS GRAFT  12/13/2012    cabg x 4- Dr. Livia Roque    CT BONE MARROW BIOPSY  12/21/2022    CT BONE MARROW BIOPSY 12/21/2022 [facility]    DIAGNOSTIC CARDIAC CATH LAB PROCEDURE  12/12/2012    [facility] cath lab- Dr. Luiz Kocher  12/12/2012         TONSILLECTOMY         Medications Prior to Admission:    Medications Prior to Admission: vitamin B-12 (CYANOCOBALAMIN) 500 MCG tablet, Take 1 tablet by mouth daily  doxycycline hyclate (VIBRA-TABS) 100 MG tablet, Take 1 tablet by mouth 2 times daily  furosemide (LASIX) 20 MG tablet, Take 0.5 tablets by mouth daily as needed (may increase to 20 mg if needed)  spironolactone (ALDACTONE) 25 MG tablet, Take 1 tablet by mouth at bedtime  Multiple Vitamins-Minerals (THERAPEUTIC MULTIVITAMIN-MINERALS) tablet, Take 1 tablet by mouth daily  Multiple Minerals-Vitamins (CALCIUM-MAGNESIUM-ZINC-D3 PO), Take 1 tablet by mouth daily  amiodarone (CORDARONE) 200 MG tablet, Take 1 tablet by mouth daily (with breakfast) Start with 200mg (1 tab) three times daily with food for two weeks (05/12/2023) then daily with food.   niacin 500 MG extended release capsule, Take 1 capsule by mouth every morning  metoprolol succinate (TOPROL XL) 50 MG extended release tablet, Take 1 tablet by mouth daily  losartan (COZAAR) 25 MG tablet, Take 1 tablet by mouth in the morning and at bedtime  atorvastatin (LIPITOR) 40 MG tablet, Take 1 tablet by mouth nightly  aspirin 81 MG chewable tablet, Take 1 tablet by mouth daily  folic acid (FOLVITE) 925 MCG tablet, Take 1,000 tablets by mouth daily  Saw [place] 450 MG CAPS, Take 450 mg by mouth daily    Note that the patient's home medications were reviewed and the

## 2023-05-02 NOTE — ANESTHESIA PRE PROCEDURE
Department of Anesthesiology  Preprocedure Note       Name:  Octavia Diaz   Age:  69 y.o.  :  1945                                          MRN:  [id number]         Date:  2023      Surgeon: Salazar Chapman):  Kevin Ramos DO    Procedure: Procedure(s):  RIGHT HIP OPEN REDUCTION INTERNAL FIXATION - ( NEEDS CLEARANCE FOR SURGERY)    Medications prior to admission:   Prior to Admission medications    Medication Sig Start Date End Date Taking? Authorizing Provider   vitamin B-12 (CYANOCOBALAMIN) 500 MCG tablet Take 1 tablet by mouth daily   Yes Historical Provider, MD   doxycycline hyclate (VIBRA-TABS) 100 MG tablet Take 1 tablet by mouth 2 times daily 23 Yes Historical Provider, MD   furosemide (LASIX) 20 MG tablet Take 0.5 tablets by mouth daily as needed (may increase to 20 mg if needed)   Yes Historical Provider, MD   spironolactone (ALDACTONE) 25 MG tablet Take 1 tablet by mouth at bedtime   Yes Historical Provider, MD   Multiple Vitamins-Minerals (THERAPEUTIC MULTIVITAMIN-MINERALS) tablet Take 1 tablet by mouth daily   Yes Historical Provider, MD   Multiple Minerals-Vitamins (CALCIUM-MAGNESIUM-ZINC-D3 PO) Take 1 tablet by mouth daily   Yes Historical Provider, MD   amiodarone (CORDARONE) 200 MG tablet Take 1 tablet by mouth daily (with breakfast) Start with 200mg (1 tab) three times daily with food for two weeks (2023) then daily with food.  23   YANETH Abraham - CNP   niacin 500 MG extended release capsule Take 1 capsule by mouth every morning    Historical Provider, MD   metoprolol succinate (TOPROL XL) 50 MG extended release tablet Take 1 tablet by mouth daily 22   Luis Antonio Ochoa DO   losartan (COZAAR) 25 MG tablet Take 1 tablet by mouth in the morning and at bedtime 22   Luis Antonio Ochoa DO   atorvastatin (LIPITOR) 40 MG tablet Take 1 tablet by mouth nightly 7/3/20   Emy Bruner MD   aspirin 81 MG chewable tablet Take 1 tablet by mouth daily 7/3/20

## 2023-05-02 NOTE — CONSULTS
Department of Orthopedic Surgery  Consult Note    Reason for Consult: Right hip fracture    HISTORY OF PRESENT ILLNESS:       Patient is a 69 y.o. male who presents with right hip pain. Patient states he was at home when he fell off for 1 step stepstool, landing on his right side. Patient states he is having significant right hip pain, he is also having some right knee pain however he cannot tell if it is coming from his right hip. He states he does have some chronic swelling over the right knee as well that he has been dealing with for several months. Denies hitting his head or losing consciousness. Patient denies using any blood thinners. He does state that he just had a pacemaker placed 2 weeks ago. Denies numbness/tingling/paresthesias. Denies any other orthopedic complaints at this time.       Past Medical History:        Diagnosis Date    Acute on chronic systolic congestive heart failure ([address] 10/18/2018    Aortic regurgitation     trace    Arthritis     CAD (coronary artery disease)     Hypercholesteremia     Hypertension     Ischemic cardiomyopathy     Mitral regurgitation     mild to moderate    STEMI (ST elevation myocardial infarction) ([address] 12/12/2012    anteriolateral     Past Surgical History:        Procedure Laterality Date    CARDIAC DEFIBRILLATOR PLACEMENT Left 04/14/2023    [employer] Dual ICD  Dr. Munir Rondon  12/12/2012    Balloon only to LAD    CORONARY ARTERY BYPASS GRAFT  12/13/2012    cabg x 4- Dr. Moreno Economy BIOPSY  12/21/2022    CT BONE MARROW BIOPSY 12/21/2022 [facility] CT    DIAGNOSTIC CARDIAC CATH LAB PROCEDURE  12/12/2012    [facility] cath lab- Dr. Abbie Lake  12/12/2012         TONSILLECTOMY       Current Medications:   Current Facility-Administered Medications: [START ON 5/2/2023] amiodarone (CORDARONE) tablet 200 mg, 200 mg, Oral, Daily with breakfast  [Held by provider] aspirin chewable tablet 81 mg, 81 mg, Oral,
(36.6 °C) (Temporal)   Resp 16   Ht 5' 9\" (1.753 m)   Wt 164 lb (74.4 kg)   SpO2 96%   BMI 24.22 kg/m²   CONST:  Well developed, well nourished 67-year-old  male who appears stated age. Awake, alert, cooperative, no apparent distress  HEENT:   Head- Normocephalic, atraumatic   Eyes- Conjunctivae pink, anicteric  Throat- Oral mucosa pink and moist  Neck-  No stridor, trachea midline, no jugular venous distention. No adenopathy   CHEST: Chest symmetrical and non-tender to palpation. No accessory muscle use or intercostal retractions  RESPIRATORY: Lung sounds - clear throughout fields   CARDIOVASCULAR:     No carotid bruit  Heart Inspection- shows no noted pulsations  Heart Palpation- no heaves or thrills; PMI is non-displaced   Heart Ausculation- Regular rate and rhythm, 2/6 systolic murmur. No s3, s4 or rub   PV: +1 BLE edema. No varicosities. Pedal pulses palpable, no clubbing or cyanosis   ABDOMEN: Soft, non-tender to light palpation. Bowel sounds present. No palpable masses no organomegaly; no abdominal bruit  MS: Good muscle strength and tone. No atrophy or abnormal movements. : Deferred  SKIN: Warm and dry no statis dermatitis or ulcers   NEURO / PSYCH: Oriented to person, place and time. Speech clear and appropriate. Follows all commands. Pleasant affect     DATA:    ECG: Atrial sensed ventricular paced. Vent rate 63, NH interval 164, QRS duration 130, QTc 505.   Tele strips: Not currently on telemetry  Diagnostic:      Labs:   CBC:   Recent Labs     05/01/23  1122 05/02/23  0511   WBC 10.1 11.4   HGB 10.4* 10.1*   HCT 31.1* 29.9*    184     BMP:   Recent Labs     05/01/23  1122 05/02/23  0511    135   K 4.7 4.3   CO2 24 21*   BUN 26* 26*   CREATININE 1.0 1.1   LABGLOM >60 >60   CALCIUM 9.1 8.6       HgA1c:   Lab Results   Component Value Date    LABA1C 5.8 (H) 07/06/2020       PT/INR:   Recent Labs     05/01/23  1936   PROTIME 14.4*   INR 1.3     APTT:  Recent Labs

## 2023-05-03 ENCOUNTER — TELEPHONE (OUTPATIENT)
Dept: CARDIOLOGY CLINIC | Age: 78
End: 2023-05-03

## 2023-05-03 LAB
ANION GAP SERPL CALCULATED.3IONS-SCNC: 11 MMOL/L (ref 7–16)
BASOPHILS # BLD: 0.01 E9/L (ref 0–0.2)
BASOPHILS NFR BLD: 0.1 % (ref 0–2)
BUN SERPL-MCNC: 27 MG/DL (ref 6–23)
CALCIUM SERPL-MCNC: 8.4 MG/DL (ref 8.6–10.2)
CHLORIDE SERPL-SCNC: 96 MMOL/L (ref 98–107)
CO2 SERPL-SCNC: 22 MMOL/L (ref 22–29)
CREAT SERPL-MCNC: 1.1 MG/DL (ref 0.7–1.2)
EOSINOPHIL # BLD: 0 E9/L (ref 0.05–0.5)
EOSINOPHIL NFR BLD: 0 % (ref 0–6)
ERYTHROCYTE [DISTWIDTH] IN BLOOD BY AUTOMATED COUNT: 13.5 FL (ref 11.5–15)
GLUCOSE SERPL-MCNC: 145 MG/DL (ref 74–99)
HCT VFR BLD AUTO: 26.6 % (ref 37–54)
HGB BLD-MCNC: 8.9 G/DL (ref 12.5–16.5)
IMM GRANULOCYTES # BLD: 0.05 E9/L
IMM GRANULOCYTES NFR BLD: 0.4 % (ref 0–5)
LYMPHOCYTES # BLD: 0.73 E9/L (ref 1.5–4)
LYMPHOCYTES NFR BLD: 6.4 % (ref 20–42)
MCH RBC QN AUTO: 35.3 PG (ref 26–35)
MCHC RBC AUTO-ENTMCNC: 33.5 % (ref 32–34.5)
MCV RBC AUTO: 105.6 FL (ref 80–99.9)
MONOCYTES # BLD: 0.86 E9/L (ref 0.1–0.95)
MONOCYTES NFR BLD: 7.6 % (ref 2–12)
NEUTROPHILS # BLD: 9.68 E9/L (ref 1.8–7.3)
NEUTS SEG NFR BLD: 85.5 % (ref 43–80)
PLATELET # BLD AUTO: 158 E9/L (ref 130–450)
PMV BLD AUTO: 10.4 FL (ref 7–12)
POTASSIUM SERPL-SCNC: 4.4 MMOL/L (ref 3.5–5)
RBC # BLD AUTO: 2.52 E12/L (ref 3.8–5.8)
SODIUM SERPL-SCNC: 129 MMOL/L (ref 132–146)
WBC # BLD: 11.3 E9/L (ref 4.5–11.5)

## 2023-05-03 PROCEDURE — 97165 OT EVAL LOW COMPLEX 30 MIN: CPT

## 2023-05-03 PROCEDURE — 85025 COMPLETE CBC W/AUTO DIFF WBC: CPT

## 2023-05-03 PROCEDURE — 97161 PT EVAL LOW COMPLEX 20 MIN: CPT

## 2023-05-03 PROCEDURE — 99231 SBSQ HOSP IP/OBS SF/LOW 25: CPT | Performed by: INTERNAL MEDICINE

## 2023-05-03 PROCEDURE — 97530 THERAPEUTIC ACTIVITIES: CPT

## 2023-05-03 PROCEDURE — 6370000000 HC RX 637 (ALT 250 FOR IP): Performed by: ORTHOPAEDIC SURGERY

## 2023-05-03 PROCEDURE — 36415 COLL VENOUS BLD VENIPUNCTURE: CPT

## 2023-05-03 PROCEDURE — 80048 BASIC METABOLIC PNL TOTAL CA: CPT

## 2023-05-03 PROCEDURE — 6370000000 HC RX 637 (ALT 250 FOR IP): Performed by: NURSE PRACTITIONER

## 2023-05-03 PROCEDURE — 2580000003 HC RX 258: Performed by: INTERNAL MEDICINE

## 2023-05-03 PROCEDURE — 2580000003 HC RX 258: Performed by: ORTHOPAEDIC SURGERY

## 2023-05-03 PROCEDURE — 6360000002 HC RX W HCPCS: Performed by: ORTHOPAEDIC SURGERY

## 2023-05-03 PROCEDURE — 1200000000 HC SEMI PRIVATE

## 2023-05-03 PROCEDURE — 97535 SELF CARE MNGMENT TRAINING: CPT

## 2023-05-03 RX ORDER — LOSARTAN POTASSIUM 50 MG/1
25 TABLET ORAL DAILY
Status: DISCONTINUED | OUTPATIENT
Start: 2023-05-04 | End: 2023-05-05 | Stop reason: HOSPADM

## 2023-05-03 RX ORDER — METOPROLOL SUCCINATE 50 MG/1
50 TABLET, EXTENDED RELEASE ORAL DAILY
Status: DISCONTINUED | OUTPATIENT
Start: 2023-05-04 | End: 2023-05-05 | Stop reason: HOSPADM

## 2023-05-03 RX ORDER — 0.9 % SODIUM CHLORIDE 0.9 %
250 INTRAVENOUS SOLUTION INTRAVENOUS ONCE
Status: COMPLETED | OUTPATIENT
Start: 2023-05-03 | End: 2023-05-03

## 2023-05-03 RX ADMIN — SODIUM CHLORIDE 250 ML: 9 INJECTION, SOLUTION INTRAVENOUS at 14:28

## 2023-05-03 RX ADMIN — SODIUM CHLORIDE, PRESERVATIVE FREE 10 ML: 5 INJECTION INTRAVENOUS at 08:48

## 2023-05-03 RX ADMIN — ASPIRIN 325 MG: 325 TABLET, COATED ORAL at 20:24

## 2023-05-03 RX ADMIN — LOSARTAN POTASSIUM 25 MG: 50 TABLET, FILM COATED ORAL at 08:47

## 2023-05-03 RX ADMIN — CEFAZOLIN 2000 MG: 2 INJECTION, POWDER, FOR SOLUTION INTRAMUSCULAR; INTRAVENOUS at 04:19

## 2023-05-03 RX ADMIN — ATORVASTATIN CALCIUM 40 MG: 40 TABLET, FILM COATED ORAL at 20:24

## 2023-05-03 RX ADMIN — ASPIRIN 325 MG: 325 TABLET, COATED ORAL at 08:47

## 2023-05-03 RX ADMIN — MORPHINE SULFATE 4 MG: 4 INJECTION, SOLUTION INTRAMUSCULAR; INTRAVENOUS at 06:23

## 2023-05-03 ASSESSMENT — PAIN DESCRIPTION - LOCATION: LOCATION: HIP

## 2023-05-03 ASSESSMENT — PAIN - FUNCTIONAL ASSESSMENT: PAIN_FUNCTIONAL_ASSESSMENT: PREVENTS OR INTERFERES SOME ACTIVE ACTIVITIES AND ADLS

## 2023-05-03 ASSESSMENT — PAIN DESCRIPTION - ORIENTATION: ORIENTATION: RIGHT

## 2023-05-03 ASSESSMENT — PAIN SCALES - GENERAL
PAINLEVEL_OUTOF10: 0
PAINLEVEL_OUTOF10: 9

## 2023-05-03 ASSESSMENT — PAIN DESCRIPTION - DESCRIPTORS: DESCRIPTORS: ACHING;DISCOMFORT;NAGGING

## 2023-05-03 NOTE — PLAN OF CARE
B/P 103/42 PULSE=80. PT DENIES LIGHTHEADENESS OR DIZZINESS. PT ENCOURAGED TO DRINK MORE FLUIDS.  CHARGE NURSE WILL NOTIFY DR MORALES OF LOW B/P

## 2023-05-03 NOTE — CARE COORDINATION
5/3/2023social work transition of care planning  Pt plan is to [facility] of [employer] stability. Pasar and envelope completed.   Electronically signed by NIKHIL Sol on 5/3/2023 at 11:06 AM

## 2023-05-03 NOTE — DISCHARGE INSTR - COC
12/14/12 Abdomen Mid;Upper (Active)   Number of days: 3181       Incision 04/14/23 Chest Left;Upper (Active)   Dressing Status Clean;Dry; Intact 05/02/23 0730   Dressing/Treatment Alginate with Ag 05/02/23 0730   Incision Assessment Other (Comment) 05/02/23 0730   Drainage Amount None 05/02/23 0730   Odor None 05/02/23 0730   Suzanne-incision Assessment Other (Comment) 05/02/23 0730   Number of days: 19       Incision 05/02/23 Leg Right (Active)   Dressing Status Clean;Dry; Intact;Breakthrough drainage noted 05/02/23 2058   Incision Cleansed Cleansed with saline 05/02/23 2000   Dressing/Treatment Alginate with Ag 05/02/23 2058   Closure Sutures; Staples 05/02/23 1919   Drainage Amount Small 05/02/23 2058   Number of days: 0        Elimination:  Continence: Bowel: Yes  Bladder: No  Urinary Catheter: Insertion Date: 5/5/2023    Colostomy/Ileostomy/Ileal Conduit: {YES / OB:23477}       Date of Last BM: 5/1/23    Intake/Output Summary (Last 24 hours) at 5/3/2023 1039  Last data filed at 5/3/2023 0704  Gross per 24 hour   Intake 400 ml   Output 1150 ml   Net -750 ml     I/O last 3 completed shifts: In: 400 [I.V.:400]  Out: 600 [Urine:600]    Safety Concerns:     History of Falls (last 30 days)    Impairments/Disabilities:      None    Nutrition Therapy:  Current Nutrition Therapy:   - Oral Diet:  General    Routes of Feeding: Oral  Liquids: Thin Liquids  Daily Fluid Restriction: no  Last Modified Barium Swallow with Video (Video Swallowing Test): not done    Treatments at the Time of Hospital Discharge:   Respiratory Treatments: ***  Oxygen Therapy:  is not on home oxygen therapy.   Ventilator:    - No ventilator support    Rehab Therapies: Physical Therapy and Occupational Therapy  Weight Bearing Status/Restrictions: No weight bearing restrictions  Other Medical Equipment (for information only, NOT a DME order):  walker  Other Treatments: ***    Patient's personal belongings (please select all that are sent with

## 2023-05-03 NOTE — ANESTHESIA POSTPROCEDURE EVALUATION
Department of Anesthesiology  Postprocedure Note    Patient: Cassidy Crespo  MRN: [id number]  YOB: 1945  Date of evaluation: 5/3/2023      Procedure Summary     Date: 05/02/23 Room / Location: [facility] OR  / [facility]    Anesthesia Start: 3809 Anesthesia Stop: 1937    Procedure: RIGHT HIP OPEN REDUCTION INTERNAL FIXATION (Right: Hip) Diagnosis:       Closed fracture of right hip, initial encounter ([address]      (Closed fracture of right hip, initial encounter ([address] Elroy Levine)    Surgeons: Erica Bender DO Responsible Provider: Vivian Thomas MD    Anesthesia Type: General ASA Status: 4          Anesthesia Type: General    Lambert Phase I: Lambert Score: 10    Lambert Phase II:        Anesthesia Post Evaluation    Patient location during evaluation: PACU  Patient participation: complete - patient participated  Level of consciousness: awake  Airway patency: patent  Nausea & Vomiting: no nausea and no vomiting  Complications: no  Cardiovascular status: hemodynamically stable  Respiratory status: acceptable  Hydration status: euvolemic

## 2023-05-04 LAB
ANION GAP SERPL CALCULATED.3IONS-SCNC: 9 MMOL/L (ref 7–16)
BASOPHILS # BLD: 0.01 E9/L (ref 0–0.2)
BASOPHILS NFR BLD: 0.1 % (ref 0–2)
BUN SERPL-MCNC: 31 MG/DL (ref 6–23)
CALCIUM SERPL-MCNC: 7.7 MG/DL (ref 8.6–10.2)
CHLORIDE SERPL-SCNC: 104 MMOL/L (ref 98–107)
CO2 SERPL-SCNC: 22 MMOL/L (ref 22–29)
CREAT SERPL-MCNC: 1 MG/DL (ref 0.7–1.2)
EOSINOPHIL # BLD: 0.07 E9/L (ref 0.05–0.5)
EOSINOPHIL NFR BLD: 0.6 % (ref 0–6)
ERYTHROCYTE [DISTWIDTH] IN BLOOD BY AUTOMATED COUNT: 13.7 FL (ref 11.5–15)
GLUCOSE SERPL-MCNC: 119 MG/DL (ref 74–99)
HCT VFR BLD AUTO: 22.1 % (ref 37–54)
HGB BLD-MCNC: 7.6 G/DL (ref 12.5–16.5)
IMM GRANULOCYTES # BLD: 0.04 E9/L
IMM GRANULOCYTES NFR BLD: 0.3 % (ref 0–5)
LYMPHOCYTES # BLD: 1.83 E9/L (ref 1.5–4)
LYMPHOCYTES NFR BLD: 15.5 % (ref 20–42)
MCH RBC QN AUTO: 35.5 PG (ref 26–35)
MCHC RBC AUTO-ENTMCNC: 34.4 % (ref 32–34.5)
MCV RBC AUTO: 103.3 FL (ref 80–99.9)
MONOCYTES # BLD: 1.16 E9/L (ref 0.1–0.95)
MONOCYTES NFR BLD: 9.8 % (ref 2–12)
NEUTROPHILS # BLD: 8.67 E9/L (ref 1.8–7.3)
NEUTS SEG NFR BLD: 73.7 % (ref 43–80)
PLATELET # BLD AUTO: 132 E9/L (ref 130–450)
PMV BLD AUTO: 10.2 FL (ref 7–12)
POTASSIUM SERPL-SCNC: 4 MMOL/L (ref 3.5–5)
RBC # BLD AUTO: 2.14 E12/L (ref 3.8–5.8)
SODIUM SERPL-SCNC: 135 MMOL/L (ref 132–146)
WBC # BLD: 11.8 E9/L (ref 4.5–11.5)

## 2023-05-04 PROCEDURE — 85025 COMPLETE CBC W/AUTO DIFF WBC: CPT

## 2023-05-04 PROCEDURE — 97530 THERAPEUTIC ACTIVITIES: CPT

## 2023-05-04 PROCEDURE — 36415 COLL VENOUS BLD VENIPUNCTURE: CPT

## 2023-05-04 PROCEDURE — 80048 BASIC METABOLIC PNL TOTAL CA: CPT

## 2023-05-04 PROCEDURE — 97535 SELF CARE MNGMENT TRAINING: CPT

## 2023-05-04 PROCEDURE — 6370000000 HC RX 637 (ALT 250 FOR IP): Performed by: NURSE PRACTITIONER

## 2023-05-04 PROCEDURE — 51798 US URINE CAPACITY MEASURE: CPT

## 2023-05-04 PROCEDURE — 1200000000 HC SEMI PRIVATE

## 2023-05-04 PROCEDURE — 6370000000 HC RX 637 (ALT 250 FOR IP): Performed by: ORTHOPAEDIC SURGERY

## 2023-05-04 PROCEDURE — 2580000003 HC RX 258: Performed by: ORTHOPAEDIC SURGERY

## 2023-05-04 PROCEDURE — 6370000000 HC RX 637 (ALT 250 FOR IP): Performed by: INTERNAL MEDICINE

## 2023-05-04 RX ORDER — BISACODYL 10 MG
10 SUPPOSITORY, RECTAL RECTAL DAILY
Status: DISCONTINUED | OUTPATIENT
Start: 2023-05-04 | End: 2023-05-05 | Stop reason: HOSPADM

## 2023-05-04 RX ADMIN — OXYCODONE HYDROCHLORIDE 10 MG: 10 TABLET ORAL at 09:22

## 2023-05-04 RX ADMIN — ATORVASTATIN CALCIUM 40 MG: 40 TABLET, FILM COATED ORAL at 20:50

## 2023-05-04 RX ADMIN — ASPIRIN 325 MG: 325 TABLET, COATED ORAL at 08:05

## 2023-05-04 RX ADMIN — OXYCODONE HYDROCHLORIDE 10 MG: 10 TABLET ORAL at 16:14

## 2023-05-04 RX ADMIN — SODIUM CHLORIDE, PRESERVATIVE FREE 10 ML: 5 INJECTION INTRAVENOUS at 08:06

## 2023-05-04 RX ADMIN — ASPIRIN 325 MG: 325 TABLET, COATED ORAL at 20:50

## 2023-05-04 RX ADMIN — SODIUM CHLORIDE, PRESERVATIVE FREE 10 ML: 5 INJECTION INTRAVENOUS at 20:50

## 2023-05-04 RX ADMIN — METOPROLOL SUCCINATE 50 MG: 50 TABLET, EXTENDED RELEASE ORAL at 08:05

## 2023-05-04 RX ADMIN — POLYETHYLENE GLYCOL 3350 17 G: 17 POWDER, FOR SOLUTION ORAL at 16:19

## 2023-05-04 ASSESSMENT — PAIN DESCRIPTION - FREQUENCY: FREQUENCY: CONTINUOUS

## 2023-05-04 ASSESSMENT — PAIN DESCRIPTION - LOCATION
LOCATION: LEG;HIP
LOCATION: LEG

## 2023-05-04 ASSESSMENT — PAIN DESCRIPTION - PAIN TYPE: TYPE: SURGICAL PAIN

## 2023-05-04 ASSESSMENT — PAIN SCALES - WONG BAKER: WONGBAKER_NUMERICALRESPONSE: 0

## 2023-05-04 ASSESSMENT — PAIN SCALES - GENERAL
PAINLEVEL_OUTOF10: 8
PAINLEVEL_OUTOF10: 5
PAINLEVEL_OUTOF10: 7

## 2023-05-04 ASSESSMENT — PAIN DESCRIPTION - ORIENTATION
ORIENTATION: RIGHT
ORIENTATION: RIGHT

## 2023-05-04 ASSESSMENT — PAIN DESCRIPTION - ONSET: ONSET: ON-GOING

## 2023-05-04 ASSESSMENT — PAIN DESCRIPTION - DESCRIPTORS
DESCRIPTORS: STABBING;SHOOTING;SORE
DESCRIPTORS: ACHING;DISCOMFORT;SORE;SHARP

## 2023-05-04 ASSESSMENT — PAIN - FUNCTIONAL ASSESSMENT
PAIN_FUNCTIONAL_ASSESSMENT: ACTIVITIES ARE NOT PREVENTED
PAIN_FUNCTIONAL_ASSESSMENT: PREVENTS OR INTERFERES SOME ACTIVE ACTIVITIES AND ADLS

## 2023-05-04 NOTE — CARE COORDINATION
5/4/2023social work transition of care planning  Pt plan is to [facility]. [facility]aria checking stability for discharge.   Electronically signed by NIKHIL Lepe on 5/4/2023 at 11:13 AM

## 2023-05-04 NOTE — PLAN OF CARE
Problem: Discharge Planning  Goal: Discharge to home or other facility with appropriate resources  5/3/2023 2116 by Jodie Christopher RN  Outcome: Progressing  5/3/2023 1504 by Lazarus Mesi, RN  Outcome: Progressing     Problem: Pain  Goal: Verbalizes/displays adequate comfort level or baseline comfort level  5/3/2023 2116 by Jodie Christopher RN  Outcome: Progressing  5/3/2023 1504 by Lazarus Mesi, RN  Outcome: Progressing     Problem: Safety - Adult  Goal: Free from fall injury  5/3/2023 2116 by Jodie Christopher RN  Outcome: Progressing  5/3/2023 1504 by Lazarus Mesi, RN  Outcome: Progressing

## 2023-05-05 VITALS
HEART RATE: 71 BPM | SYSTOLIC BLOOD PRESSURE: 109 MMHG | RESPIRATION RATE: 16 BRPM | TEMPERATURE: 97.6 F | BODY MASS INDEX: 24.29 KG/M2 | DIASTOLIC BLOOD PRESSURE: 64 MMHG | OXYGEN SATURATION: 98 % | WEIGHT: 164 LBS | HEIGHT: 69 IN

## 2023-05-05 LAB
ANION GAP SERPL CALCULATED.3IONS-SCNC: 10 MMOL/L (ref 7–16)
BUN SERPL-MCNC: 25 MG/DL (ref 6–23)
CALCIUM SERPL-MCNC: 8 MG/DL (ref 8.6–10.2)
CHLORIDE SERPL-SCNC: 99 MMOL/L (ref 98–107)
CO2 SERPL-SCNC: 22 MMOL/L (ref 22–29)
CREAT SERPL-MCNC: 0.9 MG/DL (ref 0.7–1.2)
ERYTHROCYTE [DISTWIDTH] IN BLOOD BY AUTOMATED COUNT: 13.6 FL (ref 11.5–15)
GLUCOSE SERPL-MCNC: 108 MG/DL (ref 74–99)
HCT VFR BLD AUTO: 21.7 % (ref 37–54)
HGB BLD-MCNC: 7.4 G/DL (ref 12.5–16.5)
MCH RBC QN AUTO: 35.4 PG (ref 26–35)
MCHC RBC AUTO-ENTMCNC: 34.1 % (ref 32–34.5)
MCV RBC AUTO: 103.8 FL (ref 80–99.9)
PLATELET # BLD AUTO: 156 E9/L (ref 130–450)
PMV BLD AUTO: 10.5 FL (ref 7–12)
POTASSIUM SERPL-SCNC: 4.1 MMOL/L (ref 3.5–5)
RBC # BLD AUTO: 2.09 E12/L (ref 3.8–5.8)
SODIUM SERPL-SCNC: 131 MMOL/L (ref 132–146)
WBC # BLD: 9.9 E9/L (ref 4.5–11.5)

## 2023-05-05 PROCEDURE — 80048 BASIC METABOLIC PNL TOTAL CA: CPT

## 2023-05-05 PROCEDURE — 36415 COLL VENOUS BLD VENIPUNCTURE: CPT

## 2023-05-05 PROCEDURE — 6370000000 HC RX 637 (ALT 250 FOR IP): Performed by: NURSE PRACTITIONER

## 2023-05-05 PROCEDURE — 6370000000 HC RX 637 (ALT 250 FOR IP): Performed by: ORTHOPAEDIC SURGERY

## 2023-05-05 PROCEDURE — 85027 COMPLETE CBC AUTOMATED: CPT

## 2023-05-05 PROCEDURE — 2580000003 HC RX 258: Performed by: NURSE PRACTITIONER

## 2023-05-05 PROCEDURE — 6370000000 HC RX 637 (ALT 250 FOR IP): Performed by: INTERNAL MEDICINE

## 2023-05-05 RX ORDER — OXYCODONE HYDROCHLORIDE 5 MG/1
5 TABLET ORAL EVERY 6 HOURS PRN
Qty: 28 TABLET | Refills: 0 | Status: SHIPPED | OUTPATIENT
Start: 2023-05-05 | End: 2023-05-12

## 2023-05-05 RX ORDER — LOSARTAN POTASSIUM 25 MG/1
25 TABLET ORAL DAILY
Qty: 30 TABLET | Refills: 3 | DISCHARGE
Start: 2023-05-06

## 2023-05-05 RX ADMIN — OXYCODONE 5 MG: 5 TABLET ORAL at 08:13

## 2023-05-05 RX ADMIN — ASPIRIN 325 MG: 325 TABLET, COATED ORAL at 08:11

## 2023-05-05 RX ADMIN — LOSARTAN POTASSIUM 25 MG: 50 TABLET, FILM COATED ORAL at 08:11

## 2023-05-05 RX ADMIN — SODIUM CHLORIDE, PRESERVATIVE FREE 10 ML: 5 INJECTION INTRAVENOUS at 08:32

## 2023-05-05 RX ADMIN — MAGNESIUM HYDROXIDE 30 ML: 400 SUSPENSION ORAL at 11:55

## 2023-05-05 RX ADMIN — METOPROLOL SUCCINATE 50 MG: 50 TABLET, EXTENDED RELEASE ORAL at 08:12

## 2023-05-05 RX ADMIN — AMIODARONE HYDROCHLORIDE 200 MG: 200 TABLET ORAL at 08:12

## 2023-05-05 ASSESSMENT — PAIN DESCRIPTION - ORIENTATION: ORIENTATION: RIGHT

## 2023-05-05 ASSESSMENT — PAIN DESCRIPTION - LOCATION: LOCATION: HIP

## 2023-05-05 ASSESSMENT — PAIN SCALES - GENERAL: PAINLEVEL_OUTOF10: 7

## 2023-05-05 ASSESSMENT — PAIN SCALES - WONG BAKER: WONGBAKER_NUMERICALRESPONSE: 0

## 2023-05-05 NOTE — PLAN OF CARE
Problem: Chronic Conditions and Co-morbidities  Goal: Patient's chronic conditions and co-morbidity symptoms are monitored and maintained or improved  5/5/2023 0040 by Lana Prieto RN  Outcome: Progressing     Problem: Discharge Planning  Goal: Discharge to home or other facility with appropriate resources  5/5/2023 0040 by Lana Prieto RN  Outcome: Progressing     Problem: Pain  Goal: Verbalizes/displays adequate comfort level or baseline comfort level  5/5/2023 0040 by Lana Prieto RN  Outcome: Progressing     Problem: Safety - Adult  Goal: Free from fall injury  5/5/2023 0040 by Lana Prieto RN  Outcome: Progressing     Problem: ABCDS Injury Assessment  Goal: Absence of physical injury  5/5/2023 0040 by Lana Prieto RN  Outcome: Progressing     Problem: Skin/Tissue Integrity  Goal: Absence of new skin breakdown  Description: 1. Monitor for areas of redness and/or skin breakdown  2. Assess vascular access sites hourly  3. Every 4-6 hours minimum:  Change oxygen saturation probe site  4. Every 4-6 hours:  If on nasal continuous positive airway pressure, respiratory therapy assess nares and determine need for appliance change or resting period.   Outcome: Progressing

## 2023-05-05 NOTE — CARE COORDINATION
5/5/2023social work transition of care planning  Pt plan is to [facility],once medically stable. Electronically signed by NIKHIL Feliz on 5/5/2023 at 7:24 AM    Addendum: Anamaria set up [facility] ambulance for 1:30 pm to [facility]. Anamaria notified RN,facility liaison and pt. Pt will call family. Electronically signed by NIKHIL Feliz on 5/5/2023 at 11:32 AM    Addendum: Anamaria notified of [facility] delay. anamaria called pas:ETA 19 minutes.   Electronically signed by NIKHIL Feliz on 5/5/2023 at 3:35 PM

## 2023-05-05 NOTE — PROGRESS NOTES
190 [facility] perfect served regarding patient rescent bladder scan of 560. Awaiting repy.
Cardiology consult sent to Héctor Lamb NP, for preop clearance. Awaiting reply.
Department of Orthopedic Surgery  Progress Note    Patient seen and examined, resting in bed. Pain controlled with medication. No new complaints. Denies chest pain, shortness of breath, dizziness/lightheadedness. Questions regarding surgery were discussed this AM.  He has still not had a bowel movement although he states he feels he is getting close. He has been passing gas however. We reviewed his x-rays of his hip today. No new complaints. All of his questions and concerns were addressed. VITALS:  /60   Pulse 71   Temp (!) 96.5 °F (35.8 °C) (Temporal)   Resp 16   Ht 5' 9\" (1.753 m)   Wt 164 lb (74.4 kg)   SpO2 98%   BMI 24.22 kg/m²     General: alert and oriented to person, place and time, well-developed and well-nourished, in no acute distress    MUSCULOSKELETAL:   right lower extremity:  Dressings C/D/I with some saturation about the proximal dressing  Compartments soft and compressible  +PF/DF/EHL  +2/4 DP & PT pulses, Brisk Cap refill, Toes warm and perfused  Distal sensation grossly intact to Peroneals, Sural, Saphenous, and tibial nrs    CBC:   Lab Results   Component Value Date/Time    WBC 9.9 05/05/2023 05:59 AM    HGB 7.4 05/05/2023 05:59 AM    HCT 21.7 05/05/2023 05:59 AM     05/05/2023 05:59 AM     PT/INR:    Lab Results   Component Value Date/Time    PROTIME 14.4 05/01/2023 07:36 PM    INR 1.3 05/01/2023 07:36 PM       ASSESSMENT  S/P Right hip ORIF on 5/2/23    PLAN      Continue physical therapy and protocol: WBAT - RLE  24 hour abx coverage completed  Deep venous thrombosis prophylaxis - aspirin, early mobilization  PT/OT as able  Pain Control: IV and PO  Monitor H&H. Hemoglobin 7.6 this AM transfuse below 7  Suppository ordered  D/C Planning- appreciate PT/OT, CM, care team recommendations okay for discharge from orthopedic standpoint. We will follow peripherally at this point.   Please call with questions or concerns
Dr Last Chowdary left voice message regarding follow up Bp of 103/42 and if ok for Toprol XLto be given.
Dr. Lisa Benavides updated on bladder scan. Pt bladder scanned for 179ml.  Pt for possible dc tomorrow
Hospitalist Progress Note      PCP: Anh Sandoval MD    Date of Admission: 5/1/2023        Hospital Course:   HAD A RIGHT ALEXX. . BP LOW TODAY         Subjective:    NO COMPLAINTS           Medications:  Reviewed    Infusion Medications    sodium chloride 100 mL/hr at 05/02/23 2125    sodium chloride      sodium chloride       Scheduled Medications    sodium chloride  250 mL IntraVENous Once    [START ON 5/4/2023] metoprolol succinate  50 mg Oral Daily    [START ON 5/4/2023] losartan  25 mg Oral Daily    sodium chloride flush  5-40 mL IntraVENous 2 times per day    ceFAZolin (ANCEF) IVPB  2,000 mg IntraVENous Q8H    aspirin  325 mg Oral BID    amiodarone  200 mg Oral Daily with breakfast    atorvastatin  40 mg Oral Nightly    [Held by provider] spironolactone  25 mg Oral Daily    sodium chloride flush  5-40 mL IntraVENous 2 times per day    bisacodyl  10 mg Rectal Once     PRN Meds: sodium chloride flush, sodium chloride, morphine **OR** morphine, acetaminophen, oxyCODONE **OR** oxyCODONE, polyethylene glycol, magnesium hydroxide, sodium chloride flush, sodium chloride      Intake/Output Summary (Last 24 hours) at 5/3/2023 1621  Last data filed at 5/3/2023 1434  Gross per 24 hour   Intake 880 ml   Output 900 ml   Net -20 ml       Exam:    BP (!) 96/50   Pulse 68   Temp 97 °F (36.1 °C) (Temporal)   Resp 18   Ht 5' 9\" (1.753 m)   Wt 164 lb (74.4 kg)   SpO2 98%   BMI 24.22 kg/m²           Gen: WELL DEVELOPED  HEENT: NC/AT, moist mucous membranes, no oropharyngeal erythema or exudate  Neck: supple, trachea midline, no anterior cervical or SC LAD  Heart:  Normal s1/s2, RRR,  Lungs:  CTA bilaterally,  Abd: bowel sounds present, soft, nontender, nondistended, no masses  Extrem:  No clubbing, cyanosis,    NO jessica  Skin: no rashes or lesions  Psych: A & O x3  Neuro: grossly intact, moves all four extremities.                   Labs:   Recent Labs     05/01/23  1122 05/02/23  0511 05/03/23  0520   WBC 10.1
Hospitalist Progress Note      PCP: Crystal Gan MD    Date of Admission: 5/1/2023        Hospital Course:   HAD A RIGHT ALEXX. . BP LOW TODAY   Candelaria removed, awating urination prior to discharge      Subjective:  no complaints           Medications:  Reviewed    Infusion Medications    sodium chloride      sodium chloride       Scheduled Medications    bisacodyl  10 mg Rectal Daily    metoprolol succinate  50 mg Oral Daily    losartan  25 mg Oral Daily    sodium chloride flush  5-40 mL IntraVENous 2 times per day    aspirin  325 mg Oral BID    amiodarone  200 mg Oral Daily with breakfast    atorvastatin  40 mg Oral Nightly    [Held by provider] spironolactone  25 mg Oral Daily    sodium chloride flush  5-40 mL IntraVENous 2 times per day    bisacodyl  10 mg Rectal Once     PRN Meds: sodium chloride flush, sodium chloride, morphine **OR** morphine, acetaminophen, oxyCODONE **OR** oxyCODONE, polyethylene glycol, magnesium hydroxide, sodium chloride flush, sodium chloride      Intake/Output Summary (Last 24 hours) at 5/4/2023 1446  Last data filed at 5/4/2023 1100  Gross per 24 hour   Intake 180 ml   Output 1275 ml   Net -1095 ml       Exam:    BP (!) 111/51   Pulse 78   Temp 97.8 °F (36.6 °C) (Temporal)   Resp 16   Ht 5' 9\" (1.753 m)   Wt 164 lb (74.4 kg)   SpO2 97%   BMI 24.22 kg/m²       Gen: WELL DEVELOPED  HEENT: NC/AT, moist mucous membranes,   Neck: supple, trachea midline, no anterior cervical or SC LAD  Heart:  Normal s1/s2, RRR,  Lungs:  CTA bilaterally,  Abd: bowel sounds present, soft, nontender, nondistended, no masses  Extrem:  No clubbing, cyanosis,    NO jessica  Skin: no rashes or lesions  Psych: A & O x3  Neuro: grossly intact, moves all four extremities.                   Labs:   Recent Labs     05/02/23  0511 05/03/23  0520 05/04/23  0512   WBC 11.4 11.3 11.8*   HGB 10.1* 8.9* 7.6*   HCT 29.9* 26.6* 22.1*    158 132     Recent Labs     05/02/23  0511 05/03/23  0520
Hospitalist Progress Note      PCP: Lisa Nash MD    Date of Admission: 5/1/2023        Hospital Course:  HAD A RIGHT ALEXX. . BP LOW TODAY   Campbell removed, awating urination prior to discharge** HE WAS UNABLE TO URINATE, WILL GO TO [facility] WITH [facility] AND SEE UROLOGY AS OP       NO COMPLAINTS Subjective:            Medications:  Reviewed    Infusion Medications    sodium chloride      sodium chloride       Scheduled Medications    bisacodyl  10 mg Rectal Daily    metoprolol succinate  50 mg Oral Daily    losartan  25 mg Oral Daily    sodium chloride flush  5-40 mL IntraVENous 2 times per day    aspirin  325 mg Oral BID    amiodarone  200 mg Oral Daily with breakfast    atorvastatin  40 mg Oral Nightly    [Held by provider] spironolactone  25 mg Oral Daily    sodium chloride flush  5-40 mL IntraVENous 2 times per day    bisacodyl  10 mg Rectal Once     PRN Meds: sodium chloride flush, sodium chloride, morphine **OR** morphine, acetaminophen, oxyCODONE **OR** oxyCODONE, polyethylene glycol, magnesium hydroxide, sodium chloride flush, sodium chloride      Intake/Output Summary (Last 24 hours) at 5/5/2023 1617  Last data filed at 5/5/2023 5533  Gross per 24 hour   Intake 360 ml   Output 1000 ml   Net -640 ml       Exam:    /64   Pulse 71   Temp 97.6 °F (36.4 °C) (Temporal)   Resp 16   Ht 5' 9\" (1.753 m)   Wt 164 lb (74.4 kg)   SpO2 98%   BMI 24.22 kg/m²       Gen: WELL DEVELOPED  HEENT: NC/AT, moist mucous membranes,   Neck: supple, trachea midline, no anterior cervical or SC LAD  Heart:  Normal s1/s2, RRR,  Lungs:  CTA bilaterally,  Abd: bowel sounds present, soft, nontender, nondistended, no masses  Extrem:  No clubbing, cyanosis,    NO jessica  Skin: no rashes or lesions  Psych: A & O x3  Neuro: grossly intact, moves all four extremities.                   Labs:   Recent Labs     05/03/23  0520 05/04/23  0512 05/05/23  0559   WBC 11.3 11.8* 9.9   HGB 8.9* 7.6* 7.4*   HCT 26.6* 22.1* 21.7*   
I have seen and evaluated the patient and agree with the above assessment on today's visit. I have performed the key components of the history and physical examination and concur completely with the findings and plans as documented. Agree with ROS, examination, FMH, PMH, PSH, SocHx, and allergies as above. Patient is resting comfortably this morning. He did get dizziness with physical therapy yesterday and was unable to ambulate. He is hoping to get up and get moving today with therapy. He has not taken pain medicine since yesterday. He is taking aspirin for DVT prophylaxis. He finished his postoperative antibiotics. His hemoglobin is 7.6 this morning. We will defer to the primary team for transfusion below 7. He can follow-up in my office 2 weeks after discharge      [id number] [facility]   5/4/2023  7:23 AM        Department of Orthopedic Surgery  Progress Note    Patient seen and examined, resting in bed. Pain controlled with medication. No new complaints. Denies chest pain, shortness of breath, dizziness/lightheadedness. Questions regarding surgery were discussed this AM. He has been having difficulty having a bowel movement and is requesting a suppository.      VITALS:  BP (!) 108/58   Pulse 85   Temp 97.4 °F (36.3 °C) (Temporal)   Resp 18   Ht 5' 9\" (1.753 m)   Wt 164 lb (74.4 kg)   SpO2 94%   BMI 24.22 kg/m²     General: alert and oriented to person, place and time, well-developed and well-nourished, in no acute distress    MUSCULOSKELETAL:   right lower extremity:  Dressings C/D/I with some saturation about the proximal dressing  Compartments soft and compressible  +PF/DF/EHL  +2/4 DP & PT pulses, Brisk Cap refill, Toes warm and perfused  Distal sensation grossly intact to Peroneals, Sural, Saphenous, and tibial nrs    CBC:   Lab Results   Component Value Date/Time    WBC 11.8 05/04/2023 05:12 AM    HGB 7.6 05/04/2023 05:12 AM    HCT 22.1 05/04/2023 05:12 AM    
I have seen and evaluated the patient and agree with the above assessment on today's visit. I have performed the key components of the history and physical examination and concur completely with the findings and plans as documented. Agree with ROS, examination, FMH, PMH, PSH, SocHx, and allergies as above. Patient seen and examined this morning. His pain is well controlled. He is resting comfortably. His hip is feeling better since surgery. We will get moving with physical therapy today. He will finish his postoperative antibiotics. He can start aspirin for DVT prophylaxis today. He is happy with his results. We will keep an eye on him but he would likely need rehab placement. Ruy Santana DO   Orthopaedic Surgery   5/3/2023  7:45 AM        Department of Orthopedic Surgery  Progress Note    Patient seen and examined, resting in bed. Pain controlled with medication. No new complaints. Denies chest pain, shortness of breath, dizziness/lightheadedness. Candelaria intact.  Questions regarding surgery were discussed this AM.    VITALS:  BP (!) 104/51   Pulse 71   Temp 97 °F (36.1 °C) (Temporal)   Resp 18   Ht 5' 9\" (1.753 m)   Wt 164 lb (74.4 kg)   SpO2 97%   BMI 24.22 kg/m²     General: alert and oriented to person, place and time, well-developed and well-nourished, in no acute distress    MUSCULOSKELETAL:   right lower extremity:  Dressings C/D/I with some saturation about the proximal dressing  Compartments soft and compressible  +PF/DF/EHL  +2/4 DP & PT pulses, Brisk Cap refill, Toes warm and perfused  Distal sensation grossly intact to Peroneals, Sural, Saphenous, and tibial nrs    CBC:   Lab Results   Component Value Date/Time    WBC 11.3 05/03/2023 05:20 AM    HGB 8.9 05/03/2023 05:20 AM    HCT 26.6 05/03/2023 05:20 AM     05/03/2023 05:20 AM     PT/INR:    Lab Results   Component Value Date/Time    PROTIME 14.4 05/01/2023 07:36 PM    INR 1.3 05/01/2023 07:36 PM       ASSESSMENT  S/P
OCCUPATIONAL THERAPY TREATMENT NOTE    [facility] TREATMENT NOTE      Date:2023  Patient Name: Shamika Reeves  MRN: [id number]  : 1945  Room: [address] [place] [address]-A       OT orders received & appreciated, chart reviewed, will hold for scheduled OR this day with Ortho for ORIF of R hip. Will follow post-op in accordance with Ortho orders/POC. Thank you,  Anshu Green.  PADMA BarrosoR/L   License #  [id number]
PROGRESS NOTE     CARDIOLOGY    Chief complaint: Seen today for follow up, management & recommendations for preop cardiac risk assessment, cardiomyopathy, mitral regurgitation. He denies chest pain or shortness of breath today. He was reclining in bed. He was comfortable and in no distress. Wt Readings from Last 3 Encounters:   05/01/23 164 lb (74.4 kg)   04/14/23 166 lb (75.3 kg)   02/20/23 176 lb (79.8 kg)     Temp Readings from Last 3 Encounters:   05/03/23 97 °F (36.1 °C) (Temporal)   04/14/23 97.3 °F (36.3 °C) (Tympanic)   01/08/23 97.9 °F (36.6 °C)     BP Readings from Last 3 Encounters:   05/03/23 (!) 103/42   04/14/23 112/73   02/20/23 126/68     Pulse Readings from Last 3 Encounters:   05/03/23 80   04/14/23 78   02/20/23 69         Intake/Output Summary (Last 24 hours) at 5/3/2023 1248  Last data filed at 5/3/2023 0704  Gross per 24 hour   Intake 400 ml   Output 1150 ml   Net -750 ml       Recent Labs     05/01/23  1122 05/02/23  0511 05/03/23  0520   WBC 10.1 11.4 11.3   HGB 10.4* 10.1* 8.9*   HCT 31.1* 29.9* 26.6*   .4* 103.8* 105.6*    184 158     Recent Labs     05/01/23  1122 05/02/23  0511 05/03/23  0520    135 129*   K 4.7 4.3 4.4    103 96*   CO2 24 21* 22   BUN 26* 26* 27*   CREATININE 1.0 1.1 1.1     Recent Labs     05/01/23  1936   PROTIME 14.4*   INR 1.3     No results for input(s): CKTOTAL, CKMB, CKMBINDEX, TROPONINI in the last 72 hours. No results for input(s): BNP in the last 72 hours. No results for input(s): CHOL, HDL, TRIG in the last 72 hours. Invalid input(s): CHOLHDLR, LDLCALCU  No results for input(s): TROPHS in the last 72 hours.       0.9 % sodium chloride infusion, Continuous  sodium chloride flush 0.9 % injection 5-40 mL, 2 times per day  sodium chloride flush 0.9 % injection 5-40 mL, PRN  0.9 % sodium chloride infusion, PRN  ceFAZolin (ANCEF) 2,000 mg in sterile water 20 mL IV syringe, Q8H  morphine (PF) injection 2 mg, Q4H PRN
PT was unable to void. Bladder scanned for 586mL.  Candelaria replaced
Patient seen and examined in preoperative holding. Is a 81-year-old male who fell off a stepstool and his right side. He ambulates without assist device. He did have a pacemaker in defibrillator placed 2 weeks ago. He has significant cardiac history. He was cleared medically. He was seen in preoperative holding and we discussed treatment. This is an operative fracture requiring stabilization for early mobilization. Risks, benefits, and alternatives were discussed with the patient and their family. Risks include but are not limited to infection, blood loss, damage to neurovascular and musculoskeletal structures, malunion, nonunion, symptomatic hardware, need for further surgery, possible arthritis despite surgical stabilization, stiffness, and catastrophic anesthesia complications. They understand and wish to proceed. Informed consent was obtained and signed and placed in the chart in preop holding. My initials were placed on his right hip. Plan today is long cephalomedullary nail for the right side. He understands that this is a significant recovery and it takes about 12 weeks for the femur to heal.  He also still understands that he may lose a level of function after this and that there is a 30% 1 year mortality rate associated with hip fractures. All of his questions were answered in detail.         [address]   Orthopaedic Surgery   5/2/2023  6:06 PM
Patient to be discharged to [facility]. Nurse to nurse called to facility. IV removed per policy .  Transport set up for 1 pm.
Physical Therapy Initial Assessment     Name: Shane Bridges  : 1945  MRN: [id number]      Date of Service: 5/3/2023    Evaluating PT:  Genia Tuttle, PT, DPT [id number]    Room #:  1863/8990-P  Diagnosis:  Closed right hip fracture, initial encounter ([email or web address] [S72.001A]  Falls, initial encounter [W19. XXXA]  PMHx/PSHx:    Past Medical History:   Diagnosis Date    Acute on chronic systolic congestive heart failure ([email or web address].) 10/18/2018    Aortic regurgitation     trace    Arthritis     CAD (coronary artery disease)     Hypercholesteremia     Hypertension     Ischemic cardiomyopathy     Mitral regurgitation     mild to moderate    STEMI (ST elevation myocardial infarction) ([email or web address] 2012    anteriolateral     Procedure/Surgery: 5/3 RIGHT HIP OPEN REDUCTION INTERNAL FIXATION cephalomedullary nail  Reason for admission:   Precautions:  WBAT RLE, monitor BP, tyler   Equipment Needs:  [employer]    SUBJECTIVE:  Pt lives alone in single story home with 1+1 SARA with 1 HR. Pt has basement with 2 HR with walk in shower in basement and 2 rails on stairs to access. Pt ambulated with no AD PTA. OBJECTIVE:   Initial Evaluation  Date: 5/3/23 Treatment Short Term/ Long Term   Goals   AM-PAC 6 Clicks 44/38     Was pt agreeable to Eval/treatment?  Yes     Does pt have pain? 4-5/10 at rest in hip, increased with activity      Bed Mobility  Rolling: NT  Supine to sit: mod a  Sit to supine: mod a  Scooting: NT  Rolling: ind  Supine to sit: ind  Sit to supine: ind  Scooting: ind   Transfers Sit to stand: mod a x2  Stand to sit: mod a x2  Stand pivot: NT  Sit to stand: ind  Stand to sit: ind  Stand pivot: mod I FWW   Ambulation   NT  150 feet with FWW mod I    Stair negotiation: ascended and descended NT  2 steps with one rail ind   ROM BUE:  Refer to OT eval   BLE:  WNL      Strength BUE:  Refer to OT eval   BLE:  WNL LLE, RLE WNL except hip flexion      Balance Sitting EOB:  SBA  Dynamic Standing:  NT  Sitting EOB:  ind  Dynamic Standing:
Physical Therapy Treatment     Name: Taran Ashford  : 1945  MRN: [id number]      Date of Service: 2023    Evaluating PT:  Bob Brewster PT, DPT [id number]    Room #:  9155/3458-V  Diagnosis:  Closed right hip fracture, initial encounter ([facility] 75.) [S72.001A]  Falls, initial encounter [W19. XXXA]  PMHx/PSHx:    Past Medical History:   Diagnosis Date    Acute on chronic systolic congestive heart failure ([facility] 75.) 10/18/2018    Aortic regurgitation     trace    Arthritis     CAD (coronary artery disease)     Hypercholesteremia     Hypertension     Ischemic cardiomyopathy     Mitral regurgitation     mild to moderate    STEMI (ST elevation myocardial infarction) ([facility].) 2012    anteriolateral     Procedure/Surgery: 5/3 RIGHT HIP OPEN REDUCTION INTERNAL FIXATION cephalomedullary nail  Reason for admission:   Precautions:  WBAT RLE, monitor BP, tyler   Equipment Needs:  [employer]    SUBJECTIVE:  Pt lives alone in single story home with 1+1 SARA with 1 HR. Pt has basement with 2 HR with walk in shower in basement and 2 rails on stairs to access. Pt ambulated with no AD PTA. OBJECTIVE:   Initial Evaluation  Date: 5/3/23 Treatment  23 Short Term/ Long Term   Goals   AM-PAC 6 Clicks 98/05 15/74    Was pt agreeable to Eval/treatment?  Yes yes    Does pt have pain? 4-5/10 at rest in hip, increased with activity  Min pain prior to mobility, increased with standing activity    Bed Mobility  Rolling: NT  Supine to sit: mod a  Sit to supine: mod a  Scooting: NT Rolling: NT  Supine to sit: mod a  Sit to supine: mod a  Scooting: NT Rolling: ind  Supine to sit: ind  Sit to supine: ind  Scooting: ind   Transfers Sit to stand: mod a x2  Stand to sit: mod a x2  Stand pivot: NT Sit to stand: mod a   Stand to sit: mod a   Stand pivot: mod A FWW Sit to stand: ind  Stand to sit: ind  Stand pivot: mod I FWW   Ambulation   NT 5' with FWW mod A  150 feet with FWW mod I    Stair negotiation: ascended and descended NT NT 2 steps with one rail
Yessenia NP and Lee Hoffmann NP notified of pts surgery time and stated that they will see the patient this morning
[id number] 15 1   Neuro Re-ed [address] [facility] manage/training  [id number]     Non-Billable Time     Total Timed Treatment 30 [address]
and HR throughout session to maximize safety. Rehab Potential: Good for established goals     LTG: maximize independence with ADLs to return to PLOF    Patient and/or family were instructed on functional diagnosis, prognosis/goals and OT plan of care. Demonstrated fair understanding. Eval Complexity: Low  History: Expanded chart review of medical records and additional review of physical, cognitive, or psychosocial history related to current functional performance  Exam: 3+ performance deficits  Assistance/Modification: Min/mod OR Mod/Max assistance or modifications required to perform tasks. May have comorbidities that affect occupational performance. Evaluation time includes thorough review of current medical information, gathering information on past medical & social history & PLOF, completion of standardized testing, informal observation of tasks, consultation with other medical professions/disciplines, assessment of data & development of POC/goals. Time In: 1:30p  Time Out: 2:10p  Total Treatment Time: 25 minutes    Min Units   OT Eval Low 57536  x     OT Eval Medium 95012      OT Eval High 61324      OT Re-Eval [id number]       Therapeutic Ex 30886       Therapeutic Activities 46213  10 1    ADL/Self Care 03612  15 1    Orthotic Management 46821       Manual 71876     Neuro Re-Ed 42919       Non-Billable Time          Evaluation Time additionally includes thorough review of current medical information, gathering information on past medical history/social history and prior level of function, interpretation of standardized testing/informal observation of tasks, assessment of data and development of plan of care and goals.             Leonardo Chandra OTR/L; [id number]

## 2023-05-07 LAB — SARS-COV-2 N GENE RESP QL NAA+PROBE: NOT DETECTED

## 2023-05-09 ENCOUNTER — TELEPHONE (OUTPATIENT)
Dept: NON INVASIVE DIAGNOSTICS | Age: 78
End: 2023-05-09

## 2023-05-09 LAB — SARS-COV-2 N GENE RESP QL NAA+PROBE: NOT DETECTED

## 2023-05-11 LAB — SARS-COV-2 N GENE RESP QL NAA+PROBE: NOT DETECTED

## 2023-05-11 NOTE — DISCHARGE SUMMARY
Hospitalist Discharge Summary    Patient ID: Amy Valentin   Patient : 1945  Patient's PCP: Ana Maria Preston MD    Admit Date: 2023   Admitting Physician: Deandre Pollack MD    Discharge Date:  2023   Discharge Physician: Jessica Tomlin DO   Discharge Condition: Stable  Discharge Disposition: Skilled Facility      Discharge Diagnoses: Active Hospital Problems    Diagnosis Date Noted    Closed right hip fracture, initial encounter ([facility] [S72.001A] 2023    Falls, initial encounter [W19. XXXA] 2023   HYPOTENSION  DYSURIA   URINARY RETENTION        Hospital course in brief:  HAD A RIGHT ALEXX. . BP LOW TODAY   Campbell removed, awating urination prior to discharge** HE WAS UNABLE TO URINATE, WILL GO TO [facility] WITH [facility] AND SEE UROLOGY AS OP      PHYSICAL EXAM:    /64   Pulse 71   Temp 97.6 °F (36.4 °C) (Temporal)   Resp 16   Ht 5' 9\" (1.753 m)   Wt 164 lb (74.4 kg)   SpO2 98%   BMI 24.22 kg/m²     Gen: WELL DEVELOPED  HEENT: NC/AT, moist mucous membranes,   Neck: supple, trachea midline, no anterior cervical or SC LAD  Heart:  Normal s1/s2, RRR,  Lungs:  CTA bilaterally,  Abd: bowel sounds present, soft, nontender, nondistended, no masses  Extrem:  No clubbing, cyanosis,    NO jessica  Skin: no rashes or lesions  Psych: A & O x3  Neuro: grossly intact, moves all four extremities. Prior to Admission medications    Medication Sig Start Date End Date Taking? Authorizing Provider   losartan (COZAAR) 25 MG tablet Take 1 tablet by mouth daily 23  Yes Jessica Tomlin DO   aspirin 325 MG EC tablet Take 1 tablet by mouth 2 times daily 23  Yes Nick Bonds DO   oxyCODONE (ROXICODONE) 5 MG immediate release tablet Take 1 tablet by mouth every 6 hours as needed for Pain for up to 7 days. Intended supply: 7 days.  Take lowest dose possible to manage pain Max Daily Amount: 20 mg 23 Yes Theresa Jewell PA-C   aspirin 325 MG EC tablet Take 1

## 2023-05-12 ENCOUNTER — NURSE ONLY (OUTPATIENT)
Dept: NON INVASIVE DIAGNOSTICS | Age: 78
End: 2023-05-12

## 2023-05-12 DIAGNOSIS — Z95.810 PRESENCE OF AUTOMATIC CARDIOVERTER/DEFIBRILLATOR (AICD): ICD-10-CM

## 2023-05-12 DIAGNOSIS — I47.20 VENTRICULAR TACHYCARDIA (HCC): Primary | ICD-10-CM

## 2023-05-12 LAB — SARS-COV-2 N GENE RESP QL NAA+PROBE: NOT DETECTED

## 2023-05-12 NOTE — PATIENT INSTRUCTIONS
Keep monitor hooked up. Keep appointment with NP to discuss amiodarone   Call if any symptoms of dizziness, lightheadedness, passing out or shocks. If you receive a shock    1 shock and you feel fine send a home transmission and call the office [phone]   1 shock and you feel poorly after the shock go to the emergency room and do not drive. Multiple shocks report to the emergency room and do no drive.

## 2023-05-15 ENCOUNTER — TELEPHONE (OUTPATIENT)
Dept: NON INVASIVE DIAGNOSTICS | Age: 78
End: 2023-05-15

## 2023-05-15 NOTE — TELEPHONE ENCOUNTER
I attempted to contact patient at [place] [id number]. Patient refused to talk and desires to only talk in person at the time of his appointment on 6/6/2023 at 10:30 AM.  I informed patient's nurse as to the reasons why amiodarone was prescribed (VT).   She will attempt to talk to patient further.    -Radhames Beltran, DO

## 2023-05-16 ENCOUNTER — TELEPHONE (OUTPATIENT)
Dept: NON INVASIVE DIAGNOSTICS | Age: 78
End: 2023-05-16

## 2023-05-18 ENCOUNTER — OFFICE VISIT (OUTPATIENT)
Dept: ORTHOPEDIC SURGERY | Age: 78
End: 2023-05-18

## 2023-05-18 VITALS — BODY MASS INDEX: 24.29 KG/M2 | HEIGHT: 69 IN | WEIGHT: 164 LBS

## 2023-05-18 DIAGNOSIS — Z87.81 S/P RIGHT HIP FRACTURE: Primary | ICD-10-CM

## 2023-05-18 PROCEDURE — 99024 POSTOP FOLLOW-UP VISIT: CPT | Performed by: STUDENT IN AN ORGANIZED HEALTH CARE EDUCATION/TRAINING PROGRAM

## 2023-05-18 NOTE — PROGRESS NOTES
Follow Up Post Operative Visit     Surgery: Right hip open reduction internal fixation with cephalomedullary nail  Date: 5/2/2023    Subjective:    Jamey Nolan is here for follow up visit s/p above procedure. He is doing well. He has been living in a nursing home and has been able to stand and ambulate with minimal pain. He is progressing with therapy. He is taking aspirin for DVT prophylaxis. He still has a Candelaria catheter in place. Denies fever and chills. Denies numbness tingling. He is happy with his result so far. Controlled Substances Monitoring:        Physical Exam:    No data recorded    General: Alert and oriented x3, no acute distress  Cardiovascular/pulmonary: No labored breathing, peripheral perfusion intact  Musculoskeletal:    Right hip: Incisions well approximated staples these were removed and replaced with Steri-Strips. No signs of infection or drainage. He is tender palpation around his incisions. His thigh soft compressible. He has near full hip range of motion without pain. He has 5 out of 5 strength with knee flexion extension. Imaging: Multiple views of the right hip and femur demonstrate maintained cephalomedullary nail with moderate collapse of his fracture into a position of stability. This four-part fracture has shown escape of the lesser trochanter and mild displacement the greater trochanter. Overall alignment is well-maintained with no evidence of hardware complication or failure. Assessment and Plan: 2 weeks status post right four-part intertrochanteric femur open duction internal fixation with cephalomedullary nail  -Patient is doing well. We discussed his imaging results in detail. He can continue weightbearing as tolerated. He will take aspirin for DVT prophylaxis for 30 days. Continue therapy protocol that he is currently undergoing. We will see him back in 6 weeks for repeat imaging.   We did discuss the small risk of failure with this type of

## 2023-05-24 NOTE — TELEPHONE ENCOUNTER
Sarai at [facility] notified of Dr. Corina Deleon recommendation. F/U scheduled for 10/2/23 at 10:40 a.m.

## 2023-05-31 ENCOUNTER — TELEPHONE (OUTPATIENT)
Dept: CARDIOLOGY CLINIC | Age: 78
End: 2023-05-31

## 2023-06-29 ENCOUNTER — OFFICE VISIT (OUTPATIENT)
Dept: ORTHOPEDIC SURGERY | Age: 78
End: 2023-06-29

## 2023-06-29 VITALS — WEIGHT: 164 LBS | HEIGHT: 69 IN | BODY MASS INDEX: 24.29 KG/M2

## 2023-06-29 DIAGNOSIS — Z87.81 S/P RIGHT HIP FRACTURE: Primary | ICD-10-CM

## 2023-06-29 PROCEDURE — 99024 POSTOP FOLLOW-UP VISIT: CPT | Performed by: STUDENT IN AN ORGANIZED HEALTH CARE EDUCATION/TRAINING PROGRAM

## 2023-06-29 RX ORDER — LANOLIN ALCOHOL/MO/W.PET/CERES
1000 CREAM (GRAM) TOPICAL DAILY
COMMUNITY

## 2023-06-29 RX ORDER — LANOLIN ALCOHOL/MO/W.PET/CERES
500 CREAM (GRAM) TOPICAL NIGHTLY
COMMUNITY

## 2023-06-29 RX ORDER — ZINC 25 MG
TABLET ORAL
COMMUNITY

## 2023-06-29 RX ORDER — PSYLLIUM HUSK 0.4 G
CAPSULE ORAL
COMMUNITY

## 2023-06-29 RX ORDER — SPIRONOLACTONE 25 MG/1
25 TABLET ORAL DAILY
COMMUNITY

## 2023-06-29 RX ORDER — FUROSEMIDE 20 MG/1
20 TABLET ORAL 2 TIMES DAILY
COMMUNITY

## 2023-07-06 ENCOUNTER — HOSPITAL ENCOUNTER (OUTPATIENT)
Dept: CARDIOLOGY | Age: 78
Discharge: HOME | End: 2023-07-06
Payer: MEDICARE

## 2023-07-06 DIAGNOSIS — I35.1 NONRHEUMATIC AORTIC (VALVE) INSUFFICIENCY: ICD-10-CM

## 2023-07-06 PROCEDURE — 93306 TTE W/DOPPLER COMPLETE: CPT

## 2023-07-06 PROCEDURE — 2580000003 HC RX 258: Performed by: FAMILY MEDICINE

## 2023-07-06 PROCEDURE — 6360000004 HC RX CONTRAST MEDICATION: Performed by: FAMILY MEDICINE

## 2023-07-06 RX ORDER — SODIUM CHLORIDE 0.9 % (FLUSH) 0.9 %
5-40 SYRINGE (ML) INJECTION PRN
Status: DISCONTINUED | OUTPATIENT
Start: 2023-07-06 | End: 2023-07-07 | Stop reason: HOSPADM

## 2023-07-06 RX ADMIN — SODIUM CHLORIDE, PRESERVATIVE FREE 10 ML: 5 INJECTION INTRAVENOUS at 14:10

## 2023-07-06 RX ADMIN — SODIUM CHLORIDE, PRESERVATIVE FREE 10 ML: 5 INJECTION INTRAVENOUS at 14:08

## 2023-07-06 RX ADMIN — PERFLUTREN 1.65 MG: 6.52 INJECTION, SUSPENSION INTRAVENOUS at 14:17

## 2023-07-11 ENCOUNTER — HOSPITAL ENCOUNTER (OUTPATIENT)
Dept: ULTRASOUND IMAGING | Age: 78
Discharge: HOME | End: 2023-07-13
Payer: MEDICARE

## 2023-07-11 DIAGNOSIS — M79.604 PAIN IN RIGHT LEG: ICD-10-CM

## 2023-07-11 DIAGNOSIS — M79.605 PAIN IN LEFT LEG: ICD-10-CM

## 2023-07-11 PROCEDURE — 93970 EXTREMITY STUDY: CPT

## 2023-07-19 ENCOUNTER — TELEPHONE (OUTPATIENT)
Dept: NON INVASIVE DIAGNOSTICS | Age: 78
End: 2023-07-19

## 2023-07-19 NOTE — TELEPHONE ENCOUNTER
----- Message from Leo Feng DO sent at 7/18/2023  9:12 AM EDT -----  Echo is stable compared to the prior with LVEF of 30% and moderate MR    -Leo Feng DO   ----- Message -----  From:  [place], [place]  Sent: 7/14/2023   4:29 PM EDT  To: Leo Feng DO    Pt called for results please advise

## 2023-07-24 ENCOUNTER — TELEPHONE (OUTPATIENT)
Dept: NON INVASIVE DIAGNOSTICS | Age: 78
End: 2023-07-24

## 2023-07-24 NOTE — TELEPHONE ENCOUNTER
Spoke with patient regarding echo result. Patient wanted to know if  has any recommendations for a valve specialist at [facility] that would not require his chest to be \"cracked open\" like his last open heart surgery, for the future incase his mitral valve needs work done.

## 2023-07-28 PROCEDURE — 93296 REM INTERROG EVL PM/IDS: CPT | Performed by: STUDENT IN AN ORGANIZED HEALTH CARE EDUCATION/TRAINING PROGRAM

## 2023-07-28 PROCEDURE — 93295 DEV INTERROG REMOTE 1/2/MLT: CPT | Performed by: STUDENT IN AN ORGANIZED HEALTH CARE EDUCATION/TRAINING PROGRAM

## 2023-07-30 PROCEDURE — 93297 REM INTERROG DEV EVAL ICPMS: CPT | Performed by: STUDENT IN AN ORGANIZED HEALTH CARE EDUCATION/TRAINING PROGRAM

## 2023-07-30 PROCEDURE — G2066 INTER DEVC REMOTE 30D: HCPCS | Performed by: STUDENT IN AN ORGANIZED HEALTH CARE EDUCATION/TRAINING PROGRAM

## 2023-07-31 NOTE — TELEPHONE ENCOUNTER
Spoke with patient and verbalized understanding. Patient will call back at the end of September if he wants to go with that doctor.

## 2023-08-14 ENCOUNTER — TELEPHONE (OUTPATIENT)
Dept: ORTHOPEDIC SURGERY | Age: 78
End: 2023-08-14

## 2023-08-14 DIAGNOSIS — Z87.81 S/P RIGHT HIP FRACTURE: Primary | ICD-10-CM

## 2023-08-14 NOTE — TELEPHONE ENCOUNTER
Right hip ORIF 5/2/23    Patient called and is out of home PT sessions.  Would like out patient PT for [place] location    Order pending

## 2023-08-24 ENCOUNTER — TELEPHONE (OUTPATIENT)
Dept: CARDIOLOGY CLINIC | Age: 78
End: 2023-08-24

## 2023-08-24 DIAGNOSIS — I34.0 MITRAL VALVE INSUFFICIENCY, UNSPECIFIED ETIOLOGY: Primary | ICD-10-CM

## 2023-08-24 NOTE — TELEPHONE ENCOUNTER
Christina called and stated he would like to be referred to the new interventionist at the valve clinic for evaluation of TAVR so he does not have to go to [facility].   Please advise

## 2023-08-25 ENCOUNTER — EVALUATION (OUTPATIENT)
Dept: PHYSICAL THERAPY | Age: 78
End: 2023-08-25

## 2023-08-25 DIAGNOSIS — Z87.81 S/P RIGHT HIP FRACTURE: Primary | ICD-10-CM

## 2023-08-28 ENCOUNTER — TREATMENT (OUTPATIENT)
Dept: PHYSICAL THERAPY | Age: 78
End: 2023-08-28
Payer: MEDICARE

## 2023-08-28 DIAGNOSIS — Z87.81 S/P RIGHT HIP FRACTURE: Primary | ICD-10-CM

## 2023-08-28 PROCEDURE — 97110 THERAPEUTIC EXERCISES: CPT | Performed by: PHYSICAL THERAPIST

## 2023-08-28 NOTE — TELEPHONE ENCOUNTER
Patient notified. Referral placed to valve clinic but for moderate to severe mitral regurg not a TAVR.

## 2023-08-30 ENCOUNTER — TREATMENT (OUTPATIENT)
Dept: PHYSICAL THERAPY | Age: 78
End: 2023-08-30

## 2023-08-30 DIAGNOSIS — Z87.81 S/P RIGHT HIP FRACTURE: Primary | ICD-10-CM

## 2023-08-31 ENCOUNTER — OFFICE VISIT (OUTPATIENT)
Dept: ORTHOPEDIC SURGERY | Age: 78
End: 2023-08-31

## 2023-08-31 DIAGNOSIS — Z87.81 S/P RIGHT HIP FRACTURE: Primary | ICD-10-CM

## 2023-08-31 DIAGNOSIS — M25.561 RIGHT KNEE PAIN, UNSPECIFIED CHRONICITY: ICD-10-CM

## 2023-08-31 NOTE — PROGRESS NOTES
Detail Level: Zone
Follow Up Post Operative Visit     Surgery: Right hip open reduction internal fixation with cephalomedullary nail  Date: 5/2/2023    Subjective:    Katlyn Reese is here for follow up visit s/p above procedure. He is doing very well. He is not complaining of any hip pain. Is ambulating normally. He is complaining of right knee pain. The pain is activity related. Comes and goes. Denies any recent injuries. Denies fever and chills. Denies numbness tingling. Controlled Substances Monitoring:        Physical Exam:    No data recorded    General: Alert and oriented x3, no acute distress  Cardiovascular/pulmonary: No labored breathing, peripheral perfusion intact  Musculoskeletal:    Right hip: Incisions have healed appropriately with good scar formation. No signs of infection. Rasta Craw He is tender palpation around his incisions. His thigh soft compressible. He has near full hip range of motion without pain. He has 5 out of 5 strength with knee flexion extension. Imaging: Multiple views of the right hip and femur demonstrate maintained cephalomedullary nail with moderate collapse of his fracture into a position of stability. This four-part fracture has shown escape of the lesser trochanter and mild displacement the greater trochanter. The alignment is maintained since previous x-rays. There appears to be interval healing in a stable position. Increased callus formation and evidence of healing without hardware failure    X-rays of the right knee independently interpreted myself and discussed with the patient: There is mild medial and patellofemoral joint arthritis noted    Assessment and Plan: 4 months status post right four-part intertrochanteric femur open reduction internal fixation with cephalomedullary nail  -He appears to clinically and radiographically healed his intertrochanteric femur fracture. There has been lesser trochanter escape. He does not have any pain he is ambulating normally.   He
Otc Regimen: Cetaphil daily Lotion with broad spectrum sunscreen and CeraVe AM
Plan: Recommended to use a moisturizer to help alleviate some dryness. \\n\\nInstructed patient to take a break from Ampicillin over the summer. If patient begins to flare in the fall, instructed to contact office to restart Ampicillin
Continue Regimen: Ampicillin 500mg once daily and over the counter Spotless by [employer] (Benzoyl Peroxide) twice daily
Modify Regimen: Restart Tretinoin 0.025% cream nightly

## 2023-09-06 ENCOUNTER — TREATMENT (OUTPATIENT)
Dept: PHYSICAL THERAPY | Age: 78
End: 2023-09-06

## 2023-09-06 DIAGNOSIS — Z87.81 S/P RIGHT HIP FRACTURE: Primary | ICD-10-CM

## 2023-09-06 NOTE — PROGRESS NOTES
[facility]          Phone: [phone] Fax: [phone]    Physical Therapy Daily Treatment Note  Date:  2023    Patient Name:  Molly Licea    :  1945  MRN: [id number]    Evaluating therapist: Cristo Hu PT, DPT  [id number]    Restrictions/Precautions:      Diagnosis:     Diagnosis Orders   1. S/P right hip fracture              Treatment Diagnosis:    Insurance/Certification information:  Medicare Part A&B  Referring Physician:  Shabnam Porras OD  Plan of care signed (Y/N):  yes  Visit# / total visits:    Pain level: 2/10   Time In:  1340  Time Out:  1420    Subjective:  Pt reports his hip has been improving but does endorse flare ups with overactivity. He has R knee OA reported after ortho follow up. He has improved ability with sit<>stand with HEP. Exercises:  Exercise/Equipment Resistance/Repetitions Other comments     Stepper 10 min Level 2        SLR 10x 5x      Sit<>stand 10x2 from mat      LAQ 10x2 2# at ankle     3-way kicks 10x ea      Step ups 10 forward to 6\" step, B HR      Supine clamshell 10x GTB     HS Curl 10x2 Blue TB    Standing reciprocal March 10x ea LE              Reciprocal stair negotiation 10 stairs, single HR with SPC      Standing toe tap to step/cone 10x ea LE Single UE support for L LE to 6\" step, R UE without UE support to cone                                               Other Therapeutic Activities: Pt tolerated progression of TE's with emphasis on balance to promote independence with laundry management and decreased risk of falls with functional mobility at home. Pt had increased difficulty with L LE step forward d/t difficulty with R LE SLS and balance.     Home Exercise Program:  SLR, heel slides AROM, sit<>stand without UE's    Manual Treatments:  N/A    Modalities:  N/A     Time-in Time-out Total Time   67863  Evaluation Low Complexity      88412  Evaluation Med Complexity      30892  Evaluation High Complexity

## 2023-09-08 ENCOUNTER — TREATMENT (OUTPATIENT)
Dept: PHYSICAL THERAPY | Age: 78
End: 2023-09-08

## 2023-09-08 DIAGNOSIS — Z87.81 S/P RIGHT HIP FRACTURE: Primary | ICD-10-CM

## 2023-09-08 NOTE — PROGRESS NOTES
[facility]          Phone: [phone] Fax: [phone]    Physical Therapy Daily Treatment Note  Date:  2023    Patient Name:  Juno Adam    :  1945  MRN: [id number]    Evaluating therapist: Ivy Hutchinson PT, DPT  [id number]    Restrictions/Precautions:      Diagnosis:     Diagnosis Orders   1. S/P right hip fracture              Treatment Diagnosis:    Insurance/Certification information:  Medicare Part A&B  Referring Physician:  Merlinda Hammers, OD  Plan of care signed (Y/N):  yes  Visit# / total visits:    Pain level: 1/10   Time In:  1325  Time Out:  1415    Subjective:  Pt reports he was sore following last session which he attributes to increased stair negotiation and change of order of TE's vs Stepper    Exercises:  Exercise/Equipment Resistance/Repetitions Other comments     Stepper 10 min Level 2        SLR 10x      Sit<>stand 10x2 from mat      LAQ 10x2 2# at ankle     3-way kicks 10x ea      Step ups 10 forward to 6\" step, B HR      Supine clamshell 15x GTB     HS Curl 10x2 Blue TB    Standing reciprocal March             Reciprocal stair negotiation      Standing toe tap to step/cone 10x ea LE Single UE support for L LE to 6\" step, R UE without UE support to cone                                               Other Therapeutic Activities: Pt noted increased tension in quads this PM while on stepper requiring break at 5 min to recover. Pt requested to attempt amb without SPC as personal goal has progressed to amb household distances without AD. Pt was able to amb 60' while holding [address] above floor (per his request) with Jose for balance.      Home Exercise Program:  SLR, heel slides AROM, sit<>stand without UE's    Manual Treatments:  N/A    Modalities:  N/A     Time-in Time-out Total Time   85892  Evaluation Low Complexity      99473  Evaluation Med Complexity      07735  Evaluation High Complexity      74519  Ther Ex 1325 1415 50   [id number]  Neuro Re-ed

## 2023-09-13 ENCOUNTER — TREATMENT (OUTPATIENT)
Dept: PHYSICAL THERAPY | Age: 78
End: 2023-09-13
Payer: MEDICARE

## 2023-09-13 DIAGNOSIS — Z87.81 S/P RIGHT HIP FRACTURE: Primary | ICD-10-CM

## 2023-09-13 PROCEDURE — 97110 THERAPEUTIC EXERCISES: CPT | Performed by: PHYSICAL THERAPIST

## 2023-09-15 ENCOUNTER — TREATMENT (OUTPATIENT)
Dept: PHYSICAL THERAPY | Age: 78
End: 2023-09-15

## 2023-09-15 ENCOUNTER — TELEPHONE (OUTPATIENT)
Dept: CARDIOLOGY CLINIC | Age: 78
End: 2023-09-15

## 2023-09-15 DIAGNOSIS — Z87.81 S/P RIGHT HIP FRACTURE: Primary | ICD-10-CM

## 2023-09-15 NOTE — TELEPHONE ENCOUNTER
Called and left voice message for the patient to call back to schedule appointment with the valve clinic.  Referral from dr Dina Cadena

## 2023-09-20 ENCOUNTER — TREATMENT (OUTPATIENT)
Dept: PHYSICAL THERAPY | Age: 78
End: 2023-09-20
Payer: MEDICARE

## 2023-09-20 DIAGNOSIS — Z87.81 S/P RIGHT HIP FRACTURE: Primary | ICD-10-CM

## 2023-09-20 PROCEDURE — 97110 THERAPEUTIC EXERCISES: CPT

## 2023-09-25 ENCOUNTER — TREATMENT (OUTPATIENT)
Dept: PHYSICAL THERAPY | Age: 78
End: 2023-09-25
Payer: MEDICARE

## 2023-09-25 DIAGNOSIS — Z87.81 S/P RIGHT HIP FRACTURE: Primary | ICD-10-CM

## 2023-09-25 PROCEDURE — 97110 THERAPEUTIC EXERCISES: CPT | Performed by: PHYSICAL THERAPIST

## 2023-09-27 ENCOUNTER — TREATMENT (OUTPATIENT)
Dept: PHYSICAL THERAPY | Age: 78
End: 2023-09-27
Payer: MEDICARE

## 2023-09-27 DIAGNOSIS — Z87.81 S/P RIGHT HIP FRACTURE: Primary | ICD-10-CM

## 2023-09-27 PROCEDURE — 97110 THERAPEUTIC EXERCISES: CPT

## 2023-10-04 ENCOUNTER — TREATMENT (OUTPATIENT)
Dept: PHYSICAL THERAPY | Age: 78
End: 2023-10-04

## 2023-10-04 DIAGNOSIS — Z87.81 S/P RIGHT HIP FRACTURE: Primary | ICD-10-CM

## 2023-10-05 ENCOUNTER — OFFICE VISIT (OUTPATIENT)
Dept: CARDIOTHORACIC SURGERY | Age: 78
End: 2023-10-05

## 2023-10-05 ENCOUNTER — OFFICE VISIT (OUTPATIENT)
Dept: CARDIOLOGY CLINIC | Age: 78
End: 2023-10-05
Payer: MEDICARE

## 2023-10-05 VITALS
BODY MASS INDEX: 24.59 KG/M2 | DIASTOLIC BLOOD PRESSURE: 69 MMHG | WEIGHT: 166 LBS | SYSTOLIC BLOOD PRESSURE: 121 MMHG | HEIGHT: 69 IN | RESPIRATION RATE: 24 BRPM | HEART RATE: 65 BPM

## 2023-10-05 DIAGNOSIS — I34.0 NONRHEUMATIC MITRAL VALVE REGURGITATION: ICD-10-CM

## 2023-10-05 DIAGNOSIS — I25.119 CORONARY ARTERY DISEASE INVOLVING NATIVE HEART WITH ANGINA PECTORIS, UNSPECIFIED VESSEL OR LESION TYPE (HCC): ICD-10-CM

## 2023-10-05 DIAGNOSIS — I34.0 MITRAL VALVE INSUFFICIENCY, UNSPECIFIED ETIOLOGY: Primary | ICD-10-CM

## 2023-10-05 DIAGNOSIS — I42.8 NICM (NONISCHEMIC CARDIOMYOPATHY) (HCC): Primary | ICD-10-CM

## 2023-10-05 PROCEDURE — 1123F ACP DISCUSS/DSCN MKR DOCD: CPT | Performed by: INTERNAL MEDICINE

## 2023-10-05 PROCEDURE — 3078F DIAST BP <80 MM HG: CPT | Performed by: INTERNAL MEDICINE

## 2023-10-05 PROCEDURE — 3074F SYST BP LT 130 MM HG: CPT | Performed by: INTERNAL MEDICINE

## 2023-10-05 PROCEDURE — 99205 OFFICE O/P NEW HI 60 MIN: CPT | Performed by: INTERNAL MEDICINE

## 2023-10-05 RX ORDER — ASPIRIN 81 MG/1
81 TABLET, CHEWABLE ORAL DAILY
Status: ON HOLD | COMMUNITY

## 2023-10-05 ASSESSMENT — ENCOUNTER SYMPTOMS
BACK PAIN: 0
COUGH: 0
BLOOD IN STOOL: 0
SHORTNESS OF BREATH: 1
CHEST TIGHTNESS: 0
NAUSEA: 0
VOMITING: 0
ABDOMINAL PAIN: 0

## 2023-10-05 NOTE — PROGRESS NOTES
grafts and coronary anatomy. He also needs optimization of his medications. Once these are done and he continues to have symptoms and MR then he needs a JINA to confirm severe MR and then we would consider a MitraClip.   Marck Vargas MD

## 2023-10-05 NOTE — PROGRESS NOTES
two weeks (05/12/2023) then daily with food. , Disp: 45 tablet, Rfl: 3    metoprolol succinate (TOPROL XL) 50 MG extended release tablet, Take 1 tablet by mouth daily, Disp: 90 tablet, Rfl: 3    atorvastatin (LIPITOR) 40 MG tablet, Take 1 tablet by mouth nightly, Disp: 30 tablet, Rfl: 3    folic acid (FOLVITE) 998 MCG tablet, Take 1,000 tablets by mouth daily, Disp: , Rfl:       REVIEW OF SYSTEMS:   Review of Systems   Constitutional:  Positive for fatigue. Negative for chills and fever. HENT:  Negative for nosebleeds. Respiratory:  Positive for shortness of breath. Negative for cough and chest tightness. Cardiovascular:  Positive for leg swelling. Negative for chest pain and palpitations. Gastrointestinal:  Negative for abdominal pain, blood in stool, nausea and vomiting. Genitourinary:  Negative for hematuria. Musculoskeletal:  Negative for back pain and myalgias. Skin: Negative. Neurological:  Negative for dizziness, syncope, speech difficulty, weakness and light-headedness. Hematological:  Does not bruise/bleed easily. Psychiatric/Behavioral: Negative. PHYSICAL EXAMINATION:   VITAL SIGNS: There were no vitals taken for this visit. GENERAL: NAD   HEAD: Normocephalic, atraumatic. NECK: + JVD. No carotid bruits. No neck masses. CARDIOVASCULAR: Normal rate, regular rhythm, normal S1, S2, holosystolic murmur best heard over 4th intercostal space. LUNGS: bibasilar crackles, no wheezing. ABDOMEN: Abdomen is soft and not distended. No tenderness. EXTREMITIES: There is 3+ pedal edema. MUSCULOSKELETAL: No joint swelling. Normal range of motion    SKIN: Skin is moist. No ulcers or lesions. NEUROLOGICAL: No evidence of obvious neurological deficits. PSYCHOLOGICAL: Patient is in normal mood. PERTINENT RADIOGRAPHIC/DIAGNOSTICS:     TTE: 7/6/2023   Severely dilated left ventricle. Severely reduced left ventricular systolic function.    Indeterminate diastolic

## 2023-10-05 NOTE — PATIENT INSTRUCTIONS
Valeria Casarez will be in touch to schedule your heart catheterization to be done by Dr. Horton Plane your blood work drawn as scheduled    Referral to heart failure clinic for medication optimization and blood work     We will follow up with you in two months

## 2023-10-06 ENCOUNTER — APPOINTMENT (OUTPATIENT)
Dept: GENERAL RADIOLOGY | Age: 78
End: 2023-10-06
Payer: MEDICARE

## 2023-10-06 ENCOUNTER — HOSPITAL ENCOUNTER (INPATIENT)
Age: 78
LOS: 4 days | Discharge: HOME | End: 2023-10-10
Attending: STUDENT IN AN ORGANIZED HEALTH CARE EDUCATION/TRAINING PROGRAM | Admitting: FAMILY MEDICINE
Payer: MEDICARE

## 2023-10-06 DIAGNOSIS — I50.9 ACUTE ON CHRONIC CONGESTIVE HEART FAILURE, UNSPECIFIED HEART FAILURE TYPE (HCC): Primary | ICD-10-CM

## 2023-10-06 PROBLEM — I50.43 ACUTE ON CHRONIC COMBINED SYSTOLIC AND DIASTOLIC CHF (CONGESTIVE HEART FAILURE) (HCC): Status: ACTIVE | Noted: 2023-10-06

## 2023-10-06 LAB
ALBUMIN SERPL-MCNC: 4.1 G/DL (ref 3.5–5.2)
ALP SERPL-CCNC: 92 U/L (ref 40–129)
ALT SERPL-CCNC: 21 U/L (ref 0–40)
ANION GAP SERPL CALCULATED.3IONS-SCNC: 19 MMOL/L (ref 7–16)
AST SERPL-CCNC: 21 U/L (ref 0–39)
BASOPHILS # BLD: 0.03 K/UL (ref 0–0.2)
BASOPHILS NFR BLD: 1 % (ref 0–2)
BILIRUB SERPL-MCNC: 0.9 MG/DL (ref 0–1.2)
BNP SERPL-MCNC: 2928 PG/ML (ref 0–450)
BUN SERPL-MCNC: 34 MG/DL (ref 6–23)
CALCIUM SERPL-MCNC: 9 MG/DL (ref 8.6–10.2)
CHLORIDE SERPL-SCNC: 104 MMOL/L (ref 98–107)
CO2 SERPL-SCNC: 15 MMOL/L (ref 22–29)
CREAT SERPL-MCNC: 1.1 MG/DL (ref 0.7–1.2)
EKG ATRIAL RATE: 67 BPM
EKG P AXIS: 51 DEGREES
EKG P-R INTERVAL: 234 MS
EKG Q-T INTERVAL: 444 MS
EKG QRS DURATION: 108 MS
EKG QTC CALCULATION (BAZETT): 469 MS
EKG R AXIS: 156 DEGREES
EKG T AXIS: -15 DEGREES
EKG VENTRICULAR RATE: 67 BPM
EOSINOPHIL # BLD: 0.16 K/UL (ref 0.05–0.5)
EOSINOPHILS RELATIVE PERCENT: 2 % (ref 0–6)
ERYTHROCYTE [DISTWIDTH] IN BLOOD BY AUTOMATED COUNT: 16.8 % (ref 11.5–15)
GFR SERPL CREATININE-BSD FRML MDRD: >60 ML/MIN/1.73M2
GLUCOSE SERPL-MCNC: 107 MG/DL (ref 74–99)
HCT VFR BLD AUTO: 31.6 % (ref 37–54)
HGB BLD-MCNC: 10.4 G/DL (ref 12.5–16.5)
IMM GRANULOCYTES # BLD AUTO: <0.03 K/UL (ref 0–0.58)
IMM GRANULOCYTES NFR BLD: 0 % (ref 0–5)
IRON SATN MFR SERPL: 37 % (ref 20–55)
IRON SERPL-MCNC: 125 UG/DL (ref 59–158)
LYMPHOCYTES NFR BLD: 1.06 K/UL (ref 1.5–4)
LYMPHOCYTES RELATIVE PERCENT: 16 % (ref 20–42)
MCH RBC QN AUTO: 35.6 PG (ref 26–35)
MCHC RBC AUTO-ENTMCNC: 32.9 G/DL (ref 32–34.5)
MCV RBC AUTO: 108.2 FL (ref 80–99.9)
MONOCYTES NFR BLD: 0.58 K/UL (ref 0.1–0.95)
MONOCYTES NFR BLD: 9 % (ref 2–12)
NEUTROPHILS NFR BLD: 72 % (ref 43–80)
NEUTS SEG NFR BLD: 4.79 K/UL (ref 1.8–7.3)
PLATELET # BLD AUTO: 217 K/UL (ref 130–450)
PMV BLD AUTO: 9.9 FL (ref 7–12)
POTASSIUM SERPL-SCNC: 4 MMOL/L (ref 3.5–5)
PROT SERPL-MCNC: 8 G/DL (ref 6.4–8.3)
RBC # BLD AUTO: 2.92 M/UL (ref 3.8–5.8)
SODIUM SERPL-SCNC: 138 MMOL/L (ref 132–146)
TIBC SERPL-MCNC: 337 UG/DL (ref 250–450)
TROPONIN I SERPL HS-MCNC: 17 NG/L (ref 0–11)
TROPONIN I SERPL HS-MCNC: 20 NG/L (ref 0–11)
WBC OTHER # BLD: 6.6 K/UL (ref 4.5–11.5)

## 2023-10-06 PROCEDURE — 6360000002 HC RX W HCPCS

## 2023-10-06 PROCEDURE — 84484 ASSAY OF TROPONIN QUANT: CPT

## 2023-10-06 PROCEDURE — 83550 IRON BINDING TEST: CPT

## 2023-10-06 PROCEDURE — 99285 EMERGENCY DEPT VISIT HI MDM: CPT

## 2023-10-06 PROCEDURE — 2500000003 HC RX 250 WO HCPCS: Performed by: FAMILY MEDICINE

## 2023-10-06 PROCEDURE — 2140000000 HC CCU INTERMEDIATE R&B

## 2023-10-06 PROCEDURE — 80053 COMPREHEN METABOLIC PANEL: CPT

## 2023-10-06 PROCEDURE — 2580000003 HC RX 258: Performed by: FAMILY MEDICINE

## 2023-10-06 PROCEDURE — 96374 THER/PROPH/DIAG INJ IV PUSH: CPT

## 2023-10-06 PROCEDURE — 83880 ASSAY OF NATRIURETIC PEPTIDE: CPT

## 2023-10-06 PROCEDURE — 93010 ELECTROCARDIOGRAM REPORT: CPT | Performed by: INTERNAL MEDICINE

## 2023-10-06 PROCEDURE — 83540 ASSAY OF IRON: CPT

## 2023-10-06 PROCEDURE — 6370000000 HC RX 637 (ALT 250 FOR IP): Performed by: FAMILY MEDICINE

## 2023-10-06 PROCEDURE — 71045 X-RAY EXAM CHEST 1 VIEW: CPT

## 2023-10-06 PROCEDURE — 85025 COMPLETE CBC W/AUTO DIFF WBC: CPT

## 2023-10-06 PROCEDURE — 36415 COLL VENOUS BLD VENIPUNCTURE: CPT

## 2023-10-06 PROCEDURE — 93005 ELECTROCARDIOGRAM TRACING: CPT

## 2023-10-06 RX ORDER — LANOLIN ALCOHOL/MO/W.PET/CERES
500 CREAM (GRAM) TOPICAL NIGHTLY
Status: DISCONTINUED | OUTPATIENT
Start: 2023-10-06 | End: 2023-10-10 | Stop reason: HOSPADM

## 2023-10-06 RX ORDER — SODIUM CHLORIDE 9 MG/ML
INJECTION, SOLUTION INTRAVENOUS PRN
Status: DISCONTINUED | OUTPATIENT
Start: 2023-10-06 | End: 2023-10-09 | Stop reason: SDUPTHER

## 2023-10-06 RX ORDER — ONDANSETRON 4 MG/1
4 TABLET, ORALLY DISINTEGRATING ORAL EVERY 8 HOURS PRN
Status: DISCONTINUED | OUTPATIENT
Start: 2023-10-06 | End: 2023-10-10 | Stop reason: HOSPADM

## 2023-10-06 RX ORDER — ACETAMINOPHEN 325 MG/1
650 TABLET ORAL EVERY 6 HOURS PRN
Status: DISCONTINUED | OUTPATIENT
Start: 2023-10-06 | End: 2023-10-10 | Stop reason: HOSPADM

## 2023-10-06 RX ORDER — ENOXAPARIN SODIUM 100 MG/ML
40 INJECTION SUBCUTANEOUS DAILY
Status: DISCONTINUED | OUTPATIENT
Start: 2023-10-06 | End: 2023-10-10 | Stop reason: HOSPADM

## 2023-10-06 RX ORDER — MAGNESIUM SULFATE IN WATER 40 MG/ML
2000 INJECTION, SOLUTION INTRAVENOUS PRN
Status: DISCONTINUED | OUTPATIENT
Start: 2023-10-06 | End: 2023-10-10 | Stop reason: HOSPADM

## 2023-10-06 RX ORDER — POTASSIUM CHLORIDE 7.45 MG/ML
10 INJECTION INTRAVENOUS PRN
Status: DISCONTINUED | OUTPATIENT
Start: 2023-10-06 | End: 2023-10-10 | Stop reason: HOSPADM

## 2023-10-06 RX ORDER — METOPROLOL SUCCINATE 50 MG/1
50 TABLET, EXTENDED RELEASE ORAL DAILY
Status: DISCONTINUED | OUTPATIENT
Start: 2023-10-06 | End: 2023-10-10 | Stop reason: HOSPADM

## 2023-10-06 RX ORDER — BUMETANIDE 0.25 MG/ML
1 INJECTION INTRAMUSCULAR; INTRAVENOUS 2 TIMES DAILY
Status: DISCONTINUED | OUTPATIENT
Start: 2023-10-06 | End: 2023-10-10 | Stop reason: HOSPADM

## 2023-10-06 RX ORDER — SODIUM CHLORIDE 0.9 % (FLUSH) 0.9 %
5-40 SYRINGE (ML) INJECTION EVERY 12 HOURS SCHEDULED
Status: DISCONTINUED | OUTPATIENT
Start: 2023-10-06 | End: 2023-10-10 | Stop reason: HOSPADM

## 2023-10-06 RX ORDER — ATORVASTATIN CALCIUM 40 MG/1
40 TABLET, FILM COATED ORAL NIGHTLY
Status: DISCONTINUED | OUTPATIENT
Start: 2023-10-06 | End: 2023-10-10 | Stop reason: HOSPADM

## 2023-10-06 RX ORDER — SODIUM CHLORIDE 0.9 % (FLUSH) 0.9 %
10 SYRINGE (ML) INJECTION PRN
Status: DISCONTINUED | OUTPATIENT
Start: 2023-10-06 | End: 2023-10-10 | Stop reason: HOSPADM

## 2023-10-06 RX ORDER — ACETAMINOPHEN 650 MG/1
650 SUPPOSITORY RECTAL EVERY 6 HOURS PRN
Status: DISCONTINUED | OUTPATIENT
Start: 2023-10-06 | End: 2023-10-10 | Stop reason: HOSPADM

## 2023-10-06 RX ORDER — POTASSIUM CHLORIDE 20 MEQ/1
40 TABLET, EXTENDED RELEASE ORAL PRN
Status: DISCONTINUED | OUTPATIENT
Start: 2023-10-06 | End: 2023-10-10 | Stop reason: HOSPADM

## 2023-10-06 RX ORDER — ASPIRIN 81 MG/1
81 TABLET, CHEWABLE ORAL DAILY
Status: DISCONTINUED | OUTPATIENT
Start: 2023-10-06 | End: 2023-10-10 | Stop reason: HOSPADM

## 2023-10-06 RX ORDER — LOSARTAN POTASSIUM 25 MG/1
25 TABLET ORAL 2 TIMES DAILY
Status: DISCONTINUED | OUTPATIENT
Start: 2023-10-06 | End: 2023-10-10 | Stop reason: HOSPADM

## 2023-10-06 RX ORDER — FUROSEMIDE 10 MG/ML
40 INJECTION INTRAMUSCULAR; INTRAVENOUS ONCE
Status: COMPLETED | OUTPATIENT
Start: 2023-10-06 | End: 2023-10-06

## 2023-10-06 RX ORDER — ONDANSETRON 2 MG/ML
4 INJECTION INTRAMUSCULAR; INTRAVENOUS EVERY 6 HOURS PRN
Status: DISCONTINUED | OUTPATIENT
Start: 2023-10-06 | End: 2023-10-10 | Stop reason: HOSPADM

## 2023-10-06 RX ORDER — FOLIC ACID 1 MG/1
1000 TABLET ORAL DAILY
Status: DISCONTINUED | OUTPATIENT
Start: 2023-10-06 | End: 2023-10-10 | Stop reason: HOSPADM

## 2023-10-06 RX ADMIN — Medication 500 MG: at 21:18

## 2023-10-06 RX ADMIN — ASPIRIN 81 MG: 81 TABLET, CHEWABLE ORAL at 21:19

## 2023-10-06 RX ADMIN — Medication 10 ML: at 21:21

## 2023-10-06 RX ADMIN — ATORVASTATIN CALCIUM 40 MG: 40 TABLET, FILM COATED ORAL at 21:20

## 2023-10-06 RX ADMIN — LOSARTAN POTASSIUM 25 MG: 25 TABLET, FILM COATED ORAL at 21:20

## 2023-10-06 RX ADMIN — BUMETANIDE 1 MG: 0.25 INJECTION INTRAMUSCULAR; INTRAVENOUS at 21:19

## 2023-10-06 RX ADMIN — FUROSEMIDE 40 MG: 10 INJECTION, SOLUTION INTRAMUSCULAR; INTRAVENOUS at 11:46

## 2023-10-06 RX ADMIN — METOPROLOL SUCCINATE 50 MG: 50 TABLET, EXTENDED RELEASE ORAL at 21:23

## 2023-10-06 RX ADMIN — FOLIC ACID 1000 MCG: 1 TABLET ORAL at 21:20

## 2023-10-06 ASSESSMENT — PAIN - FUNCTIONAL ASSESSMENT: PAIN_FUNCTIONAL_ASSESSMENT: NONE - DENIES PAIN

## 2023-10-06 NOTE — ED PROVIDER NOTES
[address] ENCOUNTER        Pt Name: Audra Esquivel  MRN: [id number]  [address] [facility] [place] 1945  Date of evaluation: 10/6/2023  Provider: Lexi Castro DO  PCP: Parker Schilling MD  Note Started: 9:20 AM EDT 10/6/23    CHIEF COMPLAINT       Chief Complaint   Patient presents with    Shortness of Breath     On going SOB worse over past few days, history of CHF + ankle edema        HISTORY OF PRESENT ILLNESS: 1 or more Elements   History From: Patient    Limitations to history : None    Audra Esquivel is a 69 y.o. male who presents to the emergency department with chief complaint of shortness of breath. Patient states that he has been having increasing lower extremity swelling over the past couple of weeks and has been more short of breath over the past several days. He states that he has Lasix at home to take as needed but when he woke up this morning the shortness of breath was too severe and so he came to the emergency department. Patient states he is not having any cough and does have a history of CHF and that this is very similar to previous CHF exacerbations. Patient otherwise denies any chest pain and states that he just feels that he needs to get the extra fluid off. Nothing seems to make the symptoms better or worse. Patient denies any headache, lightheadedness or dizziness, fever or chills, nausea or vomiting, chest pain, abdominal pain, hematuria or dysuria, constipation or diarrhea. Nursing Notes were all reviewed and agreed with or any disagreements were addressed in the HPI. REVIEW OF SYSTEMS :      Positives and Pertinent negatives as per HPI.      PAST MEDICAL HISTORY/Chronic Conditions Affecting Care      has a past medical history of Acute on chronic systolic congestive heart failure ([address]) (10/18/2018), Aortic regurgitation, Arthritis, CAD (coronary artery disease), Hypercholesteremia, Hypertension, have hemodynamically stabilized. After emergency department management is complete, patient to be admitted to the hospital.    Differential diagnosis includes but is not limited to: Acute coronary syndrome, pneumonia, pneumothorax, CHF exacerbation, COPD exacerbation. Please refer to the ED Course as available for additional MDM. ED Course as of 10/06/23 1506   Fri Oct 06, 2023   0917 EKG: Normal sinus rate and rhythm atrial sensed and ventricular paced at 67 bpm with right axis deviation and QTc of 469. No significant ST changes or elevation with no prior EKG to compare to at this time. EKG interpreted by myself. [RW]   [phone] with Nash Paredes who accepts pt for admission [RW]      ED Course User Index  [RW] Ana Maria Love DO        Social Determinants affecting Dx or Tx: Patient has good medical compliance and fluency as well as established follow-up with family physician. Records Reviewed: EKG from 5/1/2023 reviewed in comparison to today's. I am the Primary Clinician of Record. CONSULTS: (Who and What was discussed)  IP CONSULT TO INTERNAL MEDICINE  IP CONSULT TO INTERNAL MEDICINE  IP CONSULT TO HEART FAILURE NURSE/COORDINATOR  IP CONSULT TO DIETITIAN  IP CONSULT TO CARDIOLOGY    FINAL IMPRESSION      1. Acute on chronic congestive heart failure, unspecified heart failure type [facility])          DISPOSITION/PLAN     DISPOSITION Admitted 10/06/2023 01:39:49 PM    PATIENT REFERRED TO:  No follow-up provider specified.     DISCHARGE MEDICATIONS:  New Prescriptions    No medications on file            (Please note that portions of this note were completed with a voice recognition program.  Efforts were made to edit the dictations but occasionally words are mis-transcribed.)    Ana Maria Love DO (electronically signed)       Ana Maria Love DO  Resident  10/06/23 0601

## 2023-10-07 PROBLEM — I50.9 ACUTE ON CHRONIC CONGESTIVE HEART FAILURE (HCC): Status: ACTIVE | Noted: 2023-10-07

## 2023-10-07 PROBLEM — I38 VHD (VALVULAR HEART DISEASE): Status: ACTIVE | Noted: 2023-10-07

## 2023-10-07 LAB
ANION GAP SERPL CALCULATED.3IONS-SCNC: 20 MMOL/L (ref 7–16)
BUN SERPL-MCNC: 34 MG/DL (ref 6–23)
CALCIUM SERPL-MCNC: 9.1 MG/DL (ref 8.6–10.2)
CHLORIDE SERPL-SCNC: 100 MMOL/L (ref 98–107)
CHOLEST SERPL-MCNC: 104 MG/DL
CO2 SERPL-SCNC: 18 MMOL/L (ref 22–29)
CREAT SERPL-MCNC: 1.3 MG/DL (ref 0.7–1.2)
GFR SERPL CREATININE-BSD FRML MDRD: 58 ML/MIN/1.73M2
GLUCOSE SERPL-MCNC: 103 MG/DL (ref 74–99)
HDLC SERPL-MCNC: 38 MG/DL
LDLC SERPL CALC-MCNC: 56 MG/DL
MAGNESIUM SERPL-MCNC: 2 MG/DL (ref 1.6–2.6)
POTASSIUM SERPL-SCNC: 3.8 MMOL/L (ref 3.5–5)
SODIUM SERPL-SCNC: 138 MMOL/L (ref 132–146)
TRIGL SERPL-MCNC: 48 MG/DL
VLDLC SERPL CALC-MCNC: 10 MG/DL

## 2023-10-07 PROCEDURE — 83735 ASSAY OF MAGNESIUM: CPT

## 2023-10-07 PROCEDURE — 2580000003 HC RX 258: Performed by: FAMILY MEDICINE

## 2023-10-07 PROCEDURE — 6370000000 HC RX 637 (ALT 250 FOR IP): Performed by: FAMILY MEDICINE

## 2023-10-07 PROCEDURE — 6370000000 HC RX 637 (ALT 250 FOR IP): Performed by: CLINICAL NURSE SPECIALIST

## 2023-10-07 PROCEDURE — 80048 BASIC METABOLIC PNL TOTAL CA: CPT

## 2023-10-07 PROCEDURE — 80061 LIPID PANEL: CPT

## 2023-10-07 PROCEDURE — 2140000000 HC CCU INTERMEDIATE R&B

## 2023-10-07 PROCEDURE — APPSS180 APP SPLIT SHARED TIME > 60 MINUTES: Performed by: CLINICAL NURSE SPECIALIST

## 2023-10-07 PROCEDURE — 99223 1ST HOSP IP/OBS HIGH 75: CPT | Performed by: INTERNAL MEDICINE

## 2023-10-07 PROCEDURE — 36415 COLL VENOUS BLD VENIPUNCTURE: CPT

## 2023-10-07 PROCEDURE — 2500000003 HC RX 250 WO HCPCS: Performed by: FAMILY MEDICINE

## 2023-10-07 RX ORDER — SPIRONOLACTONE 25 MG/1
50 TABLET ORAL DAILY
Status: DISCONTINUED | OUTPATIENT
Start: 2023-10-07 | End: 2023-10-10 | Stop reason: HOSPADM

## 2023-10-07 RX ORDER — AMIODARONE HYDROCHLORIDE 200 MG/1
200 TABLET ORAL DAILY
Status: DISCONTINUED | OUTPATIENT
Start: 2023-10-07 | End: 2023-10-10 | Stop reason: HOSPADM

## 2023-10-07 RX ADMIN — SPIRONOLACTONE 50 MG: 25 TABLET ORAL at 12:19

## 2023-10-07 RX ADMIN — Medication 10 ML: at 21:01

## 2023-10-07 RX ADMIN — AMIODARONE HYDROCHLORIDE 200 MG: 200 TABLET ORAL at 17:24

## 2023-10-07 RX ADMIN — METOPROLOL SUCCINATE 50 MG: 50 TABLET, EXTENDED RELEASE ORAL at 08:45

## 2023-10-07 RX ADMIN — Medication 500 MG: at 21:01

## 2023-10-07 RX ADMIN — ATORVASTATIN CALCIUM 40 MG: 40 TABLET, FILM COATED ORAL at 21:01

## 2023-10-07 RX ADMIN — LOSARTAN POTASSIUM 25 MG: 25 TABLET, FILM COATED ORAL at 08:45

## 2023-10-07 RX ADMIN — Medication 10 ML: at 09:11

## 2023-10-07 RX ADMIN — BUMETANIDE 1 MG: 0.25 INJECTION INTRAMUSCULAR; INTRAVENOUS at 09:11

## 2023-10-07 RX ADMIN — ASPIRIN 81 MG: 81 TABLET, CHEWABLE ORAL at 08:45

## 2023-10-07 NOTE — PROGRESS NOTES
4 Eyes Skin Assessment     NAME:  Crista Chaudhry  YOB: 1945  MEDICAL RECORD NUMBER:  [id number]    The patient is being assessed for  Admission    I agree that at least one RN has performed a thorough Head to Toe Skin Assessment on the patient. ALL assessment sites listed below have been assessed. Areas assessed by both nurses:    Head, Face, Ears, Shoulders, Back, Chest, Arms, Elbows, Hands, Sacrum. Buttock, Coccyx, Ischium, Legs. Feet and Heels, and Under Medical Devices         Does the Patient have a Wound?  No noted wound(s)       Jacky Prevention initiated by RN: No  Wound Care Orders initiated by RN: No    Pressure Injury (Stage 3,4, Unstageable, DTI, NWPT, and Complex wounds) if present, place Wound referral order by RN under : No    New Ostomies, if present place, Ostomy referral order under : No     Nurse 1 eSignature: Electronically signed by Opal Grider RN on 10/7/23 at 1:59 AM EDT    **SHARE this note so that the co-signing nurse can place an eSignature**    Nurse 2 eSignature: {Esignature:[id number]}

## 2023-10-07 NOTE — CONSULTS
Inpatient Cardiology Consultation      Reason for Consult:  Heart failure     Consulting Physician: Dr. Cortney Love     Requesting Physician:  Tima Vargas, APRN-CNP    Date of Consultation: 10/7/2023    History of Present Illness:   Mr. Frances Feng is a 69year old gentleman who is followed at our practice by both Dr. Annie Lopez as well as Dr. Hang Mary from an electrophysiology standpoint. He has a lengthy cardiac history including longstanding ischemic cardiomyopathy s/p CABG in 2012; and CRT-D implant in 4/2023 with chronic HFrEF; and mitral valve disease. He was seen in our Valve Clinic in consultation on October 5, was volume overloaded, and recommended left and right heart cath as well as optimization of GDMT: he was prescribed Lasix, Jardiance, and Spironolactone with plans for follow up labs next week and to establish with the CHF clinic. He presented to the emergency room on October 6 with worsening or his chronic dyspnea. He has been sleeping in a recliner since December, which he reports is due to in part to sinus congestion. Since early April he has had some functional decline, but when returning home from rehab after right femur fracture in June, he was able to walk up one fight of stairs. He now becomes short of breath with that, especially with carrying a laundry basket. On the morning of admission, he woke up unable to take a deep breath. CXR showed CHF and right pleural effusion, BNP was 2928, and he was treated with 40 mg of IV Lasix. Documented output thus far is 350 mls, although he has been documenting his output and reports diuresis of more than three liters in the ER. Cr  has bumped from 1.1 to 1.3. He has had significant improvement in his dyspnea, but remains fatigued and with lower extremity edema. He was unable to  his prescription of higher dose of spironolactone before coming to the ER, but did speak to his pharmacy by phone and his cost of Jardiance would be ~ $600/month.        Past 10/06/23  0927   AST 21   ALT 21   LABALBU 4.1     CXR 10/06/2023:  Impression:     Interval development of CHF and right pleural effusion. Superimposed   pneumonia cannot be excluded. Assessment:     Acute on chronic HFrEF  ACC stage C, NYHA class II  LVEF has been 25 to 35% since STEMI and subsequent CABG in 2012  S/p CRT-D in 4/2023 with 97.6% LV pacing on recent device interrogation   LVDD 7.1 in 11/2022>>5.8 on most recent echo in 7/2023   Diuresing well on IV Bumex   GDMT: Toprol XL, Cozaar, Spironolactone. Entresto cost prohibitive   Jardiance recently prescribed but cost prohibitive     2. Functional MR    3. Coronary artery disease s/p CABG in 2012. No recent ischemic evaluation. 4. Ventricular tachycardia (on device interrogation 5/2023) : on amiodarone     5. Hypertension: well controlled     Plan:      Continue IV Bumex: allow for increase in Cr in the setting of aggressive diuresis     2. Resume spironolactone at 50 mg daily     3. Resume Amiodarone at 200 mg daily : check TSH    4. Continue Toprol XL and Cozaar: titrate as blood pressure allows     5. Continue aspirin and high intensity statin (LDL was 49 in 4/2023)     6. Will also check if he qualifies for assistance for Jardiance     7. Monitor I/O, daily weights, BMP    8.    Schedule for right and left heart cath Monday, October 9 with Dr. Charlette Cordon. AUC: (7): 94; Score 7     9. CHF clinic on discharge    10. Prolonged discussion was held with Mr. John Hutchinson regarding the treatment plan and he is agreeable to proceed. The above assessment and treatment plan was made in collaboration with Dr. Frank Frost.          Electronically signed by YANETH Cook on 10/7/2023 at 10:07 AM

## 2023-10-07 NOTE — H&P
CORONARY ANGIOPLASTY  12/12/2012    Balloon only to LAD    CORONARY ARTERY BYPASS GRAFT  12/13/2012    cabg x 4- Dr. Reese Rodriguez    CT BONE MARROW BIOPSY  12/21/2022    CT BONE MARROW BIOPSY 12/21/2022 [facility] CT    DIAGNOSTIC CARDIAC CATH LAB PROCEDURE  12/12/2012    [facility] cath lab- Dr. Sunni Mckinney  12/12/2012         HIP FRACTURE SURGERY Right 5/2/2023    RIGHT HIP OPEN REDUCTION INTERNAL FIXATION performed by Mirza Mcpherson DO at [address] [place] [address]         Medications Prior to Admission:    Medications Prior to Admission: aspirin 81 MG chewable tablet, Take 1 tablet by mouth daily  empagliflozin (JARDIANCE) 10 MG tablet, Take 1 tablet by mouth daily  furosemide (LASIX) 20 MG tablet, Take 1 tablet by mouth in the morning and at bedtime 10/20 per patients discretion  spironolactone (ALDACTONE) 25 MG tablet, Take 2 tablets by mouth daily  vitamin B-12 (CYANOCOBALAMIN) 1000 MCG tablet, Take 1 tablet by mouth daily  niacin (SLO-NIACIN) 500 MG extended release tablet, Take 1 tablet by mouth nightly  Calcium Carb-Cholecalciferol (CALCIUM 1000 + D) 1000-20 MG-MCG TABS, Take by mouth  Zinc 25 MG TABS, Take by mouth  losartan (COZAAR) 25 MG tablet, Take 1 tablet by mouth daily (Patient taking differently: Take 1 tablet by mouth 2 times daily)  Multiple Minerals-Vitamins (CALCIUM-MAGNESIUM-ZINC-D3 PO), Take 1 tablet by mouth daily  amiodarone (CORDARONE) 200 MG tablet, Take 1 tablet by mouth daily (with breakfast) Start with 200mg (1 tab) three times daily with food for two weeks (05/12/2023) then daily with food.  (Patient taking differently: Take 1 tablet by mouth daily Start with 200mg (1 tab) three times daily with food for two weeks (05/12/2023) then daily with food.)  metoprolol succinate (TOPROL XL) 50 MG extended release tablet, Take 1 tablet by mouth daily  atorvastatin (LIPITOR) 40 MG tablet, Take 1 tablet by mouth nightly  folic acid (FOLVITE) 615 MCG tablet, Take 1,000 tablets by mouth 10/06/2023 09:27 AM    K 4.0 10/06/2023 09:27 AM    K 4.1 05/05/2023 05:59 AM     10/06/2023 09:27 AM    CO2 15 10/06/2023 09:27 AM    BUN 34 10/06/2023 09:27 AM    CREATININE 1.1 10/06/2023 09:27 AM    GFRAA >60 11/30/2021 04:19 PM    LABGLOM >60 10/06/2023 09:27 AM    LABGLOM 68 04/05/2023 12:00 AM    GLUCOSE 107 10/06/2023 09:27 AM    PROT 8.0 10/06/2023 09:27 AM    LABALBU 4.1 10/06/2023 09:27 AM    CALCIUM 9.0 10/06/2023 09:27 AM    BILITOT 0.9 10/06/2023 09:27 AM    ALKPHOS 92 10/06/2023 09:27 AM    AST 21 10/06/2023 09:27 AM    ALT 21 10/06/2023 09:27 AM       Imaging:  CXR: Bilateral mild CHF and right pleural effusion. Superimposed pneumonia cannot be excluded. EKG:  I've personally reviewed the patient's EKG:  AV Paced    Telemetry:  I've personally reviewed the patient's telemetry:      ASSESSMENT/PLAN:  Principal Problem:    Acute on chronic combined systolic and diastolic CHF (congestive heart failure) ([address])  Resolved Problems:    * No resolved hospital problems.  *    75 year old male admitted to telemetry unit with    Acute exacerbation CHF  -Diurese cautiously so as to avoid renal's insufficiency  -BUN/Creat: 34/1.1  -BNP - 2928  Bumex IV 1 mg bid  -Daily weights strict I's and O's  -Low-sodium diet  -Supplement O2 to maintain pulse ox duration greater than 93% wean as tolerated  -Echo - 7/2023 - EF 30% - severe reduced left systolic function         Code status: Full  Requires inpatient level of care    Electronically signed by Denisa Terrell DO on 10/7/2023 at 12:03 PM

## 2023-10-07 NOTE — PATIENT CARE CONFERENCE
[facility] Quality Flow/Interdisciplinary Rounds Progress Note        Quality Flow Rounds held on October 7, 2023    Disciplines Attending:  Bedside Nurse and Nursing Unit Leadership    Tara Starr was admitted on 10/6/2023  8:59 AM    Anticipated Discharge Date:       Disposition:    Jacky Score:  Jacky Scale Score: 21    Readmission Risk              Risk of Unplanned Readmission:  14           Discussed patient goal for the day, patient clinical progression, and barriers to discharge.   The following Goal(s) of the Day/Commitment(s) have been identified:   instruct patient on importance of taking medication and educate him on any new medication      Cordella Angelucci, RN  October 7, 2023

## 2023-10-08 LAB
ANION GAP SERPL CALCULATED.3IONS-SCNC: 17 MMOL/L (ref 7–16)
BNP SERPL-MCNC: 1478 PG/ML (ref 0–450)
BUN SERPL-MCNC: 39 MG/DL (ref 6–23)
CALCIUM SERPL-MCNC: 8.5 MG/DL (ref 8.6–10.2)
CHLORIDE SERPL-SCNC: 100 MMOL/L (ref 98–107)
CO2 SERPL-SCNC: 19 MMOL/L (ref 22–29)
CREAT SERPL-MCNC: 1.4 MG/DL (ref 0.7–1.2)
GFR SERPL CREATININE-BSD FRML MDRD: 54 ML/MIN/1.73M2
GLUCOSE SERPL-MCNC: 85 MG/DL (ref 74–99)
MAGNESIUM SERPL-MCNC: 1.9 MG/DL (ref 1.6–2.6)
POTASSIUM SERPL-SCNC: 3.7 MMOL/L (ref 3.5–5)
SODIUM SERPL-SCNC: 136 MMOL/L (ref 132–146)
T4 FREE SERPL-MCNC: 1.8 NG/DL (ref 0.9–1.7)
TSH SERPL DL<=0.05 MIU/L-ACNC: 6.97 UIU/ML (ref 0.27–4.2)

## 2023-10-08 PROCEDURE — 36415 COLL VENOUS BLD VENIPUNCTURE: CPT

## 2023-10-08 PROCEDURE — 6370000000 HC RX 637 (ALT 250 FOR IP): Performed by: FAMILY MEDICINE

## 2023-10-08 PROCEDURE — 6370000000 HC RX 637 (ALT 250 FOR IP): Performed by: CLINICAL NURSE SPECIALIST

## 2023-10-08 PROCEDURE — 99233 SBSQ HOSP IP/OBS HIGH 50: CPT | Performed by: INTERNAL MEDICINE

## 2023-10-08 PROCEDURE — 83880 ASSAY OF NATRIURETIC PEPTIDE: CPT

## 2023-10-08 PROCEDURE — 2140000000 HC CCU INTERMEDIATE R&B

## 2023-10-08 PROCEDURE — 84439 ASSAY OF FREE THYROXINE: CPT

## 2023-10-08 PROCEDURE — 83735 ASSAY OF MAGNESIUM: CPT

## 2023-10-08 PROCEDURE — 84443 ASSAY THYROID STIM HORMONE: CPT

## 2023-10-08 PROCEDURE — 2580000003 HC RX 258: Performed by: FAMILY MEDICINE

## 2023-10-08 PROCEDURE — 80048 BASIC METABOLIC PNL TOTAL CA: CPT

## 2023-10-08 RX ADMIN — ASPIRIN 81 MG: 81 TABLET, CHEWABLE ORAL at 08:38

## 2023-10-08 RX ADMIN — SPIRONOLACTONE 50 MG: 25 TABLET ORAL at 14:47

## 2023-10-08 RX ADMIN — LOSARTAN POTASSIUM 25 MG: 25 TABLET, FILM COATED ORAL at 08:39

## 2023-10-08 RX ADMIN — ATORVASTATIN CALCIUM 40 MG: 40 TABLET, FILM COATED ORAL at 21:08

## 2023-10-08 RX ADMIN — METOPROLOL SUCCINATE 50 MG: 50 TABLET, EXTENDED RELEASE ORAL at 08:38

## 2023-10-08 RX ADMIN — AMIODARONE HYDROCHLORIDE 200 MG: 200 TABLET ORAL at 16:52

## 2023-10-08 RX ADMIN — Medication 500 MG: at 22:23

## 2023-10-08 RX ADMIN — Medication 10 ML: at 21:08

## 2023-10-08 RX ADMIN — LOSARTAN POTASSIUM 25 MG: 25 TABLET, FILM COATED ORAL at 21:08

## 2023-10-08 RX ADMIN — Medication 10 ML: at 09:17

## 2023-10-08 NOTE — PLAN OF CARE
Problem: Chronic Conditions and Co-morbidities  Goal: Patient's chronic conditions and co-morbidity symptoms are monitored and maintained or improved  Outcome: Progressing     Problem: Discharge Planning  Goal: Discharge to home or other facility with appropriate resources  Outcome: Progressing     Problem: Safety - Adult  Goal: Free from fall injury  10/8/2023 1356 by Ina Zhou RN  Outcome: Progressing    Problem: ABCDS Injury Assessment  Goal: Absence of physical injury  10/8/2023 1356 by Ina Zhou RN  Outcome: Progressing

## 2023-10-08 NOTE — PLAN OF CARE
Problem: Safety - Adult  Goal: Free from fall injury  10/8/2023 0420 by Nav Lopez RN  Outcome: Progressing     Problem: ABCDS Injury Assessment  Goal: Absence of physical injury  10/8/2023 0420 by Nav Lopez RN  Outcome: Progressing

## 2023-10-09 LAB
ABO + RH BLD: NORMAL
ALBUMIN SERPL-MCNC: 4.4 G/DL (ref 3.5–5.2)
ALP SERPL-CCNC: 94 U/L (ref 40–129)
ALT SERPL-CCNC: 19 U/L (ref 0–40)
ANION GAP SERPL CALCULATED.3IONS-SCNC: 12 MMOL/L (ref 7–16)
ARM BAND NUMBER: NORMAL
AST SERPL-CCNC: 22 U/L (ref 0–39)
BASOPHILS # BLD: 0.04 K/UL (ref 0–0.2)
BASOPHILS NFR BLD: 1 % (ref 0–2)
BILIRUB SERPL-MCNC: 0.6 MG/DL (ref 0–1.2)
BLOOD BANK SAMPLE EXPIRATION: NORMAL
BLOOD GROUP ANTIBODIES SERPL: NEGATIVE
BUN SERPL-MCNC: 32 MG/DL (ref 6–23)
CALCIUM SERPL-MCNC: 9.2 MG/DL (ref 8.6–10.2)
CHLORIDE SERPL-SCNC: 99 MMOL/L (ref 98–107)
CO2 SERPL-SCNC: 24 MMOL/L (ref 22–29)
CREAT SERPL-MCNC: 1.3 MG/DL (ref 0.7–1.2)
EOSINOPHIL # BLD: 0.39 K/UL (ref 0.05–0.5)
EOSINOPHILS RELATIVE PERCENT: 5 % (ref 0–6)
ERYTHROCYTE [DISTWIDTH] IN BLOOD BY AUTOMATED COUNT: 16.4 % (ref 11.5–15)
GFR SERPL CREATININE-BSD FRML MDRD: 58 ML/MIN/1.73M2
GLUCOSE SERPL-MCNC: 104 MG/DL (ref 74–99)
HCT VFR BLD AUTO: 33.5 % (ref 37–54)
HGB BLD-MCNC: 11.2 G/DL (ref 12.5–16.5)
IMM GRANULOCYTES # BLD AUTO: <0.03 K/UL (ref 0–0.58)
IMM GRANULOCYTES NFR BLD: 0 % (ref 0–5)
LYMPHOCYTES NFR BLD: 1.85 K/UL (ref 1.5–4)
LYMPHOCYTES RELATIVE PERCENT: 22 % (ref 20–42)
MAGNESIUM SERPL-MCNC: 2 MG/DL (ref 1.6–2.6)
MCH RBC QN AUTO: 36.4 PG (ref 26–35)
MCHC RBC AUTO-ENTMCNC: 33.4 G/DL (ref 32–34.5)
MCV RBC AUTO: 108.8 FL (ref 80–99.9)
MONOCYTES NFR BLD: 0.98 K/UL (ref 0.1–0.95)
MONOCYTES NFR BLD: 11 % (ref 2–12)
NEUTROPHILS NFR BLD: 62 % (ref 43–80)
NEUTS SEG NFR BLD: 5.34 K/UL (ref 1.8–7.3)
PHOSPHATE SERPL-MCNC: 3.9 MG/DL (ref 2.5–4.5)
PLATELET # BLD AUTO: 276 K/UL (ref 130–450)
PMV BLD AUTO: 10.1 FL (ref 7–12)
POTASSIUM SERPL-SCNC: 4.6 MMOL/L (ref 3.5–5)
PROT SERPL-MCNC: 8.3 G/DL (ref 6.4–8.3)
PSA SERPL-MCNC: 1.86 NG/ML (ref 0–4)
RBC # BLD AUTO: 3.08 M/UL (ref 3.8–5.8)
SODIUM SERPL-SCNC: 135 MMOL/L (ref 132–146)
WBC OTHER # BLD: 8.6 K/UL (ref 4.5–11.5)

## 2023-10-09 PROCEDURE — C1760 CLOSURE DEV, VASC: HCPCS

## 2023-10-09 PROCEDURE — 2500000003 HC RX 250 WO HCPCS

## 2023-10-09 PROCEDURE — 36415 COLL VENOUS BLD VENIPUNCTURE: CPT

## 2023-10-09 PROCEDURE — B2131ZZ FLUOROSCOPY OF MULTIPLE CORONARY ARTERY BYPASS GRAFTS USING LOW OSMOLAR CONTRAST: ICD-10-PCS | Performed by: INTERNAL MEDICINE

## 2023-10-09 PROCEDURE — 4A023N8 MEASUREMENT OF CARDIAC SAMPLING AND PRESSURE, BILATERAL, PERCUTANEOUS APPROACH: ICD-10-PCS | Performed by: INTERNAL MEDICINE

## 2023-10-09 PROCEDURE — 80053 COMPREHEN METABOLIC PANEL: CPT

## 2023-10-09 PROCEDURE — 2580000003 HC RX 258: Performed by: FAMILY MEDICINE

## 2023-10-09 PROCEDURE — 6360000002 HC RX W HCPCS

## 2023-10-09 PROCEDURE — C1769 GUIDE WIRE: HCPCS

## 2023-10-09 PROCEDURE — B2111ZZ FLUOROSCOPY OF MULTIPLE CORONARY ARTERIES USING LOW OSMOLAR CONTRAST: ICD-10-PCS | Performed by: INTERNAL MEDICINE

## 2023-10-09 PROCEDURE — C1894 INTRO/SHEATH, NON-LASER: HCPCS

## 2023-10-09 PROCEDURE — 6370000000 HC RX 637 (ALT 250 FOR IP): Performed by: CLINICAL NURSE SPECIALIST

## 2023-10-09 PROCEDURE — 2140000000 HC CCU INTERMEDIATE R&B

## 2023-10-09 PROCEDURE — 85025 COMPLETE CBC W/AUTO DIFF WBC: CPT

## 2023-10-09 PROCEDURE — 93460 R&L HRT ART/VENTRICLE ANGIO: CPT | Performed by: INTERNAL MEDICINE

## 2023-10-09 PROCEDURE — 99152 MOD SED SAME PHYS/QHP 5/>YRS: CPT | Performed by: INTERNAL MEDICINE

## 2023-10-09 PROCEDURE — 83735 ASSAY OF MAGNESIUM: CPT

## 2023-10-09 PROCEDURE — 84100 ASSAY OF PHOSPHORUS: CPT

## 2023-10-09 PROCEDURE — 84153 ASSAY OF PSA TOTAL: CPT

## 2023-10-09 PROCEDURE — 6370000000 HC RX 637 (ALT 250 FOR IP): Performed by: FAMILY MEDICINE

## 2023-10-09 PROCEDURE — 86900 BLOOD TYPING SEROLOGIC ABO: CPT

## 2023-10-09 PROCEDURE — 86901 BLOOD TYPING SEROLOGIC RH(D): CPT

## 2023-10-09 PROCEDURE — 2709999900 HC NON-CHARGEABLE SUPPLY

## 2023-10-09 PROCEDURE — 93461 R&L HRT ART/VENTRICLE ANGIO: CPT

## 2023-10-09 PROCEDURE — 86850 RBC ANTIBODY SCREEN: CPT

## 2023-10-09 PROCEDURE — C1751 CATH, INF, PER/CENT/MIDLINE: HCPCS

## 2023-10-09 RX ORDER — SODIUM CHLORIDE 0.9 % (FLUSH) 0.9 %
5-40 SYRINGE (ML) INJECTION PRN
Status: DISCONTINUED | OUTPATIENT
Start: 2023-10-09 | End: 2023-10-09 | Stop reason: SDUPTHER

## 2023-10-09 RX ORDER — SODIUM CHLORIDE 9 MG/ML
INJECTION, SOLUTION INTRAVENOUS PRN
Status: DISCONTINUED | OUTPATIENT
Start: 2023-10-09 | End: 2023-10-10 | Stop reason: HOSPADM

## 2023-10-09 RX ORDER — ACETAMINOPHEN 325 MG/1
650 TABLET ORAL EVERY 4 HOURS PRN
Status: DISCONTINUED | OUTPATIENT
Start: 2023-10-09 | End: 2023-10-09 | Stop reason: SDUPTHER

## 2023-10-09 RX ORDER — SODIUM CHLORIDE 0.9 % (FLUSH) 0.9 %
5-40 SYRINGE (ML) INJECTION EVERY 12 HOURS SCHEDULED
Status: DISCONTINUED | OUTPATIENT
Start: 2023-10-09 | End: 2023-10-09 | Stop reason: SDUPTHER

## 2023-10-09 RX ADMIN — Medication 500 MG: at 20:19

## 2023-10-09 RX ADMIN — ASPIRIN 81 MG: 81 TABLET, CHEWABLE ORAL at 10:18

## 2023-10-09 RX ADMIN — METOPROLOL SUCCINATE 50 MG: 50 TABLET, EXTENDED RELEASE ORAL at 10:19

## 2023-10-09 RX ADMIN — FOLIC ACID 1000 MCG: 1 TABLET ORAL at 10:19

## 2023-10-09 RX ADMIN — ATORVASTATIN CALCIUM 40 MG: 40 TABLET, FILM COATED ORAL at 20:19

## 2023-10-09 RX ADMIN — AMIODARONE HYDROCHLORIDE 200 MG: 200 TABLET ORAL at 20:19

## 2023-10-09 RX ADMIN — SPIRONOLACTONE 50 MG: 25 TABLET ORAL at 10:19

## 2023-10-09 RX ADMIN — LOSARTAN POTASSIUM 25 MG: 25 TABLET, FILM COATED ORAL at 10:19

## 2023-10-09 RX ADMIN — Medication 10 ML: at 20:19

## 2023-10-09 RX ADMIN — Medication 10 ML: at 10:23

## 2023-10-09 NOTE — PROGRESS NOTES
RN notified Interventional Cardiology via Monitor110ve re:  Patient has concerns and questions regarding heart cath today. He will not sign consent until concerns have been addressed and questions have been answered. Thanks. Patient states that he wants his heart cath to be \"exploratory only\" and \"does not want his chest open\". He has concerns that Dr. Dave Orellana wants to \"replace his heart valve\". He also has concerns that he will not be able to remain supine during the heart cath. Interventional Cardiology responded via Perfect Serve re:  Francesca Yañez talk to him soon Please let him know this is a diagnostic cath today and no intervention will be performed on his valve today. RN notified patient of response from Interventional Cardiology.

## 2023-10-09 NOTE — ACP (ADVANCE CARE PLANNING)
Advance Care Planning   Healthcare Decision Maker:    Primary Decision Maker: Lilli Baptiste - Lucia - [phone]    Click here to complete Healthcare Decision Makers including selection of the Healthcare Decision Maker Relationship (ie \"Primary\"). Patient states his daughter is the primary contact and his former wife Julio Sandoval is second. Patient has no POA.

## 2023-10-09 NOTE — DISCHARGE INSTRUCTIONS
HEART FAILURE  / CONGESTIVE HEART FAILURE  DISCHARGE INSTRUCTIONS:  GUIDELINES TO FOLLOW AT HOME    Self- Managed Care:     MEDICATIONS:  Take your medication as directed. If you are experiencing any side effects, inform your doctor, Do not stop taking any of your medications without letting your doctor know. Check with your doctor before taking any over-the-counter medications / herbal / or dietary supplements. They may interfere with your other medications. Do not take ibuprofen (Advil or Motrin) and naproxen (Aleve) without talking to your doctor first. They could make your heart failure worse. WEIGHT MONITORING:   Weigh yourself everyday (with the same scale) around the same time of the day and write it down. (you can chart them on a calendar or keep track of them on paper. Notify your doctor of a weight gain of 3 pounds or more in 1 day   OR a total of 5 pounds or more in 1 week    Take your weight record to your doctor visits  Also, the same goes if you loose more than 3# in one day, let your heart doctor know. DIET:   Cardiac heart healthy diet- Low saturated / low trans fat, no added salt, caffeine restricted, Low sodium diet-   No more than 2,000mg (2 grams) of salt / sodium per day (which equals to a little less than  a teaspoon of salt)  If your doctor wants you on a fluid restriction. ..it is usually recommended a fluid limit of 2,000cc -  Fluid restriction- 2,000 ml (milliliters) = 64 ounces = you can have 8 glasses of fluid per day (each glass 8 ounces)    Follow a low salt diet - avoid using salt at the table, avoid / limit use of canned soups, processed / packaged foods, salted snacks, olives and pickles. Do not use a salt substitute without checking with your doctor, they may contain a high amount of potassioum. (Mrs. Corry Cortes is safe to use).     Limit the use of alcohol       CALL YOUR DOCTOR THE FIRST DAY YOU NOTICE ANY OF THESE   SYMPTOMS:  You have a

## 2023-10-09 NOTE — PLAN OF CARE
Problem: Chronic Conditions and Co-morbidities  Goal: Patient's chronic conditions and co-morbidity symptoms are monitored and maintained or improved  Outcome: Progressing     Problem: Safety - Adult  Goal: Free from fall injury  10/9/2023 0158 by Leela Lei RN  Outcome: Progressing     Problem: ABCDS Injury Assessment  Goal: Absence of physical injury  10/9/2023 0158 by Leela Lei RN  Outcome: Progressing     Problem: Discharge Planning  Goal: Discharge to home or other facility with appropriate resources  10/9/2023 0158 by Leela Lei RN  Outcome: Progressing

## 2023-10-09 NOTE — PROCEDURES
CARDIAC CATHETERIZATION REPORT     : Demetrio Pacheco MD     Date of procedure: 10/09/23       Indication:  Cardiomyopathy     Procedures:  Left heart catheterization, Coronary angiogram and Right heart catheterization     Sedation/analgesia:  Midazolam IV and Fentanyl IV      Time sedation was administered: 1423. I was present in the room when sedation was administered. Procedure end time: 4632  Time spent with face to face monitoring of moderate sedation: 61 minutes     Brief history  71-year-old male with past medical history of CAD status post CABG x4 in 2012 (LIMA to LAD, EL to diagonal, SVG to PLB, SVG to OM), cardiomyopathy with HFrEF 30%, CRT-D, HTN, HLD, CVA, former smoker, NSVT and mitral regurgitation. Patient presented to Cath Lab for further evaluation of his cardiomyopathy with left and right heart cath. Description of procedure: The patient presented to the Cath Lab in a(n) elective fashion. The patient was prepped and draped in a sterile manner. Timeout, airway and ASA assessment were completed. Local anesthesia with 2% lidocaine was administered and sedation/analgesia were administered as above. Access was obtained using the modified Seldinger technique and a regular cook needle needle with ultrasound guidance in the right femoral artery with  6 Djib[place] sheath    Diagnostic angiography was performed using 6F JL4.0 and 6F JR4.0 and 6F IM, 6F MPA1 diagnostic catheters. Venous access was obtained in the right brachial vein by nursing staff, exchanged to a 5 [place] sheath over wire and the catheter. Coronary anatomy:  Dominance: Right  Left main: Angiographically unremarkable  Left anterior descending: Totally occluded proximal LAD, mid to distal LAD is supplied by patent LIMA  D1: Totally occluded vessel with patent free EL to proximal diagonal supplying flow to the vessel. D2: Supplied from backflow from the LAD through the LIMA  Ramus intermedius:  This is

## 2023-10-09 NOTE — PATIENT CARE CONFERENCE
P Quality Flow/Interdisciplinary Rounds Progress Note        Quality Flow Rounds held on October 9, 2023    Disciplines Attending:  Bedside Nurse, , , and Nursing Unit Leadership    Donaldo Grace was admitted on 10/6/2023  8:59 AM    Anticipated Discharge Date:       Disposition:    Jacky Score:  Jacky Scale Score: 21    Readmission Risk              Risk of Unplanned Readmission:  19           Discussed patient goal for the day, patient clinical progression, and barriers to discharge.   The following Goal(s) of the Day/Commitment(s) have been identified:  Report labs/diagnostics      Leander Waters RN  October 9, 2023

## 2023-10-09 NOTE — PLAN OF CARE
Patient's chart updated to reflect:      . - HF care plan, HF education points and HF discharge instructions.  -Orders: 2 gram sodium diet, daily weights, I/O.  -PCP and cardiology follow up appointments to be scheduled within 7 days of hospital discharge. -CHF education session will be provided to the patient prior to hospital discharge.     Pierre Tello, RN   Heart Failure Navigator

## 2023-10-09 NOTE — PLAN OF CARE
Problem: Discharge Planning  Goal: Discharge to home or other facility with appropriate resources  10/9/2023 0158 by Noam Emery RN  Outcome: Progressing     Problem: Safety - Adult  Goal: Free from fall injury  10/9/2023 0158 by Noam Emeyr RN  Outcome: Progressing     Problem: ABCDS Injury Assessment  Goal: Absence of physical injury  10/9/2023 0158 by Noam Emery RN  Outcome: Progressing

## 2023-10-09 NOTE — PROGRESS NOTES
Nutrition Education    Provided educational handouts and sample meal plans on cardiac/heart healthy diet. Information in discharge instructions. Patient off floor at this time for planned cardiac cath. Will attempt to  patient at a later time. Recommend outpatient nutritional counseling for further education.      Gabriel Martines RD, LD  Contact Number: [phone]

## 2023-10-09 NOTE — CARE COORDINATION
10/9/23  Spoke with patient regarding transition of care. Patient admitted for systolic and diastolic CHF. Patient is up ambulating the unit independently. Patient is planned for a heart cath today. Patient is on IV Bumex to diurese. Patient states he lives at home alone in a 1 story house. Patient states he is independent with all ADL's and drives. Patient has no active home care but states he has a history with [facility] in addition to [address] [place] [address] history at [address]. Patient was very happy with both and they would be his home care and CAMILO choice in the future should he have any needs. PCP is Dr. Sherry Magdaleno and pharmacy choice is [facility] in [place]. Discharge goal is home with no needs pending course of treatment. EPIFANIO/Walter to follow. Electronically signed by NIKHIL Ramirez on 10/9/2023 at 1:07 PM     Case Management Assessment  Initial Evaluation    Date/Time of Evaluation: 10/9/2023 1:07 PM  Assessment Completed by: NIKHIL Ramirez    If patient is discharged prior to next notation, then this note serves as note for discharge by case management. Patient Name: Lanis Boas                   YOB: 1945  Diagnosis: Acute on chronic combined systolic and diastolic CHF (congestive heart failure) ([address]) [I50.43]  Acute on chronic congestive heart failure, unspecified heart failure type ([address]) [I50.9]                   Date / Time: 10/6/2023  8:59 AM    Patient Admission Status: Inpatient   Readmission Risk (Low < 19, Mod (19-27), High > 27): Readmission Risk Score: 15.9    Current PCP: Lisa Davis MD  PCP verified by CM? Yes    Chart Reviewed: Yes      History Provided by: Patient  Patient Orientation: Alert and Oriented, Person, Place, Situation    Patient Cognition: Alert    Hospitalization in the last 30 days (Readmission):  No    If yes, Readmission Assessment in  Navigator will be completed.     Advance Directives:      Code Status: Full Code Patient's Primary Decision Maker is:      Primary Decision Maker: Lilli Perezyodit - Child - [phone]    Discharge Planning:    Patient lives with: Alone Type of Home: House  Primary Care Giver: Self  Patient Support Systems include: Children   Current Financial resources:    Current community resources:    Current services prior to admission: None            Current DME:              Type of Home Care services:  None    ADLS  Prior functional level: Independent in ADLs/IADLs  Current functional level: Independent in ADLs/IADLs    PT AM-PAC:   /24  OT AM-PAC:   /24    Family can provide assistance at DC: Yes  Would you like Case Management to discuss the discharge plan with any other family members/significant others, and if so, who?  Yes (my daughter)  Plans to Return to Present Housing: Yes  Other Identified Issues/Barriers to RETURNING to current housing: None noted at this time  Potential Assistance needed at discharge: N/A            Potential DME:    Patient expects to discharge to: [address] [place] [address] for transportation at discharge:      Financial    Payor: Lalo Esquivel / Plan: MEDICARE PART A AND B / Product Type: *No Product type* /     Does insurance require precert for SNF: No    Potential assistance Purchasing Medications:    Meds-to-Beds request:        [facility] #[id number] Hammad[place] joe, [address] [facility] [address] [facility] [address]  Phone: [phone] Fax: [phone]      Notes:    Factors facilitating achievement of predicted outcomes: Family support, Motivated, Cooperative, Pleasant, and Has needed Durable Medical Equipment at home    Barriers to discharge: none noted    Additional Case Management Notes: See above    The Plan for Transition of Care is related to the following treatment goals of Acute on chronic combined systolic and diastolic CHF (congestive heart failure) ([facility]) [I50.43]  Acute on chronic congestive heart failure, unspecified heart

## 2023-10-10 VITALS
HEIGHT: 69 IN | OXYGEN SATURATION: 97 % | HEART RATE: 60 BPM | DIASTOLIC BLOOD PRESSURE: 63 MMHG | WEIGHT: 158 LBS | SYSTOLIC BLOOD PRESSURE: 111 MMHG | TEMPERATURE: 97.7 F | RESPIRATION RATE: 19 BRPM | BODY MASS INDEX: 23.4 KG/M2

## 2023-10-10 LAB
ALBUMIN SERPL-MCNC: 4.1 G/DL (ref 3.5–5.2)
ALP SERPL-CCNC: 91 U/L (ref 40–129)
ALT SERPL-CCNC: 17 U/L (ref 0–40)
ANION GAP SERPL CALCULATED.3IONS-SCNC: 14 MMOL/L (ref 7–16)
AST SERPL-CCNC: 20 U/L (ref 0–39)
BASOPHILS # BLD: 0.02 K/UL (ref 0–0.2)
BASOPHILS NFR BLD: 0 % (ref 0–2)
BILIRUB SERPL-MCNC: 0.7 MG/DL (ref 0–1.2)
BNP SERPL-MCNC: 2532 PG/ML (ref 0–450)
BUN SERPL-MCNC: 33 MG/DL (ref 6–23)
CALCIUM SERPL-MCNC: 9 MG/DL (ref 8.6–10.2)
CHLORIDE SERPL-SCNC: 100 MMOL/L (ref 98–107)
CO2 SERPL-SCNC: 21 MMOL/L (ref 22–29)
CREAT SERPL-MCNC: 1.3 MG/DL (ref 0.7–1.2)
EOSINOPHIL # BLD: 0.26 K/UL (ref 0.05–0.5)
EOSINOPHILS RELATIVE PERCENT: 3 % (ref 0–6)
ERYTHROCYTE [DISTWIDTH] IN BLOOD BY AUTOMATED COUNT: 16.7 % (ref 11.5–15)
GFR SERPL CREATININE-BSD FRML MDRD: 59 ML/MIN/1.73M2
GLUCOSE SERPL-MCNC: 98 MG/DL (ref 74–99)
HCT VFR BLD AUTO: 32 % (ref 37–54)
HGB BLD-MCNC: 10.4 G/DL (ref 12.5–16.5)
IMM GRANULOCYTES # BLD AUTO: 0.03 K/UL (ref 0–0.58)
IMM GRANULOCYTES NFR BLD: 0 % (ref 0–5)
LYMPHOCYTES NFR BLD: 1.29 K/UL (ref 1.5–4)
LYMPHOCYTES RELATIVE PERCENT: 17 % (ref 20–42)
MAGNESIUM SERPL-MCNC: 2.3 MG/DL (ref 1.6–2.6)
MCH RBC QN AUTO: 35.4 PG (ref 26–35)
MCHC RBC AUTO-ENTMCNC: 32.5 G/DL (ref 32–34.5)
MCV RBC AUTO: 108.8 FL (ref 80–99.9)
MONOCYTES NFR BLD: 0.84 K/UL (ref 0.1–0.95)
MONOCYTES NFR BLD: 11 % (ref 2–12)
NEUTROPHILS NFR BLD: 68 % (ref 43–80)
NEUTS SEG NFR BLD: 5.22 K/UL (ref 1.8–7.3)
PHOSPHATE SERPL-MCNC: 3.5 MG/DL (ref 2.5–4.5)
PLATELET # BLD AUTO: 255 K/UL (ref 130–450)
PMV BLD AUTO: 10.2 FL (ref 7–12)
POTASSIUM SERPL-SCNC: 4.6 MMOL/L (ref 3.5–5)
PROT SERPL-MCNC: 7.9 G/DL (ref 6.4–8.3)
RBC # BLD AUTO: 2.94 M/UL (ref 3.8–5.8)
SODIUM SERPL-SCNC: 135 MMOL/L (ref 132–146)
WBC OTHER # BLD: 7.7 K/UL (ref 4.5–11.5)

## 2023-10-10 PROCEDURE — 83735 ASSAY OF MAGNESIUM: CPT

## 2023-10-10 PROCEDURE — 6370000000 HC RX 637 (ALT 250 FOR IP): Performed by: FAMILY MEDICINE

## 2023-10-10 PROCEDURE — 99232 SBSQ HOSP IP/OBS MODERATE 35: CPT | Performed by: INTERNAL MEDICINE

## 2023-10-10 PROCEDURE — 2580000003 HC RX 258: Performed by: FAMILY MEDICINE

## 2023-10-10 PROCEDURE — 36415 COLL VENOUS BLD VENIPUNCTURE: CPT

## 2023-10-10 PROCEDURE — 84100 ASSAY OF PHOSPHORUS: CPT

## 2023-10-10 PROCEDURE — 85025 COMPLETE CBC W/AUTO DIFF WBC: CPT

## 2023-10-10 PROCEDURE — 83880 ASSAY OF NATRIURETIC PEPTIDE: CPT

## 2023-10-10 PROCEDURE — 6370000000 HC RX 637 (ALT 250 FOR IP): Performed by: CLINICAL NURSE SPECIALIST

## 2023-10-10 PROCEDURE — 80053 COMPREHEN METABOLIC PANEL: CPT

## 2023-10-10 RX ORDER — LOSARTAN POTASSIUM 25 MG/1
25 TABLET ORAL 2 TIMES DAILY
Qty: 30 TABLET | Refills: 3 | Status: SHIPPED | OUTPATIENT
Start: 2023-10-10

## 2023-10-10 RX ADMIN — Medication 10 ML: at 09:01

## 2023-10-10 RX ADMIN — LOSARTAN POTASSIUM 25 MG: 25 TABLET, FILM COATED ORAL at 08:59

## 2023-10-10 RX ADMIN — FOLIC ACID 1000 MCG: 1 TABLET ORAL at 08:59

## 2023-10-10 RX ADMIN — ASPIRIN 81 MG: 81 TABLET, CHEWABLE ORAL at 08:59

## 2023-10-10 RX ADMIN — SPIRONOLACTONE 50 MG: 25 TABLET ORAL at 08:59

## 2023-10-10 RX ADMIN — ACETAMINOPHEN 650 MG: 325 TABLET ORAL at 00:36

## 2023-10-10 ASSESSMENT — PAIN SCALES - GENERAL
PAINLEVEL_OUTOF10: 2
PAINLEVEL_OUTOF10: 0

## 2023-10-10 ASSESSMENT — PAIN DESCRIPTION - ONSET: ONSET: GRADUAL

## 2023-10-10 ASSESSMENT — PAIN DESCRIPTION - DESCRIPTORS: DESCRIPTORS: DULL;DISCOMFORT

## 2023-10-10 ASSESSMENT — PAIN - FUNCTIONAL ASSESSMENT: PAIN_FUNCTIONAL_ASSESSMENT: ACTIVITIES ARE NOT PREVENTED

## 2023-10-10 ASSESSMENT — PAIN DESCRIPTION - FREQUENCY: FREQUENCY: INTERMITTENT

## 2023-10-10 ASSESSMENT — PAIN DESCRIPTION - PAIN TYPE: TYPE: SURGICAL PAIN

## 2023-10-10 ASSESSMENT — PAIN DESCRIPTION - LOCATION: LOCATION: GROIN

## 2023-10-10 ASSESSMENT — PAIN DESCRIPTION - ORIENTATION: ORIENTATION: RIGHT

## 2023-10-10 NOTE — PROGRESS NOTES
[facility] Quality Flow/Interdisciplinary Rounds Progress Note        Quality Flow Rounds held on October 10, 2023    Disciplines Attending:  Bedside Nurse, , , and Nursing Unit Leadership    Crista Chaudhry was admitted on 10/6/2023  8:59 AM    Anticipated Discharge Date:       Disposition:    Jacky Score:  Jacky Scale Score: 21    Readmission Risk              Risk of Unplanned Readmission:  18           Discussed patient goal for the day, patient clinical progression, and barriers to discharge.   The following Goal(s) of the Day/Commitment(s) have been identified:  Diagnostics - Report Results and Labs - Report Results        Akosua Bowser RN  October 10, 2023

## 2023-10-10 NOTE — DISCHARGE SUMMARY
[facility]   Discharge summary   Patient ID:  Sveta Ibarra  [id number]  46 y.o.  1945    Admit date: 10/6/2023    Discharge date and time: 10/10/2023    Admission Diagnoses:   Patient Active Problem List   Diagnosis    CAD (coronary artery disease)    Ischemic cardiomyopathy    Aortic regurgitation    Nonrheumatic mitral valve regurgitation    Acute respiratory failure (HCC)    Hyperlipidemia LDL goal <100    Essential hypertension    NICM (nonischemic cardiomyopathy) ([address])    Acute CVA (cerebrovascular accident) ([address])    Noncompliance with medications    TIA (transient ischemic attack)    Cardiac resynchronization therapy defibrillator (CRT-D) in place    Falls, initial encounter    Closed right hip fracture, initial encounter ([address])    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 male of Dimple Au MD with significant past medical history of *** who presents with ***    Recent Labs     10/09/23  0554 10/10/23  0543   WBC 8.6 7.7   HGB 11.2* 10.4*   HCT 33.5* 32.0*    255       Recent Labs     10/08/23  0509 10/09/23  0554 10/10/23  0543    135 135   K 3.7 4.6 4.6    99 100   CO2 19* 24 21*   BUN 39* 32* 33*   CREATININE 1.4* 1.3* 1.3*   CALCIUM 8.5* 9.2 9.0       XR CHEST PORTABLE    Result Date: 10/6/2023  EXAMINATION: ONE XRAY VIEW OF THE CHEST 10/6/2023 10:15 am COMPARISON: May 1, 2023 HISTORY: ORDERING SYSTEM PROVIDED HISTORY: shortness of breath TECHNOLOGIST PROVIDED HISTORY: Reason for exam:->shortness of breath What reading provider will be dictating this exam?->CRC FINDINGS: Heart is enlarged. Pulmonary vessels are congested. Mild hazy opacity, worse on the right. No pneumothorax.   Mild blunting of the right

## 2023-10-10 NOTE — PLAN OF CARE
Problem: Discharge Planning  Goal: Discharge to home or other facility with appropriate resources  Outcome: Progressing  Flowsheets (Taken 10/10/2023 0900)  Discharge to home or other facility with appropriate resources: Identify barriers to discharge with patient and caregiver     Problem: Chronic Conditions and Co-morbidities  Goal: Patient's chronic conditions and co-morbidity symptoms are monitored and maintained or improved  Outcome: Progressing  Flowsheets (Taken 10/10/2023 0900)  Care Plan - Patient's Chronic Conditions and Co-Morbidity Symptoms are Monitored and Maintained or Improved:   Monitor and assess patient's chronic conditions and comorbid symptoms for stability, deterioration, or improvement   Collaborate with multidisciplinary team to address chronic and comorbid conditions and prevent exacerbation or deterioration

## 2023-10-10 NOTE — PROGRESS NOTES
Shaheed Urban notified via perfect serve to see if patient okay to discharge from cardiology standpoint

## 2023-10-10 NOTE — PLAN OF CARE
Problem: Chronic Conditions and Co-morbidities  Goal: Patient's chronic conditions and co-morbidity symptoms are monitored and maintained or improved  10/9/2023 2002 by Joshua Portillo RN  Outcome: Progressing  10/9/2023 1345 by Christelle Moreno RN  Outcome: Progressing     Problem: Discharge Planning  Goal: Discharge to home or other facility with appropriate resources  10/9/2023 2002 by Joshua Portillo RN  Outcome: Progressing  10/9/2023 1345 by Christelle Moreno RN  Outcome: Progressing     Problem: Safety - Adult  Goal: Free from fall injury  10/9/2023 2002 by Joshua Portillo RN  Outcome: Progressing  10/9/2023 1345 by Christelle Moreno RN  Outcome: Progressing     Problem: ABCDS Injury Assessment  Goal: Absence of physical injury  10/9/2023 2002 by Joshua Portillo RN  Outcome: Progressing  10/9/2023 1345 by Christelle Moreno RN  Outcome: Progressing

## 2023-10-10 NOTE — CARE COORDINATION
10/10/23  Transition of Care Update. Patient admitted for systolic and diastolic CHF. Patient is up ambulating the unit independently. Patient had a clean right and left heart cath yesterday. Patient continues on IV Bumex with plan to transition to PO at discharge. Patient is independent from home with plan to discharge home with no needs pending course of treatment. Patient planned to follow up out patient at the valve clinic for future care. SW/CM to follow.     Electronically signed by NIKHIL Givens on 10/10/2023 at 11:39 AM

## 2023-10-10 NOTE — PROGRESS NOTES
Discharge instructions printed and reviewed with patient and family at bedside, all questions answered. IV removed, dressing applied. Telemetry removed, cleaned, and returned to nurses station.  Patient in possession of all bedside belongings upon discharge from the unit

## 2023-10-10 NOTE — PLAN OF CARE
Problem: Cardiovascular - Adult  Goal: Maintains optimal cardiac output and hemodynamic stability  Outcome: Progressing     Problem: Cardiovascular - Adult  Goal: Absence of cardiac dysrhythmias or at baseline  Outcome: Progressing     Problem: Metabolic/Fluid and Electrolytes - Adult  Goal: Electrolytes maintained within normal limits  Outcome: Progressing     Problem: Metabolic/Fluid and Electrolytes - Adult  Goal: Hemodynamic stability and optimal renal function maintained  Outcome: Progressing

## 2023-10-12 RX ORDER — SPIRONOLACTONE 25 MG/1
50 TABLET ORAL DAILY
Qty: 180 TABLET | Refills: 1 | Status: SHIPPED | OUTPATIENT
Start: 2023-10-12

## 2023-10-17 ENCOUNTER — TREATMENT (OUTPATIENT)
Dept: PHYSICAL THERAPY | Age: 78
End: 2023-10-17
Payer: MEDICARE

## 2023-10-17 DIAGNOSIS — Z87.81 S/P RIGHT HIP FRACTURE: Primary | ICD-10-CM

## 2023-10-17 PROCEDURE — 97530 THERAPEUTIC ACTIVITIES: CPT | Performed by: PHYSICAL THERAPIST

## 2023-10-24 ENCOUNTER — TELEPHONE (OUTPATIENT)
Dept: OTHER | Age: 78
End: 2023-10-24

## 2023-10-24 ENCOUNTER — HOSPITAL ENCOUNTER (OUTPATIENT)
Dept: OTHER | Age: 78
Setting detail: THERAPIES SERIES
Discharge: HOME | End: 2023-10-24
Payer: MEDICARE

## 2023-10-24 VITALS
HEART RATE: 70 BPM | BODY MASS INDEX: 24.22 KG/M2 | WEIGHT: 164 LBS | DIASTOLIC BLOOD PRESSURE: 70 MMHG | RESPIRATION RATE: 16 BRPM | OXYGEN SATURATION: 96 % | SYSTOLIC BLOOD PRESSURE: 126 MMHG

## 2023-10-24 DIAGNOSIS — I50.20 HFREF (HEART FAILURE WITH REDUCED EJECTION FRACTION) (HCC): Primary | ICD-10-CM

## 2023-10-24 LAB
ANION GAP SERPL CALCULATED.3IONS-SCNC: 9 MMOL/L (ref 7–16)
BNP SERPL-MCNC: 2581 PG/ML (ref 0–450)
BUN SERPL-MCNC: 24 MG/DL (ref 6–23)
CALCIUM SERPL-MCNC: 9.2 MG/DL (ref 8.6–10.2)
CHLORIDE SERPL-SCNC: 101 MMOL/L (ref 98–107)
CO2 SERPL-SCNC: 23 MMOL/L (ref 22–29)
CREAT SERPL-MCNC: 1.2 MG/DL (ref 0.7–1.2)
GFR SERPL CREATININE-BSD FRML MDRD: >60 ML/MIN/1.73M2
GLUCOSE SERPL-MCNC: 109 MG/DL (ref 74–99)
POTASSIUM SERPL-SCNC: 4.5 MMOL/L (ref 3.5–5)
SODIUM SERPL-SCNC: 133 MMOL/L (ref 132–146)

## 2023-10-24 PROCEDURE — 80048 BASIC METABOLIC PNL TOTAL CA: CPT

## 2023-10-24 PROCEDURE — 6360000002 HC RX W HCPCS

## 2023-10-24 PROCEDURE — 96374 THER/PROPH/DIAG INJ IV PUSH: CPT

## 2023-10-24 PROCEDURE — 2580000003 HC RX 258

## 2023-10-24 PROCEDURE — 83880 ASSAY OF NATRIURETIC PEPTIDE: CPT

## 2023-10-24 PROCEDURE — 36415 COLL VENOUS BLD VENIPUNCTURE: CPT

## 2023-10-24 PROCEDURE — 99205 OFFICE O/P NEW HI 60 MIN: CPT

## 2023-10-24 RX ORDER — MAG HYDROX/ALUMINUM HYD/SIMETH 400-400-40
450 SUSPENSION, ORAL (FINAL DOSE FORM) ORAL DAILY
COMMUNITY

## 2023-10-24 RX ORDER — SODIUM CHLORIDE 0.9 % (FLUSH) 0.9 %
10 SYRINGE (ML) INJECTION ONCE
Status: DISCONTINUED | OUTPATIENT
Start: 2023-10-24 | End: 2023-10-25 | Stop reason: HOSPADM

## 2023-10-24 RX ORDER — SODIUM CHLORIDE 0.9 % (FLUSH) 0.9 %
SYRINGE (ML) INJECTION
Status: COMPLETED
Start: 2023-10-24 | End: 2023-10-24

## 2023-10-24 RX ORDER — FUROSEMIDE 20 MG/1
20 TABLET ORAL DAILY
Qty: 90 TABLET | Refills: 3 | Status: SHIPPED | OUTPATIENT
Start: 2023-10-24

## 2023-10-24 RX ORDER — FUROSEMIDE 10 MG/ML
40 INJECTION INTRAMUSCULAR; INTRAVENOUS ONCE
Status: DISCONTINUED | OUTPATIENT
Start: 2023-10-24 | End: 2023-10-25 | Stop reason: HOSPADM

## 2023-10-24 RX ORDER — FUROSEMIDE 10 MG/ML
INJECTION INTRAMUSCULAR; INTRAVENOUS
Status: COMPLETED
Start: 2023-10-24 | End: 2023-10-24

## 2023-10-24 RX ADMIN — SODIUM CHLORIDE, PRESERVATIVE FREE 10 ML: 5 INJECTION INTRAVENOUS at 11:36

## 2023-10-24 RX ADMIN — FUROSEMIDE 40 MG: 10 INJECTION, SOLUTION INTRAMUSCULAR; INTRAVENOUS at 11:35

## 2023-10-24 ASSESSMENT — PATIENT HEALTH QUESTIONNAIRE - PHQ9
2. FEELING DOWN, DEPRESSED OR HOPELESS: NOT AT ALL
1. LITTLE INTEREST OR PLEASURE IN DOING THINGS: NOT AT ALL
SUM OF ALL RESPONSES TO PHQ9 QUESTIONS 1 & 2: 0

## 2023-10-24 NOTE — TELEPHONE ENCOUNTER
Spoke with Carisa Anderson RN regarding GDMT:    Vitals: 126/70, 70    Refuses amiodarone , Jardiance, entresto,       Take lasix 20 mg daily  Follow up labs in 1 week

## 2023-10-26 ENCOUNTER — TELEPHONE (OUTPATIENT)
Dept: CARDIOLOGY CLINIC | Age: 78
End: 2023-10-26

## 2023-10-26 NOTE — TELEPHONE ENCOUNTER
----- Message from Philemon Bosworth, DO sent at 10/25/2023  8:19 PM EDT -----  Increase the Lasix to twice daily for 3 days.

## 2023-10-26 NOTE — TELEPHONE ENCOUNTER
Patient notified of lab results and Dr. Farooq Rodrigues recommendations. However, patient states he only takes Lasix 20 mg PRN and has not taken any for (4) weeks. The Lasix on his med list was just e-scribed by Pavel Flores NP two days ago.

## 2023-10-27 PROCEDURE — 93296 REM INTERROG EVL PM/IDS: CPT | Performed by: STUDENT IN AN ORGANIZED HEALTH CARE EDUCATION/TRAINING PROGRAM

## 2023-10-27 PROCEDURE — 93295 DEV INTERROG REMOTE 1/2/MLT: CPT | Performed by: STUDENT IN AN ORGANIZED HEALTH CARE EDUCATION/TRAINING PROGRAM

## 2023-10-29 PROCEDURE — 93297 REM INTERROG DEV EVAL ICPMS: CPT | Performed by: STUDENT IN AN ORGANIZED HEALTH CARE EDUCATION/TRAINING PROGRAM

## 2023-10-29 PROCEDURE — G2066 INTER DEVC REMOTE 30D: HCPCS | Performed by: STUDENT IN AN ORGANIZED HEALTH CARE EDUCATION/TRAINING PROGRAM

## 2023-10-30 ENCOUNTER — OFFICE VISIT (OUTPATIENT)
Dept: CARDIOLOGY CLINIC | Age: 78
End: 2023-10-30
Payer: MEDICARE

## 2023-10-30 VITALS
HEART RATE: 70 BPM | BODY MASS INDEX: 24.32 KG/M2 | HEIGHT: 69 IN | SYSTOLIC BLOOD PRESSURE: 116 MMHG | WEIGHT: 164.2 LBS | DIASTOLIC BLOOD PRESSURE: 62 MMHG | RESPIRATION RATE: 18 BRPM

## 2023-10-30 DIAGNOSIS — Z09 HOSPITAL DISCHARGE FOLLOW-UP: ICD-10-CM

## 2023-10-30 DIAGNOSIS — I25.119 CORONARY ARTERY DISEASE INVOLVING NATIVE HEART WITH ANGINA PECTORIS, UNSPECIFIED VESSEL OR LESION TYPE (HCC): Primary | ICD-10-CM

## 2023-10-30 PROCEDURE — G8420 CALC BMI NORM PARAMETERS: HCPCS | Performed by: INTERNAL MEDICINE

## 2023-10-30 PROCEDURE — G8427 DOCREV CUR MEDS BY ELIG CLIN: HCPCS | Performed by: INTERNAL MEDICINE

## 2023-10-30 PROCEDURE — 99214 OFFICE O/P EST MOD 30 MIN: CPT | Performed by: INTERNAL MEDICINE

## 2023-10-30 PROCEDURE — 1111F DSCHRG MED/CURRENT MED MERGE: CPT | Performed by: INTERNAL MEDICINE

## 2023-10-30 PROCEDURE — 4004F PT TOBACCO SCREEN RCVD TLK: CPT | Performed by: INTERNAL MEDICINE

## 2023-10-30 PROCEDURE — 1123F ACP DISCUSS/DSCN MKR DOCD: CPT | Performed by: INTERNAL MEDICINE

## 2023-10-30 PROCEDURE — G8484 FLU IMMUNIZE NO ADMIN: HCPCS | Performed by: INTERNAL MEDICINE

## 2023-10-30 PROCEDURE — 93000 ELECTROCARDIOGRAM COMPLETE: CPT | Performed by: INTERNAL MEDICINE

## 2023-10-30 PROCEDURE — 3078F DIAST BP <80 MM HG: CPT | Performed by: INTERNAL MEDICINE

## 2023-10-30 PROCEDURE — 3074F SYST BP LT 130 MM HG: CPT | Performed by: INTERNAL MEDICINE

## 2023-10-30 RX ORDER — ZINC 25 MG
TABLET ORAL
COMMUNITY

## 2023-10-31 NOTE — PROGRESS NOTES
LVEDP 15, and cardiac index 2. Since his discharge, he continues to have significant lower extremity edema states that his weight has remained steady. He denies any chest discomfort or dyspnea. On his last hospital day he was refusing his diuretics. REVIEW OF SYSTEMS:  As above. Patient does not complain of any fever, chills, nausea, vomiting or diarrhea. No focal, motor or neurological deficits. No changes in hearing, bowel or bladder habits. He is not known to have a history of thyroid problems. No recent nose bleeds. PHYSICAL EXAM:  Vitals:    10/30/23 1015   BP: 116/62   Pulse: 70   Resp: 18   Weight: 74.5 kg (164 lb 3.2 oz)   Height: 1.753 m (5' 9\")       GENERAL:  He is alert and oriented x 3, communicates well, in no distress. NECK:  No masses, trachea is mid position. Supple, full ROM, mild JVD or bruits on exam.  No palpable thyromegaly or lymphadenopathy. HEART: Distant to auscultation. Regular rate and rhythm. Normal S1 and S2. There is an S4 gallop and a II/VI holosystolic murmur. LUNGS: Poor air movement but bibasilar rales. No use of accessory muscles. symmetrical excursion. ABDOMEN:  Soft, non-tender. Normal bowel sounds. EXTREMITIES:  Full ROM x 4. Moderately severe bilateral lower extremity edema on exam.  Good distal pulses. EYES:  Extraocular muscles intact. PERRL. Normal lids & conjunctiva. ENT:  Nares are clear & not bleeding. Moist mucosa. Normal lips formation. No external masses   NEURO: no tremors, full ROM x 4, EOMI. SKIN:  Warm, dry and intact. Normal turgor. EKG: A sensed, V paced, 70 bpm.      Assessment:   Coronary artery disease as outlined above. No symptoms of recurring ischemia today. Cardiac catheterization 10/9/2023 shows the LIMA to the LAD, SVG to diagonal, and SVG to OM 2 to be patent. The SVG to the PLB is occluded but goes to a small vessel. Severe cardiomyopathy as outlined above.   He continues to have significant

## 2023-11-02 ENCOUNTER — OFFICE VISIT (OUTPATIENT)
Dept: ORTHOPEDIC SURGERY | Age: 78
End: 2023-11-02

## 2023-11-02 DIAGNOSIS — Z87.81 S/P RIGHT HIP FRACTURE: Primary | ICD-10-CM

## 2023-11-02 NOTE — PROGRESS NOTES
Follow Up Post Operative Visit     Surgery: Right hip open reduction internal fixation with cephalomedullary nail  Date: 5/2/2023    Subjective:    Noy Couch is here for follow up visit s/p above procedure. He is doing very well. He uses a cane for long distances but not at home. He is back on a stepladder. He is happy with the results. He does have some lateral sided hip pain. Controlled Substances Monitoring:        Physical Exam:    No data recorded    General: Alert and oriented x3, no acute distress  Cardiovascular/pulmonary: No labored breathing, peripheral perfusion intact  Musculoskeletal:    Right hip: Incisions have healed appropriately with good scar formation. No signs of infection. Maria De Jesus Pinch He is tender palpation around his incisions. His thigh soft compressible. He has near full hip range of motion without pain. He has 5 out of 5 strength with knee flexion extension. Imaging: Multiple views of the right hip and femur demonstrate maintained cephalomedullary nail with moderate collapse of his fracture into a position of stability. This four-part fracture has shown escape of the lesser trochanter and mild displacement the greater trochanter. The alignment is maintained since previous x-rays. Radiographic union is noted at this point        Assessment and Plan: 6 months status post right four-part intertrochanteric femur open reduction internal fixation with cephalomedullary nail  -Is healed uneventfully. Recommend using an assist device in the winter so he does not fall. He can follow-up with me on an as-needed basis.   He is happy with his treatment          Veronica Easley DO   Orthopaedic Surgery   11/2/23  9:56 AM

## 2023-11-07 ENCOUNTER — HOSPITAL ENCOUNTER (OUTPATIENT)
Dept: OTHER | Age: 78
Setting detail: THERAPIES SERIES
Discharge: HOME | End: 2023-11-07
Payer: MEDICARE

## 2023-11-07 VITALS
BODY MASS INDEX: 24.14 KG/M2 | OXYGEN SATURATION: 97 % | SYSTOLIC BLOOD PRESSURE: 104 MMHG | HEART RATE: 60 BPM | DIASTOLIC BLOOD PRESSURE: 59 MMHG | WEIGHT: 163.5 LBS | RESPIRATION RATE: 16 BRPM

## 2023-11-07 LAB
ANION GAP SERPL CALCULATED.3IONS-SCNC: 11 MMOL/L (ref 7–16)
BNP SERPL-MCNC: 2222 PG/ML (ref 0–450)
BUN SERPL-MCNC: 27 MG/DL (ref 6–23)
CALCIUM SERPL-MCNC: 9.4 MG/DL (ref 8.6–10.2)
CHLORIDE SERPL-SCNC: 102 MMOL/L (ref 98–107)
CO2 SERPL-SCNC: 21 MMOL/L (ref 22–29)
CREAT SERPL-MCNC: 1.2 MG/DL (ref 0.7–1.2)
GFR SERPL CREATININE-BSD FRML MDRD: >60 ML/MIN/1.73M2
GLUCOSE SERPL-MCNC: 103 MG/DL (ref 74–99)
POTASSIUM SERPL-SCNC: 4.6 MMOL/L (ref 3.5–5)
SODIUM SERPL-SCNC: 134 MMOL/L (ref 132–146)

## 2023-11-07 PROCEDURE — 36415 COLL VENOUS BLD VENIPUNCTURE: CPT

## 2023-11-07 PROCEDURE — 80048 BASIC METABOLIC PNL TOTAL CA: CPT

## 2023-11-07 PROCEDURE — 83880 ASSAY OF NATRIURETIC PEPTIDE: CPT

## 2023-11-07 PROCEDURE — 99214 OFFICE O/P EST MOD 30 MIN: CPT

## 2023-11-07 NOTE — PROGRESS NOTES
Congestive Heart Failure [address]       Referring Provider: Dr Bonnie Mosquera  Primary Care Physician: Malaika Zuniga MD   Cardiologist: Dr Bonnie Mosquera  Nephrologist: N/A      HISTORY OF PRESENT ILLNESS:     Tino Marin is a 69 y.o. (1945) male with a history of HFrEF (EF < 40%), most recent EF:  Lab Results   Component Value Date    LVEF 30 07/06/2023       He presents to the CHF clinic for ongoing evaluation and monitoring of heart failure. In the CHF clinic today he denies any adverse symptoms except:  [] Dizziness or lightheadedness   [] Syncope or near syncope  [] Recent Fall  [] Shortness of breath at rest   [] Dyspnea with exertion  [] Decline in functional capacity (unable to perform activities they had previously been able to do)  [] Fatigue   [] Orthopnea  [] PND  [] Nocturnal cough  [] Hemoptysis  [] Chest pain, pressure, or discomfort  [] Palpitations  [] Abdominal distention  [] Abdominal bloating  [] Early satiety  [] Blood in stool   [] Diarrhea  [] Constipation  [] Nausea/Vomiting  [] Decreased urinary response to oral diuretic   [] Scrotal swelling   [x] Lower extremity edema (improved)  [] Used PRN doses of oral diuretic   [] Weight gain    Wt Readings from Last 3 Encounters:   11/07/23 74.2 kg (163 lb 8 oz)   10/30/23 74.5 kg (164 lb 3.2 oz)   10/24/23 74.4 kg (164 lb)       SOCIAL HISTORY:  [] Denies tobacco, alcohol or illicit drug abuse  [x] Tobacco use:pipe  [] ETOH use:  [] Illicit drug use:        MEDICATIONS:    Allergies   Allergen Reactions    Prednisone Nausea Only     Prior to Visit Medications    Medication Sig Taking?  Authorizing Provider   Calcium Carbonate (CALCIUM 600 PO) Take 1,000 mg by mouth  Tessie Cornejo MD   Zinc 25 MG TABS Take by mouth  Tessie Cornejo MD Saw [place] 450 MG CAPS Take 450 mg by mouth daily  Tessie Cornejo MD   furosemide (LASIX) 20 MG tablet Take 1 tablet by mouth daily  Patient

## 2023-11-15 ENCOUNTER — TELEPHONE (OUTPATIENT)
Dept: OTHER | Age: 78
End: 2023-11-15

## 2023-11-15 ENCOUNTER — HOSPITAL ENCOUNTER (OUTPATIENT)
Dept: OTHER | Age: 78
Setting detail: THERAPIES SERIES
Discharge: HOME | End: 2023-11-15
Payer: MEDICARE

## 2023-11-15 VITALS
DIASTOLIC BLOOD PRESSURE: 56 MMHG | HEART RATE: 60 BPM | BODY MASS INDEX: 24.51 KG/M2 | RESPIRATION RATE: 16 BRPM | SYSTOLIC BLOOD PRESSURE: 103 MMHG | WEIGHT: 166 LBS | OXYGEN SATURATION: 97 %

## 2023-11-15 DIAGNOSIS — I50.23 ACUTE ON CHRONIC SYSTOLIC CONGESTIVE HEART FAILURE (HCC): ICD-10-CM

## 2023-11-15 DIAGNOSIS — Z59.6 PATIENT CANNOT AFFORD MEDICATIONS: ICD-10-CM

## 2023-11-15 DIAGNOSIS — Z13.9 ENCOUNTER FOR SCREENING INVOLVING SOCIAL DETERMINANTS OF HEALTH (SDOH): Primary | ICD-10-CM

## 2023-11-15 DIAGNOSIS — Z59.6 PATIENT CANNOT AFFORD MEDICATIONS: Primary | ICD-10-CM

## 2023-11-15 LAB
ANION GAP SERPL CALCULATED.3IONS-SCNC: 11 MMOL/L (ref 7–16)
BNP SERPL-MCNC: 2467 PG/ML (ref 0–450)
BUN SERPL-MCNC: 28 MG/DL (ref 6–23)
CALCIUM SERPL-MCNC: 9.2 MG/DL (ref 8.6–10.2)
CHLORIDE SERPL-SCNC: 100 MMOL/L (ref 98–107)
CO2 SERPL-SCNC: 22 MMOL/L (ref 22–29)
CREAT SERPL-MCNC: 1.2 MG/DL (ref 0.7–1.2)
GFR SERPL CREATININE-BSD FRML MDRD: >60 ML/MIN/1.73M2
GLUCOSE SERPL-MCNC: 100 MG/DL (ref 74–99)
POTASSIUM SERPL-SCNC: 4.3 MMOL/L (ref 3.5–5)
SODIUM SERPL-SCNC: 133 MMOL/L (ref 132–146)

## 2023-11-15 PROCEDURE — 99214 OFFICE O/P EST MOD 30 MIN: CPT

## 2023-11-15 PROCEDURE — 80048 BASIC METABOLIC PNL TOTAL CA: CPT

## 2023-11-15 PROCEDURE — 83880 ASSAY OF NATRIURETIC PEPTIDE: CPT

## 2023-11-15 PROCEDURE — 36415 COLL VENOUS BLD VENIPUNCTURE: CPT

## 2023-11-15 SDOH — ECONOMIC STABILITY - INCOME SECURITY: LOW INCOME: Z59.6

## 2023-11-15 NOTE — PROGRESS NOTES
Congestive Heart Failure [address]      Referring Provider: Dr Jasvir Kwan  Primary Care Physician: Juan Carlos Laboy MD   Cardiologist: Dr Jasvir Kwan  Nephrologist: N/A      HISTORY OF PRESENT ILLNESS:     Jaclyn Stafford is a 69 y.o. (1945) male with a history of HFrEF (EF < 40%), most recent EF:  Lab Results   Component Value Date    LVEF 30 07/06/2023       He presents to the CHF clinic for ongoing evaluation and monitoring of heart failure. In the CHF clinic today he denies any adverse symptoms except:  [] Dizziness or lightheadedness   [] Syncope or near syncope  [] Recent Fall  [] Shortness of breath at rest   [] Dyspnea with exertion  [] Decline in functional capacity (unable to perform activities they had previously been able to do)  [] Fatigue   [] Orthopnea  [] PND  [] Nocturnal cough  [] Hemoptysis  [] Chest pain, pressure, or discomfort  [] Palpitations  [] Abdominal distention  [] Abdominal bloating  [] Early satiety  [] Blood in stool   [] Diarrhea  [] Constipation  [] Nausea/Vomiting  [] Decreased urinary response to oral diuretic   [] Scrotal swelling   [x] Lower extremity edema (improved)  [] Used PRN doses of oral diuretic   [] Weight gain    Wt Readings from Last 3 Encounters:   11/15/23 75.3 kg (166 lb)   11/07/23 74.2 kg (163 lb 8 oz)   10/30/23 74.5 kg (164 lb 3.2 oz)       SOCIAL HISTORY:  [] Denies tobacco, alcohol or illicit drug abuse  [x] Tobacco use:pipe  [] ETOH use:  [] Illicit drug use:        MEDICATIONS:    Allergies   Allergen Reactions    Prednisone Nausea Only     Prior to Visit Medications    Medication Sig Taking?  Authorizing Provider   empagliflozin (JARDIANCE) 10 MG tablet Take 1 tablet by mouth Daily with lunch  Lisa Wyatt, APRN - CNP   Saw [place] 450 MG CAPS Take 450 mg by mouth daily  Provider, MD Tessie   furosemide (LASIX) 20 MG tablet Take 1 tablet by mouth daily  Patient taking differently:

## 2023-11-15 NOTE — TELEPHONE ENCOUNTER
CHF CHW Initial Call  11/15/2023  3:18 PM    CHW received referral from Neli Agee RN. Patient need: Assistance with coverage for Entresto and Jardiance. Notes: CHW called patient to assist with medication coverage. I sent referral to the PAP. Sherice Paige will email me once application is started. I also spoke to pt in regards to referral. He said he would like to apply for [facility], he is unsure about Jardiance. I told him the about the PAP and that they will be contacting him soon to start application process. I gave pt my contact information and he will contact me with any future needs or concerns. Patient in agreement with plan and further assistance. [x] Agreed    [] Declined      CHW informed patient of the services and resources that can be provided to them. CHW instructed patient to call CHF CHW at [phone] for any future assistance.      Electronically signed by Brian Doll on 11/15/2023 at 3:18 PM

## 2023-11-16 ENCOUNTER — TELEPHONE (OUTPATIENT)
Dept: OTHER | Age: 78
End: 2023-11-16

## 2023-11-16 ENCOUNTER — TELEPHONE (OUTPATIENT)
Dept: CARDIOLOGY CLINIC | Age: 78
End: 2023-11-16

## 2023-11-16 NOTE — TELEPHONE ENCOUNTER
----- Message from Mook Wilkerson DO sent at 11/15/2023  8:35 PM EST -----  Increase the Lasix to 40 mg twice daily for 3 days and then back to his usual dose.

## 2023-11-16 NOTE — TELEPHONE ENCOUNTER
Patient notified of Dr. Garnetta Sicard recommendations. However, patient only takes 10 mg daily alternating with 20 mg daily every other day if needed.       11/9/23 20 mg   11/12/23  10 mg   11/14 23 20 mg   No Lasix yesterday

## 2023-11-16 NOTE — TELEPHONE ENCOUNTER
Sandro Cisneros called CHF clinic office today and says he is going to wait to start GDMT medication for heart failure, Jardiance until his office visit with Dr. Azul Jackson. Patient says he does not want to start a medication right before holidays and risk something like an adverse effect. Instructions yesterday per YANETH Rosenbaum-CNP-have patient start Jardiance 10 mg daily at lunch 7 days prior to next CHF clinic appointment. Patient will start Jardiance 10 mg on 11/30/23 and be seen at [facility] clinic on 11/30/23 for repeat labs/assessment. CHF Clinic will send Dr. Kent Shed an update.     Electronically signed by Jag Nino RN on 11/16/2023 at 3:19 PM

## 2023-11-17 NOTE — TELEPHONE ENCOUNTER
I would take 20 mg twice a day tomorrow and then 20 mg once a day every day for 4 days after that. Then go back to his usual routine.
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
## 2023-12-05 ENCOUNTER — OFFICE VISIT (OUTPATIENT)
Dept: NON INVASIVE DIAGNOSTICS | Age: 78
End: 2023-12-05
Payer: MEDICARE

## 2023-12-05 VITALS
WEIGHT: 155 LBS | HEART RATE: 67 BPM | DIASTOLIC BLOOD PRESSURE: 60 MMHG | BODY MASS INDEX: 22.96 KG/M2 | SYSTOLIC BLOOD PRESSURE: 112 MMHG | HEIGHT: 69 IN

## 2023-12-05 DIAGNOSIS — Z95.810 CARDIAC RESYNCHRONIZATION THERAPY DEFIBRILLATOR (CRT-D) IN PLACE: ICD-10-CM

## 2023-12-05 DIAGNOSIS — I25.5 ISCHEMIC CARDIOMYOPATHY: Primary | ICD-10-CM

## 2023-12-05 PROCEDURE — 4004F PT TOBACCO SCREEN RCVD TLK: CPT | Performed by: NURSE PRACTITIONER

## 2023-12-05 PROCEDURE — G8427 DOCREV CUR MEDS BY ELIG CLIN: HCPCS | Performed by: NURSE PRACTITIONER

## 2023-12-05 PROCEDURE — 93000 ELECTROCARDIOGRAM COMPLETE: CPT | Performed by: STUDENT IN AN ORGANIZED HEALTH CARE EDUCATION/TRAINING PROGRAM

## 2023-12-05 PROCEDURE — G8420 CALC BMI NORM PARAMETERS: HCPCS | Performed by: NURSE PRACTITIONER

## 2023-12-05 PROCEDURE — 1123F ACP DISCUSS/DSCN MKR DOCD: CPT | Performed by: NURSE PRACTITIONER

## 2023-12-05 PROCEDURE — G8484 FLU IMMUNIZE NO ADMIN: HCPCS | Performed by: NURSE PRACTITIONER

## 2023-12-05 PROCEDURE — 3078F DIAST BP <80 MM HG: CPT | Performed by: NURSE PRACTITIONER

## 2023-12-05 PROCEDURE — 3074F SYST BP LT 130 MM HG: CPT | Performed by: NURSE PRACTITIONER

## 2023-12-05 NOTE — PATIENT INSTRUCTIONS
Consider Sotalol or Tikosyn will require 3 day hospitalization, this is for the abnormal heart rhythm Ventricular tachycardia. Please call the office back in 2 days if you would like to proceed.

## 2023-12-06 ENCOUNTER — TELEPHONE (OUTPATIENT)
Dept: NON INVASIVE DIAGNOSTICS | Age: 78
End: 2023-12-06

## 2023-12-06 NOTE — TELEPHONE ENCOUNTER
Patient was seen in office by Flako Marte yesterday and noticed some discrepancies on medication. Patient is not taking nor has he ever taken Jardiance. This has been updated in chart. Patient has declined to start Sotalol or Tikosyn. Luci Siegel notified.      Electronically signed by Viet Beckett MA on 12/6/2023 at 9:29 AM

## 2023-12-13 ENCOUNTER — TELEPHONE (OUTPATIENT)
Dept: CARDIOLOGY CLINIC | Age: 78
End: 2023-12-13

## 2023-12-13 RX ORDER — SACUBITRIL AND VALSARTAN 24; 26 MG/1; MG/1
1 TABLET, FILM COATED ORAL 2 TIMES DAILY
COMMUNITY

## 2023-12-13 NOTE — TELEPHONE ENCOUNTER
Patient called stating he has been approved for [facility] so he is good to start. Does he need to stop any medications when he starts Entresto? Should he start 24-26 mg BID? [FreeTextEntry1] : Hemodynamically stable with acceptable BP\par No clinical evidence of thyroid dysfunction\par Vascular status stable\par Lab profiles drawn in office and sent\par Flu vaccine given L deltoid

## 2023-12-14 ENCOUNTER — HOSPITAL ENCOUNTER (OUTPATIENT)
Dept: OTHER | Age: 78
Setting detail: THERAPIES SERIES
Discharge: HOME | End: 2023-12-14
Payer: MEDICARE

## 2023-12-14 ENCOUNTER — TELEPHONE (OUTPATIENT)
Dept: OTHER | Age: 78
End: 2023-12-14

## 2023-12-14 VITALS
RESPIRATION RATE: 16 BRPM | WEIGHT: 166 LBS | HEART RATE: 78 BPM | BODY MASS INDEX: 24.51 KG/M2 | SYSTOLIC BLOOD PRESSURE: 103 MMHG | DIASTOLIC BLOOD PRESSURE: 56 MMHG | OXYGEN SATURATION: 96 %

## 2023-12-14 LAB
ANION GAP SERPL CALCULATED.3IONS-SCNC: 10 MMOL/L (ref 7–16)
BNP SERPL-MCNC: 2678 PG/ML (ref 0–450)
BUN SERPL-MCNC: 26 MG/DL (ref 6–23)
CALCIUM SERPL-MCNC: 9.1 MG/DL (ref 8.6–10.2)
CHLORIDE SERPL-SCNC: 104 MMOL/L (ref 98–107)
CO2 SERPL-SCNC: 20 MMOL/L (ref 22–29)
CREAT SERPL-MCNC: 1.1 MG/DL (ref 0.7–1.2)
GFR SERPL CREATININE-BSD FRML MDRD: >60 ML/MIN/1.73M2
GLUCOSE SERPL-MCNC: 123 MG/DL (ref 74–99)
POTASSIUM SERPL-SCNC: 3.9 MMOL/L (ref 3.5–5)
SODIUM SERPL-SCNC: 134 MMOL/L (ref 132–146)

## 2023-12-14 PROCEDURE — 6360000002 HC RX W HCPCS

## 2023-12-14 PROCEDURE — 80048 BASIC METABOLIC PNL TOTAL CA: CPT

## 2023-12-14 PROCEDURE — 2580000003 HC RX 258

## 2023-12-14 PROCEDURE — 83880 ASSAY OF NATRIURETIC PEPTIDE: CPT

## 2023-12-14 PROCEDURE — 99214 OFFICE O/P EST MOD 30 MIN: CPT

## 2023-12-14 PROCEDURE — 36415 COLL VENOUS BLD VENIPUNCTURE: CPT

## 2023-12-14 PROCEDURE — 96374 THER/PROPH/DIAG INJ IV PUSH: CPT

## 2023-12-14 RX ORDER — FUROSEMIDE 10 MG/ML
40 INJECTION INTRAMUSCULAR; INTRAVENOUS ONCE
Status: COMPLETED | OUTPATIENT
Start: 2023-12-14 | End: 2023-12-14

## 2023-12-14 RX ORDER — SODIUM CHLORIDE 0.9 % (FLUSH) 0.9 %
SYRINGE (ML) INJECTION
Status: COMPLETED
Start: 2023-12-14 | End: 2023-12-14

## 2023-12-14 RX ORDER — FUROSEMIDE 20 MG/1
20 TABLET ORAL DAILY
Qty: 90 TABLET | Refills: 3 | Status: SHIPPED | OUTPATIENT
Start: 2023-12-14

## 2023-12-14 RX ORDER — LOSARTAN POTASSIUM 25 MG/1
25 TABLET ORAL 2 TIMES DAILY
COMMUNITY

## 2023-12-14 RX ORDER — FUROSEMIDE 10 MG/ML
INJECTION INTRAMUSCULAR; INTRAVENOUS
Status: COMPLETED
Start: 2023-12-14 | End: 2023-12-14

## 2023-12-14 RX ORDER — SODIUM CHLORIDE 0.9 % (FLUSH) 0.9 %
10 SYRINGE (ML) INJECTION ONCE
Status: COMPLETED | OUTPATIENT
Start: 2023-12-14 | End: 2023-12-14

## 2023-12-14 RX ADMIN — FUROSEMIDE 40 MG: 10 INJECTION, SOLUTION INTRAMUSCULAR; INTRAVENOUS at 12:33

## 2023-12-14 RX ADMIN — SODIUM CHLORIDE, PRESERVATIVE FREE 10 ML: 5 INJECTION INTRAVENOUS at 12:34

## 2023-12-14 RX ADMIN — Medication 10 ML: at 12:34

## 2023-12-14 RX ADMIN — FUROSEMIDE 40 MG: 10 INJECTION INTRAMUSCULAR; INTRAVENOUS at 12:33

## 2023-12-14 NOTE — RESULT ENCOUNTER NOTE
Labs and CHF clinic note reviewed  Spoke with Adin Jones RN in CHF clinic   Patient hypervolemic - has lasix PRN    Will have him take lasix 20 mg daily   Follow up labs in 1 week   Patient planning on starting Entresto 1/1/24  Follow up in CHF clinic week after starting Entresto    Thank you

## 2023-12-14 NOTE — TELEPHONE ENCOUNTER
----- Message from YANETH Stanford - CNP sent at 12/14/2023  1:59 PM EST -----  Labs and CHF clinic note reviewed  Spoke with Sherrill Chauhan RN in CHF clinic   Patient hypervolemic - has lasix PRN    Will have him take lasix 20 mg daily   Follow up labs in 1 week   Patient planning on starting Entresto 1/1/24  Follow up in CHF clinic week after starting Entresto    Thank you

## 2023-12-14 NOTE — TELEPHONE ENCOUNTER
Spoke with patient regarding taking Lasix every other day until he starts Entresto. Follow up labs next week.

## 2023-12-14 NOTE — PROGRESS NOTES
Congestive Heart Failure [address]      Referring Provider: Dr Nicole Leon  Primary Care Physician: Baljeet Monterroso MD   Cardiologist: Dr Nicole Leon  Nephrologist: N/A      HISTORY OF PRESENT ILLNESS:     Bam Hanna is a 69 y.o. (1945) male with a history of HFrEF (EF < 40%), most recent EF:  Lab Results   Component Value Date    LVEF 30 07/06/2023       He presents to the CHF clinic for ongoing evaluation and monitoring of heart failure. In the CHF clinic today he denies any adverse symptoms except:  [] Dizziness or lightheadedness   [] Syncope or near syncope  [] Recent Fall  [] Shortness of breath at rest   [] Dyspnea with exertion  [] Decline in functional capacity (unable to perform activities they had previously been able to do)  [] Fatigue   [] Orthopnea  [] PND  [] Nocturnal cough  [] Hemoptysis  [] Chest pain, pressure, or discomfort  [] Palpitations  [] Abdominal distention  [] Abdominal bloating  [] Early satiety  [] Blood in stool   [] Diarrhea  [] Constipation  [] Nausea/Vomiting  [] Decreased urinary response to oral diuretic   [] Scrotal swelling   [x] Lower extremity edema   [] Used PRN doses of oral diuretic   [] Weight gain    Wt Readings from Last 3 Encounters:   12/14/23 75.3 kg (166 lb)   12/05/23 70.3 kg (155 lb)   11/15/23 75.3 kg (166 lb)       SOCIAL HISTORY:  [] Denies tobacco, alcohol or illicit drug abuse  [x] Tobacco use:pipe  [] ETOH use:  [] Illicit drug use:        MEDICATIONS:    Allergies   Allergen Reactions    Prednisone Nausea Only     Prior to Visit Medications    Medication Sig Taking? Authorizing Provider   losartan (COZAAR) 25 MG tablet Take 1 tablet by mouth in the morning and 1 tablet in the evening.  Yes Provider, MD Tessie   CALCIUM MAGNESIUM ZINC PO Take 1 capsule by mouth daily Yes Provider, MD Tessie   sacubitril-valsartan (ENTRESTO) 24-26 MG per tablet Take 1 tablet by mouth 2 times

## 2024-01-04 ENCOUNTER — OFFICE VISIT (OUTPATIENT)
Dept: CARDIOLOGY CLINIC | Age: 79
End: 2024-01-04

## 2024-01-04 VITALS
SYSTOLIC BLOOD PRESSURE: 114 MMHG | HEART RATE: 61 BPM | BODY MASS INDEX: 22.51 KG/M2 | DIASTOLIC BLOOD PRESSURE: 70 MMHG | WEIGHT: 152 LBS | HEIGHT: 69 IN | RESPIRATION RATE: 16 BRPM

## 2024-01-04 DIAGNOSIS — I25.119 CORONARY ARTERY DISEASE INVOLVING NATIVE HEART WITH ANGINA PECTORIS, UNSPECIFIED VESSEL OR LESION TYPE (HCC): Primary | ICD-10-CM

## 2024-01-04 RX ORDER — METOPROLOL SUCCINATE 50 MG/1
50 TABLET, EXTENDED RELEASE ORAL DAILY
Qty: 90 TABLET | Refills: 3 | Status: SHIPPED | OUTPATIENT
Start: 2024-01-04

## 2024-01-04 RX ORDER — FUROSEMIDE 20 MG/1
20 TABLET ORAL EVERY OTHER DAY
Qty: 50 TABLET | Refills: 3 | Status: SHIPPED | OUTPATIENT
Start: 2024-01-04

## 2024-01-04 RX ORDER — FUROSEMIDE 10 MG/ML
10 SOLUTION ORAL PRN
COMMUNITY
End: 2024-01-04

## 2024-01-09 ENCOUNTER — HOSPITAL ENCOUNTER (OUTPATIENT)
Dept: OTHER | Age: 79
Setting detail: THERAPIES SERIES
Discharge: HOME | End: 2024-01-09
Payer: MEDICARE

## 2024-01-09 VITALS
DIASTOLIC BLOOD PRESSURE: 57 MMHG | HEART RATE: 68 BPM | BODY MASS INDEX: 24.07 KG/M2 | SYSTOLIC BLOOD PRESSURE: 105 MMHG | WEIGHT: 163 LBS | OXYGEN SATURATION: 99 % | RESPIRATION RATE: 16 BRPM

## 2024-01-09 LAB
ANION GAP SERPL CALCULATED.3IONS-SCNC: 7 MMOL/L (ref 7–16)
BNP SERPL-MCNC: 1481 PG/ML (ref 0–450)
BUN SERPL-MCNC: 31 MG/DL (ref 6–23)
CALCIUM SERPL-MCNC: 9.2 MG/DL (ref 8.6–10.2)
CHLORIDE SERPL-SCNC: 102 MMOL/L (ref 98–107)
CO2 SERPL-SCNC: 26 MMOL/L (ref 22–29)
CREAT SERPL-MCNC: 1.2 MG/DL (ref 0.7–1.2)
GFR SERPL CREATININE-BSD FRML MDRD: >60 ML/MIN/1.73M2
GLUCOSE SERPL-MCNC: 86 MG/DL (ref 74–99)
POTASSIUM SERPL-SCNC: 4 MMOL/L (ref 3.5–5)
SODIUM SERPL-SCNC: 135 MMOL/L (ref 132–146)

## 2024-01-09 PROCEDURE — 36415 COLL VENOUS BLD VENIPUNCTURE: CPT

## 2024-01-09 PROCEDURE — 99214 OFFICE O/P EST MOD 30 MIN: CPT

## 2024-01-09 PROCEDURE — 80048 BASIC METABOLIC PNL TOTAL CA: CPT

## 2024-01-09 PROCEDURE — 83880 ASSAY OF NATRIURETIC PEPTIDE: CPT

## 2024-01-09 NOTE — PROGRESS NOTES
aware of signs and symptoms of worsening HF, CHF clinic phone number provided and made aware to call clinic for sooner if evaluation if needed     [x] Difficulty affording medications   [x] CHF   [x] Prescription assistance information   [] [facility] medication assistance program information given   [] Sample medications provided to patient to help bridge gap until affordability N/A            Scheduled to follow up in CHF clinic on:   Future Appointments   Date Time Provider Department Center   2/9/2024 10:40 AM Luis Antonio Moreau,  [facility]   6/3/2024 11:15 AM Noble Ponce DO [facility]LECT[facility]   6/3/2024 11:15 AM SCHEDULE, DEVICE CLINIC 1 JEAN [facility]

## 2024-01-10 ENCOUNTER — TELEPHONE (OUTPATIENT)
Dept: CARDIOLOGY CLINIC | Age: 79
End: 2024-01-10

## 2024-01-10 DIAGNOSIS — I10 ESSENTIAL HYPERTENSION: Chronic | ICD-10-CM

## 2024-01-10 DIAGNOSIS — I50.9 ACUTE ON CHRONIC CONGESTIVE HEART FAILURE, UNSPECIFIED HEART FAILURE TYPE (HCC): Primary | ICD-10-CM

## 2024-01-10 NOTE — TELEPHONE ENCOUNTER
----- Message from Luis Antonio Moreau DO sent at 1/9/2024  5:24 PM EST -----  Repeat the Lasix once a day for 3 days again and then go back to every other day.  Repeat BMP and BNP in 2 weeks

## 2024-01-10 NOTE — TELEPHONE ENCOUNTER
Patient notified of Dr. Moreau's recommendations.  However, patient did not start the 5-day increase to BID until a couple of days ago.  He still has today, Thursday and Friday left of those initial instructions.

## 2024-01-11 NOTE — TELEPHONE ENCOUNTER
Patient notified of Dr. Moreau's recommendations. He is scheduled at CHF Clinic on 1/23/24 and labs will be rechecked at that time.

## 2024-01-23 ENCOUNTER — HOSPITAL ENCOUNTER (OUTPATIENT)
Dept: OTHER | Age: 79
Setting detail: THERAPIES SERIES
Discharge: HOME | End: 2024-01-23
Payer: MEDICARE

## 2024-01-23 VITALS
RESPIRATION RATE: 17 BRPM | SYSTOLIC BLOOD PRESSURE: 110 MMHG | HEART RATE: 68 BPM | DIASTOLIC BLOOD PRESSURE: 70 MMHG | BODY MASS INDEX: 24.22 KG/M2 | OXYGEN SATURATION: 97 % | WEIGHT: 164 LBS

## 2024-01-23 LAB
ANION GAP SERPL CALCULATED.3IONS-SCNC: 10 MMOL/L (ref 7–16)
BNP SERPL-MCNC: 1341 PG/ML (ref 0–450)
BUN SERPL-MCNC: 30 MG/DL (ref 6–23)
CALCIUM SERPL-MCNC: 9.1 MG/DL (ref 8.6–10.2)
CHLORIDE SERPL-SCNC: 100 MMOL/L (ref 98–107)
CO2 SERPL-SCNC: 23 MMOL/L (ref 22–29)
CREAT SERPL-MCNC: 1.1 MG/DL (ref 0.7–1.2)
GFR SERPL CREATININE-BSD FRML MDRD: >60 ML/MIN/1.73M2
GLUCOSE SERPL-MCNC: 94 MG/DL (ref 74–99)
POTASSIUM SERPL-SCNC: 4.5 MMOL/L (ref 3.5–5)
SODIUM SERPL-SCNC: 133 MMOL/L (ref 132–146)

## 2024-01-23 PROCEDURE — 83880 ASSAY OF NATRIURETIC PEPTIDE: CPT

## 2024-01-23 PROCEDURE — 36415 COLL VENOUS BLD VENIPUNCTURE: CPT

## 2024-01-23 PROCEDURE — 99214 OFFICE O/P EST MOD 30 MIN: CPT

## 2024-01-23 PROCEDURE — 80048 BASIC METABOLIC PNL TOTAL CA: CPT

## 2024-01-23 NOTE — RESULT ENCOUNTER NOTE
CHF clinic note and labs reviewed    VS:  Pulse 68  BP: 110/70    BNP 2678>>1481>>1341    BUN 30>>31>>30  Cr 1.2>>1.2>>1.1  Potassium 4.5     GDMT:    Lasix 20 mg every other day   Toprol XL 50 mg daily   Aldactone 25 mg twice daily   Entresto 24/26 mg twice daily   Reportedly declines Jardiance: can we revisit this please     Continue current medications for now given borderline blood pressure.   Will revisit starting Jardiance.   Anticipate titration of Entresto at next visit.     Follow up with Dr. Moreau as scheduled

## 2024-01-23 NOTE — PROGRESS NOTES
Gastrointestinal  Abdominal (WDL): Within Defined Limits  RUQ Bowel Sounds: Active  LUQ Bowel Sounds: Active  RLQ Bowel Sounds: Active  LLQ Bowel Sounds: Active      Bowel Sounds  RUQ Bowel Sounds: Active  LUQ Bowel Sounds: Active  RLQ Bowel Sounds: Active  LLQ Bowel Sounds: Active  Peripheral Vascular  Edema: Right lower extremity, Left lower extremity  RLE Edema: +1, Pitting  LLE Edema: +1, Pitting           Genitourinary  Genitourinary (WDL): Within Defined Limits  Psychosocial  Psychosocial (WDL): Within Defined Limits              Pulse: 68           LAB DATA:  BMP:  Sodium (mmol/L)   Date Value   01/23/2024 133   01/09/2024 135   12/22/2023 136     Potassium (mmol/L)   Date Value   01/23/2024 4.5   01/09/2024 4.0   12/22/2023 4.9     Potassium reflex Magnesium (mmol/L)   Date Value   05/05/2023 4.1   05/04/2023 4.0   05/03/2023 4.4     Chloride (mmol/L)   Date Value   01/23/2024 100   01/09/2024 102   12/22/2023 99     CO2 (mmol/L)   Date Value   01/23/2024 23   01/09/2024 26   12/22/2023 26     BUN (mg/dL)   Date Value   01/23/2024 30 (H)   01/09/2024 31 (H)   12/22/2023 30 (H)     Creatinine (mg/dL)   Date Value   01/23/2024 1.1   01/09/2024 1.2   12/22/2023 1.2     Glucose (mg/dL)   Date Value   01/23/2024 94   01/09/2024 86   12/22/2023 94     Calcium (mg/dL)   Date Value   01/23/2024 9.1   01/09/2024 9.2   12/22/2023 9.7     BNP:  Pro-BNP (pg/mL)   Date Value   01/23/2024 1,341 (H)   01/09/2024 1,481 (H)   12/14/2023 2,678 (H)      CBC:  WBC (k/uL)   Date Value   10/10/2023 7.7     Hemoglobin (g/dL)   Date Value   10/10/2023 10.4 (L)     Hematocrit (%)   Date Value   10/10/2023 32.0 (L)     Platelets (k/uL)   Date Value   10/10/2023 255     Iron Studies:  Iron (ug/dL)   Date Value   10/06/2023 125     TIBC (ug/dL)   Date Value   10/06/2023 337     Hepatic:  AST (U/L)   Date Value   10/10/2023 20     ALT (U/L)   Date Value   10/10/2023 17     Total Bilirubin (mg/dL)   Date Value   10/10/2023 0.7

## 2024-01-26 PROCEDURE — 93296 REM INTERROG EVL PM/IDS: CPT | Performed by: STUDENT IN AN ORGANIZED HEALTH CARE EDUCATION/TRAINING PROGRAM

## 2024-01-26 PROCEDURE — 93295 DEV INTERROG REMOTE 1/2/MLT: CPT | Performed by: STUDENT IN AN ORGANIZED HEALTH CARE EDUCATION/TRAINING PROGRAM

## 2024-01-28 PROCEDURE — 93297 REM INTERROG DEV EVAL ICPMS: CPT | Performed by: STUDENT IN AN ORGANIZED HEALTH CARE EDUCATION/TRAINING PROGRAM

## 2024-02-08 ENCOUNTER — TELEPHONE (OUTPATIENT)
Dept: CARDIOLOGY CLINIC | Age: 79
End: 2024-02-08

## 2024-02-09 ENCOUNTER — OFFICE VISIT (OUTPATIENT)
Dept: CARDIOLOGY CLINIC | Age: 79
End: 2024-02-09

## 2024-02-09 VITALS
SYSTOLIC BLOOD PRESSURE: 121 MMHG | BODY MASS INDEX: 24.79 KG/M2 | HEART RATE: 61 BPM | RESPIRATION RATE: 16 BRPM | HEIGHT: 69 IN | WEIGHT: 167.4 LBS | DIASTOLIC BLOOD PRESSURE: 60 MMHG

## 2024-02-09 DIAGNOSIS — I34.0 NONRHEUMATIC MITRAL VALVE REGURGITATION: ICD-10-CM

## 2024-02-09 DIAGNOSIS — I10 ESSENTIAL HYPERTENSION: Primary | Chronic | ICD-10-CM

## 2024-02-09 DIAGNOSIS — I35.1 AORTIC VALVE INSUFFICIENCY, ETIOLOGY OF CARDIAC VALVE DISEASE UNSPECIFIED: ICD-10-CM

## 2024-02-09 NOTE — PROGRESS NOTES
Enrique Gonzalez  1945  Date of Service: 2/9/2024    Patient Active Problem List    Diagnosis Date Noted    Acute on chronic congestive heart failure ([facility]) 10/07/2023    VHD (valvular heart disease) 10/07/2023    Acute on chronic combined systolic and diastolic CHF (congestive heart failure) ([facility]) 10/06/2023    Closed right hip fracture, initial encounter ([facility]) 05/02/2023    Falls, initial encounter 05/01/2023    Cardiac resynchronization therapy defibrillator (CRT-D) in place 04/14/2023    Noncompliance with medications 07/05/2020    TIA (transient ischemic attack) 07/05/2020    Acute CVA (cerebrovascular accident) ([facility]) 07/01/2020    NICM (nonischemic cardiomyopathy) ([facility]) 04/29/2019     Overview Note:     Echo 10-18  EF 25%    He refuses an ICD      Acute respiratory failure ([facility]) 10/18/2018    Hyperlipidemia LDL goal <100 10/18/2018    Essential hypertension 10/18/2018    Nonrheumatic mitral valve regurgitation      Overview Note:     moderate      Ischemic cardiomyopathy      Overview Note:     EF 35 - 40%    Seen by Dr. Celis.  Defers ICD.      Aortic regurgitation      Overview Note:     trace      CAD (coronary artery disease) 12/12/2012     Overview Note:     12-13-12 anterior STEMI  POBO LAD  CABG [place]-LAD   Free EL-diag   SVG-PLB   SVG-OM    Cardiac catheterization 10/9/2023 shows the LIMA to the LAD, SVG to diagonal, and SVG to OM 2 to be patent.  The SVG to the PLB is occluded but goes to a small vessel.         Social History     Socioeconomic History    Marital status: Single     Spouse name: None    Number of children: None    Years of education: None    Highest education level: None   Tobacco Use    Smoking status: Some Days     Types: Pipe     Passive exposure: Current    Smokeless tobacco: Never    Tobacco comments:     quit cigarettes 35yrs ago   Vaping Use    Vaping Use: Never used   Substance and Sexual Activity    Alcohol use: No    Drug use: No       Current Outpatient Medications

## 2024-02-21 ENCOUNTER — HOSPITAL ENCOUNTER (OUTPATIENT)
Dept: OTHER | Age: 79
Setting detail: THERAPIES SERIES
Discharge: HOME | End: 2024-02-21
Payer: MEDICARE

## 2024-02-21 VITALS
DIASTOLIC BLOOD PRESSURE: 50 MMHG | BODY MASS INDEX: 25.1 KG/M2 | SYSTOLIC BLOOD PRESSURE: 104 MMHG | OXYGEN SATURATION: 100 % | WEIGHT: 170 LBS | RESPIRATION RATE: 16 BRPM | HEART RATE: 60 BPM

## 2024-02-21 LAB
ANION GAP SERPL CALCULATED.3IONS-SCNC: 10 MMOL/L (ref 7–16)
BNP SERPL-MCNC: 1321 PG/ML (ref 0–450)
BUN SERPL-MCNC: 27 MG/DL (ref 6–23)
CALCIUM SERPL-MCNC: 9.4 MG/DL (ref 8.6–10.2)
CHLORIDE SERPL-SCNC: 99 MMOL/L (ref 98–107)
CO2 SERPL-SCNC: 24 MMOL/L (ref 22–29)
CREAT SERPL-MCNC: 1.2 MG/DL (ref 0.7–1.2)
GFR SERPL CREATININE-BSD FRML MDRD: >60 ML/MIN/1.73M2
GLUCOSE SERPL-MCNC: 87 MG/DL (ref 74–99)
POTASSIUM SERPL-SCNC: 4.5 MMOL/L (ref 3.5–5)
SODIUM SERPL-SCNC: 133 MMOL/L (ref 132–146)

## 2024-02-21 PROCEDURE — 99214 OFFICE O/P EST MOD 30 MIN: CPT

## 2024-02-21 PROCEDURE — 83880 ASSAY OF NATRIURETIC PEPTIDE: CPT

## 2024-02-21 PROCEDURE — 6360000002 HC RX W HCPCS

## 2024-02-21 PROCEDURE — 2580000003 HC RX 258

## 2024-02-21 PROCEDURE — 80048 BASIC METABOLIC PNL TOTAL CA: CPT

## 2024-02-21 PROCEDURE — 36415 COLL VENOUS BLD VENIPUNCTURE: CPT

## 2024-02-21 PROCEDURE — 96374 THER/PROPH/DIAG INJ IV PUSH: CPT

## 2024-02-21 RX ORDER — FUROSEMIDE 10 MG/ML
40 INJECTION INTRAMUSCULAR; INTRAVENOUS ONCE
Status: DISCONTINUED | OUTPATIENT
Start: 2024-02-21 | End: 2024-02-22 | Stop reason: HOSPADM

## 2024-02-21 RX ORDER — SODIUM CHLORIDE 0.9 % (FLUSH) 0.9 %
SYRINGE (ML) INJECTION
Status: COMPLETED
Start: 2024-02-21 | End: 2024-02-21

## 2024-02-21 RX ORDER — FUROSEMIDE 10 MG/ML
INJECTION INTRAMUSCULAR; INTRAVENOUS
Status: COMPLETED
Start: 2024-02-21 | End: 2024-02-21

## 2024-02-21 RX ORDER — SODIUM CHLORIDE 0.9 % (FLUSH) 0.9 %
10 SYRINGE (ML) INJECTION ONCE
Status: DISCONTINUED | OUTPATIENT
Start: 2024-02-21 | End: 2024-02-22 | Stop reason: HOSPADM

## 2024-02-21 RX ADMIN — SODIUM CHLORIDE, PRESERVATIVE FREE 10 ML: 5 INJECTION INTRAVENOUS at 11:08

## 2024-02-21 RX ADMIN — FUROSEMIDE 40 MG: 10 INJECTION, SOLUTION INTRAMUSCULAR; INTRAVENOUS at 11:08

## 2024-02-21 NOTE — PROGRESS NOTES
Congestive Heart Failure Clinic   [facility]      Referring Provider: Dr Moreau  Primary Care Physician: Peter Campos Jr., MD   Cardiologist: Dr Moreau  Nephrologist: N/A      HISTORY OF PRESENT ILLNESS:     Enrique Gonzalez is a 78 y.o. (1945) male with a history of HFrEF (EF < 40%), most recent EF:  Lab Results   Component Value Date    LVEF 30 07/06/2023       He presents to the CHF clinic for ongoing evaluation and monitoring of heart failure.    In the CHF clinic today he denies any adverse symptoms except:  [] Dizziness or lightheadedness   [] Syncope or near syncope  [] Recent Fall  [] Shortness of breath at rest   [] Dyspnea with exertion  [] Decline in functional capacity (unable to perform activities they had previously been able to do)  [] Fatigue   [] Orthopnea  [] PND  [] Nocturnal cough  [] Hemoptysis  [] Chest pain, pressure, or discomfort  [] Palpitations  [x] Abdominal distention  [] Abdominal bloating  [] Early satiety  [] Blood in stool   [] Diarrhea  [] Constipation  [] Nausea/Vomiting  [] Decreased urinary response to oral diuretic   [] Scrotal swelling   [x] Lower extremity edema   [] Used PRN doses of oral diuretic   [x] Weight gain    Wt Readings from Last 3 Encounters:   02/21/24 77.1 kg (170 lb)   02/09/24 75.9 kg (167 lb 6.4 oz)   01/23/24 74.4 kg (164 lb)       SOCIAL HISTORY:  [] Denies tobacco, alcohol or illicit drug abuse  [x] Tobacco use:pipe  [] ETOH use:  [] Illicit drug use:        MEDICATIONS:    Allergies   Allergen Reactions    Prednisone Nausea Only     Prior to Visit Medications    Medication Sig Taking? Authorizing Provider   Multiple Vitamins-Minerals (CENTRUM SILVER 50+MEN PO) Take by mouth  Provider, MD Tessie   furosemide (LASIX) 20 MG tablet Take 1 tablet by mouth every other day  Luis Antonio Moreau DO   metoprolol succinate (TOPROL XL) 50 MG extended release tablet Take 1 tablet by mouth daily  Lizzeth

## 2024-02-21 NOTE — PLAN OF CARE
Problem: Chronic Conditions and Co-morbidities  Goal: Patient's chronic conditions and co-morbidity symptoms are monitored and maintained or improved  Outcome: Progressing   CONTINUE PLAN OF CARE

## 2024-02-22 NOTE — PROGRESS NOTES
[facility] PRE-ADMISSION TESTING INSTRUCTIONS    The Preadmission Testing patient is instructed accordingly using the following criteria (check applicable):    ARRIVAL INSTRUCTIONS:  [x] Parking the day of Surgery is located in the Main Entrance lot.  Upon entering the door, make an immediate right to the surgery reception desk    [x] Bring photo ID and insurance card    [x] Bring in a copy of Living will or Durable Power of  papers.    [x] Please be sure to arrange for a responsible adult to provide transportation to and from the hospital    [x] Please arrange for a responsible adult to be with you for the 24 hour period post procedure due to having anesthesia    [x] If you awake am of surgery not feeling well or have temperature >100 please call [phone]    GENERAL INSTRUCTIONS:    [x] No solid foods after midnight, may have up to 8oz of water until 4 hours prior to surgery. No gum, no candy, no mints. NPO:       [x] You may brush your teeth, do not swallow any toothpaste    [x] Take medications as instructed     [x] Stop herbal supplements and vitamins 5 days prior to procedure    [] Follow preop dosing of blood thinners per physician instructions    [] Take 1/2 dose of evening insulin, but no insulin after midnight    [] No oral diabetic medications after midnight    [] If diabetic and have low blood sugar or feel symptomatic, take 1-2oz apple juice only    [] Bring inhalers day of surgery    [] Bring urine specimen day of surgery    [x] Shower or bath with soap, lather and rinse well, AM of Surgery, no lotion, powders or creams to surgical site    [] Follow bowel prep as instructed per surgeon    [x] No tobacco products within 24 hours of surgery     [x] No alcohol or illegal drug use, marijuana included, within 24 hours of surgery.    [x] Jewelry, body piercing's, eyeglasses, contact lenses and dentures are not permitted into surgery (bring cases)      [x] Please do not

## 2024-02-28 ENCOUNTER — ANESTHESIA EVENT (OUTPATIENT)
Age: 79
End: 2024-02-28
Payer: MEDICARE

## 2024-02-28 ENCOUNTER — ANESTHESIA (OUTPATIENT)
Age: 79
End: 2024-02-28
Payer: MEDICARE

## 2024-02-28 ENCOUNTER — HOSPITAL ENCOUNTER (OUTPATIENT)
Age: 79
Discharge: HOME | End: 2024-03-01
Payer: MEDICARE

## 2024-02-28 VITALS
HEIGHT: 69 IN | BODY MASS INDEX: 22.96 KG/M2 | RESPIRATION RATE: 16 BRPM | TEMPERATURE: 97.8 F | WEIGHT: 155 LBS | HEART RATE: 60 BPM | DIASTOLIC BLOOD PRESSURE: 50 MMHG | OXYGEN SATURATION: 100 % | SYSTOLIC BLOOD PRESSURE: 105 MMHG

## 2024-02-28 DIAGNOSIS — I34.0 NONRHEUMATIC MITRAL VALVE REGURGITATION: ICD-10-CM

## 2024-02-28 DIAGNOSIS — I35.1 AORTIC VALVE INSUFFICIENCY, ETIOLOGY OF CARDIAC VALVE DISEASE UNSPECIFIED: ICD-10-CM

## 2024-02-28 LAB
ECHO AO ASC DIAM: 4 CM
ECHO AO ASCENDING AORTA INDEX: 2.16 CM/M2
ECHO AO SINUS VALSALVA DIAM: 3.7 CM
ECHO AO SINUS VALSALVA INDEX: 2 CM/M2
ECHO AR MAX VEL PISA: 3.2 M/S
ECHO AV AREA PEAK VELOCITY: 2.2 CM2
ECHO AV AREA VTI: 2.4 CM2
ECHO AV AREA/BSA PEAK VELOCITY: 1.2 CM2/M2
ECHO AV AREA/BSA VTI: 1.3 CM2/M2
ECHO AV MEAN GRADIENT: 5 MMHG
ECHO AV MEAN VELOCITY: 1.1 M/S
ECHO AV PEAK GRADIENT: 8 MMHG
ECHO AV PEAK VELOCITY: 1.4 M/S
ECHO AV REGURGITANT PHT: 567.4 MILLISECOND
ECHO AV VELOCITY RATIO: 0.71
ECHO AV VTI: 29.2 CM
ECHO BSA: 1.85 M2
ECHO EST RA PRESSURE: 8 MMHG
ECHO LVOT AREA: 3.1 CM2
ECHO LVOT AV VTI INDEX: 0.74
ECHO LVOT DIAM: 2 CM
ECHO LVOT MEAN GRADIENT: 2 MMHG
ECHO LVOT PEAK GRADIENT: 4 MMHG
ECHO LVOT PEAK VELOCITY: 1 M/S
ECHO LVOT STROKE VOLUME INDEX: 36.8 ML/M2
ECHO LVOT SV: 68.1 ML
ECHO LVOT VTI: 21.7 CM
ECHO MV AREA PHT: 4.2 CM2
ECHO MV AREA VTI: 3.2 CM2
ECHO MV EROA PISA: 0.3 CM2
ECHO MV LVOT VTI INDEX: 0.98
ECHO MV MAX VELOCITY: 0.9 M/S
ECHO MV MEAN GRADIENT: 1 MMHG
ECHO MV MEAN VELOCITY: 0.6 M/S
ECHO MV PEAK GRADIENT: 3 MMHG
ECHO MV PRESSURE HALF TIME (PHT): 53 MS
ECHO MV REGURGITANT ALIASING (NYQUIST) VELOCITY: 31 CM/S
ECHO MV REGURGITANT RADIUS PISA: 0.78 CM
ECHO MV REGURGITANT VELOCITY PISA: 3.7 M/S
ECHO MV REGURGITANT VOLUME PISA: 43.63 ML
ECHO MV REGURGITANT VTIA: 136.3 CM
ECHO MV VTI: 21.2 CM
LEFT VENTRICULAR EJECTION FRACTION HIGH VALUE: 30 %
LEFT VENTRICULAR EJECTION FRACTION MODE: NORMAL
LV EF: 25 %

## 2024-02-28 PROCEDURE — 93325 DOPPLER ECHO COLOR FLOW MAPG: CPT | Performed by: INTERNAL MEDICINE

## 2024-02-28 PROCEDURE — 2580000003 HC RX 258: Performed by: NURSE ANESTHETIST, CERTIFIED REGISTERED

## 2024-02-28 PROCEDURE — 6360000002 HC RX W HCPCS: Performed by: NURSE ANESTHETIST, CERTIFIED REGISTERED

## 2024-02-28 PROCEDURE — 93325 DOPPLER ECHO COLOR FLOW MAPG: CPT

## 2024-02-28 PROCEDURE — 3700000001 HC ADD 15 MINUTES (ANESTHESIA)

## 2024-02-28 PROCEDURE — 93312 ECHO TRANSESOPHAGEAL: CPT | Performed by: INTERNAL MEDICINE

## 2024-02-28 PROCEDURE — 7100000010 HC PHASE II RECOVERY - FIRST 15 MIN

## 2024-02-28 PROCEDURE — 6360000002 HC RX W HCPCS

## 2024-02-28 PROCEDURE — 93320 DOPPLER ECHO COMPLETE: CPT | Performed by: INTERNAL MEDICINE

## 2024-02-28 PROCEDURE — 7100000011 HC PHASE II RECOVERY - ADDTL 15 MIN

## 2024-02-28 PROCEDURE — 3700000000 HC ANESTHESIA ATTENDED CARE

## 2024-02-28 RX ORDER — FENTANYL CITRATE 50 UG/ML
25 INJECTION, SOLUTION INTRAMUSCULAR; INTRAVENOUS EVERY 5 MIN PRN
Status: DISCONTINUED | OUTPATIENT
Start: 2024-02-28 | End: 2024-03-02 | Stop reason: HOSPADM

## 2024-02-28 RX ORDER — LABETALOL HYDROCHLORIDE 5 MG/ML
5 INJECTION, SOLUTION INTRAVENOUS
Status: DISCONTINUED | OUTPATIENT
Start: 2024-02-28 | End: 2024-03-02 | Stop reason: HOSPADM

## 2024-02-28 RX ORDER — DIPHENHYDRAMINE HYDROCHLORIDE 50 MG/ML
12.5 INJECTION INTRAMUSCULAR; INTRAVENOUS
Status: ACTIVE | OUTPATIENT
Start: 2024-02-28 | End: 2024-02-29

## 2024-02-28 RX ORDER — SODIUM CHLORIDE 0.9 % (FLUSH) 0.9 %
5-40 SYRINGE (ML) INJECTION PRN
Status: CANCELLED | OUTPATIENT
Start: 2024-02-28

## 2024-02-28 RX ORDER — PROCHLORPERAZINE EDISYLATE 5 MG/ML
5 INJECTION INTRAMUSCULAR; INTRAVENOUS
Status: ACTIVE | OUTPATIENT
Start: 2024-02-28 | End: 2024-02-29

## 2024-02-28 RX ORDER — SODIUM CHLORIDE 0.9 % (FLUSH) 0.9 %
5-40 SYRINGE (ML) INJECTION EVERY 12 HOURS SCHEDULED
Status: DISCONTINUED | OUTPATIENT
Start: 2024-02-28 | End: 2024-03-02 | Stop reason: HOSPADM

## 2024-02-28 RX ORDER — MEPERIDINE HYDROCHLORIDE 25 MG/ML
12.5 INJECTION INTRAMUSCULAR; INTRAVENOUS; SUBCUTANEOUS EVERY 5 MIN PRN
Status: DISCONTINUED | OUTPATIENT
Start: 2024-02-28 | End: 2024-03-02 | Stop reason: HOSPADM

## 2024-02-28 RX ORDER — PROPOFOL 10 MG/ML
INJECTION, EMULSION INTRAVENOUS PRN
Status: DISCONTINUED | OUTPATIENT
Start: 2024-02-28 | End: 2024-02-28 | Stop reason: SDUPTHER

## 2024-02-28 RX ORDER — SODIUM CHLORIDE 9 MG/ML
INJECTION, SOLUTION INTRAVENOUS PRN
Status: CANCELLED | OUTPATIENT
Start: 2024-02-28

## 2024-02-28 RX ORDER — SODIUM CHLORIDE 9 MG/ML
INJECTION, SOLUTION INTRAVENOUS PRN
Status: DISCONTINUED | OUTPATIENT
Start: 2024-02-28 | End: 2024-03-02 | Stop reason: HOSPADM

## 2024-02-28 RX ORDER — SODIUM CHLORIDE 0.9 % (FLUSH) 0.9 %
5-40 SYRINGE (ML) INJECTION EVERY 12 HOURS SCHEDULED
Status: CANCELLED | OUTPATIENT
Start: 2024-02-28

## 2024-02-28 RX ORDER — SODIUM CHLORIDE 0.9 % (FLUSH) 0.9 %
5-40 SYRINGE (ML) INJECTION PRN
Status: DISCONTINUED | OUTPATIENT
Start: 2024-02-28 | End: 2024-03-02 | Stop reason: HOSPADM

## 2024-02-28 RX ORDER — HYDRALAZINE HYDROCHLORIDE 20 MG/ML
5 INJECTION INTRAMUSCULAR; INTRAVENOUS
Status: DISCONTINUED | OUTPATIENT
Start: 2024-02-28 | End: 2024-03-02 | Stop reason: HOSPADM

## 2024-02-28 RX ORDER — SODIUM CHLORIDE 9 MG/ML
INJECTION, SOLUTION INTRAVENOUS CONTINUOUS PRN
Status: DISCONTINUED | OUTPATIENT
Start: 2024-02-28 | End: 2024-02-28 | Stop reason: SDUPTHER

## 2024-02-28 RX ADMIN — SODIUM CHLORIDE: 9 INJECTION, SOLUTION INTRAVENOUS at 07:33

## 2024-02-28 RX ADMIN — PHENYLEPHRINE HYDROCHLORIDE 100 MCG: 10 INJECTION INTRAVENOUS at 07:48

## 2024-02-28 RX ADMIN — PHENYLEPHRINE HYDROCHLORIDE 100 MCG: 10 INJECTION INTRAVENOUS at 07:51

## 2024-02-28 RX ADMIN — PROPOFOL 260 MG: 10 INJECTION, EMULSION INTRAVENOUS at 07:43

## 2024-02-28 ASSESSMENT — PAIN - FUNCTIONAL ASSESSMENT
PAIN_FUNCTIONAL_ASSESSMENT: ACTIVITIES ARE NOT PREVENTED
PAIN_FUNCTIONAL_ASSESSMENT: 0-10
PAIN_FUNCTIONAL_ASSESSMENT: ACTIVITIES ARE NOT PREVENTED

## 2024-02-28 ASSESSMENT — LIFESTYLE VARIABLES: SMOKING_STATUS: 1

## 2024-02-28 NOTE — ANESTHESIA POSTPROCEDURE EVALUATION
Department of Anesthesiology  Postprocedure Note    Patient: Enrique Gonzalez  MRN: [id number]  YOB: 1945  Date of evaluation: 2/28/2024    Procedure Summary       Date: 02/28/24 Room / Location: [facility] Cardiology    Anesthesia Start: 0722 Anesthesia Stop: 0803    Procedure: JINA (PRN CONTRAST/BUBBLE/3D) Diagnosis:       Aortic valve insufficiency, etiology of cardiac valve disease unspecified      Nonrheumatic mitral valve regurgitation    Scheduled Providers:  Responsible Provider: Buster Raya DO    Anesthesia Type: MAC ASA Status: 4            Anesthesia Type: No value filed.    Lambert Phase I: Lambert Score: 10    Lambert Phase II:      Anesthesia Post Evaluation    Patient location during evaluation: bedside  Patient participation: complete - patient participated  Level of consciousness: awake  Pain score: 1  Airway patency: patent  Nausea & Vomiting: no vomiting and no nausea  Cardiovascular status: hemodynamically stable  Respiratory status: acceptable  Hydration status: stable  Comments: Seen and examined.  Progressing well.  Questions answered.  Pain management: adequate    No notable events documented.

## 2024-02-28 NOTE — PROCEDURES
Preprocedure diagnosis: VHD    Procedures: JINA    Primary procedure  Written informed consent was obtained, the JINA was performed under stable fasting condition. The patient was set up for monitoring of surface EKG and pulse oximetry continuously. Blood pressure was monitored and with automatic cuff measurements. Supplemental oxygen was administered. The procedure was performed under anesthesia by anesthesiology. After ensuring adequate anesthesia, JINA probe was intubated without difficulty.      Result: prelim - Mod MR and mild AI, EF 25-30%, full report to follow .    Complication: None    Patient was monitored until awake and vitals remained stable.    Miroslava Smith M.D.  [facility] cardiology

## 2024-02-28 NOTE — ANESTHESIA PRE PROCEDURE
Department of Anesthesiology  Preprocedure Note       Name:  Enrique Gonzalez   Age:  78 y.o.  :  1945                                          MRN:  [id number]         Date:  2024      Surgeon: Cardiology    Procedure: JINA    Medications prior to admission:   Prior to Admission medications    Medication Sig Start Date End Date Taking? Authorizing Provider   Multiple Vitamins-Minerals (CENTRUM SILVER 50+MEN PO) Take by mouth    Tessie Cornejo MD   furosemide (LASIX) 20 MG tablet Take 1 tablet by mouth every other day 24   Luis Antonio Moreau DO   metoprolol succinate (TOPROL XL) 50 MG extended release tablet Take 1 tablet by mouth daily 24   Luis Antonio Moreau DO   CALCIUM MAGNESIUM ZINC PO Take 1 capsule by mouth daily    Tessie Cornejo MD   sacubitril-valsartan (ENTRESTO) 24-26 MG per tablet Take 1 tablet by mouth 2 times daily AM AND PM    Tessie Cornejo MD   [place] 450 MG CAPS Take 450 mg by mouth daily    Tessie Cornejo MD   spironolactone (ALDACTONE) 25 MG tablet Take 2 tablets by mouth daily  Patient taking differently: Take 1 tablet by mouth 2 times daily Am and pm 10/12/23   Serafin Porter MD   aspirin 81 MG chewable tablet Take 1 tablet by mouth daily    Tessie Cornejo MD   vitamin B-12 (CYANOCOBALAMIN) 1000 MCG tablet Take 1 tablet by mouth daily    Tessie Cornejo MD   niacin (SLO-NIACIN) 500 MG extended release tablet Take 1 tablet by mouth daily    Tessie Cornejo MD   atorvastatin (LIPITOR) 40 MG tablet Take 1 tablet by mouth nightly 7/3/20   Josie Benítez MD   folic acid (FOLVITE) 400 MCG tablet Take 1 tablet by mouth daily    Tessie Cornejo MD       Current medications:    Current Outpatient Medications   Medication Sig Dispense Refill    Multiple Vitamins-Minerals (CENTRUM SILVER 50+MEN PO) Take by mouth      furosemide (LASIX) 20 MG tablet Take 1 tablet by mouth every other day 50 tablet 3    metoprolol succinate (TOPROL

## 2024-02-29 ENCOUNTER — TELEPHONE (OUTPATIENT)
Dept: CARDIOLOGY CLINIC | Age: 79
End: 2024-02-29

## 2024-02-29 NOTE — TELEPHONE ENCOUNTER
----- Message from Luis Antonio Moreau, DO sent at 2/29/2024  3:07 PM EST -----  Please let him know that with the medical therapy the mitral regurgitation has improved to back into the moderate range.  Therefore it does not need to have surgery.

## 2024-03-08 ENCOUNTER — HOSPITAL ENCOUNTER (OUTPATIENT)
Dept: OTHER | Age: 79
Setting detail: THERAPIES SERIES
Discharge: HOME | End: 2024-03-08
Payer: MEDICARE

## 2024-03-08 VITALS
SYSTOLIC BLOOD PRESSURE: 116 MMHG | OXYGEN SATURATION: 100 % | HEART RATE: 67 BPM | DIASTOLIC BLOOD PRESSURE: 58 MMHG | RESPIRATION RATE: 16 BRPM | BODY MASS INDEX: 24.81 KG/M2 | WEIGHT: 168 LBS

## 2024-03-08 LAB
ANION GAP SERPL CALCULATED.3IONS-SCNC: 10 MMOL/L (ref 7–16)
BNP SERPL-MCNC: 1091 PG/ML (ref 0–450)
BUN SERPL-MCNC: 34 MG/DL (ref 6–23)
CALCIUM SERPL-MCNC: 9.2 MG/DL (ref 8.6–10.2)
CHLORIDE SERPL-SCNC: 100 MMOL/L (ref 98–107)
CO2 SERPL-SCNC: 23 MMOL/L (ref 22–29)
CREAT SERPL-MCNC: 1.2 MG/DL (ref 0.7–1.2)
GFR SERPL CREATININE-BSD FRML MDRD: 60 ML/MIN/1.73M2
GLUCOSE SERPL-MCNC: 101 MG/DL (ref 74–99)
POTASSIUM SERPL-SCNC: 4.5 MMOL/L (ref 3.5–5)
SODIUM SERPL-SCNC: 133 MMOL/L (ref 132–146)

## 2024-03-08 PROCEDURE — 80048 BASIC METABOLIC PNL TOTAL CA: CPT

## 2024-03-08 PROCEDURE — 36415 COLL VENOUS BLD VENIPUNCTURE: CPT

## 2024-03-08 PROCEDURE — 99214 OFFICE O/P EST MOD 30 MIN: CPT

## 2024-03-08 PROCEDURE — 83880 ASSAY OF NATRIURETIC PEPTIDE: CPT

## 2024-03-08 NOTE — PROGRESS NOTES
pharmacy   [] CPAP/BiPAP use  [] Low impact exercise / cardiac rehab   [] LifeVest use  [x] Patient aware of signs and symptoms of worsening HF, CHF clinic phone number provided and made aware to call clinic for sooner if evaluation if needed     [x] Difficulty affording medications   [x] CHF   [x] Prescription assistance information   [] [facility] medication assistance program information given   [] Sample medications provided to patient to help bridge gap until affordability N/A            Scheduled to follow up in CHF clinic on:   Future Appointments   Date Time Provider Department Center   4/8/2024 10:15 AM REYMUNDO CHF ROOM 2 [facility]   6/3/2024 11:15 AM Noble Ponce DO ELECTRO PHYS [facility]   6/3/2024 11:15 AM SCHEDULE, DEVICE CLINIC 1 BRIANDA ELECTRO PHYS [facility]

## 2024-04-08 ENCOUNTER — HOSPITAL ENCOUNTER (OUTPATIENT)
Dept: OTHER | Age: 79
Setting detail: THERAPIES SERIES
Discharge: HOME | End: 2024-04-08
Payer: MEDICARE

## 2024-04-08 VITALS
DIASTOLIC BLOOD PRESSURE: 62 MMHG | HEART RATE: 67 BPM | SYSTOLIC BLOOD PRESSURE: 114 MMHG | OXYGEN SATURATION: 98 % | BODY MASS INDEX: 25.4 KG/M2 | WEIGHT: 172 LBS | RESPIRATION RATE: 16 BRPM

## 2024-04-08 LAB
ANION GAP SERPL CALCULATED.3IONS-SCNC: 6 MMOL/L (ref 7–16)
BNP SERPL-MCNC: 1121 PG/ML (ref 0–450)
BUN SERPL-MCNC: 32 MG/DL (ref 6–23)
CALCIUM SERPL-MCNC: 9 MG/DL (ref 8.6–10.2)
CHLORIDE SERPL-SCNC: 102 MMOL/L (ref 98–107)
CO2 SERPL-SCNC: 25 MMOL/L (ref 22–29)
CREAT SERPL-MCNC: 1.1 MG/DL (ref 0.7–1.2)
GFR SERPL CREATININE-BSD FRML MDRD: 68 ML/MIN/1.73M2
GLUCOSE SERPL-MCNC: 96 MG/DL (ref 74–99)
POTASSIUM SERPL-SCNC: 4.4 MMOL/L (ref 3.5–5)
SODIUM SERPL-SCNC: 133 MMOL/L (ref 132–146)

## 2024-04-08 PROCEDURE — 83880 ASSAY OF NATRIURETIC PEPTIDE: CPT

## 2024-04-08 PROCEDURE — 36415 COLL VENOUS BLD VENIPUNCTURE: CPT

## 2024-04-08 PROCEDURE — 99214 OFFICE O/P EST MOD 30 MIN: CPT

## 2024-04-08 PROCEDURE — 80048 BASIC METABOLIC PNL TOTAL CA: CPT

## 2024-04-08 ASSESSMENT — PATIENT HEALTH QUESTIONNAIRE - PHQ9
SUM OF ALL RESPONSES TO PHQ9 QUESTIONS 1 & 2: 0
SUM OF ALL RESPONSES TO PHQ QUESTIONS 1-9: 0
2. FEELING DOWN, DEPRESSED OR HOPELESS: NOT AT ALL
1. LITTLE INTEREST OR PLEASURE IN DOING THINGS: NOT AT ALL
SUM OF ALL RESPONSES TO PHQ QUESTIONS 1-9: 0

## 2024-04-08 NOTE — PROGRESS NOTES
01/23/2024 30 (H)     Creatinine (mg/dL)   Date Value   03/08/2024 1.2   02/21/2024 1.2   01/23/2024 1.1     Glucose (mg/dL)   Date Value   03/08/2024 101 (H)   02/21/2024 87   01/23/2024 94     Calcium (mg/dL)   Date Value   03/08/2024 9.2   02/21/2024 9.4   01/23/2024 9.1     BNP:  Pro-BNP (pg/mL)   Date Value   03/08/2024 1,091 (H)   02/21/2024 1,321 (H)   01/23/2024 1,341 (H)      CBC:  WBC (k/uL)   Date Value   10/10/2023 7.7     Hemoglobin (g/dL)   Date Value   10/10/2023 10.4 (L)     Hematocrit (%)   Date Value   10/10/2023 32.0 (L)     Platelets (k/uL)   Date Value   10/10/2023 255     Iron Studies:  Iron (ug/dL)   Date Value   10/06/2023 125     TIBC (ug/dL)   Date Value   10/06/2023 337     Hepatic:  AST (U/L)   Date Value   10/10/2023 20     ALT (U/L)   Date Value   10/10/2023 17     Total Bilirubin (mg/dL)   Date Value   10/10/2023 0.7     Alkaline Phosphatase (U/L)   Date Value   10/10/2023 91     INR:  INR (no units)   Date Value   05/01/2023 1.3     Wt Readings from Last 3 Encounters:   04/08/24 78 kg (172 lb)   03/08/24 76.2 kg (168 lb)   02/28/24 70.3 kg (155 lb)       ASSESSMENT/PLAN:    [x] Euvolemic          [] Hypervolemic, with increase from baseline:  [] Shortness of breath/FOWLER  [] JVD  [] HJR  [] Abnormal lung assessment:   [] Orthopnea  [] PND  [] Decreased urinary response to oral diuretic   [] Scrotal swelling   [] Lower extremity edema   [] Compression stockings provided (declined)  [] Decline in functional capacity (unable to perform activities they had previously been able to do)  [] Weight gain     [] IV diuretics given NO  [] Provider notified of recurrent IV diuretic use    Additional Notes:  patient feeling good his only complaint is he is tired and has sinus drainage.     [x]Lab work obtained    [x] Patient/Family Educated On:  [] HF zones (Green, Yellow, Red) and aware of when to take action   [x] Daily weights  [] Scale provided   [x] Low sodium diet (2000 mg)  Barriers to

## 2024-04-11 RX ORDER — SPIRONOLACTONE 25 MG/1
25 TABLET ORAL 2 TIMES DAILY
Qty: 180 TABLET | Refills: 3 | Status: SHIPPED | OUTPATIENT
Start: 2024-04-11

## 2024-04-11 RX ORDER — SPIRONOLACTONE 25 MG/1
25 TABLET ORAL 2 TIMES DAILY
COMMUNITY
End: 2024-04-11 | Stop reason: SDUPTHER

## 2024-05-09 ENCOUNTER — HOSPITAL ENCOUNTER (OUTPATIENT)
Dept: OTHER | Age: 79
Setting detail: THERAPIES SERIES
Discharge: HOME | End: 2024-05-09
Payer: MEDICARE

## 2024-05-09 VITALS
BODY MASS INDEX: 28.83 KG/M2 | OXYGEN SATURATION: 100 % | WEIGHT: 195.2 LBS | HEART RATE: 70 BPM | SYSTOLIC BLOOD PRESSURE: 108 MMHG | RESPIRATION RATE: 16 BRPM | DIASTOLIC BLOOD PRESSURE: 61 MMHG

## 2024-05-09 LAB
ANION GAP SERPL CALCULATED.3IONS-SCNC: 11 MMOL/L (ref 7–16)
BNP SERPL-MCNC: 1040 PG/ML (ref 0–450)
BUN SERPL-MCNC: 31 MG/DL (ref 6–23)
CALCIUM SERPL-MCNC: 9.1 MG/DL (ref 8.6–10.2)
CHLORIDE SERPL-SCNC: 100 MMOL/L (ref 98–107)
CO2 SERPL-SCNC: 23 MMOL/L (ref 22–29)
CREAT SERPL-MCNC: 1.2 MG/DL (ref 0.7–1.2)
GFR, ESTIMATED: 63 ML/MIN/1.73M2
GLUCOSE SERPL-MCNC: 95 MG/DL (ref 74–99)
POTASSIUM SERPL-SCNC: 4.5 MMOL/L (ref 3.5–5)
SODIUM SERPL-SCNC: 134 MMOL/L (ref 132–146)

## 2024-05-09 PROCEDURE — 36415 COLL VENOUS BLD VENIPUNCTURE: CPT

## 2024-05-09 PROCEDURE — 80048 BASIC METABOLIC PNL TOTAL CA: CPT

## 2024-05-09 PROCEDURE — 99214 OFFICE O/P EST MOD 30 MIN: CPT

## 2024-05-09 PROCEDURE — 83880 ASSAY OF NATRIURETIC PEPTIDE: CPT

## 2024-05-09 NOTE — PROGRESS NOTES
Congestive Heart Failure Clinic   [facility]      Referring Provider: Dr Moreau  Primary Care Physician: Peter Campos Jr., MD   Cardiologist: Dr Moreau  Nephrologist: N/A      HISTORY OF PRESENT ILLNESS:     Enrique Gonzalez is a 79 y.o. (1945) male with a history of HFrEF (EF < 40%), most recent EF:  Lab Results   Component Value Date    LVEF 30 07/06/2023       He presents to the CHF clinic for ongoing evaluation and monitoring of heart failure.    In the CHF clinic today he denies any adverse symptoms except:  [] Dizziness or lightheadedness   [] Syncope or near syncope  [] Recent Fall  [] Shortness of breath at rest   [] Dyspnea with exertion  [] Decline in functional capacity (unable to perform activities they had previously been able to do)  [x] Fatigue   [] Orthopnea  [] PND  [] Nocturnal cough  [] Hemoptysis  [] Chest pain, pressure, or discomfort  [] Palpitations  [] Abdominal distention  [] Abdominal bloating  [] Early satiety  [] Blood in stool   [] Diarrhea  [] Constipation  [] Nausea/Vomiting  [] Decreased urinary response to oral diuretic   [] Scrotal swelling   [x] Lower extremity edema   [] Used PRN doses of oral diuretic   [] Weight gain    Wt Readings from Last 3 Encounters:   05/09/24 88.5 kg (195 lb 3.2 oz)   04/08/24 78 kg (172 lb)   03/08/24 76.2 kg (168 lb)       SOCIAL HISTORY:  [] Denies tobacco, alcohol or illicit drug abuse  [x] Tobacco use:pipe  [] ETOH use:  [] Illicit drug use:        MEDICATIONS:    Allergies   Allergen Reactions    Prednisone Nausea Only     Prior to Visit Medications    Medication Sig Taking? Authorizing Provider   spironolactone (ALDACTONE) 25 MG tablet Take 1 tablet by mouth 2 times daily Morning and evening  Luis Antonio Moreau, DO   Multiple Vitamins-Minerals (CENTRUM SILVER 50+MEN PO) Take by mouth  Provider, MD Tessie   furosemide (LASIX) 20 MG tablet Take 1 tablet by mouth every other day  Patient

## 2024-06-03 ENCOUNTER — OFFICE VISIT (OUTPATIENT)
Dept: NON INVASIVE DIAGNOSTICS | Age: 79
End: 2024-06-03
Payer: MEDICARE

## 2024-06-03 ENCOUNTER — NURSE ONLY (OUTPATIENT)
Dept: NON INVASIVE DIAGNOSTICS | Age: 79
End: 2024-06-03

## 2024-06-03 ENCOUNTER — TELEPHONE (OUTPATIENT)
Dept: CARDIOLOGY CLINIC | Age: 79
End: 2024-06-03

## 2024-06-03 VITALS
OXYGEN SATURATION: 97 % | WEIGHT: 190 LBS | SYSTOLIC BLOOD PRESSURE: 106 MMHG | BODY MASS INDEX: 33.66 KG/M2 | HEART RATE: 76 BPM | DIASTOLIC BLOOD PRESSURE: 40 MMHG | RESPIRATION RATE: 16 BRPM | HEIGHT: 63 IN

## 2024-06-03 DIAGNOSIS — Z95.810 CARDIAC RESYNCHRONIZATION THERAPY DEFIBRILLATOR (CRT-D) IN PLACE: Primary | ICD-10-CM

## 2024-06-03 DIAGNOSIS — R55 SYNCOPE, UNSPECIFIED SYNCOPE TYPE: Primary | ICD-10-CM

## 2024-06-03 PROCEDURE — 1123F ACP DISCUSS/DSCN MKR DOCD: CPT | Performed by: STUDENT IN AN ORGANIZED HEALTH CARE EDUCATION/TRAINING PROGRAM

## 2024-06-03 PROCEDURE — G8427 DOCREV CUR MEDS BY ELIG CLIN: HCPCS | Performed by: STUDENT IN AN ORGANIZED HEALTH CARE EDUCATION/TRAINING PROGRAM

## 2024-06-03 PROCEDURE — G8417 CALC BMI ABV UP PARAM F/U: HCPCS | Performed by: STUDENT IN AN ORGANIZED HEALTH CARE EDUCATION/TRAINING PROGRAM

## 2024-06-03 PROCEDURE — 3074F SYST BP LT 130 MM HG: CPT | Performed by: STUDENT IN AN ORGANIZED HEALTH CARE EDUCATION/TRAINING PROGRAM

## 2024-06-03 PROCEDURE — 93000 ELECTROCARDIOGRAM COMPLETE: CPT | Performed by: STUDENT IN AN ORGANIZED HEALTH CARE EDUCATION/TRAINING PROGRAM

## 2024-06-03 PROCEDURE — 99215 OFFICE O/P EST HI 40 MIN: CPT | Performed by: STUDENT IN AN ORGANIZED HEALTH CARE EDUCATION/TRAINING PROGRAM

## 2024-06-03 PROCEDURE — 3078F DIAST BP <80 MM HG: CPT | Performed by: STUDENT IN AN ORGANIZED HEALTH CARE EDUCATION/TRAINING PROGRAM

## 2024-06-03 PROCEDURE — 4004F PT TOBACCO SCREEN RCVD TLK: CPT | Performed by: STUDENT IN AN ORGANIZED HEALTH CARE EDUCATION/TRAINING PROGRAM

## 2024-06-03 RX ORDER — OMEGA-3S/DHA/EPA/FISH OIL/D3 300MG-1000
400 CAPSULE ORAL DAILY
COMMUNITY

## 2024-06-03 NOTE — PROGRESS NOTES
[facility] CARDIOLOGY  CARDIAC ELECTROPHYSIOLOGY DEPARTMENT/DIVISION OF CARDIOLOGY  Outpatient Progress Report  PATIENT: Enriqeu Gonzalez  MEDICAL RECORD NUMBER: [id number]  DATE OF SERVICE:  6/3/2024  ATTENDING ELECTROPHYSIOLOGIST:  Noble Ponce D.O.  REFERRING PHYSICIAN:  Peter Campos Jr., MD  CHIEF COMPLAINT: syncope    HPI: Enrique Gonzalez is a 79 y.o. male with a history of NYHA class II HFrEF-ischemic sp CABG x4 (12/13/12: LIMA-LAD, EL-D1, SVG-PLB, SVG-OM - Dr Bruno) and Medtronic CRT-D (DOI: 4/14/2023-Dr. Ponce), severe MR possibly primary, HTN, CVA (2020), and noncompliance. He is managed by Dr Moreau with aspirin 81 mg daily, atorvastatin 40 mg daily, furosemide 20 mg q. M/W/F, metoprolol XL 50 mg daily, Entresto 24-26 mg twice daily, and spironolactone 25 mg twice daily. In 12/2012, patient was diagnosed with multivessel CAD, which was treated with PTCA of LAD, then CABG x 4. Since that time, his LVEF has remained </= 35% despite > 3 months of GDMT. 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 In 2013, he was evaluated by EP (Dr Celis), who recommend primary prevention ICD, but patient declined. In 2022, he was noted to have moderate-severe MR, as well as IVCD (~150 msec) and 1* AV block. In 1/2023, an event monitor reported frequent brief episodes of SVT, as well as episodes of NSVT. In 4/2023, Medtronic CRT-D implant with the LV lead placed in a lateral branch of the posterolateral CS branch.  Later that month, he had sustained VT at 182 bpm (below tachy therapy zone).  His device programming was adjusted and amiodarone initiated.  In 5/2023, he had a fall resulting in hip fracture requiring ORIF.  In 10/2023, he was noted to have moderate MR.  He was evaluated by structural heart team.  [facility] reported progression of CAD (occluded SVG-PDA).  JINA reported moderate MR.  Around this time, patient reportedly stopped taking amiodarone on his own due to intolerance (fatigue).  He

## 2024-06-03 NOTE — PATIENT INSTRUCTIONS
START apixaban (Eliquis) 5 mg tablet, take 1 tablet by mouth every 12 hours. Sample provided today. Call [facility] prescription assistance office. If assistance not available there, then call [facility].  Continue remote monitoring of ICD every 91 days.  Dr Ponce office will attempt to obtain labs from PCP's office.  Follow-up with this office in ~1 year.

## 2024-06-03 NOTE — TELEPHONE ENCOUNTER
Call to [facility] Supplemental Ins s/w [place] coverage active efft 3/1/2011- plan N follows Medicare guidelines. Ref#[place] 6/3/2024

## 2024-06-04 ENCOUNTER — TELEPHONE (OUTPATIENT)
Dept: NON INVASIVE DIAGNOSTICS | Age: 79
End: 2024-06-04

## 2024-06-04 NOTE — TELEPHONE ENCOUNTER
----- Message from Noble Ponce DO sent at 6/3/2024 11:56 AM EDT -----  Regarding: PCP labs  Patient reports TSH, Free T4, and CBC drawn at PCP's office last month.    Please obtain a copy.    -Noble Ponce DO

## 2024-06-13 ENCOUNTER — TELEPHONE (OUTPATIENT)
Dept: NON INVASIVE DIAGNOSTICS | Age: 79
End: 2024-06-13

## 2024-06-13 NOTE — TELEPHONE ENCOUNTER
Patient called in today to speak to me about his height and weight. Patient stood as tall as he could the day of the visit and was weighed appropriately. He wants me to change his height to 5'6\" and weight to 157 lb. Patient claims that he is 5'6\" and 157 lbs The previous vitals from other visits show the range of what I have in for the OV on 6/63/24. Nothing was adjusted.   No significant past surgical history

## 2024-07-10 RX ORDER — SPIRONOLACTONE 25 MG/1
25 TABLET ORAL 2 TIMES DAILY
Qty: 180 TABLET | Refills: 3 | Status: SHIPPED | OUTPATIENT
Start: 2024-07-10

## 2024-07-10 RX ORDER — FUROSEMIDE 20 MG/1
20 TABLET ORAL EVERY OTHER DAY
Qty: 45 TABLET | Refills: 3 | Status: SHIPPED | OUTPATIENT
Start: 2024-07-10

## 2024-07-10 RX ORDER — METOPROLOL SUCCINATE 50 MG/1
50 TABLET, EXTENDED RELEASE ORAL DAILY
Qty: 90 TABLET | Refills: 3 | Status: SHIPPED | OUTPATIENT
Start: 2024-07-10

## 2024-07-11 ENCOUNTER — HOSPITAL ENCOUNTER (OUTPATIENT)
Dept: OTHER | Age: 79
Setting detail: THERAPIES SERIES
Discharge: HOME | End: 2024-07-11
Payer: MEDICARE

## 2024-07-11 VITALS
HEART RATE: 66 BPM | DIASTOLIC BLOOD PRESSURE: 63 MMHG | SYSTOLIC BLOOD PRESSURE: 121 MMHG | WEIGHT: 166.8 LBS | BODY MASS INDEX: 29.55 KG/M2 | OXYGEN SATURATION: 99 % | RESPIRATION RATE: 16 BRPM

## 2024-07-11 LAB
ANION GAP SERPL CALCULATED.3IONS-SCNC: 11 MMOL/L (ref 7–16)
BNP SERPL-MCNC: 1245 PG/ML (ref 0–450)
BUN SERPL-MCNC: 26 MG/DL (ref 6–23)
CALCIUM SERPL-MCNC: 8.8 MG/DL (ref 8.6–10.2)
CHLORIDE SERPL-SCNC: 101 MMOL/L (ref 98–107)
CO2 SERPL-SCNC: 22 MMOL/L (ref 22–29)
CREAT SERPL-MCNC: 1.2 MG/DL (ref 0.7–1.2)
GFR, ESTIMATED: 64 ML/MIN/1.73M2
GLUCOSE SERPL-MCNC: 104 MG/DL (ref 74–99)
POTASSIUM SERPL-SCNC: 4.1 MMOL/L (ref 3.5–5)
SODIUM SERPL-SCNC: 134 MMOL/L (ref 132–146)

## 2024-07-11 PROCEDURE — 99214 OFFICE O/P EST MOD 30 MIN: CPT

## 2024-07-11 PROCEDURE — 36415 COLL VENOUS BLD VENIPUNCTURE: CPT

## 2024-07-11 PROCEDURE — 83880 ASSAY OF NATRIURETIC PEPTIDE: CPT

## 2024-07-11 PROCEDURE — 80048 BASIC METABOLIC PNL TOTAL CA: CPT

## 2024-07-11 NOTE — PROGRESS NOTES
etc)  [] CHF CHW consulted  [] Low sodium meal delivery options given to patient  [] Dietician consulted   [] Low sodium recipes provided  [] Sodium free seasoning provided   [x] Fluid intake 6-8 cups (around 64 oz)  [x] Reviewed currently prescribed medications with patient, educated on importance of compliance and answered any questions regarding their medication  [] Pill box provided to patient  [] Patient using pill packing pharmacy   [] CPAP/BiPAP use  [] Low impact exercise / cardiac rehab   [] LifeVest use  [x] Patient aware of signs and symptoms of worsening HF, CHF clinic phone number provided and made aware to call clinic for sooner if evaluation if needed     [x] Difficulty affording medications   [x] CHF   [x] Prescription assistance information   [] [facility] medication assistance program information given   [] Sample medications provided to patient to help bridge gap until affordability N/A      Scheduled to follow up in CHF clinic on:   Future Appointments   Date Time Provider Department Center   10/8/2024  9:30 AM REYMUNDO [facility] ROOM 3 [facility]

## 2024-10-08 ENCOUNTER — TELEPHONE (OUTPATIENT)
Dept: CARDIOLOGY CLINIC | Age: 79
End: 2024-10-08

## 2024-10-08 ENCOUNTER — HOSPITAL ENCOUNTER (OUTPATIENT)
Dept: OTHER | Age: 79
Setting detail: THERAPIES SERIES
Discharge: HOME | End: 2024-10-08
Payer: MEDICARE

## 2024-10-08 VITALS
WEIGHT: 162.4 LBS | HEART RATE: 82 BPM | DIASTOLIC BLOOD PRESSURE: 56 MMHG | BODY MASS INDEX: 28.77 KG/M2 | SYSTOLIC BLOOD PRESSURE: 126 MMHG | OXYGEN SATURATION: 99 %

## 2024-10-08 LAB
ANION GAP SERPL CALCULATED.3IONS-SCNC: 8 MMOL/L (ref 7–16)
BNP SERPL-MCNC: 1381 PG/ML (ref 0–450)
BUN SERPL-MCNC: 27 MG/DL (ref 6–23)
CALCIUM SERPL-MCNC: 8.9 MG/DL (ref 8.6–10.2)
CHLORIDE SERPL-SCNC: 103 MMOL/L (ref 98–107)
CO2 SERPL-SCNC: 23 MMOL/L (ref 22–29)
CREAT SERPL-MCNC: 1 MG/DL (ref 0.7–1.2)
GFR, ESTIMATED: 74 ML/MIN/1.73M2
GLUCOSE SERPL-MCNC: 118 MG/DL (ref 74–99)
POTASSIUM SERPL-SCNC: 4.1 MMOL/L (ref 3.5–5)
SODIUM SERPL-SCNC: 134 MMOL/L (ref 132–146)

## 2024-10-08 PROCEDURE — 99214 OFFICE O/P EST MOD 30 MIN: CPT

## 2024-10-08 PROCEDURE — 80048 BASIC METABOLIC PNL TOTAL CA: CPT

## 2024-10-08 PROCEDURE — 83880 ASSAY OF NATRIURETIC PEPTIDE: CPT

## 2024-10-08 PROCEDURE — 36415 COLL VENOUS BLD VENIPUNCTURE: CPT

## 2024-10-08 ASSESSMENT — PATIENT HEALTH QUESTIONNAIRE - PHQ9
SUM OF ALL RESPONSES TO PHQ QUESTIONS 1-9: 0
SUM OF ALL RESPONSES TO PHQ QUESTIONS 1-9: 0
1. LITTLE INTEREST OR PLEASURE IN DOING THINGS: NOT AT ALL
SUM OF ALL RESPONSES TO PHQ QUESTIONS 1-9: 0
SUM OF ALL RESPONSES TO PHQ9 QUESTIONS 1 & 2: 0
2. FEELING DOWN, DEPRESSED OR HOPELESS: NOT AT ALL
SUM OF ALL RESPONSES TO PHQ QUESTIONS 1-9: 0

## 2024-10-08 NOTE — TELEPHONE ENCOUNTER
----- Message from Dr. Luis Antonio Moreau, DO sent at 10/8/2024 11:23 AM EDT -----  He takes the Lasix every other day.  Increase this to every day for 6 days and then go back to every other day.

## 2024-10-08 NOTE — PROGRESS NOTES
Congestive Heart Failure Clinic   [facility]      Referring Provider: Dr Moreau  Primary Care Physician: Peter Campos Jr., MD   Cardiologist: Dr Moreau  Nephrologist: N/A      HISTORY OF PRESENT ILLNESS:     Enrique Gonzalez is a 79 y.o. (1945) male with a history of HFrEF (EF < 40%), most recent EF:  Lab Results   Component Value Date    LVEF 25 02/28/2024    LVEFMODE Echo 02/28/2024     He presents to the CHF clinic for ongoing evaluation and monitoring of heart failure.  In the CHF clinic today he denies any adverse symptoms except:  [] Dizziness or lightheadedness   [] Syncope or near syncope  [] Recent Fall  [] Shortness of breath at rest   [] Dyspnea with exertion  [] Decline in functional capacity (unable to perform activities they had previously been able to do)  [x] Fatigue   [] Orthopnea  [] PND  [] Nocturnal cough  [] Hemoptysis  [] Chest pain, pressure, or discomfort  [] Palpitations  [] Abdominal distention  [] Abdominal bloating  [] Early satiety  [] Blood in stool   [] Diarrhea  [] Constipation  [] Nausea/Vomiting  [] Decreased urinary response to oral diuretic   [] Scrotal swelling   [x] Lower extremity edema -- unchanged  [] Used PRN doses of oral diuretic   [] Weight gain    Wt Readings from Last 3 Encounters:   10/08/24 73.7 kg (162 lb 6.4 oz)   07/11/24 75.7 kg (166 lb 12.8 oz)   06/03/24 86.2 kg (190 lb)     SOCIAL HISTORY:  [] Denies tobacco, alcohol or illicit drug abuse  [x] Tobacco use:pipe  [] ETOH use:  [] Illicit drug use:        MEDICATIONS:    Allergies   Allergen Reactions    Prednisone Nausea Only     Prior to Visit Medications    Medication Sig Taking? Authorizing Provider   furosemide (LASIX) 20 MG tablet Take 1 tablet by mouth every other day Yes Luis Antonio Moreau, DO   metoprolol succinate (TOPROL XL) 50 MG extended release tablet Take 1 tablet by mouth daily  Patient taking differently: Take 1 tablet by mouth at bedtime

## 2024-12-09 RX ORDER — SACUBITRIL AND VALSARTAN 24; 26 MG/1; MG/1
1 TABLET, FILM COATED ORAL 2 TIMES DAILY
Qty: 180 TABLET | Refills: 3 | Status: SHIPPED | OUTPATIENT
Start: 2024-12-09

## 2024-12-20 ENCOUNTER — TELEPHONE (OUTPATIENT)
Dept: NON INVASIVE DIAGNOSTICS | Age: 79
End: 2024-12-20

## 2024-12-20 NOTE — TELEPHONE ENCOUNTER
----- Message from Dr. Noble Ponce DO sent at 12/18/2024 11:09 PM EST -----  Regarding: appt  AF on remote.    EP appt to discuss longterm AF management plan.    -Noble Ponce DO

## 2025-01-07 ENCOUNTER — CLINICAL DOCUMENTATION (OUTPATIENT)
Dept: NON INVASIVE DIAGNOSTICS | Age: 80
End: 2025-01-07

## 2025-01-07 NOTE — PROGRESS NOTES
Remote transmission shows atrial fibrillation since January 2, 2025.  BiV pacing down to 85.8%    Eliquis listed but states not taking on chart.   Toprol on home med list as 50 mg in the evening.     Please discuss with patient to take eliquis as prescribed to decrease stroke risk, I can send a new RX if needed.  Also please have him increase toprol to 25 mg in am and continue 50 mg pm.   He has appt in office on 1/28/25     Erica Webster, APRN - CNP

## 2025-01-08 ENCOUNTER — TELEPHONE (OUTPATIENT)
Dept: NON INVASIVE DIAGNOSTICS | Age: 80
End: 2025-01-08

## 2025-01-09 ENCOUNTER — HOSPITAL ENCOUNTER (INPATIENT)
Age: 80
LOS: 4 days | Discharge: HOME | DRG: 291 | End: 2025-01-13
Attending: EMERGENCY MEDICINE | Admitting: INTERNAL MEDICINE
Payer: MEDICARE

## 2025-01-09 ENCOUNTER — APPOINTMENT (OUTPATIENT)
Dept: GENERAL RADIOLOGY | Age: 80
DRG: 291 | End: 2025-01-09
Payer: MEDICARE

## 2025-01-09 DIAGNOSIS — I48.91 ATRIAL FIBRILLATION WITH RVR (HCC): ICD-10-CM

## 2025-01-09 DIAGNOSIS — I50.9 ACUTE ON CHRONIC CONGESTIVE HEART FAILURE, UNSPECIFIED HEART FAILURE TYPE (HCC): Primary | ICD-10-CM

## 2025-01-09 DIAGNOSIS — U07.1 COVID-19: ICD-10-CM

## 2025-01-09 DIAGNOSIS — R06.02 SHORTNESS OF BREATH: ICD-10-CM

## 2025-01-09 PROBLEM — E87.70 FLUID OVERLOAD: Status: ACTIVE | Noted: 2025-01-09

## 2025-01-09 LAB
ANION GAP SERPL CALCULATED.3IONS-SCNC: 12 MMOL/L (ref 7–16)
B.E.: -8.2 MMOL/L (ref -3–3)
BASOPHILS # BLD: 0.02 K/UL (ref 0–0.2)
BASOPHILS NFR BLD: 0 % (ref 0–2)
BNP SERPL-MCNC: 5003 PG/ML (ref 0–450)
BUN SERPL-MCNC: 31 MG/DL (ref 6–23)
CALCIUM SERPL-MCNC: 9 MG/DL (ref 8.6–10.2)
CHLORIDE SERPL-SCNC: 101 MMOL/L (ref 98–107)
CO2 SERPL-SCNC: 23 MMOL/L (ref 22–29)
COHB: 0.5 % (ref 0–1.5)
CREAT SERPL-MCNC: 1.3 MG/DL (ref 0.7–1.2)
CRITICAL: ABNORMAL
DATE ANALYZED: ABNORMAL
DATE OF COLLECTION: ABNORMAL
EKG ATRIAL RATE: 114 BPM
EKG P AXIS: 90 DEGREES
EKG Q-T INTERVAL: 346 MS
EKG QRS DURATION: 108 MS
EKG QTC CALCULATION (BAZETT): 476 MS
EKG R AXIS: -95 DEGREES
EKG T AXIS: 90 DEGREES
EKG VENTRICULAR RATE: 114 BPM
EOSINOPHIL # BLD: 0.08 K/UL (ref 0.05–0.5)
EOSINOPHILS RELATIVE PERCENT: 1 % (ref 0–6)
ERYTHROCYTE [DISTWIDTH] IN BLOOD BY AUTOMATED COUNT: 12.9 % (ref 11.5–15)
GFR, ESTIMATED: 56 ML/MIN/1.73M2
GLUCOSE SERPL-MCNC: 125 MG/DL (ref 74–99)
HCO3: 17.6 MMOL/L (ref 22–26)
HCT VFR BLD AUTO: 35 % (ref 37–54)
HGB BLD-MCNC: 12 G/DL (ref 12.5–16.5)
HHB: 18 % (ref 0–5)
IMM GRANULOCYTES # BLD AUTO: 0.04 K/UL (ref 0–0.58)
IMM GRANULOCYTES NFR BLD: 1 % (ref 0–5)
INFLUENZA A BY PCR: NOT DETECTED
INFLUENZA B BY PCR: NOT DETECTED
LAB: ABNORMAL
LACTATE BLDV-SCNC: 1.3 MMOL/L (ref 0.5–2.2)
LYMPHOCYTES NFR BLD: 0.77 K/UL (ref 1.5–4)
LYMPHOCYTES RELATIVE PERCENT: 10 % (ref 20–42)
Lab: 2112
MAGNESIUM SERPL-MCNC: 2.1 MG/DL (ref 1.6–2.6)
MCH RBC QN AUTO: 36.6 PG (ref 26–35)
MCHC RBC AUTO-ENTMCNC: 34.3 G/DL (ref 32–34.5)
MCV RBC AUTO: 106.7 FL (ref 80–99.9)
METHB: 0.3 % (ref 0–1.5)
MODE: ABNORMAL
MONOCYTES NFR BLD: 0.99 K/UL (ref 0.1–0.95)
MONOCYTES NFR BLD: 13 % (ref 2–12)
NEUTROPHILS NFR BLD: 76 % (ref 43–80)
NEUTS SEG NFR BLD: 5.86 K/UL (ref 1.8–7.3)
O2 CONTENT: 15.6 ML/DL
O2 SATURATION: 81.9 % (ref 92–98.5)
O2HB: 81.2 % (ref 94–97)
OPERATOR ID: ABNORMAL
PATIENT TEMP: 37 C
PCO2: 37.5 MMHG (ref 35–45)
PH BLOOD GAS: 7.29 (ref 7.35–7.45)
PLATELET # BLD AUTO: 212 K/UL (ref 130–450)
PMV BLD AUTO: 10.5 FL (ref 7–12)
PO2: 52.2 MMHG (ref 75–100)
POTASSIUM SERPL-SCNC: 4.3 MMOL/L (ref 3.5–5)
RBC # BLD AUTO: 3.28 M/UL (ref 3.8–5.8)
SARS-COV-2 RDRP RESP QL NAA+PROBE: DETECTED
SODIUM SERPL-SCNC: 136 MMOL/L (ref 132–146)
SOURCE, BLOOD GAS: ABNORMAL
SPECIMEN DESCRIPTION: ABNORMAL
THB: 13.7 G/DL (ref 11.5–16.5)
TIME ANALYZED: 2121
TROPONIN I SERPL HS-MCNC: 32 NG/L (ref 0–11)
TROPONIN I SERPL HS-MCNC: 34 NG/L (ref 0–11)
WBC OTHER # BLD: 7.8 K/UL (ref 4.5–11.5)

## 2025-01-09 PROCEDURE — 94660 CPAP INITIATION&MGMT: CPT

## 2025-01-09 PROCEDURE — 96375 TX/PRO/DX INJ NEW DRUG ADDON: CPT

## 2025-01-09 PROCEDURE — 87635 SARS-COV-2 COVID-19 AMP PRB: CPT

## 2025-01-09 PROCEDURE — APPSS60 APP SPLIT SHARED TIME 46-60 MINUTES

## 2025-01-09 PROCEDURE — 82805 BLOOD GASES W/O2 SATURATION: CPT

## 2025-01-09 PROCEDURE — 6370000000 HC RX 637 (ALT 250 FOR IP): Performed by: EMERGENCY MEDICINE

## 2025-01-09 PROCEDURE — 93010 ELECTROCARDIOGRAM REPORT: CPT | Performed by: INTERNAL MEDICINE

## 2025-01-09 PROCEDURE — 96374 THER/PROPH/DIAG INJ IV PUSH: CPT

## 2025-01-09 PROCEDURE — 71045 X-RAY EXAM CHEST 1 VIEW: CPT

## 2025-01-09 PROCEDURE — 2060000000 HC ICU INTERMEDIATE R&B

## 2025-01-09 PROCEDURE — 93005 ELECTROCARDIOGRAM TRACING: CPT | Performed by: EMERGENCY MEDICINE

## 2025-01-09 PROCEDURE — 2580000003 HC RX 258: Performed by: EMERGENCY MEDICINE

## 2025-01-09 PROCEDURE — 80048 BASIC METABOLIC PNL TOTAL CA: CPT

## 2025-01-09 PROCEDURE — 87502 INFLUENZA DNA AMP PROBE: CPT

## 2025-01-09 PROCEDURE — 83605 ASSAY OF LACTIC ACID: CPT

## 2025-01-09 PROCEDURE — 2500000003 HC RX 250 WO HCPCS: Performed by: NURSE PRACTITIONER

## 2025-01-09 PROCEDURE — 2500000003 HC RX 250 WO HCPCS: Performed by: EMERGENCY MEDICINE

## 2025-01-09 PROCEDURE — 84484 ASSAY OF TROPONIN QUANT: CPT

## 2025-01-09 PROCEDURE — 83735 ASSAY OF MAGNESIUM: CPT

## 2025-01-09 PROCEDURE — 83880 ASSAY OF NATRIURETIC PEPTIDE: CPT

## 2025-01-09 PROCEDURE — 85025 COMPLETE CBC W/AUTO DIFF WBC: CPT

## 2025-01-09 PROCEDURE — 99285 EMERGENCY DEPT VISIT HI MDM: CPT

## 2025-01-09 PROCEDURE — 99222 1ST HOSP IP/OBS MODERATE 55: CPT | Performed by: INTERNAL MEDICINE

## 2025-01-09 PROCEDURE — 93005 ELECTROCARDIOGRAM TRACING: CPT | Performed by: INTERNAL MEDICINE

## 2025-01-09 PROCEDURE — 6360000002 HC RX W HCPCS: Performed by: EMERGENCY MEDICINE

## 2025-01-09 PROCEDURE — 6370000000 HC RX 637 (ALT 250 FOR IP): Performed by: NURSE PRACTITIONER

## 2025-01-09 RX ORDER — IBUPROFEN 800 MG/1
800 TABLET, FILM COATED ORAL ONCE
Status: COMPLETED | OUTPATIENT
Start: 2025-01-09 | End: 2025-01-09

## 2025-01-09 RX ORDER — ONDANSETRON 2 MG/ML
4 INJECTION INTRAMUSCULAR; INTRAVENOUS EVERY 6 HOURS PRN
Status: DISCONTINUED | OUTPATIENT
Start: 2025-01-09 | End: 2025-01-13 | Stop reason: HOSPADM

## 2025-01-09 RX ORDER — ATORVASTATIN CALCIUM 40 MG/1
40 TABLET, FILM COATED ORAL NIGHTLY
Status: DISCONTINUED | OUTPATIENT
Start: 2025-01-09 | End: 2025-01-13 | Stop reason: HOSPADM

## 2025-01-09 RX ORDER — BUMETANIDE 0.25 MG/ML
1 INJECTION, SOLUTION INTRAMUSCULAR; INTRAVENOUS ONCE
Status: COMPLETED | OUTPATIENT
Start: 2025-01-09 | End: 2025-01-09

## 2025-01-09 RX ORDER — ACETAMINOPHEN 325 MG/1
650 TABLET ORAL EVERY 6 HOURS PRN
Status: DISCONTINUED | OUTPATIENT
Start: 2025-01-09 | End: 2025-01-13 | Stop reason: HOSPADM

## 2025-01-09 RX ORDER — SODIUM CHLORIDE 0.9 % (FLUSH) 0.9 %
10 SYRINGE (ML) INJECTION PRN
Status: DISCONTINUED | OUTPATIENT
Start: 2025-01-09 | End: 2025-01-13 | Stop reason: HOSPADM

## 2025-01-09 RX ORDER — SPIRONOLACTONE 25 MG/1
25 TABLET ORAL 2 TIMES DAILY
Status: DISCONTINUED | OUTPATIENT
Start: 2025-01-09 | End: 2025-01-12

## 2025-01-09 RX ORDER — POTASSIUM CHLORIDE 1500 MG/1
40 TABLET, EXTENDED RELEASE ORAL PRN
Status: DISCONTINUED | OUTPATIENT
Start: 2025-01-09 | End: 2025-01-13 | Stop reason: HOSPADM

## 2025-01-09 RX ORDER — ACETAMINOPHEN 650 MG/1
650 SUPPOSITORY RECTAL EVERY 6 HOURS PRN
Status: DISCONTINUED | OUTPATIENT
Start: 2025-01-09 | End: 2025-01-13 | Stop reason: HOSPADM

## 2025-01-09 RX ORDER — SODIUM CHLORIDE 0.9 % (FLUSH) 0.9 %
5-40 SYRINGE (ML) INJECTION EVERY 12 HOURS SCHEDULED
Status: DISCONTINUED | OUTPATIENT
Start: 2025-01-09 | End: 2025-01-13 | Stop reason: HOSPADM

## 2025-01-09 RX ORDER — POTASSIUM CHLORIDE 7.45 MG/ML
10 INJECTION INTRAVENOUS PRN
Status: DISCONTINUED | OUTPATIENT
Start: 2025-01-09 | End: 2025-01-13 | Stop reason: HOSPADM

## 2025-01-09 RX ORDER — DILTIAZEM HYDROCHLORIDE 100 MG/1
INJECTION, POWDER, LYOPHILIZED, FOR SOLUTION INTRAVENOUS
Status: DISPENSED
Start: 2025-01-09 | End: 2025-01-10

## 2025-01-09 RX ORDER — MAGNESIUM SULFATE IN WATER 40 MG/ML
2000 INJECTION, SOLUTION INTRAVENOUS PRN
Status: DISCONTINUED | OUTPATIENT
Start: 2025-01-09 | End: 2025-01-13 | Stop reason: HOSPADM

## 2025-01-09 RX ORDER — ASPIRIN 81 MG/1
81 TABLET, CHEWABLE ORAL 2 TIMES DAILY
Status: DISCONTINUED | OUTPATIENT
Start: 2025-01-09 | End: 2025-01-13 | Stop reason: HOSPADM

## 2025-01-09 RX ORDER — DILTIAZEM HYDROCHLORIDE 5 MG/ML
10 INJECTION INTRAVENOUS ONCE
Status: COMPLETED | OUTPATIENT
Start: 2025-01-09 | End: 2025-01-09

## 2025-01-09 RX ORDER — METOPROLOL SUCCINATE 50 MG/1
50 TABLET, EXTENDED RELEASE ORAL NIGHTLY
Status: DISCONTINUED | OUTPATIENT
Start: 2025-01-09 | End: 2025-01-13 | Stop reason: HOSPADM

## 2025-01-09 RX ORDER — METOPROLOL TARTRATE 1 MG/ML
2.5 INJECTION, SOLUTION INTRAVENOUS ONCE
Status: COMPLETED | OUTPATIENT
Start: 2025-01-09 | End: 2025-01-09

## 2025-01-09 RX ORDER — FUROSEMIDE 20 MG/1
20 TABLET ORAL EVERY OTHER DAY
Status: DISCONTINUED | OUTPATIENT
Start: 2025-01-09 | End: 2025-01-13 | Stop reason: HOSPADM

## 2025-01-09 RX ORDER — ONDANSETRON 4 MG/1
4 TABLET, ORALLY DISINTEGRATING ORAL EVERY 8 HOURS PRN
Status: DISCONTINUED | OUTPATIENT
Start: 2025-01-09 | End: 2025-01-13 | Stop reason: HOSPADM

## 2025-01-09 RX ORDER — SODIUM CHLORIDE 9 MG/ML
INJECTION, SOLUTION INTRAVENOUS PRN
Status: DISCONTINUED | OUTPATIENT
Start: 2025-01-09 | End: 2025-01-13 | Stop reason: HOSPADM

## 2025-01-09 RX ORDER — LORAZEPAM 2 MG/ML
0.5 INJECTION INTRAMUSCULAR ONCE
Status: COMPLETED | OUTPATIENT
Start: 2025-01-09 | End: 2025-01-09

## 2025-01-09 RX ADMIN — SODIUM CHLORIDE, PRESERVATIVE FREE 10 ML: 5 INJECTION INTRAVENOUS at 23:47

## 2025-01-09 RX ADMIN — DILTIAZEM HYDROCHLORIDE 10 MG: 5 INJECTION, SOLUTION INTRAVENOUS at 20:36

## 2025-01-09 RX ADMIN — BUMETANIDE 1 MG: 0.25 INJECTION INTRAMUSCULAR; INTRAVENOUS at 08:05

## 2025-01-09 RX ADMIN — IBUPROFEN 800 MG: 800 TABLET, FILM COATED ORAL at 10:15

## 2025-01-09 RX ADMIN — METOPROLOL TARTRATE 2.5 MG: 5 INJECTION INTRAVENOUS at 08:05

## 2025-01-09 RX ADMIN — SODIUM CHLORIDE 5 MG/HR: 900 INJECTION, SOLUTION INTRAVENOUS at 20:39

## 2025-01-09 RX ADMIN — FUROSEMIDE 20 MG: 40 TABLET ORAL at 21:05

## 2025-01-09 RX ADMIN — LORAZEPAM 0.5 MG: 2 INJECTION INTRAMUSCULAR; INTRAVENOUS at 10:05

## 2025-01-09 ASSESSMENT — PAIN SCALES - GENERAL
PAINLEVEL_OUTOF10: 0
PAINLEVEL_OUTOF10: 0

## 2025-01-09 ASSESSMENT — LIFESTYLE VARIABLES
HOW MANY STANDARD DRINKS CONTAINING ALCOHOL DO YOU HAVE ON A TYPICAL DAY: PATIENT DOES NOT DRINK
HOW OFTEN DO YOU HAVE A DRINK CONTAINING ALCOHOL: NEVER

## 2025-01-09 ASSESSMENT — PAIN - FUNCTIONAL ASSESSMENT
PAIN_FUNCTIONAL_ASSESSMENT: 0-10
PAIN_FUNCTIONAL_ASSESSMENT: NONE - DENIES PAIN

## 2025-01-09 NOTE — ED NOTES
Patient placed in gown, and placed on monitor.  He states that he has had a cough for a while, and has a history of CHF.  He sees Dr. Moreau's cardiology group.   He reports taking 2 baby aspirins daily, and that they have been considering putting him on Warfarin for his a fib.

## 2025-01-09 NOTE — ED NOTES
Patient has a Medtronic pacemaker.  He states that they called him on Tuesday and told him that he has been in A-fib since 01/02/2025.  Pacemaker has been interrogated.

## 2025-01-09 NOTE — DISCHARGE INSTRUCTIONS
If you have trouble affording medications -  [facility]  [address] Medication program.   Call the clinic to find out if your medications are covered and/or schedule your office visit in order to obtain medications  [phone]  Medications offered at a discounted rate, most medications are a 90 day supply for $8 and the highest a medication would be is $30  Hospital Address: [address] [place] [address], [place] [address]   The clinic is on the 1st floor of [facility] and the pharmacy where you obtain the medications is also in the hospital                           HEART FAILURE  / CONGESTIVE HEART FAILURE  DISCHARGE INSTRUCTIONS:  GUIDELINES TO FOLLOW AT HOME    Self- Managed Care:     MEDICATIONS:  Take your medication as directed. If you are experiencing any side effects, inform your doctor, Do not stop taking any of your medications without letting your doctor know.   Check with your doctor before taking any over-the-counter medications / herbal / or dietary supplements. They may interfere with your other medications.  Do not take ibuprofen (Advil or Motrin) and naproxen (Aleve) without talking to your doctor first. They could make your heart failure worse.         WEIGHT MONITORING:   Weigh yourself everyday (with the same scale) around the same time of the day and write it down. (you can chart them on a calendar or keep track of them on paper.   Notify your doctor of a weight gain of 3 pounds or more in 1 day   OR a total of 5 pounds or more in 1 week    Take your weight record to your doctor visits  Also, the same goes if you loose more than 3# in one day, let your heart doctor know.         DIET:   Cardiac heart healthy diet- Low saturated / low trans fat, no added salt, caffeine restricted, Low sodium diet-   No more than 2,000mg (2 grams) of salt / sodium per day (which equals to a little less than  a teaspoon of salt)  If your doctor wants you on a fluid restriction...it is

## 2025-01-09 NOTE — ED NOTES
Patient states that he lives alone, and that has concerns about being home alone. He would like to speak to someone on this.

## 2025-01-09 NOTE — ED NOTES
Anand from [employer] called and he states that the patient has been in an atrial arrhythmia for the past 7 days.  He also says that the fluid markers for CHF have crossed the threshold, and is still ongoing over the threshold.  He also said that there seems to be an issue with one of the leads, that it is under reporting, and showing an end to episodes that are still ongoing.  He will send over his report shortly.

## 2025-01-10 LAB
ANION GAP SERPL CALCULATED.3IONS-SCNC: 13 MMOL/L (ref 7–16)
BASOPHILS # BLD: 0.01 K/UL (ref 0–0.2)
BASOPHILS NFR BLD: 0 % (ref 0–2)
BUN SERPL-MCNC: 39 MG/DL (ref 6–23)
CALCIUM SERPL-MCNC: 8.4 MG/DL (ref 8.6–10.2)
CHLORIDE SERPL-SCNC: 104 MMOL/L (ref 98–107)
CHOLEST SERPL-MCNC: 99 MG/DL
CO2 SERPL-SCNC: 21 MMOL/L (ref 22–29)
CREAT SERPL-MCNC: 1.5 MG/DL (ref 0.7–1.2)
EOSINOPHIL # BLD: 0 K/UL (ref 0.05–0.5)
EOSINOPHILS RELATIVE PERCENT: 0 % (ref 0–6)
ERYTHROCYTE [DISTWIDTH] IN BLOOD BY AUTOMATED COUNT: 13 % (ref 11.5–15)
FERRITIN SERPL-MCNC: 147 NG/ML
GFR, ESTIMATED: 46 ML/MIN/1.73M2
GLUCOSE SERPL-MCNC: 141 MG/DL (ref 74–99)
HCT VFR BLD AUTO: 36 % (ref 37–54)
HDLC SERPL-MCNC: 44 MG/DL
HGB BLD-MCNC: 12 G/DL (ref 12.5–16.5)
IMM GRANULOCYTES # BLD AUTO: <0.03 K/UL (ref 0–0.58)
IMM GRANULOCYTES NFR BLD: 0 % (ref 0–5)
IRON SATN MFR SERPL: 8 % (ref 20–55)
IRON SERPL-MCNC: 21 UG/DL (ref 59–158)
LDLC SERPL CALC-MCNC: 46 MG/DL
LYMPHOCYTES NFR BLD: 0.63 K/UL (ref 1.5–4)
LYMPHOCYTES RELATIVE PERCENT: 7 % (ref 20–42)
MAGNESIUM SERPL-MCNC: 2.1 MG/DL (ref 1.6–2.6)
MCH RBC QN AUTO: 36.1 PG (ref 26–35)
MCHC RBC AUTO-ENTMCNC: 33.3 G/DL (ref 32–34.5)
MCV RBC AUTO: 108.4 FL (ref 80–99.9)
MONOCYTES NFR BLD: 0.69 K/UL (ref 0.1–0.95)
MONOCYTES NFR BLD: 7 % (ref 2–12)
NEUTROPHILS NFR BLD: 86 % (ref 43–80)
NEUTS SEG NFR BLD: 8.39 K/UL (ref 1.8–7.3)
PLATELET CONFIRMATION: NORMAL
PLATELET, FLUORESCENCE: 180 K/UL (ref 130–450)
PMV BLD AUTO: 11.8 FL (ref 7–12)
POTASSIUM SERPL-SCNC: 4.7 MMOL/L (ref 3.5–5)
RBC # BLD AUTO: 3.32 M/UL (ref 3.8–5.8)
RBC # BLD: NORMAL 10*6/UL
SODIUM SERPL-SCNC: 138 MMOL/L (ref 132–146)
T4 FREE SERPL-MCNC: 1.3 NG/DL (ref 0.9–1.7)
TIBC SERPL-MCNC: 278 UG/DL (ref 250–450)
TRIGL SERPL-MCNC: 44 MG/DL
TSH SERPL DL<=0.05 MIU/L-ACNC: 0.85 UIU/ML (ref 0.27–4.2)
VLDLC SERPL CALC-MCNC: 9 MG/DL
WBC OTHER # BLD: 9.7 K/UL (ref 4.5–11.5)

## 2025-01-10 PROCEDURE — 80048 BASIC METABOLIC PNL TOTAL CA: CPT

## 2025-01-10 PROCEDURE — 84439 ASSAY OF FREE THYROXINE: CPT

## 2025-01-10 PROCEDURE — 97165 OT EVAL LOW COMPLEX 30 MIN: CPT

## 2025-01-10 PROCEDURE — 99233 SBSQ HOSP IP/OBS HIGH 50: CPT | Performed by: INTERNAL MEDICINE

## 2025-01-10 PROCEDURE — 83550 IRON BINDING TEST: CPT

## 2025-01-10 PROCEDURE — 5A09357 ASSISTANCE WITH RESPIRATORY VENTILATION, LESS THAN 24 CONSECUTIVE HOURS, CONTINUOUS POSITIVE AIRWAY PRESSURE: ICD-10-PCS | Performed by: INTERNAL MEDICINE

## 2025-01-10 PROCEDURE — 97161 PT EVAL LOW COMPLEX 20 MIN: CPT

## 2025-01-10 PROCEDURE — 82728 ASSAY OF FERRITIN: CPT

## 2025-01-10 PROCEDURE — 83735 ASSAY OF MAGNESIUM: CPT

## 2025-01-10 PROCEDURE — 6370000000 HC RX 637 (ALT 250 FOR IP): Performed by: NURSE PRACTITIONER

## 2025-01-10 PROCEDURE — 85025 COMPLETE CBC W/AUTO DIFF WBC: CPT

## 2025-01-10 PROCEDURE — 83540 ASSAY OF IRON: CPT

## 2025-01-10 PROCEDURE — 2700000000 HC OXYGEN THERAPY PER DAY

## 2025-01-10 PROCEDURE — 94660 CPAP INITIATION&MGMT: CPT

## 2025-01-10 PROCEDURE — 84443 ASSAY THYROID STIM HORMONE: CPT

## 2025-01-10 PROCEDURE — 97535 SELF CARE MNGMENT TRAINING: CPT

## 2025-01-10 PROCEDURE — 2060000000 HC ICU INTERMEDIATE R&B

## 2025-01-10 PROCEDURE — 2500000003 HC RX 250 WO HCPCS: Performed by: NURSE PRACTITIONER

## 2025-01-10 PROCEDURE — 80061 LIPID PANEL: CPT

## 2025-01-10 RX ADMIN — SPIRONOLACTONE 25 MG: 25 TABLET ORAL at 21:12

## 2025-01-10 RX ADMIN — CHOLECALCIFEROL TAB 10 MCG (400 UNIT) 400 UNITS: 10 TAB at 08:53

## 2025-01-10 RX ADMIN — SPIRONOLACTONE 25 MG: 25 TABLET ORAL at 08:53

## 2025-01-10 RX ADMIN — METOPROLOL SUCCINATE 50 MG: 50 TABLET, EXTENDED RELEASE ORAL at 21:12

## 2025-01-10 RX ADMIN — ASPIRIN 81 MG CHEWABLE TABLET 81 MG: 81 TABLET CHEWABLE at 08:53

## 2025-01-10 RX ADMIN — SACUBITRIL AND VALSARTAN 1 TABLET: 24; 26 TABLET, FILM COATED ORAL at 08:53

## 2025-01-10 RX ADMIN — ATORVASTATIN CALCIUM 40 MG: 40 TABLET, FILM COATED ORAL at 21:12

## 2025-01-10 RX ADMIN — ASPIRIN 81 MG CHEWABLE TABLET 81 MG: 81 TABLET CHEWABLE at 21:12

## 2025-01-10 RX ADMIN — SODIUM CHLORIDE, PRESERVATIVE FREE 10 ML: 5 INJECTION INTRAVENOUS at 08:55

## 2025-01-10 RX ADMIN — SODIUM CHLORIDE, PRESERVATIVE FREE 10 ML: 5 INJECTION INTRAVENOUS at 21:13

## 2025-01-10 RX ADMIN — SACUBITRIL AND VALSARTAN 1 TABLET: 24; 26 TABLET, FILM COATED ORAL at 21:12

## 2025-01-10 NOTE — ED PROVIDER NOTES
1/9/25  8:31 PM EST      Patient presently admitted and boarded in the department pending bed availability.  Intervention required by emergency physician.     Interval HPI:      Interval physical exam:     Constitutional/General: Alert and oriented x3    Head: Normocephalic and atraumatic  Eyes: PERRL, EOMI, sclera non icteric  Mouth: Oropharynx clear, handling secretions,   Neck: Supple, full ROM, no stridor, no meningeal signs  Respiratory: Lungs clear to auscultation bilaterally,Not in respiratory distress    Cardiovascular: Irregularly irregular tachycardia.  2+ distal pulses. Equal extremity pulses.    GI:  Abdomen Soft, Non tender, Non distended. No rebound, guarding, or rigidity.   Musculoskeletal: Moves all extremities x 4. Warm and well perfused,  Capillary refill <3 seconds  Integument: skin warm and dry. No rashes.   Neurologic: Glascow Coma Scale  Best Eye Response 4 - Opens eyes on own   Best Verbal Response 5 - Alert and oriented   Best Motor Response 6 - Follows simple motor commands   Total 15         Medications   apixaban (ELIQUIS) tablet 5 mg (has no administration in time range)   aspirin chewable tablet 81 mg (has no administration in time range)   atorvastatin (LIPITOR) tablet 40 mg (has no administration in time range)   furosemide (LASIX) tablet 20 mg (has no administration in time range)   metoprolol succinate (TOPROL XL) extended release tablet 50 mg (has no administration in time range)   sacubitril-valsartan (ENTRESTO) 24-26 MG per tablet 1 tablet (has no administration in time range)   spironolactone (ALDACTONE) tablet 25 mg (has no administration in time range)   cholecalciferol (VITAMIN D3) tablet TABS 400 Units (has no administration in time range)   sodium chloride flush 0.9 % injection 5-40 mL (has no administration in time range)   sodium chloride flush 0.9 % injection 10 mL (has no administration in time range)   0.9 % sodium chloride infusion (has no administration in time

## 2025-01-10 NOTE — ED NOTES
ED to Inpatient Handoff Report    Notified 6w that electronic handoff available and patient ready for transport to room 612.    Safety Risks: Difficulty with daily activities and Risk of falls    Patient in Restraints: no    Constant Observer or Patient : no    Telemetry Monitoring Ordered :Yes      Cardiac Rhythm: Ventricular paced    Order to transfer to unit without monitor:NO    Last MEWS: 1 Time completed: 1847    Deterioration Index Score:   Predictive Model Details          32 (Caution)  Factor Value    Calculated 1/9/2025 19:07 38% Age 79 years old    Deterioration Index Model 24% Respiratory rate 22     12% Cardiac rhythm Ventricular paced     11% Pulse 107     6% Potassium 4.3 mmol/L     3% BUN abnormal (31 mg/dL)     3% Systolic 125     2% Sodium 136 mmol/L     1% Hematocrit abnormal (35.0 %)     1% Temperature 99 °F (37.2 °C)     0% Pulse oximetry 98 %     0% WBC count 7.8 k/uL        Vitals:    01/09/25 1123 01/09/25 1223 01/09/25 1822 01/09/25 1847   BP: 105/61 112/69 125/78    Pulse: 92 85 (!) 119 (!) 107   Resp:  20 26 22   Temp:       TempSrc:       SpO2:  95% 98% 98%   Weight:       Height:             Opportunity for questions and clarification was provided.

## 2025-01-10 NOTE — PLAN OF CARE
Problem: Discharge Planning  Goal: Discharge to home or other facility with appropriate resources  1/10/2025 1150 by Precious Young, RN  Outcome: Progressing  Flowsheets (Taken 1/10/2025 0800)  Discharge to home or other facility with appropriate resources:   Identify barriers to discharge with patient and caregiver   Arrange for needed discharge resources and transportation as appropriate   Identify discharge learning needs (meds, wound care, etc)   Arrange for interpreters to assist at discharge as needed   Refer to discharge planning if patient needs post-hospital services based on physician order or complex needs related to functional status, cognitive ability or social support system  1/10/2025 0225 by Preston Saenz, RN  Outcome: Progressing  Flowsheets (Taken 1/9/2025 1302 by Huong Tripathi, RN)  Discharge to home or other facility with appropriate resources: Refer to discharge planning if patient needs post-hospital services based on physician order or complex needs related to functional status, cognitive ability or social support system     Problem: ABCDS Injury Assessment  Goal: Absence of physical injury  1/10/2025 1150 by Precious Young RN  Outcome: Progressing  1/10/2025 0225 by Preston Saenz, RN  Outcome: Progressing     Problem: Chronic Conditions and Co-morbidities  Goal: Patient's chronic conditions and co-morbidity symptoms are monitored and maintained or improved  1/10/2025 1150 by Precious Young, RN  Outcome: Progressing  Flowsheets (Taken 1/10/2025 0800)  Care Plan - Patient's Chronic Conditions and Co-Morbidity Symptoms are Monitored and Maintained or Improved:   Monitor and assess patient's chronic conditions and comorbid symptoms for stability, deterioration, or improvement   Collaborate with multidisciplinary team to address chronic and comorbid conditions and prevent exacerbation or deterioration   Update acute care plan with appropriate goals if chronic or comorbid symptoms are

## 2025-01-10 NOTE — ACP (ADVANCE CARE PLANNING)
Advance Care Planning   Healthcare Decision Maker:    Primary Decision Maker: Syeda Gonzalez - Child - [phone]    Click here to complete Healthcare Decision Makers including selection of the Healthcare Decision Maker Relationship (ie \"Primary\").  Today we documented Decision Maker(s) consistent with Legal Next of Kin hierarchy.

## 2025-01-10 NOTE — H&P
[facility]  History and Physical      CHIEF COMPLAINT:    Chief Complaint   Patient presents with    Shortness of Breath     SOB, in Afib per his pacemaker         Patient of Peter Campos Jr., MD presents with:  Fluid overload    History of Present Illness:   Mr. Gonzalez is a 79 year old  male with a past medical history of CAD s/p CABG 12/2012, chronic HFrEF, ischemic cardiomyopathy, s/p CRT-D implantation 04/14/2023, PAF, chronic anticoagulation with Eliquis (patient stopped in the past secondary to cost), currently taking  mg daily and is considering Coumadin, VT, SVT, HTN, HLD, CVA, medical noncompliance secondary to financial constraints, and osteoarthritis.  Mr. Gonzalez presented to [facility] ED on 01/09/2025 with complaints of dyspnea and atrial fibrillation.  He states that prior to presentation on 01/07/2025 he was called by the pacemaker company telling him that he has been in A-fib continuously since 01/02/2025.  Upon arrival to the ED his VS were temperature 97.9-109-18-99% on 2 L by nasal cannula-129/90.  EKG V-paced with PACs, BiV pacemaker detected, Atrial Fibrillation.  Pacemaker interrogation during his ED course (Anand from [facility]) notified ER staff that the patient has been in an atrial arrhythmia for the past 7 days and that fluid markers for CHF have crossed the threshold and he is still ongoing over the threshold.  He states that there seems to be an issue with one of the leads that is underreporting and showing an end to episodes that are still ongoing, he we will send the report over for further evaluation.  Rapid COVID testing positive.  H&H 12/35.  Blood glucose 125.  BUN/SCR 31/1.3.  Troponin 34.  NT proBNP 5003.  He received Lopressor 2.5 mg IV, Bumex 1 mg IV, Advil 800 mg, Ativan 0.5 mg and was admitted to a telemetry monitored unit.    Pleasant gentleman resting comfortably in bed in no acute distress.  He wore noninvasive ventilation overnight and tells

## 2025-01-10 NOTE — CARE COORDINATION
Patient in droplet isolation for COVID 19+. Introduced my self and provided explanation of CM role to patient via phone. Admitted for COVID 19+, fluid overload, SOB acute on chronic CHF, A-fib rvr. CHF nurse, Cardiology are following, was on cardizem gtt (stopped). PO lasix.  Consult noted: Intended to be on Eliquis, Patient reportedly stopped Eliquis 10/8/2024 due to cost. Eliquis discount card only available for commercial ins. Good Rx discount around $300 for 30tabs. Patient is unable to afford this medication. Currently on  2L O2 baseline is RA, will need pulse ox testing prior to discharge. PT/OT evals pending. He has hx with [facility] and [facility]. He voices he resides alone in a 1 story home and completes his adl's with independence. He has  a cane, ww available. Patient is established with Dr. Campos. Will continue to follow.  Yojana DELACRUZN, RN  Case Management

## 2025-01-10 NOTE — PLAN OF CARE
Patient's chart updated to reflect:      .    - HF care plan, HF education points and HF discharge instructions.  -Orders: 2 gram sodium diet, daily weights, I/O.  -PCP and cardiology follow up appointments to be scheduled within 7 days of hospital discharge.  -CHF education session will be provided to the patient prior to hospital discharge.    Lilli Wen RN   Heart Failure Navigator

## 2025-01-10 NOTE — PLAN OF CARE
Problem: Discharge Planning  Goal: Discharge to home or other facility with appropriate resources  Outcome: Progressing  Flowsheets (Taken 1/9/2025 1302 by Huong Tripathi, RN)  Discharge to home or other facility with appropriate resources: Refer to discharge planning if patient needs post-hospital services based on physician order or complex needs related to functional status, cognitive ability or social support system     Problem: ABCDS Injury Assessment  Goal: Absence of physical injury  Outcome: Progressing     Problem: Chronic Conditions and Co-morbidities  Goal: Patient's chronic conditions and co-morbidity symptoms are monitored and maintained or improved  Outcome: Progressing     Problem: Pain  Goal: Verbalizes/displays adequate comfort level or baseline comfort level  Outcome: Progressing

## 2025-01-11 ENCOUNTER — APPOINTMENT (OUTPATIENT)
Dept: ULTRASOUND IMAGING | Age: 80
DRG: 291 | End: 2025-01-11
Payer: MEDICARE

## 2025-01-11 LAB
ANION GAP SERPL CALCULATED.3IONS-SCNC: 12 MMOL/L (ref 7–16)
BASOPHILS # BLD: 0.02 K/UL (ref 0–0.2)
BASOPHILS NFR BLD: 0 % (ref 0–2)
BNP SERPL-MCNC: 3075 PG/ML (ref 0–450)
BUN SERPL-MCNC: 41 MG/DL (ref 6–23)
CALCIUM SERPL-MCNC: 8.5 MG/DL (ref 8.6–10.2)
CHLORIDE SERPL-SCNC: 100 MMOL/L (ref 98–107)
CO2 SERPL-SCNC: 23 MMOL/L (ref 22–29)
CREAT SERPL-MCNC: 1.2 MG/DL (ref 0.7–1.2)
EOSINOPHIL # BLD: 0.11 K/UL (ref 0.05–0.5)
EOSINOPHILS RELATIVE PERCENT: 1 % (ref 0–6)
ERYTHROCYTE [DISTWIDTH] IN BLOOD BY AUTOMATED COUNT: 12.8 % (ref 11.5–15)
GFR, ESTIMATED: 64 ML/MIN/1.73M2
GLUCOSE SERPL-MCNC: 100 MG/DL (ref 74–99)
HCT VFR BLD AUTO: 34.1 % (ref 37–54)
HGB BLD-MCNC: 11.3 G/DL (ref 12.5–16.5)
IMM GRANULOCYTES # BLD AUTO: 0.03 K/UL (ref 0–0.58)
IMM GRANULOCYTES NFR BLD: 0 % (ref 0–5)
LYMPHOCYTES NFR BLD: 1.07 K/UL (ref 1.5–4)
LYMPHOCYTES RELATIVE PERCENT: 13 % (ref 20–42)
MAGNESIUM SERPL-MCNC: 1.9 MG/DL (ref 1.6–2.6)
MCH RBC QN AUTO: 35.8 PG (ref 26–35)
MCHC RBC AUTO-ENTMCNC: 33.1 G/DL (ref 32–34.5)
MCV RBC AUTO: 107.9 FL (ref 80–99.9)
MONOCYTES NFR BLD: 0.77 K/UL (ref 0.1–0.95)
MONOCYTES NFR BLD: 9 % (ref 2–12)
NEUTROPHILS NFR BLD: 76 % (ref 43–80)
NEUTS SEG NFR BLD: 6.26 K/UL (ref 1.8–7.3)
PLATELET # BLD AUTO: 171 K/UL (ref 130–450)
PMV BLD AUTO: 11.3 FL (ref 7–12)
POTASSIUM SERPL-SCNC: 4 MMOL/L (ref 3.5–5)
RBC # BLD AUTO: 3.16 M/UL (ref 3.8–5.8)
SODIUM SERPL-SCNC: 135 MMOL/L (ref 132–146)
WBC OTHER # BLD: 8.3 K/UL (ref 4.5–11.5)

## 2025-01-11 PROCEDURE — 6360000002 HC RX W HCPCS: Performed by: NURSE PRACTITIONER

## 2025-01-11 PROCEDURE — 2700000000 HC OXYGEN THERAPY PER DAY

## 2025-01-11 PROCEDURE — 76705 ECHO EXAM OF ABDOMEN: CPT

## 2025-01-11 PROCEDURE — 2500000003 HC RX 250 WO HCPCS: Performed by: NURSE PRACTITIONER

## 2025-01-11 PROCEDURE — 94660 CPAP INITIATION&MGMT: CPT

## 2025-01-11 PROCEDURE — 83880 ASSAY OF NATRIURETIC PEPTIDE: CPT

## 2025-01-11 PROCEDURE — 99233 SBSQ HOSP IP/OBS HIGH 50: CPT | Performed by: INTERNAL MEDICINE

## 2025-01-11 PROCEDURE — 6370000000 HC RX 637 (ALT 250 FOR IP): Performed by: NURSE PRACTITIONER

## 2025-01-11 PROCEDURE — 2060000000 HC ICU INTERMEDIATE R&B

## 2025-01-11 PROCEDURE — 80048 BASIC METABOLIC PNL TOTAL CA: CPT

## 2025-01-11 PROCEDURE — 85025 COMPLETE CBC W/AUTO DIFF WBC: CPT

## 2025-01-11 PROCEDURE — 83735 ASSAY OF MAGNESIUM: CPT

## 2025-01-11 RX ORDER — MAGNESIUM SULFATE IN WATER 40 MG/ML
2000 INJECTION, SOLUTION INTRAVENOUS ONCE
Status: COMPLETED | OUTPATIENT
Start: 2025-01-11 | End: 2025-01-11

## 2025-01-11 RX ADMIN — ATORVASTATIN CALCIUM 40 MG: 40 TABLET, FILM COATED ORAL at 19:53

## 2025-01-11 RX ADMIN — SACUBITRIL AND VALSARTAN 1 TABLET: 24; 26 TABLET, FILM COATED ORAL at 19:53

## 2025-01-11 RX ADMIN — SODIUM CHLORIDE, PRESERVATIVE FREE 10 ML: 5 INJECTION INTRAVENOUS at 08:28

## 2025-01-11 RX ADMIN — SPIRONOLACTONE 25 MG: 25 TABLET ORAL at 08:26

## 2025-01-11 RX ADMIN — SPIRONOLACTONE 25 MG: 25 TABLET ORAL at 19:53

## 2025-01-11 RX ADMIN — MAGNESIUM SULFATE HEPTAHYDRATE 2000 MG: 40 INJECTION, SOLUTION INTRAVENOUS at 12:37

## 2025-01-11 RX ADMIN — METOPROLOL SUCCINATE 50 MG: 50 TABLET, EXTENDED RELEASE ORAL at 19:53

## 2025-01-11 RX ADMIN — CHOLECALCIFEROL TAB 10 MCG (400 UNIT) 400 UNITS: 10 TAB at 08:26

## 2025-01-11 RX ADMIN — SACUBITRIL AND VALSARTAN 1 TABLET: 24; 26 TABLET, FILM COATED ORAL at 08:26

## 2025-01-11 RX ADMIN — ASPIRIN 81 MG CHEWABLE TABLET 81 MG: 81 TABLET CHEWABLE at 08:26

## 2025-01-11 NOTE — PLAN OF CARE
Problem: Discharge Planning  Goal: Discharge to home or other facility with appropriate resources  Outcome: Progressing     Problem: ABCDS Injury Assessment  Goal: Absence of physical injury  Outcome: Progressing     Problem: Chronic Conditions and Co-morbidities  Goal: Patient's chronic conditions and co-morbidity symptoms are monitored and maintained or improved  Outcome: Progressing     Problem: Pain  Goal: Verbalizes/displays adequate comfort level or baseline comfort level  Outcome: Progressing  Flowsheets (Taken 1/10/2025 1508 by Precious Young RN)  Verbalizes/displays adequate comfort level or baseline comfort level:   Encourage patient to monitor pain and request assistance   Assess pain using appropriate pain scale   Administer analgesics based on type and severity of pain and evaluate response   Implement non-pharmacological measures as appropriate and evaluate response   Consider cultural and social influences on pain and pain management   Notify Licensed Independent Practitioner if interventions unsuccessful or patient reports new pain

## 2025-01-11 NOTE — PLAN OF CARE
Problem: ABCDS Injury Assessment  Goal: Absence of physical injury  1/11/2025 0207 by Preston Saenz RN  Outcome: Progressing     Problem: Chronic Conditions and Co-morbidities  Goal: Patient's chronic conditions and co-morbidity symptoms are monitored and maintained or improved  1/11/2025 1557 by Angélica Ordonez RN  Outcome: Progressing  Flowsheets (Taken 1/11/2025 0710)  Care Plan - Patient's Chronic Conditions and Co-Morbidity Symptoms are Monitored and Maintained or Improved: Monitor and assess patient's chronic conditions and comorbid symptoms for stability, deterioration, or improvement  1/11/2025 0207 by Preston Saenz RN  Outcome: Progressing     Problem: Pain  Goal: Verbalizes/displays adequate comfort level or baseline comfort level  1/11/2025 1557 by Angélica Ordonez RN  Outcome: Progressing  1/11/2025 0207 by Preston Saenz RN  Outcome: Progressing  Flowsheets (Taken 1/10/2025 1508 by Precious Young RN)  Verbalizes/displays adequate comfort level or baseline comfort level:   Encourage patient to monitor pain and request assistance   Assess pain using appropriate pain scale   Administer analgesics based on type and severity of pain and evaluate response   Implement non-pharmacological measures as appropriate and evaluate response   Consider cultural and social influences on pain and pain management   Notify Licensed Independent Practitioner if interventions unsuccessful or patient reports new pain

## 2025-01-12 LAB
ANION GAP SERPL CALCULATED.3IONS-SCNC: 11 MMOL/L (ref 7–16)
BASOPHILS # BLD: 0.02 K/UL (ref 0–0.2)
BASOPHILS NFR BLD: 0 % (ref 0–2)
BUN SERPL-MCNC: 35 MG/DL (ref 6–23)
CALCIUM SERPL-MCNC: 8.3 MG/DL (ref 8.6–10.2)
CHLORIDE SERPL-SCNC: 100 MMOL/L (ref 98–107)
CO2 SERPL-SCNC: 23 MMOL/L (ref 22–29)
CREAT SERPL-MCNC: 1 MG/DL (ref 0.7–1.2)
EKG ATRIAL RATE: 192 BPM
EKG Q-T INTERVAL: 258 MS
EKG QRS DURATION: 172 MS
EKG QTC CALCULATION (BAZETT): 439 MS
EKG R AXIS: -59 DEGREES
EKG T AXIS: 136 DEGREES
EKG VENTRICULAR RATE: 174 BPM
EOSINOPHIL # BLD: 0.13 K/UL (ref 0.05–0.5)
EOSINOPHILS RELATIVE PERCENT: 2 % (ref 0–6)
ERYTHROCYTE [DISTWIDTH] IN BLOOD BY AUTOMATED COUNT: 12.7 % (ref 11.5–15)
GFR, ESTIMATED: 74 ML/MIN/1.73M2
GLUCOSE SERPL-MCNC: 98 MG/DL (ref 74–99)
HCT VFR BLD AUTO: 32.5 % (ref 37–54)
HGB BLD-MCNC: 11 G/DL (ref 12.5–16.5)
IMM GRANULOCYTES # BLD AUTO: 0.03 K/UL (ref 0–0.58)
IMM GRANULOCYTES NFR BLD: 0 % (ref 0–5)
LYMPHOCYTES NFR BLD: 1.41 K/UL (ref 1.5–4)
LYMPHOCYTES RELATIVE PERCENT: 20 % (ref 20–42)
MAGNESIUM SERPL-MCNC: 2.1 MG/DL (ref 1.6–2.6)
MCH RBC QN AUTO: 35.6 PG (ref 26–35)
MCHC RBC AUTO-ENTMCNC: 33.8 G/DL (ref 32–34.5)
MCV RBC AUTO: 105.2 FL (ref 80–99.9)
MONOCYTES NFR BLD: 0.79 K/UL (ref 0.1–0.95)
MONOCYTES NFR BLD: 11 % (ref 2–12)
NEUTROPHILS NFR BLD: 66 % (ref 43–80)
NEUTS SEG NFR BLD: 4.53 K/UL (ref 1.8–7.3)
PLATELET # BLD AUTO: 186 K/UL (ref 130–450)
PMV BLD AUTO: 11.2 FL (ref 7–12)
POTASSIUM SERPL-SCNC: 3.8 MMOL/L (ref 3.5–5)
RBC # BLD AUTO: 3.09 M/UL (ref 3.8–5.8)
SODIUM SERPL-SCNC: 134 MMOL/L (ref 132–146)
WBC OTHER # BLD: 6.9 K/UL (ref 4.5–11.5)

## 2025-01-12 PROCEDURE — 94660 CPAP INITIATION&MGMT: CPT

## 2025-01-12 PROCEDURE — 6370000000 HC RX 637 (ALT 250 FOR IP): Performed by: NURSE PRACTITIONER

## 2025-01-12 PROCEDURE — 99233 SBSQ HOSP IP/OBS HIGH 50: CPT | Performed by: INTERNAL MEDICINE

## 2025-01-12 PROCEDURE — 6370000000 HC RX 637 (ALT 250 FOR IP): Performed by: INTERNAL MEDICINE

## 2025-01-12 PROCEDURE — 2500000003 HC RX 250 WO HCPCS: Performed by: NURSE PRACTITIONER

## 2025-01-12 PROCEDURE — 93010 ELECTROCARDIOGRAM REPORT: CPT | Performed by: INTERNAL MEDICINE

## 2025-01-12 PROCEDURE — 83735 ASSAY OF MAGNESIUM: CPT

## 2025-01-12 PROCEDURE — 80048 BASIC METABOLIC PNL TOTAL CA: CPT

## 2025-01-12 PROCEDURE — 2060000000 HC ICU INTERMEDIATE R&B

## 2025-01-12 PROCEDURE — 85025 COMPLETE CBC W/AUTO DIFF WBC: CPT

## 2025-01-12 RX ORDER — DOCUSATE SODIUM 100 MG/1
100 CAPSULE, LIQUID FILLED ORAL DAILY
Status: DISCONTINUED | OUTPATIENT
Start: 2025-01-12 | End: 2025-01-13 | Stop reason: HOSPADM

## 2025-01-12 RX ORDER — SPIRONOLACTONE 25 MG/1
25 TABLET ORAL DAILY
Status: DISCONTINUED | OUTPATIENT
Start: 2025-01-13 | End: 2025-01-13 | Stop reason: HOSPADM

## 2025-01-12 RX ADMIN — ASPIRIN 81 MG CHEWABLE TABLET 81 MG: 81 TABLET CHEWABLE at 19:48

## 2025-01-12 RX ADMIN — METOPROLOL SUCCINATE 50 MG: 50 TABLET, EXTENDED RELEASE ORAL at 19:39

## 2025-01-12 RX ADMIN — ONDANSETRON 4 MG: 4 TABLET, ORALLY DISINTEGRATING ORAL at 19:39

## 2025-01-12 RX ADMIN — ATORVASTATIN CALCIUM 40 MG: 40 TABLET, FILM COATED ORAL at 19:39

## 2025-01-12 RX ADMIN — SACUBITRIL AND VALSARTAN 1 TABLET: 24; 26 TABLET, FILM COATED ORAL at 07:45

## 2025-01-12 RX ADMIN — SODIUM CHLORIDE, PRESERVATIVE FREE 10 ML: 5 INJECTION INTRAVENOUS at 07:46

## 2025-01-12 RX ADMIN — SACUBITRIL AND VALSARTAN 0.5 TABLET: 24; 26 TABLET, FILM COATED ORAL at 19:39

## 2025-01-12 RX ADMIN — SPIRONOLACTONE 25 MG: 25 TABLET ORAL at 07:45

## 2025-01-12 RX ADMIN — ASPIRIN 81 MG CHEWABLE TABLET 81 MG: 81 TABLET CHEWABLE at 07:45

## 2025-01-12 RX ADMIN — CHOLECALCIFEROL TAB 10 MCG (400 UNIT) 400 UNITS: 10 TAB at 07:45

## 2025-01-12 NOTE — CONSULTS
Tal[facility] Turpin [facility]   Inpatient CHF Nurse Navigator Consult      Cardiologist: Dr Lizzeth Perezane is a 79 y.o. (1945) male with a history of HFrEF, most recent EF:  Lab Results   Component Value Date    LVEF 25 02/28/2024    LVEFMODE Echo 02/28/2024       Patient was awake and alert, laying in bed during the consultation and is agreeable to heart failure education. He was engaged and asked appropriate questions throughout the education session. He is COVID positive at this time. He is feeling a little better today.     Barriers identified during consult contributing to HF Hospitalization:  [] Limited medication adherence   [] Poor health literacy, education regarding HF medications provided   [] Pill box provided to patient  [] Difficulty affording medications  [] Difficulty obtaining/ managing medications  [] Prescription assistance information given     [] Not weighing themselves daily  [x] Weight log provided for easy monitoring  [] Scale provided     [] Not following low sodium diet  [] Food insecurity   [x] 2 gram sodium diet education provided   [] Low sodium recipes provided  [] Sodium free seasoning provided   [] Low sodium meal delivery options given to patient  [] Dietician consulted     [] Lack of transportation to appointments     [] Depression, given chronic illness  [] Primary team notified     [] Goals of care need addressed  [] Palliative care consulted     [] CHF CHW consulted, to assist with     Chart Reviewed:  Diet: ADULT DIET; Regular; No Added Salt (3-4 gm)   Daily Weights: Patient Vitals for the past 96 hrs (Last 3 readings):   Weight   01/09/25 0517 68.9 kg (152 lb)     I/O: No intake or output data in the 24 hours ending 01/10/25 1207    [] Nursing staff/manager notified of inaccurate forman weights or I/O        Discharge Plan:  Above identified barriers reviewed and needs addressed    Patient/family educated on daily monitoring tools for CHF, 
Department of General Surgery - Adult  Surgical Service   Attending Consult Note      Reason for Consult: Fat-containing inguinal hernia  Requesting Physician:      CHIEF COMPLAINT: Cough    History Obtained From:  patient    HISTORY OF PRESENT ILLNESS:                The patient is a 79 y.o. male who presents with cough and found to have COVID-19.  He states that from his cough last Wednesday he noted a bulging in his left groin.  He denies any pain at this time.  He puts pressure on it when coughing.  He has never had any prior inguinal hernia repairs.    Past Medical History:        Diagnosis Date    Acute on chronic systolic congestive heart failure ([facility]) 10/18/2018    Aortic regurgitation     trace    Arthritis     Atrial fibrillation ([facility])     CAD (coronary artery disease)     Cerebral artery occlusion with cerebral infarction ([facility]) 2020    no deficits    Hypercholesteremia     Hypertension     Ischemic cardiomyopathy     Mitral regurgitation     mild to moderate    STEMI (ST elevation myocardial infarction) ([facility]) 12/12/2012    anteriolateral     Past Surgical History:        Procedure Laterality Date    CARDIAC CATHETERIZATION  10/09/2023    Left and Right heart cath with Dr. Porter    CARDIAC DEFIBRILLATOR PLACEMENT Left 04/14/2023    [employer] Dual ICD  Dr. Ponce    CORONARY ANGIOPLASTY  12/12/2012    Balloon only to LAD    CORONARY ARTERY BYPASS GRAFT  12/13/2012    cabg x 4- Dr. Bruno    CT BIOPSY BONE MARROW  12/21/2022    CT BONE MARROW BIOPSY 12/21/2022 [facility] CT    DIAGNOSTIC CARDIAC CATH LAB PROCEDURE  12/12/2012    [facility] cath lab- Dr. Moreau    ECHO COMPL W DOP COLOR FLOW  12/12/2012         HIP FRACTURE SURGERY Right 05/02/2023    RIGHT HIP OPEN REDUCTION INTERNAL FIXATION performed by Kervin Davidson DO at [facility]    TONSILLECTOMY         Allergies:  Deltasone [prednisone]    Social History:   TOBACCO:   reports that he has been smoking pipe. He has been exposed to tobacco smoke. He has never 
  CONST:  Well developed, well nourished who appears of stated age. Awake, alert and cooperative. No apparent distress.   HEENT: Normocephalic, atraumatic. PERRLA. + JVD. No carotid bruit.   CHEST: Chest symmetrical and non-tender to palpation.   RESPIRATORY: Clear to auscultation bilaterally. No accessory muscle use. On supplemental O2.   CARDIOVASCULAR:     Heart Inspection- shows no noted pulsations  Heart Palpation- no heaves or thrills; PMI is non-displaced   Heart Ausculation- Regular rate and rhythm, no murmur. No s3, s4 or rub   PV: 1+ pitting edema BLE. No varicosities. Pedal pulses palpable  ABDOMEN: Soft, non-tender to light palpation. Bowel sounds present. No palpable masses   MS: Good muscle strength and tone. No atrophy or abnormal movements.   : Deferred  SKIN: Warm and dry no statis dermatitis or ulcers   NEURO / PSYCH: Oriented to person, place and time. Speech clear and appropriate. Follows all commands. Pleasant affect     DATA:    12 lead ECG:      Telemetry: ventricular paced with a rate in the 80s     Imaging:  CHEST XR  Cardiomegaly with central congestion. Patchy opacifications the right lung  worrisome for superimposed bronchopneumonia or asymmetric edema without  pleural effusion.    Labs:   CBC:   Recent Labs     01/09/25  0544   WBC 7.8   HGB 12.0*   HCT 35.0*        BMP:   Recent Labs     01/09/25  0544      K 4.3   CO2 23   BUN 31*   CREATININE 1.3*   LABGLOM 56*   CALCIUM 9.0     Mag:   Recent Labs     01/09/25  0629   MG 2.1     Phos: No results for input(s): \"PHOS\" in the last 72 hours.  TSH: No results for input(s): \"TSH\" in the last 72 hours.  HgA1c:   Lab Results   Component Value Date    LABA1C 5.8 (H) 07/06/2020     No results found for: \"EAG\"  proBNP:   Recent Labs     01/09/25  0544   PROBNP 5,003*     PT/INR: No results for input(s): \"PROTIME\", \"INR\" in the last 72 hours.  APTT:No results for input(s): \"APTT\" in the last 72 hours.  CARDIAC ENZYMES:  Recent Labs

## 2025-01-13 VITALS
TEMPERATURE: 97.9 F | WEIGHT: 152 LBS | DIASTOLIC BLOOD PRESSURE: 70 MMHG | SYSTOLIC BLOOD PRESSURE: 109 MMHG | HEART RATE: 88 BPM | HEIGHT: 66 IN | BODY MASS INDEX: 24.43 KG/M2 | OXYGEN SATURATION: 95 % | RESPIRATION RATE: 20 BRPM

## 2025-01-13 DIAGNOSIS — I48.91 ATRIAL FIBRILLATION, UNSPECIFIED TYPE (HCC): Primary | ICD-10-CM

## 2025-01-13 LAB
ANION GAP SERPL CALCULATED.3IONS-SCNC: 11 MMOL/L (ref 7–16)
BASOPHILS # BLD: 0.03 K/UL (ref 0–0.2)
BASOPHILS NFR BLD: 0 % (ref 0–2)
BNP SERPL-MCNC: 3516 PG/ML (ref 0–450)
BUN SERPL-MCNC: 34 MG/DL (ref 6–23)
CALCIUM SERPL-MCNC: 8.9 MG/DL (ref 8.6–10.2)
CHLORIDE SERPL-SCNC: 100 MMOL/L (ref 98–107)
CO2 SERPL-SCNC: 25 MMOL/L (ref 22–29)
CREAT SERPL-MCNC: 1.1 MG/DL (ref 0.7–1.2)
EOSINOPHIL # BLD: 0.12 K/UL (ref 0.05–0.5)
EOSINOPHILS RELATIVE PERCENT: 2 % (ref 0–6)
ERYTHROCYTE [DISTWIDTH] IN BLOOD BY AUTOMATED COUNT: 12.9 % (ref 11.5–15)
GFR, ESTIMATED: 69 ML/MIN/1.73M2
GLUCOSE SERPL-MCNC: 103 MG/DL (ref 74–99)
HCT VFR BLD AUTO: 36.4 % (ref 37–54)
HGB BLD-MCNC: 12.2 G/DL (ref 12.5–16.5)
IMM GRANULOCYTES # BLD AUTO: <0.03 K/UL (ref 0–0.58)
IMM GRANULOCYTES NFR BLD: 0 % (ref 0–5)
LYMPHOCYTES NFR BLD: 1.69 K/UL (ref 1.5–4)
LYMPHOCYTES RELATIVE PERCENT: 25 % (ref 20–42)
MAGNESIUM SERPL-MCNC: 2.4 MG/DL (ref 1.6–2.6)
MCH RBC QN AUTO: 35.8 PG (ref 26–35)
MCHC RBC AUTO-ENTMCNC: 33.5 G/DL (ref 32–34.5)
MCV RBC AUTO: 106.7 FL (ref 80–99.9)
MONOCYTES NFR BLD: 0.7 K/UL (ref 0.1–0.95)
MONOCYTES NFR BLD: 10 % (ref 2–12)
NEUTROPHILS NFR BLD: 62 % (ref 43–80)
NEUTS SEG NFR BLD: 4.21 K/UL (ref 1.8–7.3)
PLATELET # BLD AUTO: 224 K/UL (ref 130–450)
PMV BLD AUTO: 10.8 FL (ref 7–12)
POTASSIUM SERPL-SCNC: 4.4 MMOL/L (ref 3.5–5)
RBC # BLD AUTO: 3.41 M/UL (ref 3.8–5.8)
SODIUM SERPL-SCNC: 136 MMOL/L (ref 132–146)
WBC OTHER # BLD: 6.8 K/UL (ref 4.5–11.5)

## 2025-01-13 PROCEDURE — 94660 CPAP INITIATION&MGMT: CPT

## 2025-01-13 PROCEDURE — 36415 COLL VENOUS BLD VENIPUNCTURE: CPT

## 2025-01-13 PROCEDURE — 83735 ASSAY OF MAGNESIUM: CPT

## 2025-01-13 PROCEDURE — 80048 BASIC METABOLIC PNL TOTAL CA: CPT

## 2025-01-13 PROCEDURE — 85025 COMPLETE CBC W/AUTO DIFF WBC: CPT

## 2025-01-13 PROCEDURE — 6370000000 HC RX 637 (ALT 250 FOR IP): Performed by: INTERNAL MEDICINE

## 2025-01-13 PROCEDURE — 83880 ASSAY OF NATRIURETIC PEPTIDE: CPT

## 2025-01-13 PROCEDURE — 2500000003 HC RX 250 WO HCPCS: Performed by: NURSE PRACTITIONER

## 2025-01-13 PROCEDURE — 6370000000 HC RX 637 (ALT 250 FOR IP): Performed by: NURSE PRACTITIONER

## 2025-01-13 PROCEDURE — 99232 SBSQ HOSP IP/OBS MODERATE 35: CPT | Performed by: INTERNAL MEDICINE

## 2025-01-13 RX ORDER — PSEUDOEPHEDRINE HCL 30 MG
100 TABLET ORAL DAILY
Qty: 30 CAPSULE | Refills: 0 | Status: SHIPPED | OUTPATIENT
Start: 2025-01-14

## 2025-01-13 RX ORDER — SACUBITRIL AND VALSARTAN 24; 26 MG/1; MG/1
0.5 TABLET, FILM COATED ORAL 2 TIMES DAILY
Qty: 180 TABLET | Refills: 3 | Status: SHIPPED | OUTPATIENT
Start: 2025-01-13

## 2025-01-13 RX ORDER — OMEGA-3S/DHA/EPA/FISH OIL/D3 300MG-1000
400 CAPSULE ORAL DAILY
Qty: 30 TABLET | Refills: 3 | Status: SHIPPED | OUTPATIENT
Start: 2025-01-13

## 2025-01-13 RX ADMIN — ASPIRIN 81 MG CHEWABLE TABLET 81 MG: 81 TABLET CHEWABLE at 08:13

## 2025-01-13 RX ADMIN — CHOLECALCIFEROL TAB 10 MCG (400 UNIT) 400 UNITS: 10 TAB at 08:14

## 2025-01-13 RX ADMIN — SACUBITRIL AND VALSARTAN 0.5 TABLET: 24; 26 TABLET, FILM COATED ORAL at 08:14

## 2025-01-13 RX ADMIN — SPIRONOLACTONE 25 MG: 25 TABLET ORAL at 08:14

## 2025-01-13 RX ADMIN — SODIUM CHLORIDE, PRESERVATIVE FREE 10 ML: 5 INJECTION INTRAVENOUS at 08:14

## 2025-01-13 NOTE — PROGRESS NOTES
[facility] Inpatient Progress Note    Patient is a 79 y.o. male of Peter Campos Jr., MD seen in hospital follow up.     Chief complaint: CHF    HPI: Some SOB. No CP.     Past Medical History:    CAD  Anterior STEMI 12/12/2012 s/p balloon angioplasty to the LAD   CABG times x4 12/2012: LIMA-LAD, EL-D1, SVG-PLB, SVG-OM  HFrEF/Ischemic Cardiomyopathy   GDMT: Entresto, Aldactone, Toprol XL and Lasix   S/p CRT-D implant 4/14/2023 ([employer])>>> he had originally declined when recommended in 2013   Last device interrogation on 7/20/2025: 100% AT/AF burden since 1/2/2025 with intermittent episodes of RVR  VHD: Moderate MR/TR  Paroxysmal Atrial fibrillation   Initially diagnosed in 5/2024. Single episode. ~12 minutes in duration  AT/AF burden: 100% since 1/2/2025 with occasional episodes of RVR  [id number] of 7, OAC with Eliquis (Patient reportedly stopped Eliquis 10/8/2024 due to cost)  Hx sustained VT  on device interrogation in 5/2023: he was prescribed Amiodarone but initially refused to take it   Hx SVT  Hypertension   Hyperlipidemia  Former tobacco use   Hx CVA 2020  Osteoarthritis   Hx femur fracture due to fall off ladder 5/2023 s/p ORIF   History of medical noncompliance, in part due to financial constraints     Patient Active Problem List   Diagnosis    CAD (coronary artery disease)    Ischemic cardiomyopathy    Aortic regurgitation    Nonrheumatic mitral valve regurgitation    Acute respiratory failure    Hyperlipidemia LDL goal <100    Essential hypertension    NICM (nonischemic cardiomyopathy) (HCC)    Acute CVA (cerebrovascular accident) (HCC)    Noncompliance with medications    TIA (transient ischemic attack)    Cardiac resynchronization therapy defibrillator (CRT-D) in place    Falls, initial encounter    Closed right hip fracture, initial encounter (HCC)    Acute on chronic combined systolic and diastolic CHF (congestive heart failure) (HCC)    Acute on chronic congestive heart 
    [facility]                                Progress note    Subjective:  Denies chest pain and dyspnea  States that he found a hernia on the left earlier today, attributes this to coughing    Objective:  Sitting up in bed, conversing without difficulty  No acute distress  No family at the bedside  BP 99/62   Pulse 84   Temp 97.8 °F (36.6 °C) (Oral)   Resp 18   Ht 1.676 m (5' 6\")   Wt 68.9 kg (152 lb)   SpO2 99%   BMI 24.53 kg/m²   CONST:  Well developed, well nourished elderly  male who appears stated age. Awake, alert, cooperative, no apparent distress  HEENT:   Head- Normocephalic, atraumatic   Eyes- Conjunctivae pink, anicteric  Throat- Oral mucosa pink and moist  Neck-  No stridor, trachea midline, no jugular venous distention. No adenopathy   CHEST: Chest symmetrical and non-tender to palpation. No accessory muscle use or intercostal retractions  RESPIRATORY: Lung sounds - diminished throughout fields   CARDIOVASCULAR:     No carotid bruit  Heart Inspection- shows no noted pulsations  Heart Palpation- no heaves or thrills; PMI is non-displaced   Heart Ausculation- Irregular rate and rhythm, no murmur. No s3, s4 or rub   PV: No lower extremity edema. No varicosities. Pedal pulses palpable, no clubbing or cyanosis. Right groin tender and firm with palpation   ABDOMEN: Soft, non-tender to light palpation. Bowel sounds present. No palpable masses no organomegaly; no abdominal bruit  MS: Good muscle strength and tone. No atrophy or abnormal movements.   : Deferred  SKIN: Warm and dry no statis dermatitis or ulcers   NEURO / PSYCH: Oriented to person, place and time. Speech clear and appropriate. Follows all commands. Pleasant affect      Recent Labs     01/09/25  0544 01/10/25  0425 01/11/25  0420   WBC 7.8 9.7 8.3   HGB 12.0* 12.0* 11.3*   HCT 35.0* 36.0* 34.1*     --  171       Recent Labs     01/09/25  0544 01/10/25  0425 01/11/25  0420    138 135   K 4.3 4.7 4.0 
    INPATIENT CARDIOLOGY FOLLOW-UP    Name: Enrique Gonzalez    Age: 79 y.o.    Date of Admission: 1/9/2025  5:13 AM    Date of Service: 1/12/2025    Chief Complaint: Follow-up for CHF    Interim History:  No new overnight cardiac complaints except fatigue otherwise no dyspnea or chest pain feeling good.  Patient still feeling weak and tired.  Denies any chest pain or increased dyspnea.. Currently with no complaints of CP palpitations, dizziness, or lightheadedness.  V-paced rhythm on telemetry.    Review of Systems:   Cardiac: As per HPI  General: No fever, chills  Pulmonary: As per HPI  HEENT: No visual disturbances, difficult swallowing  GI: No nausea, vomiting  Endocrine: No thyroid disease or DM  Musculoskeletal: CM x 4, no focal motor deficits  Skin: Intact, no rashes  Neuro/Psych: No headache or seizures    Problem List:  Patient Active Problem List   Diagnosis    CAD (coronary artery disease)    Ischemic cardiomyopathy    Aortic regurgitation    Nonrheumatic mitral valve regurgitation    Acute respiratory failure    Hyperlipidemia LDL goal <100    Essential hypertension    NICM (nonischemic cardiomyopathy) ([facility])    Acute CVA (cerebrovascular accident) ([facility])    Noncompliance with medications    TIA (transient ischemic attack)    Cardiac resynchronization therapy defibrillator (CRT-D) in place    Falls, initial encounter    Closed right hip fracture, initial encounter ([facility])    Acute on chronic combined systolic and diastolic CHF (congestive heart failure) ([facility])    Acute on chronic congestive heart failure ([facility])    VHD (valvular heart disease)    Fluid overload       Allergies:  Allergies   Allergen Reactions    Deltasone [Prednisone] Nausea Only       Current Medications:  Current Facility-Administered Medications   Medication Dose Route Frequency Provider Last Rate Last Admin    docusate sodium (COLACE) capsule 100 mg  100 mg Oral Daily Adela Gamino, YANETH - CNP        apixaban (ELIQUIS) tablet 5 mg  5 mg 
    [facility]                                Progress note    Subjective:  Denies chest pain and dyspnea    Objective:  Sitting up in a chair, conversing without difficulty  No acute distress  No family at the bedside  BP 98/66   Pulse 91   Temp 97.2 °F (36.2 °C) (Oral)   Resp 28   Ht 1.676 m (5' 6\")   Wt 68.9 kg (152 lb)   SpO2 98%   BMI 24.53 kg/m²   CONST:  Well developed, well nourished elderly  male who appears stated age. Awake, alert, cooperative, no apparent distress  HEENT:   Head- Normocephalic, atraumatic   Eyes- Conjunctivae pink, anicteric  Throat- Oral mucosa pink and moist  Neck-  No stridor, trachea midline, no jugular venous distention. No adenopathy   CHEST: Chest symmetrical and non-tender to palpation. No accessory muscle use or intercostal retractions  RESPIRATORY: Lung sounds - diminished throughout fields   CARDIOVASCULAR:     No carotid bruit  Heart Inspection- shows no noted pulsations  Heart Palpation- no heaves or thrills; PMI is non-displaced   Heart Ausculation- Irregular rate and rhythm, no murmur. No s3, s4 or rub   PV: No lower extremity edema. No varicosities. Pedal pulses palpable, no clubbing or cyanosis. Right groin tender and firm with palpation   ABDOMEN: Soft, non-tender to light palpation. Bowel sounds present. No palpable masses no organomegaly; no abdominal bruit  MS: Good muscle strength and tone. No atrophy or abnormal movements.   : Deferred  SKIN: Warm and dry no statis dermatitis or ulcers   NEURO / PSYCH: Oriented to person, place and time. Speech clear and appropriate. Follows all commands. Pleasant affect      Recent Labs     01/10/25  0425 01/11/25  0420 01/12/25  0233   WBC 9.7 8.3 6.9   HGB 12.0* 11.3* 11.0*   HCT 36.0* 34.1* 32.5*   PLT  --  171 186       Recent Labs     01/10/25  0425 01/11/25  0420 01/12/25  0233    135 134   K 4.7 4.0 3.8    100 100   CO2 21* 23 23   BUN 39* 41* 35*   CREATININE 1.5* 1.2 1.0 
4 Eyes Skin Assessment     NAME:  Enrique Gonzalez  YOB: 1945  MEDICAL RECORD NUMBER:  [id number]    The patient is being assessed for  Admission    I agree that at least one RN has performed a thorough Head to Toe Skin Assessment on the patient. ALL assessment sites listed below have been assessed.      Areas assessed by both nurses:    Head, Face, Ears, Shoulders, Back, Chest, Arms, Elbows, Hands, Sacrum. Buttock, Coccyx, Ischium, Legs. Feet and Heels, and Under Medical Devices         Does the Patient have a Wound? No noted wound(s)       Jacky Prevention initiated by RN: No  Wound Care Orders initiated by RN: No    Pressure Injury (Stage 3,4, Unstageable, DTI, NWPT, and Complex wounds) if present, place Wound referral order by RN under : No    New Ostomies, if present place, Ostomy referral order under : No     Nurse 1 eSignature: Electronically signed by Preston Saenz RN on 1/10/25 at 1:28 AM EST    **SHARE this note so that the co-signing nurse can place an eSignature**    Nurse 2 eSignature: {Esignature:[id number]}    
CLINICAL PHARMACY NOTE: MEDS TO BEDS    Total # of Prescriptions Filled: 2   The following medications were delivered to the patient:  Entreato 24-26 mg  Docusate 100 mg     Additional Documentation:  Vitamin d over the counter   
Database initiated. Patient is A&O independent from home alone. States he uses no assistive devices and is RA at baseline.     
New consult sent to Dr. Antony's answering service at this time. Dr. Bee covering    
Nutrition Education    Educated on Diet for Heart Failure  Learners: Patient  Readiness: Acceptance  Method: Explanation  Response: Demonstrated Understanding  Contact name and number provided.  ADM w/ Acute on chronic CHF, COVID-19 . Reports good appetite at home. States he follows w/CHF clinic. Has been compliant w/diet for HF, logging wt & fluid intake.      Gloria Mitchell RD,LD  Contact Number: [phone]      
Occupational Therapy  OCCUPATIONAL THERAPY INITIAL EVALUATION    [facility]   [address], [place]         Date:1/10/2025                                                  Patient Name: Enrique Gonzalez    MRN: [id number]    : 1945    Room: [address] [place] [address]      Evaluating OT: Eva Lim OTR/L [id number]       Referring Provider:Sendy Weiner APRN - CNP     Specific Provider Orders/Date: OT eval and treat 25      Diagnosis: Fluid Overload/COVID    Surgery:      Pertinent Medical History: CHF, A-Fib,Arthritis,HTN,CAD,R ALEXX           Precautions:  Fall Risk, Droplet + isolation, O2   Assessment of current deficits    [x] Functional mobility  [x]ADLs  [x] Strength               []Cognition    [x] Functional transfers   [x] IADLs         [] Safety Awareness   [x]Endurance    [] Fine Coordination              [x] Balance      [] Vision/perception   []Sensation     []Gross Motor Coordination  [] ROM  [] Delirium                   [] Motor Control     OT PLAN OF CARE   OT POC based on physician orders, patient diagnosis and results of clinical assessment    Frequency/Duration 1-3 days/wk for 2 weeks PRN   Specific OT Treatment Interventions to include:   * Instruction/training on adapted ADL techniques and AE recommendations to increase functional independence within precautions       * Training on energy conservation strategies, correct breathing pattern and techniques to improve independence/tolerance for self-care routine  * Functional transfer/mobility training/DME recommendations for increased independence, safety, and fall prevention  * Patient/Family education to increase follow through with safety techniques and functional independence  * Recommendation of environmental modifications for increased safety with functional transfers/mobility and ADLs  * Therapeutic activities to facilitate/challenge dynamic balance, stand tolerance for increased 
Patient was on 1.5 L O2 via NC that was removed 30 min ago.  Returned to check SPO2 on RA and it was 96% while at rest.  Patient ambulated in room due to covid restrictions and ambulated approx 50ft on RA and wo assistive devices.  Pt SPO2 during ambulation on RA was 94%.  Pt denies lightheadedness or dizziness during ambulation.  States slightly SOB with exertion, but mostly fatigued and weak.  Patient returned to bed and breathing appears unlabored without accessory muscle use.   
Physical Therapy  Facility/Department: [address] MED SURG  Physical Therapy Initial Assessment    Name: Enrique Gonzalez  : 1945  MRN: [id number]  Date of Service: 1/10/2025    Attending Provider:  Josie Benítez MD    Evaluating PT:  Noble Hackett Jr., P.T.    Room #:  0612/06-A  Diagnosis:  Shortness of breath [R06.02]  Fluid overload [E87.70]  Atrial fibrillation with RVR ([facility]) [I48.91]  Acute on chronic congestive heart failure, unspecified heart failure type ([facility]) [I50.9]  COVID-19 [U07.1]  Pertinent PMHx/PSHx:  R hip ORIF 5/3/23  Precautions:  droplet +, falls    SUBJECTIVE:    Pt lives alone in a 1 story home with 1+1 stairs and 1 rail to enter.  There are 10 steps and 1 rail to basement.  Pt ambulated with no AD PTA.  He has a cane and ww if needed.    OBJECTIVE:   Initial Evaluation  Date: 1/10/25 Treatment Short Term/ Long Term   Goals   Was pt agreeable to Eval/treatment? yes     Does pt have pain? No c/o pain     Bed Mobility  NA, pt was found and left sitting on EOB Independently  Independent   Transfers Sit to stand: Independent  Stand to sit: Independent  Stand pivot: supervision  Independent    Ambulation   75 feet with no AD supervision  200 feet with no AD Independent    Stair negotiation: ascended and descended NA, pt is in droplet + isolation  If out of isolation:  10 steps with 1 rail Independent    AM-PAC 6 Clicks        BLE ROM is WFL.   BLE strength is grossly 4/5 to 4+/5.   Sensation:  Pt denies numbness and tingling to extremities  Balance: sitting is Independent and standing with no AD is Independent   Endurance: fair    Vitals:  Pt was on 2L O2 during PT eval and after amb pt had mild SOB and O2 sat was 99%.    ASSESSMENT:    Conditions Requiring Skilled Therapeutic Intervention:    [x]Decreased strength     []Decreased ROM  [x]Decreased functional mobility  []Decreased balance   [x]Decreased endurance   []Decreased posture  []Decreased sensation  []Decreased coordination 
with mild 10-20% diffuse stenosis.  OM2: Large size vessel that is occluded proximally but supplied with patent SVG.  Right coronary artery: This is a large dominant ectatic vessel.  Proximal to distal RCA with diffuse heavily calcified moderate 50% stenosis.  RPDA: Very small vessel with diffuse 50% disease.  RPL1: Small vessel that is totally occluded, left to right collaterals from distal LAD supplying the vessel.  SVG to RPL is totally occluded.  RPL2: Very small vessel with diffuse disease.  Marginal branch: Large size vessel supplying good portion of the inferior wall, vessel with mild diffuse disease 10-20%.  Grafts:  Patent LIMA to LAD  Patent SVG to diagonal  Patent SVG to OM 2  Occluded SVG to small PDA.     JINA 2/28/24    Left Ventricle: Severely reduced left ventricular systolic function with a visually estimated EF of 25 - 30%. Left ventricle is moderately dilated. Normal wall thickness. Normal wall motion.    Right Ventricle: Right ventricle size is normal. Lead present in the right ventricle. Normal systolic function.    Aortic Valve: Mild regurgitation with a centrally directed jet. No stenosis.    Mitral Valve: Moderate regurgitation. Systolic blunting of the pulmonary veins. No stenosis noted.    Tricuspid Valve: Moderate regurgitation. Unable to assess RVSP due to inadequate or insignificant tricuspid regurgitation.    Left Atrium: Normal sized appendage. No left atrial appendage thrombus noted. No left atrial appendage mass noted.    Interatrial Septum: No interatrial shunt visualized with color Doppler. Agitated saline study was negative with and without provocation.    Aorta: Mildly dilated aortic root. Mildly dilated sinus of Valsalva. Ao sinus diameter is 3.7 cm. Mildly dilated ascending aorta. Ao ascending diameter is 4.0 cm.    Pericardium: No pericardial effusion.    Image quality is adequate.    ASSESSMENT AND PLAN:  Patient Active Problem List   Diagnosis    CAD (coronary artery disease) 
p.o. twice daily and spironolactone 25 mg p.o twice daily..  If cost is not a burden then I would recommend adding Jardiance 10 mg p.o. daily.  Blood pressure remains soft will avoid uptitrating at this time.  Management of COVID-19 pneumonia as per primary service  Further recommendation pending above      More  than 50 minutes  was spent counseling the patient, reviewing the medications, results, assessment and the rationale for the above recommendations.       NOTE:  This report was transcribed using voice recognition software.  Every effort was made to ensure accuracy; however, inadvertent computerized transcription errors may be present.     Miroslava Smith MD., FACC, FASE.  [facility]

## 2025-01-13 NOTE — DISCHARGE SUMMARY
medications      docusate 100 MG Caps  Commonly known as: COLACE, DULCOLAX  Take 100 mg by mouth daily  Start taking on: January 14, 2025            CHANGE how you take these medications      Entresto 24-26 MG per tablet  Generic drug: sacubitril-valsartan  Take 0.5 tablets by mouth 2 times daily AM AND PM  What changed: how much to take     metoprolol succinate 50 MG extended release tablet  Commonly known as: TOPROL XL  Take 1 tablet by mouth daily  What changed: when to take this            CONTINUE taking these medications      apixaban 5 MG Tabs tablet  Commonly known as: Eliquis  Take 1 tablet by mouth 2 times daily     aspirin 81 MG chewable tablet     atorvastatin 40 MG tablet  Commonly known as: LIPITOR  Take 1 tablet by mouth nightly     CALCIUM MAGNESIUM ZINC PO     Centrum Silver 50+Men Tabs     cholecalciferol 400 UNIT Tabs tablet  Commonly known as: VITAMIN D3  Take 1 tablet by mouth daily 15 mcg daily     furosemide 20 MG tablet  Commonly known as: LASIX  Take 1 tablet by mouth every other day     niacin 500 MG extended release tablet  Commonly known as: SLO-NIACIN     Saw Palmetto 450 MG Caps     spironolactone 25 MG tablet  Commonly known as: ALDACTONE  Take 1 tablet by mouth 2 times daily Morning and evening               Where to Get Your Medications        These medications were sent to [address] - P [phone] - F [phone]  [address] [place], [place] [address]      Phone: [phone]   cholecalciferol 400 UNIT Tabs tablet  docusate 100 MG Caps  Entresto 24-26 MG per tablet       You can get these medications from any pharmacy    Bring a paper prescription for each of these medications  apixaban 5 MG Tabs tablet       Activity: activity as tolerated  Diet: cardiac diet    Pt has been advised to:    Follow-up with Peter Campos Jr., MD in 1 week.  Follow-up with consultants as recommended by them      Signed:  YANETH Narvaez -

## 2025-01-13 NOTE — CARE COORDINATION
CM met with patient, Eliquis prescription faxed to [facility], co-pay is $600/month. CM spoke to [place] with [facility] Prescription Assistance Program. Patient is eligible for Eliquis pharmaceutical program, Jeimy will contact patient this week to enroll. It will take about a month to be accepted into program co-pay per Jeimy will be $0 at that time. Patient states that he can get a 30-day free supply from Dr. Campos.  Discharge order noted. Patient does not qualify for home oxygen. Discharge plan is home, patients friend will transport home.   Yojana DELACRUZN, RN  Case Management

## 2025-01-14 ENCOUNTER — TELEPHONE (OUTPATIENT)
Dept: CARDIOLOGY CLINIC | Age: 80
End: 2025-01-14

## 2025-01-14 ENCOUNTER — TELEPHONE (OUTPATIENT)
Dept: NON INVASIVE DIAGNOSTICS | Age: 80
End: 2025-01-14

## 2025-01-14 NOTE — TELEPHONE ENCOUNTER
The patient called back to advise he is going to go with Eliquis because he found an assistance program. The patient will  the 30 day supply of Eliquis from the office that was originally discussed. He will have his friend pick it up at the office today.     Electronically signed by Bere Saenz MA on 1/14/2025 at 10:41 AM

## 2025-01-15 ENCOUNTER — TELEPHONE (OUTPATIENT)
Dept: NON INVASIVE DIAGNOSTICS | Age: 80
End: 2025-01-15

## 2025-01-15 NOTE — TELEPHONE ENCOUNTER
----- Message from SANCHEZ ORDOÑEZ MA sent at 1/15/2025 12:47 PM EST -----  Regarding: FW: Stop ASA and device alarm    ----- Message -----  From: Erica Webster APRN - CNP  Sent: 1/15/2025  11:09 AM EST  To: Bere Saenz MA  Subject: RE: Stop ASA and device alarm                    He should continue his aspirin for hx of CAD/CABG.   It looks like he was in the hospital and his device may be beeping because it was trying to send a remote without success. Once he sends one, we can see if it stops and what the transmission says.  Thanks   YANETH Bucio CNP  ----- Message -----  From: Bere Saenz MA  Sent: 1/15/2025  10:59 AM EST  To: YANETH Bucio CNP  Subject: Stop ASA and device alarm                        The patient is starting his OAC today and wants to know if he should stop his ASA.     He also said his device was making an alarm sound that he has never heard before.     I had the patient send a remote transmission to be sure.     Message sent to device clinic for further advisement.     Electronically signed by Bere Saenz MA on 1/15/2025 at 10:58 AM

## 2025-01-16 ENCOUNTER — TELEPHONE (OUTPATIENT)
Dept: NON INVASIVE DIAGNOSTICS | Age: 80
End: 2025-01-16

## 2025-01-16 NOTE — TELEPHONE ENCOUNTER
I returned patient's call. He states his ICD has been alarming. I informed him the device is alarming due to inability to transmit when he had AF. He told me he was in the hospital for five days with Covid. I told him the device may continue to alarm daily, but we did get a transmission over. I offered to bring him in the office (not today, bad weather). Patient told me he has an appointment here on the 28th. He can wait until then. I told him if he changes his mind to call us. He will do this.    Yojana Ervin RN, BSN  [facility] [place]   Device Clinic

## 2025-01-17 ENCOUNTER — TELEPHONE (OUTPATIENT)
Age: 80
End: 2025-01-17

## 2025-01-17 NOTE — TELEPHONE ENCOUNTER
Called and spoke with patient.    Stated I was calling to schedule an appt for the [facility] for transitioning to warfarin. He stated he is not going to take warfarin. He stated he started on Eliquis on 1/15 and he's going to see how he likes that.     Patient had questions about stopping/resuming aspirin. I stated he would need to discuss his concerns with his doctor.    I stated if he chooses to take warfarin and would like to come here in the future, he can have his provider refer him. We will cancel/close the current referral.    Electronically signed by Siobhan Nguyễn on 1/17/25 at 4:11 PM EST

## 2025-01-20 ENCOUNTER — TELEPHONE (OUTPATIENT)
Dept: NON INVASIVE DIAGNOSTICS | Age: 80
End: 2025-01-20

## 2025-01-20 NOTE — TELEPHONE ENCOUNTER
Spoke with patient and they verbalized understanding.  Patient will let us know if he decides to increase his metoprolol.

## 2025-01-20 NOTE — TELEPHONE ENCOUNTER
"  Problem: Adult Inpatient Plan of Care  Goal: Optimal Comfort and Wellbeing  Outcome: Progressing     Problem: Adult Inpatient Plan of Care  Goal: Readiness for Transition of Care  Outcome: Progressing   Goal Outcome Evaluation:      Plan of Care Reviewed With: patient    Overall Patient Progress: improvingOverall Patient Progress: improving    /67 (BP Location: Right arm)   Pulse 80   Temp 98  F (36.7  C) (Oral)   Resp 18   Ht 1.803 m (5' 11\")   Wt (!) 182.8 kg (403 lb)   SpO2 96%   BMI 56.21 kg/m       Pt is A&Ox4, on RA, and assistx1 with a walker and a gait belt. Incision is clean, dry and intact. JOSE drains in place. Pt denied pain at rest and states pain in minimal even with activity, pt declined additional pain medications at first but then later requested pain meds, PRN Tylenol and PRN oxycodone given .   B, 120    Brie Agarwal RN      " ----- Message from Erica ROSARIO sent at 1/20/2025  8:42 AM EST -----  Patient continues in atrial fibrillation. He is scheduled for appt next week on 1/28/25 with TB. Can you call him and see if he checks his BP at home or if he has home health care coming in since he recently left the hospital?   If his BP is able to be checked and is above , I would like him to add toprol 25 mg in the am. It looks like he is currently only taking 50 mg in the PM.   If his BP is overall less than 100 usually (it was in the hospital), then continue same meds and he can discuss with Og next week.   Thanks  Erica Webster, APRN - CNP

## 2025-01-20 NOTE — TELEPHONE ENCOUNTER
Spoke with patient and they verbalized understanding.  Patient will call medtronic to see if his remote is working/needs reset.

## 2025-01-28 ENCOUNTER — OFFICE VISIT (OUTPATIENT)
Dept: NON INVASIVE DIAGNOSTICS | Age: 80
End: 2025-01-28
Payer: MEDICARE

## 2025-01-28 VITALS
HEIGHT: 66 IN | HEART RATE: 92 BPM | DIASTOLIC BLOOD PRESSURE: 62 MMHG | WEIGHT: 148 LBS | OXYGEN SATURATION: 93 % | SYSTOLIC BLOOD PRESSURE: 124 MMHG | RESPIRATION RATE: 18 BRPM | TEMPERATURE: 98 F | BODY MASS INDEX: 23.78 KG/M2

## 2025-01-28 DIAGNOSIS — I48.91 ATRIAL FIBRILLATION, UNSPECIFIED TYPE (HCC): Primary | ICD-10-CM

## 2025-01-28 PROCEDURE — 4004F PT TOBACCO SCREEN RCVD TLK: CPT | Performed by: STUDENT IN AN ORGANIZED HEALTH CARE EDUCATION/TRAINING PROGRAM

## 2025-01-28 PROCEDURE — 1159F MED LIST DOCD IN RCRD: CPT | Performed by: STUDENT IN AN ORGANIZED HEALTH CARE EDUCATION/TRAINING PROGRAM

## 2025-01-28 PROCEDURE — 1111F DSCHRG MED/CURRENT MED MERGE: CPT | Performed by: STUDENT IN AN ORGANIZED HEALTH CARE EDUCATION/TRAINING PROGRAM

## 2025-01-28 PROCEDURE — 1160F RVW MEDS BY RX/DR IN RCRD: CPT | Performed by: STUDENT IN AN ORGANIZED HEALTH CARE EDUCATION/TRAINING PROGRAM

## 2025-01-28 PROCEDURE — 1123F ACP DISCUSS/DSCN MKR DOCD: CPT | Performed by: STUDENT IN AN ORGANIZED HEALTH CARE EDUCATION/TRAINING PROGRAM

## 2025-01-28 PROCEDURE — 99215 OFFICE O/P EST HI 40 MIN: CPT | Performed by: STUDENT IN AN ORGANIZED HEALTH CARE EDUCATION/TRAINING PROGRAM

## 2025-01-28 PROCEDURE — 3078F DIAST BP <80 MM HG: CPT | Performed by: STUDENT IN AN ORGANIZED HEALTH CARE EDUCATION/TRAINING PROGRAM

## 2025-01-28 PROCEDURE — G8427 DOCREV CUR MEDS BY ELIG CLIN: HCPCS | Performed by: STUDENT IN AN ORGANIZED HEALTH CARE EDUCATION/TRAINING PROGRAM

## 2025-01-28 PROCEDURE — 93000 ELECTROCARDIOGRAM COMPLETE: CPT | Performed by: STUDENT IN AN ORGANIZED HEALTH CARE EDUCATION/TRAINING PROGRAM

## 2025-01-28 PROCEDURE — 3074F SYST BP LT 130 MM HG: CPT | Performed by: STUDENT IN AN ORGANIZED HEALTH CARE EDUCATION/TRAINING PROGRAM

## 2025-01-28 PROCEDURE — G8420 CALC BMI NORM PARAMETERS: HCPCS | Performed by: STUDENT IN AN ORGANIZED HEALTH CARE EDUCATION/TRAINING PROGRAM

## 2025-01-28 NOTE — PROGRESS NOTES
[place] [facility]  CARDIAC ELECTROPHYSIOLOGY DEPARTMENT/DIVISION OF CARDIOLOGY  Outpatient Progress Report  PATIENT: Enrique Gonzalez  MEDICAL RECORD NUMBER: [id number]  DATE OF SERVICE:  1/29/2025  ATTENDING ELECTROPHYSIOLOGIST:  Noble Ponce D.O.  REFERRING PHYSICIAN:  Peter Campos Jr., MD  CHIEF COMPLAINT: syncope    HPI: Enrique Gonzalez is a 79 y.o. male with a history of NYHA class II HFrEF-ischemic sp CABG x4 (12/13/12: LIMA-LAD, EL-D1, SVG-PLB, SVG-OM - Dr Bruno) and Medtronic CRT-D (DOI: 4/14/2023-Dr. Ponce), severe MR possibly primary, HTN, CVA (2020), and noncompliance. He is managed by Dr Moreau with apixaban 5 mg BID, atorvastatin 40 mg daily, furosemide 20 mg QOD, metoprolol XL 50 mg daily, Entresto 12-13 mg twice daily, and spironolactone 25 mg twice daily. In 12/2012, patient was diagnosed with multivessel CAD, which was treated with PTCA of LAD, then CABG x 4. Since that time, his LVEF has remained </= 35% despite > 3 months of GDMT. 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 In 2013, he was evaluated by EP (Dr Celis), who recommend primary prevention ICD, but patient declined. In 2022, he was noted to have moderate-severe MR, as well as IVCD (~150 msec) and 1* AV block. In 1/2023, an event monitor reported frequent brief episodes of SVT, as well as episodes of NSVT. In 4/2023, Medtronic CRT-D implant with the LV lead placed in a lateral branch of the posterolateral CS branch.  Later that month, he had sustained VT at 182 bpm (below tachy therapy zone).  His device programming was adjusted and amiodarone initiated.  In 5/2023, he had a fall resulting in hip fracture requiring ORIF.  In 10/2023, he was noted to have moderate MR.  He was evaluated by structural heart team.  [facility] reported progression of CAD (occluded SVG-PDA).  JINA reported moderate MR.  Around this time, patient reportedly stopped taking amiodarone on his own due to intolerance (fatigue). In 6/2024, he

## 2025-01-28 NOTE — PATIENT INSTRUCTIONS
Take apixaban (Eliquis) 5 mg tablet, take 1 tablet by mouth every 12 hours. Samples provided today.   Please contact Dr Ponce office after visit after medication assistance to inform Dr Ponce office of result of the visit as well as to potentially schedule cardioversion.   Follow-up with this office in ~3 months.

## 2025-01-30 ENCOUNTER — HOSPITAL ENCOUNTER (OUTPATIENT)
Dept: OTHER | Age: 80
Setting detail: THERAPIES SERIES
Discharge: HOME | End: 2025-01-30
Payer: MEDICARE

## 2025-01-30 VITALS
DIASTOLIC BLOOD PRESSURE: 58 MMHG | BODY MASS INDEX: 25.66 KG/M2 | RESPIRATION RATE: 16 BRPM | HEART RATE: 80 BPM | OXYGEN SATURATION: 96 % | SYSTOLIC BLOOD PRESSURE: 100 MMHG | WEIGHT: 159 LBS

## 2025-01-30 LAB
ANION GAP SERPL CALCULATED.3IONS-SCNC: 12 MMOL/L (ref 7–16)
BNP SERPL-MCNC: 3198 PG/ML (ref 0–450)
BUN SERPL-MCNC: 27 MG/DL (ref 6–23)
CALCIUM SERPL-MCNC: 9.1 MG/DL (ref 8.6–10.2)
CHLORIDE SERPL-SCNC: 100 MMOL/L (ref 98–107)
CO2 SERPL-SCNC: 22 MMOL/L (ref 22–29)
CREAT SERPL-MCNC: 1.2 MG/DL (ref 0.7–1.2)
GFR, ESTIMATED: 60 ML/MIN/1.73M2
GLUCOSE SERPL-MCNC: 111 MG/DL (ref 74–99)
POTASSIUM SERPL-SCNC: 4.1 MMOL/L (ref 3.5–5)
SODIUM SERPL-SCNC: 134 MMOL/L (ref 132–146)

## 2025-01-30 PROCEDURE — 83880 ASSAY OF NATRIURETIC PEPTIDE: CPT

## 2025-01-30 PROCEDURE — 80048 BASIC METABOLIC PNL TOTAL CA: CPT

## 2025-01-30 PROCEDURE — 36415 COLL VENOUS BLD VENIPUNCTURE: CPT

## 2025-01-30 PROCEDURE — 99214 OFFICE O/P EST MOD 30 MIN: CPT

## 2025-01-30 NOTE — PROGRESS NOTES
for self (use of frozen meals, can goods, etc)  [] CHF CHW consulted  [] Low sodium meal delivery options given to patient  [] Dietician consulted   [] Low sodium recipes provided  [] Sodium free seasoning provided   [x] Fluid intake 6-8 cups (around 64 oz)  [x] Reviewed currently prescribed medications with patient, educated on importance of compliance and answered any questions regarding their medication  [] Pill box provided to patient  [] Patient using pill packing pharmacy   [] CPAP/BiPAP use  [] Low impact exercise / cardiac rehab   [] LifeVest use  [x] Patient aware of signs and symptoms of worsening HF, CHF clinic phone number provided and made aware to call clinic for sooner if evaluation if needed     [x] Difficulty affording medications   [x] CHF   [x] Prescription assistance information   [] [facility] medication assistance program information given   [] Sample medications provided to patient to help bridge gap until affordability N/A            Scheduled to follow up in CHF clinic on:   Future Appointments   Date Time Provider Department Center   3/4/2025  9:30 AM REYMUNDO [facility] ROOM 2 REYMUNDO [facility]   3/20/2025  8:40 AM Luis Antonio Moreau DO Brianda Card [facility]   5/13/2025 10:00 AM Noble Ponce DO ELECTRO PHYS [facility]   5/13/2025 10:00 AM SCHEDULE, DEVICE CLINIC 1 BRIANDA ELECTRO PHYS [facility]

## 2025-01-31 ENCOUNTER — TELEPHONE (OUTPATIENT)
Dept: NON INVASIVE DIAGNOSTICS | Age: 80
End: 2025-01-31

## 2025-01-31 NOTE — TELEPHONE ENCOUNTER
Patient will be applying for prescription assistance for Eliquis and will have them sent forms for signature once completed.

## 2025-02-04 ENCOUNTER — HOSPITAL ENCOUNTER (INPATIENT)
Age: 80
LOS: 2 days | Discharge: HOME | DRG: 291 | End: 2025-02-07
Attending: STUDENT IN AN ORGANIZED HEALTH CARE EDUCATION/TRAINING PROGRAM | Admitting: STUDENT IN AN ORGANIZED HEALTH CARE EDUCATION/TRAINING PROGRAM
Payer: MEDICARE

## 2025-02-04 ENCOUNTER — APPOINTMENT (OUTPATIENT)
Dept: GENERAL RADIOLOGY | Age: 80
DRG: 291 | End: 2025-02-04
Payer: MEDICARE

## 2025-02-04 DIAGNOSIS — I48.0 PAROXYSMAL ATRIAL FIBRILLATION (HCC): ICD-10-CM

## 2025-02-04 DIAGNOSIS — I50.9 ACUTE ON CHRONIC CONGESTIVE HEART FAILURE, UNSPECIFIED HEART FAILURE TYPE (HCC): Primary | ICD-10-CM

## 2025-02-04 PROCEDURE — 71045 X-RAY EXAM CHEST 1 VIEW: CPT

## 2025-02-04 PROCEDURE — 99285 EMERGENCY DEPT VISIT HI MDM: CPT

## 2025-02-04 ASSESSMENT — PAIN - FUNCTIONAL ASSESSMENT: PAIN_FUNCTIONAL_ASSESSMENT: NONE - DENIES PAIN

## 2025-02-04 ASSESSMENT — LIFESTYLE VARIABLES
HOW OFTEN DO YOU HAVE A DRINK CONTAINING ALCOHOL: NEVER
HOW MANY STANDARD DRINKS CONTAINING ALCOHOL DO YOU HAVE ON A TYPICAL DAY: PATIENT DOES NOT DRINK

## 2025-02-05 PROBLEM — I10 PRIMARY HYPERTENSION: Status: ACTIVE | Noted: 2018-10-18

## 2025-02-05 PROBLEM — I50.23 ACUTE ON CHRONIC SYSTOLIC (CONGESTIVE) HEART FAILURE (HCC): Status: ACTIVE | Noted: 2023-10-06

## 2025-02-05 PROBLEM — I47.20 VENTRICULAR TACHYCARDIA (HCC): Status: ACTIVE | Noted: 2025-02-05

## 2025-02-05 PROBLEM — Z91.199 NONCOMPLIANCE: Status: ACTIVE | Noted: 2020-07-05

## 2025-02-05 PROBLEM — N18.31 STAGE 3A CHRONIC KIDNEY DISEASE (HCC): Status: ACTIVE | Noted: 2025-02-05

## 2025-02-05 PROBLEM — I05.9 MITRAL VALVE DISEASE: Status: ACTIVE | Noted: 2023-10-07

## 2025-02-05 PROBLEM — Z86.73 HISTORY OF CEREBROVASCULAR ACCIDENT: Status: ACTIVE | Noted: 2025-02-05

## 2025-02-05 PROBLEM — I48.0 PAROXYSMAL ATRIAL FIBRILLATION (HCC): Status: ACTIVE | Noted: 2025-02-05

## 2025-02-05 PROBLEM — I50.9 ACUTE CONGESTIVE HEART FAILURE, UNSPECIFIED HEART FAILURE TYPE (HCC): Status: ACTIVE | Noted: 2025-02-05

## 2025-02-05 LAB
ALBUMIN SERPL-MCNC: 4 G/DL (ref 3.5–5.2)
ALP SERPL-CCNC: 59 U/L (ref 40–129)
ALT SERPL-CCNC: 12 U/L (ref 0–40)
ANION GAP SERPL CALCULATED.3IONS-SCNC: 13 MMOL/L (ref 7–16)
AST SERPL-CCNC: 16 U/L (ref 0–39)
BASOPHILS # BLD: 0.03 K/UL (ref 0–0.2)
BASOPHILS NFR BLD: 0 % (ref 0–2)
BILIRUB SERPL-MCNC: 0.6 MG/DL (ref 0–1.2)
BNP SERPL-MCNC: 3414 PG/ML (ref 0–450)
BUN SERPL-MCNC: 29 MG/DL (ref 6–23)
CALCIUM SERPL-MCNC: 8.9 MG/DL (ref 8.6–10.2)
CHLORIDE SERPL-SCNC: 104 MMOL/L (ref 98–107)
CO2 SERPL-SCNC: 21 MMOL/L (ref 22–29)
CREAT SERPL-MCNC: 1.2 MG/DL (ref 0.7–1.2)
EOSINOPHIL # BLD: 0.21 K/UL (ref 0.05–0.5)
EOSINOPHILS RELATIVE PERCENT: 3 % (ref 0–6)
ERYTHROCYTE [DISTWIDTH] IN BLOOD BY AUTOMATED COUNT: 13.5 % (ref 11.5–15)
GFR, ESTIMATED: 60 ML/MIN/1.73M2
GLUCOSE SERPL-MCNC: 111 MG/DL (ref 74–99)
HCT VFR BLD AUTO: 35 % (ref 37–54)
HGB BLD-MCNC: 11.5 G/DL (ref 12.5–16.5)
IMM GRANULOCYTES # BLD AUTO: 0.03 K/UL (ref 0–0.58)
IMM GRANULOCYTES NFR BLD: 0 % (ref 0–5)
INFLUENZA A BY PCR: NOT DETECTED
INFLUENZA B BY PCR: NOT DETECTED
LYMPHOCYTES NFR BLD: 1.26 K/UL (ref 1.5–4)
LYMPHOCYTES RELATIVE PERCENT: 18 % (ref 20–42)
MAGNESIUM SERPL-MCNC: 2 MG/DL (ref 1.6–2.6)
MCH RBC QN AUTO: 35.6 PG (ref 26–35)
MCHC RBC AUTO-ENTMCNC: 32.9 G/DL (ref 32–34.5)
MCV RBC AUTO: 108.4 FL (ref 80–99.9)
MONOCYTES NFR BLD: 0.67 K/UL (ref 0.1–0.95)
MONOCYTES NFR BLD: 9 % (ref 2–12)
NEUTROPHILS NFR BLD: 69 % (ref 43–80)
NEUTS SEG NFR BLD: 4.95 K/UL (ref 1.8–7.3)
PLATELET # BLD AUTO: 187 K/UL (ref 130–450)
PMV BLD AUTO: 10.4 FL (ref 7–12)
POTASSIUM SERPL-SCNC: 4.1 MMOL/L (ref 3.5–5)
PROT SERPL-MCNC: 7.6 G/DL (ref 6.4–8.3)
RBC # BLD AUTO: 3.23 M/UL (ref 3.8–5.8)
SARS-COV-2 RDRP RESP QL NAA+PROBE: NOT DETECTED
SODIUM SERPL-SCNC: 138 MMOL/L (ref 132–146)
SPECIMEN DESCRIPTION: NORMAL
TROPONIN I SERPL HS-MCNC: 26 NG/L (ref 0–11)
TROPONIN I SERPL HS-MCNC: 28 NG/L (ref 0–11)
TROPONIN I SERPL HS-MCNC: 29 NG/L (ref 0–11)
TSH SERPL DL<=0.05 MIU/L-ACNC: 2.83 UIU/ML (ref 0.27–4.2)
WBC OTHER # BLD: 7.2 K/UL (ref 4.5–11.5)

## 2025-02-05 PROCEDURE — 6370000000 HC RX 637 (ALT 250 FOR IP): Performed by: INTERNAL MEDICINE

## 2025-02-05 PROCEDURE — 6360000002 HC RX W HCPCS: Performed by: NURSE PRACTITIONER

## 2025-02-05 PROCEDURE — 2500000003 HC RX 250 WO HCPCS: Performed by: NURSE PRACTITIONER

## 2025-02-05 PROCEDURE — 85025 COMPLETE CBC W/AUTO DIFF WBC: CPT

## 2025-02-05 PROCEDURE — 83735 ASSAY OF MAGNESIUM: CPT

## 2025-02-05 PROCEDURE — 84439 ASSAY OF FREE THYROXINE: CPT

## 2025-02-05 PROCEDURE — 2060000000 HC ICU INTERMEDIATE R&B

## 2025-02-05 PROCEDURE — 6370000000 HC RX 637 (ALT 250 FOR IP): Performed by: STUDENT IN AN ORGANIZED HEALTH CARE EDUCATION/TRAINING PROGRAM

## 2025-02-05 PROCEDURE — 6370000000 HC RX 637 (ALT 250 FOR IP): Performed by: NURSE PRACTITIONER

## 2025-02-05 PROCEDURE — 84484 ASSAY OF TROPONIN QUANT: CPT

## 2025-02-05 PROCEDURE — 83036 HEMOGLOBIN GLYCOSYLATED A1C: CPT

## 2025-02-05 PROCEDURE — 84443 ASSAY THYROID STIM HORMONE: CPT

## 2025-02-05 PROCEDURE — 93005 ELECTROCARDIOGRAM TRACING: CPT | Performed by: STUDENT IN AN ORGANIZED HEALTH CARE EDUCATION/TRAINING PROGRAM

## 2025-02-05 PROCEDURE — 83880 ASSAY OF NATRIURETIC PEPTIDE: CPT

## 2025-02-05 PROCEDURE — 80053 COMPREHEN METABOLIC PANEL: CPT

## 2025-02-05 PROCEDURE — 87502 INFLUENZA DNA AMP PROBE: CPT

## 2025-02-05 PROCEDURE — 87635 SARS-COV-2 COVID-19 AMP PRB: CPT

## 2025-02-05 RX ORDER — SPIRONOLACTONE 25 MG/1
25 TABLET ORAL 2 TIMES DAILY
Status: DISCONTINUED | OUTPATIENT
Start: 2025-02-05 | End: 2025-02-07 | Stop reason: HOSPADM

## 2025-02-05 RX ORDER — FUROSEMIDE 10 MG/ML
20 INJECTION INTRAMUSCULAR; INTRAVENOUS ONCE
Status: DISCONTINUED | OUTPATIENT
Start: 2025-02-05 | End: 2025-02-07 | Stop reason: HOSPADM

## 2025-02-05 RX ORDER — FUROSEMIDE 10 MG/ML
20 INJECTION INTRAMUSCULAR; INTRAVENOUS 2 TIMES DAILY
Status: COMPLETED | OUTPATIENT
Start: 2025-02-05 | End: 2025-02-06

## 2025-02-05 RX ORDER — CALCIUM CARBONATE 500(1250)
1 TABLET ORAL EVERY MORNING
Status: DISCONTINUED | OUTPATIENT
Start: 2025-02-05 | End: 2025-02-07 | Stop reason: HOSPADM

## 2025-02-05 RX ORDER — FUROSEMIDE 20 MG/1
20 TABLET ORAL
COMMUNITY
End: 2025-02-05 | Stop reason: ALTCHOICE

## 2025-02-05 RX ORDER — ATORVASTATIN CALCIUM 40 MG/1
40 TABLET, FILM COATED ORAL NIGHTLY
Status: DISCONTINUED | OUTPATIENT
Start: 2025-02-05 | End: 2025-02-07 | Stop reason: HOSPADM

## 2025-02-05 RX ORDER — SODIUM CHLORIDE 9 MG/ML
INJECTION, SOLUTION INTRAVENOUS PRN
Status: DISCONTINUED | OUTPATIENT
Start: 2025-02-05 | End: 2025-02-07 | Stop reason: HOSPADM

## 2025-02-05 RX ORDER — HYDROXYZINE PAMOATE 25 MG/1
25 CAPSULE ORAL 2 TIMES DAILY PRN
Status: DISCONTINUED | OUTPATIENT
Start: 2025-02-05 | End: 2025-02-07 | Stop reason: HOSPADM

## 2025-02-05 RX ORDER — SODIUM CHLORIDE 0.9 % (FLUSH) 0.9 %
5-40 SYRINGE (ML) INJECTION EVERY 12 HOURS SCHEDULED
Status: DISCONTINUED | OUTPATIENT
Start: 2025-02-05 | End: 2025-02-07 | Stop reason: HOSPADM

## 2025-02-05 RX ORDER — SODIUM CHLORIDE 0.9 % (FLUSH) 0.9 %
5-40 SYRINGE (ML) INJECTION PRN
Status: DISCONTINUED | OUTPATIENT
Start: 2025-02-05 | End: 2025-02-07 | Stop reason: HOSPADM

## 2025-02-05 RX ORDER — ACETAMINOPHEN 325 MG/1
650 TABLET ORAL EVERY 6 HOURS PRN
Status: DISCONTINUED | OUTPATIENT
Start: 2025-02-05 | End: 2025-02-07 | Stop reason: HOSPADM

## 2025-02-05 RX ORDER — VITS A,C,E/LUTEIN/MINERALS 300MCG-200
1 TABLET ORAL EVERY MORNING
Status: DISCONTINUED | OUTPATIENT
Start: 2025-02-06 | End: 2025-02-07 | Stop reason: HOSPADM

## 2025-02-05 RX ORDER — METOPROLOL SUCCINATE 50 MG/1
50 TABLET, EXTENDED RELEASE ORAL NIGHTLY
Status: DISCONTINUED | OUTPATIENT
Start: 2025-02-05 | End: 2025-02-07 | Stop reason: HOSPADM

## 2025-02-05 RX ORDER — M-VIT,TX,IRON,MINS/CALC/FOLIC 27MG-0.4MG
1 TABLET ORAL EVERY MORNING
Status: DISCONTINUED | OUTPATIENT
Start: 2025-02-05 | End: 2025-02-05 | Stop reason: SDUPTHER

## 2025-02-05 RX ORDER — ACETAMINOPHEN 650 MG/1
650 SUPPOSITORY RECTAL EVERY 6 HOURS PRN
Status: DISCONTINUED | OUTPATIENT
Start: 2025-02-05 | End: 2025-02-07 | Stop reason: HOSPADM

## 2025-02-05 RX ORDER — PROMETHAZINE HYDROCHLORIDE 25 MG/ML
6.25 INJECTION, SOLUTION INTRAMUSCULAR; INTRAVENOUS EVERY 6 HOURS PRN
Status: DISCONTINUED | OUTPATIENT
Start: 2025-02-05 | End: 2025-02-07 | Stop reason: HOSPADM

## 2025-02-05 RX ORDER — BISACODYL 5 MG/1
5 TABLET, DELAYED RELEASE ORAL DAILY PRN
Status: DISCONTINUED | OUTPATIENT
Start: 2025-02-05 | End: 2025-02-07 | Stop reason: HOSPADM

## 2025-02-05 RX ORDER — LANOLIN ALCOHOL/MO/W.PET/CERES
500 CREAM (GRAM) TOPICAL EVERY MORNING
Status: DISCONTINUED | OUTPATIENT
Start: 2025-02-05 | End: 2025-02-07 | Stop reason: HOSPADM

## 2025-02-05 RX ADMIN — HYDROXYZINE PAMOATE 25 MG: 25 CAPSULE ORAL at 12:35

## 2025-02-05 RX ADMIN — CALCIUM 500 MG: 500 TABLET ORAL at 08:55

## 2025-02-05 RX ADMIN — METOPROLOL SUCCINATE 50 MG: 50 TABLET, EXTENDED RELEASE ORAL at 22:50

## 2025-02-05 RX ADMIN — SACUBITRIL AND VALSARTAN 0.5 TABLET: 24; 26 TABLET, FILM COATED ORAL at 08:54

## 2025-02-05 RX ADMIN — SPIRONOLACTONE 25 MG: 25 TABLET ORAL at 22:50

## 2025-02-05 RX ADMIN — Medication 1 TABLET: at 08:56

## 2025-02-05 RX ADMIN — SODIUM CHLORIDE, PRESERVATIVE FREE 10 ML: 5 INJECTION INTRAVENOUS at 08:58

## 2025-02-05 RX ADMIN — APIXABAN 5 MG: 5 TABLET, FILM COATED ORAL at 08:57

## 2025-02-05 RX ADMIN — Medication 500 MG: at 08:56

## 2025-02-05 RX ADMIN — SPIRONOLACTONE 25 MG: 25 TABLET ORAL at 08:55

## 2025-02-05 RX ADMIN — SACUBITRIL AND VALSARTAN 0.5 TABLET: 24; 26 TABLET, FILM COATED ORAL at 22:50

## 2025-02-05 RX ADMIN — APIXABAN 5 MG: 5 TABLET, FILM COATED ORAL at 22:50

## 2025-02-05 RX ADMIN — FUROSEMIDE 20 MG: 10 INJECTION, SOLUTION INTRAMUSCULAR; INTRAVENOUS at 17:47

## 2025-02-05 RX ADMIN — SODIUM CHLORIDE, PRESERVATIVE FREE 10 ML: 5 INJECTION INTRAVENOUS at 22:49

## 2025-02-05 RX ADMIN — ATORVASTATIN CALCIUM 40 MG: 40 TABLET, FILM COATED ORAL at 22:50

## 2025-02-05 NOTE — ED NOTES
ED to Inpatient Handoff Report    Notified Gold that electronic handoff available and patient ready for transport to room 628.    Safety Risks: Risk of falls    Patient in Restraints: no    Constant Observer or Patient : no    Telemetry Monitoring Ordered :Yes           Order to transfer to unit without monitor:NO    Last MEWS: 2 Time completed: 1412    Deterioration Index Score:   Predictive Model Details          39 (Caution)  Factor Value    Calculated 2/5/2025 14:14 33% Respiratory rate 26    Deterioration Index Model 32% Age 79 years old     28% Supplemental oxygen Nasal cannula     2% BUN abnormal (29 mg/dL)     2% Pulse 92     1% Potassium 4.1 mmol/L     1% Hematocrit abnormal (35.0 %)     0% Sodium 138 mmol/L     0% Pulse oximetry 98 %     0% Systolic 113     0% WBC count 7.2 k/uL     0% Temperature 98.4 °F (36.9 °C)        Vitals:    02/05/25 0633 02/05/25 0705 02/05/25 0854 02/05/25 1412   BP: 109/77 121/74 123/84 113/61   Pulse:    92   Resp:    26   Temp:    98.4 °F (36.9 °C)   TempSrc:    Oral   SpO2: 98% 100%  98%   Weight:       Height:             Opportunity for questions and clarification was provided.

## 2025-02-05 NOTE — ED NOTES
Patient is not wanting to put on a gown until he gets into his room.  Patient is in bed, with 2L of o2 on for comfort.

## 2025-02-05 NOTE — H&P
[facility]   History and Physical      CHIEF COMPLAINT:  Shortness of Breath      Reason for Admission:  Acute congestive heart failure, unspecified heart failure type   History Obtained From:  patient, electronic medical record    HISTORY OF PRESENT ILLNESS:      The patient is a 79 y.o. male of Peter Campos Jr., MD  has a past medical history of Acute on chronic systolic congestive heart failure, Aortic regurgitation, Arthritis, Atrial fibrillation , CAD (coronary artery disease), Cerebral artery occlusion with cerebral infarction, Hypercholesteremia, Hypertension, Ischemic cardiomyopathy, Mitral regurgitation, and STEMI (ST elevation myocardial infarction) who presents with Shortness of Breath    ER vitals: Temp: 98  BP  117/77  HR  90  RR   18 O2sat 99% on RA        ER Labs:  proBNP of 3414  and troponin of 24 CMP: Normal:       CBC: Hemoglobin of 11.5 , hematocrit 35, with MCV of 108.4 WBC normal.    Imaging:  CXR Mild increase in mild perihilar and interstitial pulmonary edema.     ED treatment: Placed on 2 L nasal cannula.  Lasix 20 mg, but not yet given    On evaluation, patient was laying in bed and does not appear to be in acute distress.  Patient report that his symptoms is unchanged.  States he came in because he feels he needs to be checked out.  He report that he lives alone. He denies acute complaint. Denies chest pain, but admits to SOB on exertion that is chronic.      Also discussed with the nursing staff     All labs personally reviewed   All imaging personally reviewed     Past Medical History:        Diagnosis Date    Acute on chronic systolic congestive heart failure (HCC) 10/18/2018    Aortic regurgitation     trace    Arthritis     Atrial fibrillation (HCC)     CAD (coronary artery disease)     Cerebral artery occlusion with cerebral infarction (HCC) 2020    no deficits    Hypercholesteremia     Hypertension     Ischemic cardiomyopathy     Mitral regurgitation     mild

## 2025-02-05 NOTE — ED NOTES
Patient ambulated to the restroom.  His O2 remained at 98%.  He did not have any complaints about dizziness or SOB.

## 2025-02-05 NOTE — ED NOTES
Pt informed RN that he was feeling anxious and would like something to relieve anxiety. RN called Attending and attending to placed order for medication

## 2025-02-05 NOTE — ED PROVIDER NOTES
chest x-ray.  Patient admitted for diuresis.      CONSULTS: (Who and What was discussed)  IP CONSULT TO HOSPITALIST  IP CONSULT TO HEART FAILURE NURSE/COORDINATOR    Spoke with Dr. Benítez's NP (Medicine).  Discussed case.  They will admit this patient.        I am the Primary Clinician of Record.    FINAL IMPRESSION      1. Acute on chronic congestive heart failure, unspecified heart failure type (HCC)          DISPOSITION/PLAN     DISPOSITION Admitted 02/05/2025 03:06:36 AM      PATIENT REFERRED TO:  No follow-up provider specified.    DISCHARGE MEDICATIONS:  New Prescriptions    No medications on file       DISCONTINUED MEDICATIONS:  Discontinued Medications    No medications on file              (Please note that portions of this note were completed with a voice recognition program.  Efforts were made to edit the dictations but occasionally words are mis-transcribed.)    Camilla Galindo DO (electronically signed)           Camilla Galindo DO  02/05/25 0772

## 2025-02-06 ENCOUNTER — ANESTHESIA (OUTPATIENT)
Dept: INPATIENT UNIT | Age: 80
DRG: 291 | End: 2025-02-06
Payer: MEDICARE

## 2025-02-06 ENCOUNTER — ANESTHESIA EVENT (OUTPATIENT)
Dept: INPATIENT UNIT | Age: 80
DRG: 291 | End: 2025-02-06
Payer: MEDICARE

## 2025-02-06 ENCOUNTER — APPOINTMENT (OUTPATIENT)
Dept: INPATIENT UNIT | Age: 80
DRG: 291 | End: 2025-02-06
Payer: MEDICARE

## 2025-02-06 LAB
ANION GAP SERPL CALCULATED.3IONS-SCNC: 12 MMOL/L (ref 7–16)
ATYPICAL LYMPHOCYTE ABSOLUTE COUNT: 0.2 K/UL (ref 0–0.46)
ATYPICAL LYMPHOCYTES: 3 % (ref 0–4)
BASOPHILS # BLD: 0 K/UL (ref 0–0.2)
BASOPHILS NFR BLD: 0 % (ref 0–2)
BNP SERPL-MCNC: 3819 PG/ML (ref 0–450)
BUN SERPL-MCNC: 27 MG/DL (ref 6–23)
CALCIUM SERPL-MCNC: 8.9 MG/DL (ref 8.6–10.2)
CHLORIDE SERPL-SCNC: 100 MMOL/L (ref 98–107)
CO2 SERPL-SCNC: 21 MMOL/L (ref 22–29)
CREAT SERPL-MCNC: 1.3 MG/DL (ref 0.7–1.2)
EKG ATRIAL RATE: 72 BPM
EKG ATRIAL RATE: 89 BPM
EKG P AXIS: 59 DEGREES
EKG P AXIS: 62 DEGREES
EKG P-R INTERVAL: 156 MS
EKG Q-T INTERVAL: 418 MS
EKG Q-T INTERVAL: 514 MS
EKG QRS DURATION: 164 MS
EKG QRS DURATION: 164 MS
EKG QTC CALCULATION (BAZETT): 519 MS
EKG QTC CALCULATION (BAZETT): 562 MS
EKG R AXIS: 138 DEGREES
EKG R AXIS: 142 DEGREES
EKG T AXIS: -31 DEGREES
EKG T AXIS: -7 DEGREES
EKG VENTRICULAR RATE: 72 BPM
EKG VENTRICULAR RATE: 93 BPM
EOSINOPHIL # BLD: 0.2 K/UL (ref 0.05–0.5)
EOSINOPHILS RELATIVE PERCENT: 3 % (ref 0–6)
ERYTHROCYTE [DISTWIDTH] IN BLOOD BY AUTOMATED COUNT: 13.5 % (ref 11.5–15)
GFR, ESTIMATED: 58 ML/MIN/1.73M2
GLUCOSE SERPL-MCNC: 92 MG/DL (ref 74–99)
HBA1C MFR BLD: 5.6 % (ref 4–5.6)
HCT VFR BLD AUTO: 34.8 % (ref 37–54)
HGB BLD-MCNC: 11.4 G/DL (ref 12.5–16.5)
LYMPHOCYTES NFR BLD: 1.51 K/UL (ref 1.5–4)
LYMPHOCYTES RELATIVE PERCENT: 20 % (ref 20–42)
MCH RBC QN AUTO: 36.1 PG (ref 26–35)
MCHC RBC AUTO-ENTMCNC: 32.8 G/DL (ref 32–34.5)
MCV RBC AUTO: 110.1 FL (ref 80–99.9)
MONOCYTES NFR BLD: 0.53 K/UL (ref 0.1–0.95)
MONOCYTES NFR BLD: 7 % (ref 2–12)
NEUTROPHILS NFR BLD: 68 % (ref 43–80)
NEUTS SEG NFR BLD: 5.07 K/UL (ref 1.8–7.3)
PLATELET # BLD AUTO: 174 K/UL (ref 130–450)
PMV BLD AUTO: 10.7 FL (ref 7–12)
POTASSIUM SERPL-SCNC: 4.5 MMOL/L (ref 3.5–5)
RBC # BLD AUTO: 3.16 M/UL (ref 3.8–5.8)
RBC # BLD: ABNORMAL 10*6/UL
SODIUM SERPL-SCNC: 133 MMOL/L (ref 132–146)
T4 FREE SERPL-MCNC: 1.5 NG/DL (ref 0.9–1.7)
WBC OTHER # BLD: 7.5 K/UL (ref 4.5–11.5)

## 2025-02-06 PROCEDURE — 0202U NFCT DS 22 TRGT SARS-COV-2: CPT

## 2025-02-06 PROCEDURE — 7100000010 HC PHASE II RECOVERY - FIRST 15 MIN: Performed by: STUDENT IN AN ORGANIZED HEALTH CARE EDUCATION/TRAINING PROGRAM

## 2025-02-06 PROCEDURE — 6370000000 HC RX 637 (ALT 250 FOR IP): Performed by: NURSE PRACTITIONER

## 2025-02-06 PROCEDURE — 3700000001 HC ADD 15 MINUTES (ANESTHESIA): Performed by: STUDENT IN AN ORGANIZED HEALTH CARE EDUCATION/TRAINING PROGRAM

## 2025-02-06 PROCEDURE — 2060000000 HC ICU INTERMEDIATE R&B

## 2025-02-06 PROCEDURE — 6370000000 HC RX 637 (ALT 250 FOR IP): Performed by: STUDENT IN AN ORGANIZED HEALTH CARE EDUCATION/TRAINING PROGRAM

## 2025-02-06 PROCEDURE — 7100000011 HC PHASE II RECOVERY - ADDTL 15 MIN: Performed by: STUDENT IN AN ORGANIZED HEALTH CARE EDUCATION/TRAINING PROGRAM

## 2025-02-06 PROCEDURE — 6370000000 HC RX 637 (ALT 250 FOR IP): Performed by: INTERNAL MEDICINE

## 2025-02-06 PROCEDURE — 2580000003 HC RX 258: Performed by: NURSE ANESTHETIST, CERTIFIED REGISTERED

## 2025-02-06 PROCEDURE — 5A2204Z RESTORATION OF CARDIAC RHYTHM, SINGLE: ICD-10-PCS | Performed by: INTERNAL MEDICINE

## 2025-02-06 PROCEDURE — 3700000000 HC ANESTHESIA ATTENDED CARE: Performed by: STUDENT IN AN ORGANIZED HEALTH CARE EDUCATION/TRAINING PROGRAM

## 2025-02-06 PROCEDURE — 2700000000 HC OXYGEN THERAPY PER DAY

## 2025-02-06 PROCEDURE — 36415 COLL VENOUS BLD VENIPUNCTURE: CPT

## 2025-02-06 PROCEDURE — 85025 COMPLETE CBC W/AUTO DIFF WBC: CPT

## 2025-02-06 PROCEDURE — 6360000002 HC RX W HCPCS: Performed by: NURSE ANESTHETIST, CERTIFIED REGISTERED

## 2025-02-06 PROCEDURE — 92960 CARDIOVERSION ELECTRIC EXT: CPT | Performed by: INTERNAL MEDICINE

## 2025-02-06 PROCEDURE — 80048 BASIC METABOLIC PNL TOTAL CA: CPT

## 2025-02-06 PROCEDURE — 2500000003 HC RX 250 WO HCPCS: Performed by: NURSE PRACTITIONER

## 2025-02-06 PROCEDURE — 6360000002 HC RX W HCPCS: Performed by: INTERNAL MEDICINE

## 2025-02-06 PROCEDURE — 83880 ASSAY OF NATRIURETIC PEPTIDE: CPT

## 2025-02-06 PROCEDURE — 93010 ELECTROCARDIOGRAM REPORT: CPT | Performed by: INTERNAL MEDICINE

## 2025-02-06 RX ORDER — FUROSEMIDE 20 MG/1
20 TABLET ORAL DAILY
Status: DISCONTINUED | OUTPATIENT
Start: 2025-02-07 | End: 2025-02-07 | Stop reason: HOSPADM

## 2025-02-06 RX ORDER — SODIUM CHLORIDE 9 MG/ML
INJECTION, SOLUTION INTRAVENOUS
Status: DISCONTINUED | OUTPATIENT
Start: 2025-02-06 | End: 2025-02-06 | Stop reason: SDUPTHER

## 2025-02-06 RX ORDER — PROPOFOL 10 MG/ML
INJECTION, EMULSION INTRAVENOUS
Status: DISCONTINUED | OUTPATIENT
Start: 2025-02-06 | End: 2025-02-06 | Stop reason: SDUPTHER

## 2025-02-06 RX ADMIN — CALCIUM 500 MG: 500 TABLET ORAL at 09:21

## 2025-02-06 RX ADMIN — SPIRONOLACTONE 25 MG: 25 TABLET ORAL at 09:21

## 2025-02-06 RX ADMIN — SODIUM CHLORIDE, PRESERVATIVE FREE 10 ML: 5 INJECTION INTRAVENOUS at 09:27

## 2025-02-06 RX ADMIN — APIXABAN 5 MG: 5 TABLET, FILM COATED ORAL at 09:21

## 2025-02-06 RX ADMIN — SODIUM CHLORIDE: 9 INJECTION, SOLUTION INTRAVENOUS at 08:00

## 2025-02-06 RX ADMIN — FUROSEMIDE 20 MG: 10 INJECTION, SOLUTION INTRAMUSCULAR; INTRAVENOUS at 09:22

## 2025-02-06 RX ADMIN — METOPROLOL SUCCINATE 50 MG: 50 TABLET, EXTENDED RELEASE ORAL at 22:07

## 2025-02-06 RX ADMIN — ATORVASTATIN CALCIUM 40 MG: 40 TABLET, FILM COATED ORAL at 22:07

## 2025-02-06 RX ADMIN — APIXABAN 5 MG: 5 TABLET, FILM COATED ORAL at 22:07

## 2025-02-06 RX ADMIN — PROPOFOL 80 MG: 10 INJECTION, EMULSION INTRAVENOUS at 08:07

## 2025-02-06 RX ADMIN — HYDROXYZINE PAMOATE 25 MG: 25 CAPSULE ORAL at 17:02

## 2025-02-06 RX ADMIN — SPIRONOLACTONE 25 MG: 25 TABLET ORAL at 22:07

## 2025-02-06 RX ADMIN — Medication 1 TABLET: at 09:21

## 2025-02-06 RX ADMIN — Medication 500 MG: at 09:21

## 2025-02-06 ASSESSMENT — LIFESTYLE VARIABLES: SMOKING_STATUS: 1

## 2025-02-06 ASSESSMENT — PAIN SCALES - GENERAL
PAINLEVEL_OUTOF10: 0

## 2025-02-06 NOTE — PLAN OF CARE
Problem: Respiratory - Adult  Goal: Achieves optimal ventilation and oxygenation  2/5/2025 2315 by Va Ford RN  Outcome: Progressing  2/5/2025 2315 by Va Ford RN  Outcome: Progressing

## 2025-02-06 NOTE — PLAN OF CARE
Patient's chart updated to reflect:      .    - HF care plan, HF education points and HF discharge instructions.  -Orders: 2 gram sodium diet, daily weights, I/O.  -PCP or cardiology follow up appointments to be scheduled within 7 days of hospital discharge.  -CHF education session will be provided to the patient prior to hospital discharge.    Lilli Wen RN   Heart Failure Navigator

## 2025-02-06 NOTE — CONSULTS
INPATIENT CARDIOLOGY CONSULTATION    Name: Enrique Gonzalez    Age: 79 y.o.    Date of Admission: 2/4/2025 11:23 PM    Date of Service: 2/5/2025    Reason for Consultation: Coronary atherosclerosis, ischemic cardiomyopathy, mitral valve disease, acute superimposed upon chronic systolic heart failure, mitral valve disease, hypertension, chronic kidney disease, history of cerebrovascular accident, noncompliance    Referring physician: Ivan Payne DO     History of Present Illness: The patient is a 79-year-old white male known to [facility] with primary cardiovascular care provided by Mode Moreau as well as the electrophysiology service.  He has a history of coronary atherosclerosis with surgical revascularization in December, 2012 with a left internal mammary graft to the left anterior descending, right internal mammary graft to the diagonal branch of the left anterior descending and saphenous vein grafts to the circumflex marginal and posterolateral branch of the right coronary artery with the development of an ischemic cardiomyopathy with severe left ventricular systolic dysfunction as well as that of mitral regurgitation.  Primary arrhythmia protection was recommended but the patient initially declined with persistent evidence of severe left ventricular systolic dysfunction and subsequently in January, 2023 following arrhythmia monitoring demonstrating sustained and nonsustained ventricular tachyarrhythmias the placement of a biventricular implantable cardioverter defibrillator with subsequent ventricular arrhythmias below the device detection and initiation of antiarrhythmic therapy with amiodarone.  A transthoracic echocardiogram of July, 2023 demonstrated evidence of a severely dilated left ventricular chamber with severe left ventricular systolic dysfunction and estimated ejection fraction of 30% with moderately severe mitral regurgitation with evaluation at the valve clinic and a transesophageal 
healthy / low sodium diet; weigh yourself daily, exercise regularly- per doctor recommendation and not to smoke or use an excess amount of alcohol.  Discussed calling the cardiologist / doctor with a weight gain of 3 pounds in one day or a total of 5 pounds or more in one week. Also, if you should have a significant weight loss of 3# or more in one day to call the doctor, they may need to decrease or hold the diuretic dose. On days you feels nauseated and not eating / drinking, having emesis or diarrhea,  informed to call the cardiologist  / doctor, they may need to decrease or hold diuretic to avoid dehydration.  Again, stressed the importance of informing their medical provider the first day of onset of any of the signs and symptoms in the \"Yellow Zone\" of the HF Zones.     The Heart Failure Booklet given to the patient with additional patient education addressing:  What is Heart Failure?  Things You Can Do to Live Well with HF  How to Take Your Medications  How to Eat Less Salt  [place] its Salt?  Exercising Well with Heart Failure  Signs and symptoms of HF to report  Weight Yourself Each Day  Home Self Management- activity, weight tracking, taking medications as prescribed, meals /diet planning (sodium and fluid restriction), how to read food labels, keeping physician follow ups, smoking cessation, follow the “Heart Failure Zones”  The Heart Failure zones  Every Dose Every Day    Instructed to call 911 if you have any of the following symptoms:  Struggling to breathe unrelieved with rest  Having chest pain  Confusion or can’t think clearly      Patient verbalizes understanding of above.   Greater than 30 minutes was spent educating patient.        Lilli Wen RN   Heart Failure Navigator

## 2025-02-06 NOTE — PLAN OF CARE
Problem: Chronic Conditions and Co-morbidities  Goal: Patient's chronic conditions and co-morbidity symptoms are monitored and maintained or improved  Outcome: Progressing     Problem: ABCDS Injury Assessment  Goal: Absence of physical injury  Outcome: Progressing     Problem: Safety - Adult  Goal: Free from fall injury  Outcome: Progressing     Problem: Respiratory - Adult  Goal: Achieves optimal ventilation and oxygenation  2/6/2025 1125 by Javier Chavez RN  Outcome: Progressing  2/5/2025 2315 by Va Ford RN  Outcome: Progressing  2/5/2025 2315 by Va Ford RN  Outcome: Progressing

## 2025-02-06 NOTE — PROCEDURES
PROCEDURE NOTE  Date: 2/6/2025   Name: Enrique Gonzalez  YOB: 1945    The patient presented in the post absorptive state following the documentation of persistent atrial fibrillation with documentation of therapeutic anticoagulation. Following obtaining informed consent, an intravenous catheter was placed and with the assistance of the Anesthesia service conscious sedation was achieved via the administration of 80 milligrams of propofol. Following the achievement of adequate sedation synchronized direct current cardioversion was performed with 200 watt-seconds restoring sinus rhythm.The patient tolerated the procedure without complication and was hemodynamically and neurologically intact following anesthesia recovery prior to discharge.

## 2025-02-06 NOTE — ACP (ADVANCE CARE PLANNING)
Advance Care Planning   Healthcare Decision Maker:    Primary Decision Maker: Syeda Gonzalez - Child - [phone]    Click here to complete Healthcare Decision Makers including selection of the Healthcare Decision Maker Relationship (ie \"Primary\").  Today we documented Decision Maker(s) consistent with Legal Next of Kin hierarchy.       Syeda Gonzalez Child [phone]

## 2025-02-06 NOTE — DISCHARGE INSTRUCTIONS
Please stop your Entresto until you follow-up with your cardiologist.    Your cardiologist wanted you to start taking valsartan (DIOVAN) tablet 40 mg daily                         HEART FAILURE  / CONGESTIVE HEART FAILURE  DISCHARGE INSTRUCTIONS:  GUIDELINES TO FOLLOW AT HOME    Self- Managed Care:     MEDICATIONS:  Take your medication as directed. If you are experiencing any side effects, inform your doctor, Do not stop taking any of your medications without letting your doctor know.   Check with your doctor before taking any over-the-counter medications / herbal / or dietary supplements. They may interfere with your other medications.  Do not take ibuprofen (Advil or Motrin) and naproxen (Aleve) without talking to your doctor first. They could make your heart failure worse.         WEIGHT MONITORING:   Weigh yourself everyday (with the same scale) around the same time of the day and write it down. (you can chart them on a calendar or keep track of them on paper.   Notify your doctor of a weight gain of 3 pounds or more in 1 day   OR a total of 5 pounds or more in 1 week    Take your weight record to your doctor visits  Also, the same goes if you loose more than 3# in one day, let your heart doctor know.         DIET:   Cardiac heart healthy diet- Low saturated / low trans fat, no added salt, caffeine restricted, Low sodium diet-   No more than 2,000mg (2 grams) of salt / sodium per day (which equals to a little less than  a teaspoon of salt)  If your doctor wants you on a fluid restriction...it is usually recommended a fluid limit of 2,000cc -  Fluid restriction- 2,000 ml (milliliters) = 64 ounces = you can have 8 glasses of fluid per day (each glass 8 ounces)    Follow a low salt diet - avoid using salt at the table, avoid / limit use of canned soups, processed / packaged foods, salted snacks, olives and pickles.  Do not use a salt substitute without checking with your doctor, they may contain a high amount of

## 2025-02-06 NOTE — CARE COORDINATION
Social Work / Discharge Planning : RE-ADMISSION: Patient admitted with Paroxysmal A -Fib.  Patient resides alone in a 1 story home . Patient has  a cane and ww available. Patient currently on 3 liters: RA is baseline and goal to wean.  Patient is established with Dr. Campos. He has hx with [facility] and [facility]. Cardiology consulted with cardio version planned. SW will follow for potential 02 .Patient insurance is medicare. SW to follow. Electronically signed by NIKHIL Garner on 2/6/25 at 10:26 AM EST

## 2025-02-06 NOTE — ANESTHESIA POSTPROCEDURE EVALUATION
Department of Anesthesiology  Postprocedure Note    Patient: Enrique Gonzalez  MRN: [id number]  YOB: 1945  Date of evaluation: 2/6/2025    Procedure Summary       Date: 02/06/25 Room / Location: [facility] Stage I    Anesthesia Start: 0755 Anesthesia Stop:     Procedure: CARDIOVERSION Diagnosis:     Scheduled Providers:  Responsible Provider: Yvonne Celis DO    Anesthesia Type: MAC ASA Status: 3            Anesthesia Type: No value filed.    Lambert Phase I:      Lambert Phase II:      Anesthesia Post Evaluation    Patient location during evaluation: PACU  Patient participation: complete - patient participated  Level of consciousness: awake  Airway patency: patent  Nausea & Vomiting: no nausea and no vomiting  Cardiovascular status: hemodynamically stable  Respiratory status: acceptable  Hydration status: euvolemic  Pain management: adequate        No notable events documented.

## 2025-02-06 NOTE — ANESTHESIA PRE PROCEDURE
Department of Anesthesiology  Preprocedure Note       Name:  Enrique Gonzalez   Age:  79 y.o.  :  1945                                          MRN:  [id number]         Date:  2025      Surgeon: * No surgeons listed *    Procedure: * No procedures listed *    Medications prior to admission:   Prior to Admission medications    Medication Sig Start Date End Date Taking? Authorizing Provider   Multiple Minerals-Vitamins (HOANG MAG ZINC +D3 PO) Take 1 tablet by mouth every morning PRODUCT:   CALCIUM-1000MG  MAGNESIUM-400MG  ZINC-25MG  VITAMIN D3-15MCG   Yes Tessie Cornejo MD   apixaban (ELIQUIS) 5 MG TABS tablet Take 1 tablet by mouth 2 times daily 25 Yes Adela Gamino APRN - CNP   sacubitril-valsartan (ENTRESTO) 24-26 MG per tablet Take 0.5 tablets by mouth 2 times daily AM AND PM 25  Yes Adela Gamino APRN - CNP   furosemide (LASIX) 20 MG tablet Take 1 tablet by mouth every other day 7/10/24  Yes Luis Antonio Moreau DO   metoprolol succinate (TOPROL XL) 50 MG extended release tablet Take 1 tablet by mouth daily  Patient taking differently: Take 1 tablet by mouth nightly 7/10/24  Yes Luis Antonio Moreau DO   spironolactone (ALDACTONE) 25 MG tablet Take 1 tablet by mouth 2 times daily Morning and evening 7/10/24  Yes Luis Antonio Moreau DO   Multiple Vitamins-Minerals (CENTRUM SILVER 50+MEN) TABS Take 1 tablet by mouth every morning   Yes Provider, Historical, MD   [place] 450 MG CAPS Take 450 mg by mouth every morning   Yes ProviderTessie MD   niacin (SLO-NIACIN) 500 MG extended release tablet Take 1 tablet by mouth every morning   Yes ProviderTessie MD   atorvastatin (LIPITOR) 40 MG tablet Take 1 tablet by mouth nightly 7/3/20  Yes Josie Benítez MD       Current medications:    Current Facility-Administered Medications   Medication Dose Route Frequency Provider Last Rate Last Admin    furosemide (LASIX) injection 20 mg  20 mg IntraVENous Once Camilla Galindo DO

## 2025-02-07 ENCOUNTER — APPOINTMENT (OUTPATIENT)
Dept: GENERAL RADIOLOGY | Age: 80
DRG: 291 | End: 2025-02-07
Payer: MEDICARE

## 2025-02-07 VITALS
HEIGHT: 66 IN | TEMPERATURE: 98 F | OXYGEN SATURATION: 100 % | HEART RATE: 83 BPM | SYSTOLIC BLOOD PRESSURE: 100 MMHG | RESPIRATION RATE: 18 BRPM | DIASTOLIC BLOOD PRESSURE: 67 MMHG | BODY MASS INDEX: 24.27 KG/M2 | WEIGHT: 151 LBS

## 2025-02-07 LAB
ANION GAP SERPL CALCULATED.3IONS-SCNC: 11 MMOL/L (ref 7–16)
B PARAP IS1001 DNA NPH QL NAA+NON-PROBE: NOT DETECTED
B PERT DNA SPEC QL NAA+PROBE: NOT DETECTED
BASOPHILS # BLD: 0.02 K/UL (ref 0–0.2)
BASOPHILS NFR BLD: 0 % (ref 0–2)
BUN SERPL-MCNC: 34 MG/DL (ref 6–23)
C PNEUM DNA NPH QL NAA+NON-PROBE: NOT DETECTED
CALCIUM SERPL-MCNC: 8.8 MG/DL (ref 8.6–10.2)
CHLORIDE SERPL-SCNC: 102 MMOL/L (ref 98–107)
CO2 SERPL-SCNC: 22 MMOL/L (ref 22–29)
CREAT SERPL-MCNC: 1.2 MG/DL (ref 0.7–1.2)
EOSINOPHIL # BLD: 0.24 K/UL (ref 0.05–0.5)
EOSINOPHILS RELATIVE PERCENT: 3 % (ref 0–6)
ERYTHROCYTE [DISTWIDTH] IN BLOOD BY AUTOMATED COUNT: 13.4 % (ref 11.5–15)
FLUAV RNA NPH QL NAA+NON-PROBE: NOT DETECTED
FLUBV RNA NPH QL NAA+NON-PROBE: NOT DETECTED
GFR, ESTIMATED: 64 ML/MIN/1.73M2
GLUCOSE SERPL-MCNC: 91 MG/DL (ref 74–99)
HADV DNA NPH QL NAA+NON-PROBE: NOT DETECTED
HCOV 229E RNA NPH QL NAA+NON-PROBE: NOT DETECTED
HCOV HKU1 RNA NPH QL NAA+NON-PROBE: NOT DETECTED
HCOV NL63 RNA NPH QL NAA+NON-PROBE: NOT DETECTED
HCOV OC43 RNA NPH QL NAA+NON-PROBE: NOT DETECTED
HCT VFR BLD AUTO: 30.6 % (ref 37–54)
HGB BLD-MCNC: 10.1 G/DL (ref 12.5–16.5)
HMPV RNA NPH QL NAA+NON-PROBE: NOT DETECTED
HPIV1 RNA NPH QL NAA+NON-PROBE: NOT DETECTED
HPIV2 RNA NPH QL NAA+NON-PROBE: NOT DETECTED
HPIV3 RNA NPH QL NAA+NON-PROBE: NOT DETECTED
HPIV4 RNA NPH QL NAA+NON-PROBE: NOT DETECTED
IMM GRANULOCYTES # BLD AUTO: <0.03 K/UL (ref 0–0.58)
IMM GRANULOCYTES NFR BLD: 0 % (ref 0–5)
LYMPHOCYTES NFR BLD: 1.53 K/UL (ref 1.5–4)
LYMPHOCYTES RELATIVE PERCENT: 18 % (ref 20–42)
M PNEUMO DNA NPH QL NAA+NON-PROBE: NOT DETECTED
MCH RBC QN AUTO: 36.1 PG (ref 26–35)
MCHC RBC AUTO-ENTMCNC: 33 G/DL (ref 32–34.5)
MCV RBC AUTO: 109.3 FL (ref 80–99.9)
MONOCYTES NFR BLD: 0.85 K/UL (ref 0.1–0.95)
MONOCYTES NFR BLD: 10 % (ref 2–12)
NEUTROPHILS NFR BLD: 69 % (ref 43–80)
NEUTS SEG NFR BLD: 5.97 K/UL (ref 1.8–7.3)
PLATELET # BLD AUTO: 164 K/UL (ref 130–450)
PMV BLD AUTO: 10.9 FL (ref 7–12)
POTASSIUM SERPL-SCNC: 3.8 MMOL/L (ref 3.5–5)
RBC # BLD AUTO: 2.8 M/UL (ref 3.8–5.8)
RSV RNA NPH QL NAA+NON-PROBE: NOT DETECTED
RV+EV RNA NPH QL NAA+NON-PROBE: NOT DETECTED
SARS-COV-2 RNA NPH QL NAA+NON-PROBE: NOT DETECTED
SODIUM SERPL-SCNC: 135 MMOL/L (ref 132–146)
SPECIMEN DESCRIPTION: NORMAL
WBC OTHER # BLD: 8.6 K/UL (ref 4.5–11.5)

## 2025-02-07 PROCEDURE — 6370000000 HC RX 637 (ALT 250 FOR IP): Performed by: INTERNAL MEDICINE

## 2025-02-07 PROCEDURE — 80048 BASIC METABOLIC PNL TOTAL CA: CPT

## 2025-02-07 PROCEDURE — 2500000003 HC RX 250 WO HCPCS: Performed by: NURSE PRACTITIONER

## 2025-02-07 PROCEDURE — 2700000000 HC OXYGEN THERAPY PER DAY

## 2025-02-07 PROCEDURE — 85025 COMPLETE CBC W/AUTO DIFF WBC: CPT

## 2025-02-07 PROCEDURE — 71046 X-RAY EXAM CHEST 2 VIEWS: CPT

## 2025-02-07 PROCEDURE — 6370000000 HC RX 637 (ALT 250 FOR IP): Performed by: NURSE PRACTITIONER

## 2025-02-07 RX ORDER — VALSARTAN 40 MG/1
40 TABLET ORAL DAILY
Status: DISCONTINUED | OUTPATIENT
Start: 2025-02-07 | End: 2025-02-07 | Stop reason: HOSPADM

## 2025-02-07 RX ORDER — VALSARTAN 40 MG/1
40 TABLET ORAL DAILY
Qty: 30 TABLET | Refills: 0 | Status: SHIPPED | OUTPATIENT
Start: 2025-02-08

## 2025-02-07 RX ADMIN — CALCIUM 500 MG: 500 TABLET ORAL at 08:42

## 2025-02-07 RX ADMIN — Medication 500 MG: at 08:43

## 2025-02-07 RX ADMIN — APIXABAN 5 MG: 5 TABLET, FILM COATED ORAL at 08:42

## 2025-02-07 RX ADMIN — Medication 1 TABLET: at 08:41

## 2025-02-07 RX ADMIN — SODIUM CHLORIDE, PRESERVATIVE FREE 10 ML: 5 INJECTION INTRAVENOUS at 08:43

## 2025-02-07 RX ADMIN — VALSARTAN 40 MG: 40 TABLET, FILM COATED ORAL at 10:29

## 2025-02-07 RX ADMIN — SPIRONOLACTONE 25 MG: 25 TABLET ORAL at 08:42

## 2025-02-07 RX ADMIN — FUROSEMIDE 20 MG: 20 TABLET ORAL at 08:43

## 2025-02-07 NOTE — PROGRESS NOTES
INPATIENT CARDIOLOGY FOLLOW-UP    Name: Enrique Gonzalez    Age: 79 y.o.    Date of Admission: 2/4/2025 11:23 PM    Date of Service: 2/7/2025    Chief Complaint: Follow-up for coronary atherosclerosis, ischemic cardiomyopathy, mitral valve disease, acute superimposed upon chronic systolic heart failure, mitral valve disease, hypertension, chronic kidney disease, history of cerebrovascular accident, noncompliance    Interim History: The patient relates initial improvement following cardioversion with subsequent nonspecific symptoms in part related to anxiety with no specific complaints.  Subsequent to successful cardioversion sinus rhythm with appropriate biventricular pacing has been maintained.  He remains hemodynamically stable with relative hypotension and repeat laboratory assessment demonstrates stable renal function with a persistent mild prerenal azotemia.  Incomplete maintenance of intake and output limits adequate assessment of his response to present management.  Presently oxygen has been weaned with no evidence of hypoxia on room air.      Review of Systems:   The remainder of a complete multisystem review including consitutional, central nervous, respiratory, circulatory, gastrointestinal, genitourinary, endocrinologic, hematologic, musculoskeletal and psychiatric are negative.    Problem List:  Patient Active Problem List   Diagnosis    CAD (coronary artery disease)    Ischemic cardiomyopathy    Aortic regurgitation    Pure hypercholesterolemia    Nonrheumatic mitral valve regurgitation    Acute respiratory failure    Hyperlipidemia LDL goal <100    Primary hypertension    NICM (nonischemic cardiomyopathy) ([facility])    Acute CVA (cerebrovascular accident) ([facility])    Noncompliance    TIA (transient ischemic attack)    Cardiac resynchronization therapy defibrillator (CRT-D) in place    Falls, initial encounter    Closed right hip fracture, initial encounter ([facility])    Acute on chronic systolic (congestive) 
    [facility]   Progress note      Subjective:  79-year-old male admitted for heart failure.      Was found to be in an a . fib rhythm s/p cardioversion this A.M    Patient seen and examined at bedside.  States he is doing much better than yesterday, but does not think he is ready to go home today.  Denies chest pain denies fever denies chills.    Medications Prior to Admission:    Medications Prior to Admission: Multiple Minerals-Vitamins (HOANG MAG ZINC +D3 PO), Take 1 tablet by mouth every morning PRODUCT:  CALCIUM-1000MG MAGNESIUM-400MG ZINC-25MG VITAMIN D3-15MCG  apixaban (ELIQUIS) 5 MG TABS tablet, Take 1 tablet by mouth 2 times daily  sacubitril-valsartan (ENTRESTO) 24-26 MG per tablet, Take 0.5 tablets by mouth 2 times daily AM AND PM  furosemide (LASIX) 20 MG tablet, Take 1 tablet by mouth every other day  metoprolol succinate (TOPROL XL) 50 MG extended release tablet, Take 1 tablet by mouth daily (Patient taking differently: Take 1 tablet by mouth nightly)  spironolactone (ALDACTONE) 25 MG tablet, Take 1 tablet by mouth 2 times daily Morning and evening  Multiple Vitamins-Minerals (CENTRUM SILVER 50+MEN) TABS, Take 1 tablet by mouth every morning  Saw [place] 450 MG CAPS, Take 450 mg by mouth every morning  niacin (SLO-NIACIN) 500 MG extended release tablet, Take 1 tablet by mouth every morning  atorvastatin (LIPITOR) 40 MG tablet, Take 1 tablet by mouth nightly    Current Medications    Current Facility-Administered Medications:     furosemide (LASIX) injection 20 mg, 20 mg, IntraVENous, Once, Camilla Galindo, DO    sodium chloride flush 0.9 % injection 5-40 mL, 5-40 mL, IntraVENous, 2 times per day, Jeniffer, April, APRN - CNP, 10 mL at 02/05/25 0858    sodium chloride flush 0.9 % injection 5-40 mL, 5-40 mL, IntraVENous, PRN, Jeniffer, April, APRN - CNP    0.9 % sodium chloride infusion, , IntraVENous, PRN, Jeniffer, April, APRN - CNP    acetaminophen 
  [facility] Quality Flow/Interdisciplinary Rounds Progress Note        Quality Flow Rounds held on February 6, 2025    Disciplines Attending:  Bedside Nurse, , , and Nursing Unit Leadership    Enrique Gonzalez was admitted on 2/4/2025 11:23 PM    Anticipated Discharge Date:       Disposition:    Jacky Score:  Jacky Scale Score: 20    [facility] RISK OF UNPLANNED READMISSION 2.0             16.8 Total Score        Discussed patient goal for the day, patient clinical progression, and barriers to discharge.  The following Goal(s) of the Day/Commitment(s) have been identified:   wean oxygen as able, DCCV today, IV lasix BID, po aldactone      Barbara Limon RN  February 6, 2025        
  [facility] Quality Flow/Interdisciplinary Rounds Progress Note        Quality Flow Rounds held on February 7, 2025    Disciplines Attending:  Bedside Nurse, , , and Nursing Unit Leadership    Enrique Gonzalez was admitted on 2/4/2025 11:23 PM    Anticipated Discharge Date:       Disposition:    Jacky Score:  Jacky Scale Score: 20    [facility] RISK OF UNPLANNED READMISSION 2.0             18.3 Total Score        Discussed patient goal for the day, patient clinical progression, and barriers to discharge.  The following Goal(s) of the Day/Commitment(s) have been identified:   wean oxygen and ambulate, po lasix and aldactone, CXR,       Barbraa Limon RN  February 7, 2025        
4 Eyes Skin Assessment     NAME:  Enrique Gonzalez  YOB: 1945  MEDICAL RECORD NUMBER:  [id number]    The patient is being assessed for  Admission    I agree that at least one RN has performed a thorough Head to Toe Skin Assessment on the patient. ALL assessment sites listed below have been assessed.      Areas assessed by both nurses:    Head, Face, Ears, Shoulders, Back, Chest, Arms, Elbows, Hands, Sacrum. Buttock, Coccyx, Ischium, Legs. Feet and Heels, and Under Medical Devices         Does the Patient have a Wound? No noted wound(s)       Jacky Prevention initiated by RN: No  Wound Care Orders initiated by RN: No    Pressure Injury (Stage 3,4, Unstageable, DTI, NWPT, and Complex wounds) if present, place Wound referral order by RN under : No    New Ostomies, if present place, Ostomy referral order under : No     Nurse 1 eSignature: Electronically signed by Rico Garcia RN on 2/5/25 at 4:00 PM EST    **SHARE this note so that the co-signing nurse can place an eSignature**    Nurse 2 eSignature: Electronically signed by Shaina Castro RN on 2/5/25 at 4:01 PM EST    
CLINICAL PHARMACY NOTE: MEDS TO BEDS    Total # of Prescriptions Filled: 1   The following medications were delivered to the patient:  Valsartan 40mg tabs    Additional Documentation:  To pt at the pharmacy  
ED Provider is recommending admission for acute on chronic CHF exacerbation. Reports patient as not been to the CHF clinic lately. In ED Pro-BNP was found to be 3,414 and CXR revealed mild increase in perihilar and interstitial pulmonary edema. SPO2 was WNL in ED; however, patient asked to be placed on oxygen for comfort. He was placed on 2LNC by RN, per patient request. The patient was ordered IV Lasix 20mg in ED, but it was not given as of yet due to low/soft blood pressures. ED Provider is recommending admission for further evaluation and treatment. Our group will follow the patient upon admission.     April Jeniffer, YANETH - CNP  [facility]  Internal Medicine    
Sent message to attending. Patient states they feel like they may be getting sick and wanted to be checked for covid.  
[facility]   Discharge summary   Patient ID:  Enrique Gonzalez  [id number]  79 y.o.  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 male of Peter Campos Jr., MD with significant past medical history of  has a past medical history of Acute on chronic systolic congestive heart failure (HCC), Aortic regurgitation, Arthritis, Atrial fibrillation (HCC), CAD (coronary artery disease), Cerebral artery occlusion with cerebral infarction (HCC), Hypercholesteremia, Hypertension, Ischemic cardiomyopathy, Mitral regurgitation, and STEMI (ST elevation myocardial infarction) (HCC). who presents with Shortness of Breath    79 y.o. patient of Peter Campos Jr., MD who present to the ED with Shortness of Breath and was admitted with Acute exacerbation of congestive heart failure.     Acute 
Spiritual Health History and Assessment/Progress Note  [facility]     Encounter, Spiritual/Emotional Needs,  ,  ,      Name: Enrique Gonzalez MRN: [id number]    Age: 79 y.o.     Sex: male   Language: English   Yarsani: Congregational   Acute congestive heart failure, unspecified heart failure type (HCC)     Date: 2/7/2025                           Spiritual Assessment began in [facility] INTERMEDIATE        Referral/Consult From: Rounding   Encounter Overview/Reason:  Encounter, Spiritual/Emotional Needs  Service Provided For: Patient    Shanika, Belief, Meaning:   Patient is connected with a shanika tradition or spiritual practice  Family/Friends No family/friends present      Importance and Influence:  Patient has no beliefs influential to healthcare decision-making identified during this visit  Family/Friends No family/friends present    Community:  Patient feels well-supported. Support system includes: Friends  Family/Friends No family/friends present    Assessment and Plan of Care:     Patient Interventions include: Facilitated expression of thoughts and feelings, Affirmed coping skills/support systems, and Other: Prayed for and blessed patient.  Family/Friends Interventions include: No family/friends present    Patient Plan of Care: Spiritual Care available upon further referral  Family/Friends Plan of Care: Spiritual Care available upon further referral    Electronically signed by Chaplain Deisy on 2/7/2025 at 3:57 PM   
Spiritual Health History and Assessment/Progress Note  [facility]    Initial Encounter,  ,  ,      Name: Enrique Gonzalez MRN: [id number]    Age: 79 y.o.     Sex: male   Language: English   Restorationist: Yarsani   Acute congestive heart failure, unspecified heart failure type (HCC)     Date: 2/6/2025                           Spiritual Assessment began in [facility] INTERMEDIATE        Referral/Consult From: Rounding   Encounter Overview/Reason: Initial Encounter  Service Provided For: Patient    Shanika, Belief, Meaning:   Patient is connected with a shanika tradition or spiritual practice  Family/Friends No family/friends present      Importance and Influence:  Patient unable to assess at this time  Family/Friends No family/friends present    Community:  Patient feels well-supported. Support system includes: Children and Extended family  Family/Friends No family/friends present    Assessment and Plan of Care:     Patient Interventions include: Other: Patient asleep and appears peaceful, did not disturb. Prayers of the Yarsani shanika were offered for the patient at the time.  Family/Friends Interventions include: No family/friends present    Patient Plan of Care: Spiritual Care available upon further referral  Family/Friends Plan of Care: No family/friends present    Electronically signed by Chaplain Estefania on 2/6/2025 at 2:17 PM    
02/05/2025     02/05/2025    CO2 21 (L) 02/05/2025     No results for input(s): \"CKTOTAL\", \"CKMB\" in the last 72 hours.    Invalid input(s): \"TROPONONI\"  No results found for: \"BNP\"  Lab Results   Component Value Date/Time    WBC 7.2 02/05/2025 12:46 AM    RBC 3.23 02/05/2025 12:46 AM    HGB 11.5 02/05/2025 12:46 AM    HCT 35.0 02/05/2025 12:46 AM    .4 02/05/2025 12:46 AM    MCH 35.6 02/05/2025 12:46 AM    MCHC 32.9 02/05/2025 12:46 AM    RDW 13.5 02/05/2025 12:46 AM     02/05/2025 12:46 AM    MPV 10.4 02/05/2025 12:46 AM     Recent Labs     02/05/25  0046   ALKPHOS 59   ALT 12   AST 16   BILITOT 0.6     Lab Results   Component Value Date/Time    MG 2.0 02/05/2025 10:55 PM     Lab Results   Component Value Date/Time    PROTIME 14.4 05/01/2023 07:36 PM    INR 1.3 05/01/2023 07:36 PM     Lab Results   Component Value Date/Time    TSH 2.83 02/05/2025 10:55 PM     No components found for: \"CHLPL\"  Lab Results   Component Value Date    TRIG 44 01/10/2025    TRIG 48 10/07/2023    TRIG 60 04/05/2023     Lab Results   Component Value Date    HDL 44 01/10/2025    HDL 38 (L) 10/07/2023    HDL 41 04/05/2023     No components found for: \"LDLCALC\"    Cardiac Tests:  Telemetry findings reviewed: atrial fibrillation with a mean ventricular response of approximately 80 bpm with appropriate biventricular pacing, no new tachy/bradyarrhythmias overnight      ASSESSMENT / PLAN: On a clinical basis, the patient relates subjective improvement, albeit without present objective documentation.  Based on persistent atrial arrhythmias plans of electrical cardioversion attempt to restore sinus rhythm later today remain in place with ongoing needs of careful monitoring of his volume status as well as renal function and electrolytes to further assist optimization of medical therapy.  As outlined, future consideration of the addition of an SGLT2 inhibitor if economically possible would be further beneficial to her long-term

## 2025-02-07 NOTE — PLAN OF CARE
Problem: Chronic Conditions and Co-morbidities  Goal: Patient's chronic conditions and co-morbidity symptoms are monitored and maintained or improved  2/7/2025 0945 by Hailey Flowers RN  Outcome: Progressing  Flowsheets (Taken 2/7/2025 0830)  Care Plan - Patient's Chronic Conditions and Co-Morbidity Symptoms are Monitored and Maintained or Improved: Monitor and assess patient's chronic conditions and comorbid symptoms for stability, deterioration, or improvement  2/6/2025 2350 by Jessica Ulloa RN  Outcome: Progressing     Problem: ABCDS Injury Assessment  Goal: Absence of physical injury  2/7/2025 0945 by Hailey Flowers RN  Outcome: Progressing  Flowsheets (Taken 2/7/2025 0830)  Absence of Physical Injury: Implement safety measures based on patient assessment  2/6/2025 2350 by Jessica Ulloa RN  Outcome: Progressing     Problem: Safety - Adult  Goal: Free from fall injury  2/7/2025 0945 by Hailey Flowers RN  Outcome: Progressing  Flowsheets (Taken 2/7/2025 0830)  Free From Fall Injury: Instruct family/caregiver on patient safety  2/6/2025 2350 by Jessica Ulloa RN  Outcome: Progressing     Problem: Respiratory - Adult  Goal: Achieves optimal ventilation and oxygenation  2/7/2025 0945 by Hailey Flowers RN  Outcome: Progressing  Flowsheets (Taken 2/7/2025 0830)  Achieves optimal ventilation and oxygenation: Assess for changes in respiratory status  2/6/2025 2350 by Jessica Ulloa RN  Outcome: Progressing     Problem: Pain  Goal: Verbalizes/displays adequate comfort level or baseline comfort level  2/7/2025 0945 by Hailey Flowers RN  Outcome: Progressing  2/6/2025 2350 by Jessica Ulloa RN  Outcome: Progressing

## 2025-02-07 NOTE — PLAN OF CARE
Problem: Chronic Conditions and Co-morbidities  Goal: Patient's chronic conditions and co-morbidity symptoms are monitored and maintained or improved  2/6/2025 2350 by Jessica Ulloa RN  Outcome: Progressing  2/6/2025 1125 by Javier Chavez RN  Outcome: Progressing     Problem: ABCDS Injury Assessment  Goal: Absence of physical injury  2/6/2025 2350 by Jessica Ulloa RN  Outcome: Progressing  2/6/2025 1125 by Javier Chavez RN  Outcome: Progressing     Problem: Safety - Adult  Goal: Free from fall injury  2/6/2025 2350 by Jessica Ulloa RN  Outcome: Progressing  2/6/2025 1125 by Javier Chavez RN  Outcome: Progressing     Problem: Respiratory - Adult  Goal: Achieves optimal ventilation and oxygenation  2/6/2025 2350 by Jessica Ulloa RN  Outcome: Progressing  2/6/2025 1125 by Javier Chavez RN  Outcome: Progressing     Problem: Pain  Goal: Verbalizes/displays adequate comfort level or baseline comfort level  Outcome: Progressing

## 2025-02-12 DIAGNOSIS — I34.0 NONRHEUMATIC MITRAL VALVE REGURGITATION: Primary | ICD-10-CM

## 2025-02-13 ENCOUNTER — HOSPITAL ENCOUNTER (OUTPATIENT)
Dept: CARDIOLOGY | Age: 80
Discharge: HOME | End: 2025-02-15
Payer: MEDICARE

## 2025-02-13 ENCOUNTER — OFFICE VISIT (OUTPATIENT)
Dept: CARDIOTHORACIC SURGERY | Age: 80
End: 2025-02-13

## 2025-02-13 ENCOUNTER — OFFICE VISIT (OUTPATIENT)
Dept: CARDIOLOGY CLINIC | Age: 80
End: 2025-02-13
Payer: MEDICARE

## 2025-02-13 VITALS
WEIGHT: 151 LBS | DIASTOLIC BLOOD PRESSURE: 72 MMHG | SYSTOLIC BLOOD PRESSURE: 126 MMHG | BODY MASS INDEX: 24.27 KG/M2 | HEIGHT: 66 IN | RESPIRATION RATE: 20 BRPM

## 2025-02-13 DIAGNOSIS — I34.0 NONRHEUMATIC MITRAL VALVE REGURGITATION: Primary | ICD-10-CM

## 2025-02-13 DIAGNOSIS — I34.0 SEVERE MITRAL REGURGITATION: Primary | ICD-10-CM

## 2025-02-13 DIAGNOSIS — I34.0 NONRHEUMATIC MITRAL VALVE REGURGITATION: ICD-10-CM

## 2025-02-13 LAB
ECHO AR MAX VEL PISA: 4.3 M/S
ECHO AV AREA PEAK VELOCITY: 3.3 CM2
ECHO AV AREA VTI: 3.3 CM2
ECHO AV AREA/BSA PEAK VELOCITY: 1.9 CM2/M2
ECHO AV AREA/BSA VTI: 1.9 CM2/M2
ECHO AV CUSP MM: 1.4 CM
ECHO AV MEAN GRADIENT: 4 MMHG
ECHO AV MEAN VELOCITY: 1 M/S
ECHO AV PEAK GRADIENT: 8 MMHG
ECHO AV PEAK VELOCITY: 1.4 M/S
ECHO AV REGURGITANT PHT: 318.5 MS
ECHO AV VELOCITY RATIO: 0.86
ECHO AV VTI: 23.1 CM
ECHO BSA: 1.79 M2
ECHO EST RA PRESSURE: 8 MMHG
ECHO LA DIAMETER INDEX: 2.94 CM/M2
ECHO LA DIAMETER: 5.2 CM
ECHO LA VOL A-L A2C: 111 ML (ref 18–58)
ECHO LA VOL A-L A4C: 103 ML (ref 18–58)
ECHO LA VOL MOD A2C: 103 ML (ref 18–58)
ECHO LA VOL MOD A4C: 93 ML (ref 18–58)
ECHO LA VOLUME AREA LENGTH: 107 ML
ECHO LA VOLUME INDEX A-L A2C: 63 ML/M2 (ref 16–34)
ECHO LA VOLUME INDEX A-L A4C: 58 ML/M2 (ref 16–34)
ECHO LA VOLUME INDEX AREA LENGTH: 60 ML/M2 (ref 16–34)
ECHO LA VOLUME INDEX MOD A2C: 58 ML/M2 (ref 16–34)
ECHO LA VOLUME INDEX MOD A4C: 53 ML/M2 (ref 16–34)
ECHO LV DP/DT: 772.27 MMHG/S
ECHO LV EF PHYSICIAN: 28 %
ECHO LV EJECTION FRACTION A2C: 30 %
ECHO LV EJECTION FRACTION A4C: 29 %
ECHO LV FRACTIONAL SHORTENING: 10 % (ref 28–44)
ECHO LV INTERNAL DIMENSION DIASTOLE INDEX: 4.01 CM/M2
ECHO LV INTERNAL DIMENSION DIASTOLIC: 7.1 CM (ref 4.2–5.9)
ECHO LV INTERNAL DIMENSION SYSTOLIC INDEX: 3.62 CM/M2
ECHO LV INTERNAL DIMENSION SYSTOLIC: 6.4 CM
ECHO LV IVSD: 0.7 CM (ref 0.6–1)
ECHO LV IVSS: 0.8 CM
ECHO LV MASS 2D: 250.7 G (ref 88–224)
ECHO LV MASS INDEX 2D: 141.6 G/M2 (ref 49–115)
ECHO LV POSTERIOR WALL DIASTOLIC: 0.9 CM (ref 0.6–1)
ECHO LV POSTERIOR WALL SYSTOLIC: 1.2 CM
ECHO LV RELATIVE WALL THICKNESS RATIO: 0.25
ECHO LVOT AREA: 3.8 CM2
ECHO LVOT AV VTI INDEX: 0.86
ECHO LVOT DIAM: 2.2 CM
ECHO LVOT MEAN GRADIENT: 2 MMHG
ECHO LVOT PEAK GRADIENT: 6 MMHG
ECHO LVOT PEAK VELOCITY: 1.2 M/S
ECHO LVOT STROKE VOLUME INDEX: 42.7 ML/M2
ECHO LVOT SV: 75.6 ML
ECHO LVOT VTI: 19.9 CM
ECHO MV AREA PHT: 3.7 CM2
ECHO MV AREA VTI: 2.2 CM2
ECHO MV E DECELERATION TIME (DT): 163.1 MS
ECHO MV EROA PISA: 0.6 CM2
ECHO MV LVOT VTI INDEX: 1.7
ECHO MV MAX VELOCITY: 1.6 M/S
ECHO MV MEAN GRADIENT: 3 MMHG
ECHO MV MEAN VELOCITY: 0.7 M/S
ECHO MV PEAK GRADIENT: 10 MMHG
ECHO MV PRESSURE HALF TIME (PHT): 58.8 MS
ECHO MV REGURGITANT ALIASING (NYQUIST) VELOCITY: 39 CM/S
ECHO MV REGURGITANT RADIUS PISA: 1.08 CM
ECHO MV REGURGITANT VELOCITY PISA: 4.7 M/S
ECHO MV REGURGITANT VOLUME PISA: 80.6 ML
ECHO MV REGURGITANT VTIA: 132.6 CM
ECHO MV VTI: 33.8 CM
ECHO PULMONARY ARTERY END DIASTOLIC PRESSURE: 15 MMHG
ECHO PV MAX VELOCITY: 0.9 M/S
ECHO PV MEAN GRADIENT: 2 MMHG
ECHO PV MEAN VELOCITY: 0.6 M/S
ECHO PV PEAK GRADIENT: 3 MMHG
ECHO PV REGURGITANT MAX VELOCITY: 1.9 M/S
ECHO PV VTI: 16.9 CM
ECHO PVEIN PEAK D VELOCITY: 1 M/S
ECHO PVEIN PEAK S VELOCITY: 0.3 M/S
ECHO PVEIN S/D RATIO: 0.3
ECHO RIGHT VENTRICULAR SYSTOLIC PRESSURE (RVSP): 80 MMHG
ECHO RV INTERNAL DIMENSION: 3 CM
ECHO RV TAPSE: 2 CM (ref 1.7–?)
ECHO TV REGURGITANT MAX VELOCITY: 4.25 M/S
ECHO TV REGURGITANT PEAK GRADIENT: 72 MMHG

## 2025-02-13 PROCEDURE — 93306 TTE W/DOPPLER COMPLETE: CPT

## 2025-02-13 PROCEDURE — 3078F DIAST BP <80 MM HG: CPT | Performed by: INTERNAL MEDICINE

## 2025-02-13 PROCEDURE — 1123F ACP DISCUSS/DSCN MKR DOCD: CPT | Performed by: INTERNAL MEDICINE

## 2025-02-13 PROCEDURE — 1111F DSCHRG MED/CURRENT MED MERGE: CPT | Performed by: INTERNAL MEDICINE

## 2025-02-13 PROCEDURE — G8427 DOCREV CUR MEDS BY ELIG CLIN: HCPCS | Performed by: INTERNAL MEDICINE

## 2025-02-13 PROCEDURE — 3074F SYST BP LT 130 MM HG: CPT | Performed by: INTERNAL MEDICINE

## 2025-02-13 PROCEDURE — 99215 OFFICE O/P EST HI 40 MIN: CPT | Performed by: INTERNAL MEDICINE

## 2025-02-13 PROCEDURE — G8420 CALC BMI NORM PARAMETERS: HCPCS | Performed by: INTERNAL MEDICINE

## 2025-02-13 PROCEDURE — 1159F MED LIST DOCD IN RCRD: CPT | Performed by: INTERNAL MEDICINE

## 2025-02-13 PROCEDURE — 4004F PT TOBACCO SCREEN RCVD TLK: CPT | Performed by: INTERNAL MEDICINE

## 2025-02-13 ASSESSMENT — ENCOUNTER SYMPTOMS
BACK PAIN: 0
COUGH: 0
CHEST TIGHTNESS: 0
SHORTNESS OF BREATH: 1
BLOOD IN STOOL: 0
NAUSEA: 0
ABDOMINAL PAIN: 0
VOMITING: 0

## 2025-02-13 NOTE — PROGRESS NOTES
[facility]  Visit Note      Patient name: Enrique Gonzalez    Reason for consult: mitral regurgitation    Referring Physician: Dr. Moreau    Primary Care Physician: Peter Campos Jr., MD    Date of service: 2/13/2025    Chief Complaint: mitral regurgitation    HPI: Enrique Gonzalez is a 78 y.o. male referred by Dr. Moreau for mitral regurgitation. Past medical history of CAD s/p CABG, ICM, HFrEF, CRT-D, HTN, HLD, CVA, former smoker, NSVT        -TTE 7/6/23: (Moreau) EF 30%, moderate (2-3+) anteriorly directed MR, mild-mod TR, RVSP 69, mild AS/AI    He was first seen in valve clinic in late 2023. He had complaints of significant LE edema and FOWLER.  He states he has had this FOWLER off and on to varying degrees since his CABG.  He EF has never gotten much better since his CABG.  He denied CP, N/V, F/C, orthopnea, PND and syncope. Heart cath and GDMT optimization were recommended. LHC showed occluded SVG-small PDA with all other grafts patent. JINA suggested moderate MR. He presents today for follow up with echo. He had a recent admission with CHF in January and again in February     JINA 2/28/24:    Left Ventricle: Severely reduced left ventricular systolic function with a visually estimated EF of 25 - 30%. Left ventricle is moderately dilated. Normal wall thickness. Normal wall motion.    Right Ventricle: Right ventricle size is normal. Lead present in the right ventricle. Normal systolic function.    Aortic Valve: Mild regurgitation with a centrally directed jet. No stenosis.    Mitral Valve: Moderate regurgitation. Systolic blunting of the pulmonary veins. No stenosis noted.    Tricuspid Valve: Moderate regurgitation. Unable to assess RVSP due to inadequate or insignificant tricuspid regurgitation.    Left Atrium: Normal sized appendage. No left atrial appendage thrombus noted. No left atrial appendage mass noted.    Interatrial Septum: No interatrial shunt visualized with color Doppler.

## 2025-02-13 NOTE — PROGRESS NOTES
(LIPITOR) 40 MG tablet, Take 1 tablet by mouth nightly, Disp: 30 tablet, Rfl: 3      REVIEW OF SYSTEMS:   Review of Systems   Constitutional:  Negative for chills, fatigue and fever.   HENT:  Negative for nosebleeds.    Respiratory:  Positive for shortness of breath. Negative for cough and chest tightness.    Cardiovascular:  Positive for leg swelling. Negative for chest pain and palpitations.   Gastrointestinal:  Negative for abdominal pain, blood in stool, nausea and vomiting.   Genitourinary:  Negative for hematuria.   Musculoskeletal:  Negative for back pain and myalgias.   Skin: Negative.    Neurological:  Negative for dizziness, syncope, speech difficulty, weakness and light-headedness.   Hematological:  Does not bruise/bleed easily.   Psychiatric/Behavioral: Negative.            PHYSICAL EXAMINATION:   VITAL SIGNS: /72   Resp 20   Ht 1.676 m (5' 6\")   Wt 68.5 kg (151 lb)   BMI 24.37 kg/m²     GENERAL: NAD   HEAD: Normocephalic, atraumatic.   NECK: No JVD. No carotid bruits. No neck masses.    CARDIOVASCULAR: Normal rate, regular rhythm, normal S1, S2, holosystolic murmur  LUNGS: Clear to auscultation bilaterally, no wheezing.    ABDOMEN: Abdomen is soft and not distended. No tenderness.    EXTREMITIES: There is 2+ pedal edema.   MUSCULOSKELETAL: No joint swelling. Normal range of motion    SKIN: Skin is moist. No ulcers or lesions.   NEUROLOGICAL: No evidence of obvious neurological deficits.    PSYCHOLOGICAL: Patient is in normal mood.            LABORATORY DATA:   Lab Results   Component Value Date/Time    WBC 8.6 02/07/2025 04:05 AM    HGB 10.1 02/07/2025 04:05 AM    HCT 30.6 02/07/2025 04:05 AM     02/07/2025 04:05 AM      Lab Results   Component Value Date/Time     02/07/2025 04:05 AM    K 3.8 02/07/2025 04:05 AM    K 4.1 05/05/2023 05:59 AM    CO2 22 02/07/2025 04:05 AM    BUN 34 02/07/2025 04:05 AM    CREATININE 1.2 02/07/2025 04:05 AM    MG 2.0 02/05/2025 10:55 PM      No

## 2025-02-17 ENCOUNTER — TELEPHONE (OUTPATIENT)
Dept: CARDIOLOGY CLINIC | Age: 80
End: 2025-02-17

## 2025-02-17 NOTE — TELEPHONE ENCOUNTER
Patient called stating he thinks the Valsartan is losing it's potency 22 hours in, instead of lasting 24 hours.  He states about 2 hours before he is scheduled to take it, he begins to feel a fogginess in his head that resolves after taking it.

## 2025-02-18 RX ORDER — VALSARTAN 40 MG/1
20 TABLET ORAL 2 TIMES DAILY
COMMUNITY

## 2025-02-21 ENCOUNTER — PREP FOR PROCEDURE (OUTPATIENT)
Dept: CARDIOLOGY | Age: 80
End: 2025-02-21

## 2025-02-21 DIAGNOSIS — I34.0 SEVERE MITRAL REGURGITATION BY PRIOR ECHOCARDIOGRAM: ICD-10-CM

## 2025-02-21 DIAGNOSIS — I34.0 NONRHEUMATIC MITRAL VALVE REGURGITATION: Primary | ICD-10-CM

## 2025-02-21 RX ORDER — ASPIRIN 325 MG
325 TABLET, DELAYED RELEASE (ENTERIC COATED) ORAL DAILY
Status: CANCELLED | OUTPATIENT
Start: 2025-02-21

## 2025-02-21 RX ORDER — SODIUM CHLORIDE 9 MG/ML
INJECTION, SOLUTION INTRAVENOUS CONTINUOUS
Status: CANCELLED | OUTPATIENT
Start: 2025-02-21

## 2025-02-21 RX ORDER — SODIUM CHLORIDE 0.9 % (FLUSH) 0.9 %
5-40 SYRINGE (ML) INJECTION PRN
Status: CANCELLED | OUTPATIENT
Start: 2025-02-21

## 2025-02-21 RX ORDER — SODIUM CHLORIDE 0.9 % (FLUSH) 0.9 %
5-40 SYRINGE (ML) INJECTION EVERY 12 HOURS SCHEDULED
Status: CANCELLED | OUTPATIENT
Start: 2025-02-21

## 2025-02-21 RX ORDER — MUPIROCIN 20 MG/G
OINTMENT TOPICAL ONCE
Status: CANCELLED | OUTPATIENT
Start: 2025-02-21 | End: 2025-02-21

## 2025-02-21 RX ORDER — SODIUM CHLORIDE 9 MG/ML
INJECTION, SOLUTION INTRAVENOUS PRN
Status: CANCELLED | OUTPATIENT
Start: 2025-02-21

## 2025-02-24 ENCOUNTER — TELEPHONE (OUTPATIENT)
Dept: CARDIOLOGY CLINIC | Age: 80
End: 2025-02-24

## 2025-02-24 RX ORDER — VALSARTAN 40 MG/1
20 TABLET ORAL 2 TIMES DAILY
Qty: 90 TABLET | Refills: 3 | Status: SHIPPED | OUTPATIENT
Start: 2025-02-24

## 2025-02-24 NOTE — TELEPHONE ENCOUNTER
Patient called stating he is taking the Valsartan 20 mg BID and his symptoms have completely resolved.

## 2025-02-24 NOTE — PROGRESS NOTES
[facility]   PRE-ADMISSION TESTING GENERAL INSTRUCTIONS  PAT Phone Number: [phone]      GENERAL INSTRUCTIONS:    [x] Hibiclens shower the night before AND the morning of surgery.   -Do not use Hibiclens on your face or head.  [x] Do not wear lotions, powders, deodorant the morning of surgery.  [x] Nothing to eat or drink after midnight. This includes no gum, candy, mints or water.  [x] You may brush your teeth, gargle, but do NOT swallow water.   [x] No tobacco products, illegal drugs, or alcohol within 24 hours of your surgery.  [x] Jewelry or valuables should not be brought to the hospital. All body and/or tongue piercing's must be removed prior to arriving to hospital. No contact lens or hair pins.   [x] Arrange transportation with a responsible adult  to and from the hospital. Arrange for someone to be with you for the remainder of the day and for 24 hours after your procedure due to having had anesthesia.          -Who will be your  for transportation? Not sure yet  [x] Bring insurance card and photo ID.  [x] Bring copy of living will or healthcare power of  papers to be placed in your electronic record.  [x] Transfusion (Green) Bracelet: Please bring with you to hospital, day of surgery.       PARKING INSTRUCTIONS:      ARRIVAL DATE & TIME: Wednesday 3/5 at 5 am  [x] Times are subject to change. We will contact you the business day before surgery if that were to occur.  [x] Enter into the [facility] Entrance. Two people may accompany you. Masks are not required.  [x] Parking Lot \"I\" is where you will park. It is located on the corner of [address] and [address]. The entrance is on [address].   Only one vehicle - per patient, is permitted in parking lot.   Upon entering the parking lot, a voucher ticket will print.      EDUCATION INSTRUCTIONS:           [x] Pre-admission Testing educational folder given  [x] Incentive Spirometry,coughing

## 2025-02-26 ENCOUNTER — HOSPITAL ENCOUNTER (OUTPATIENT)
Dept: PULMONOLOGY | Age: 80
Discharge: HOME | End: 2025-02-26
Attending: INTERNAL MEDICINE
Payer: MEDICARE

## 2025-02-26 ENCOUNTER — HOSPITAL ENCOUNTER (OUTPATIENT)
Dept: PREADMISSION TESTING | Age: 80
Discharge: HOME | End: 2025-02-26
Payer: MEDICARE

## 2025-02-26 ENCOUNTER — HOSPITAL ENCOUNTER (OUTPATIENT)
Age: 80
Discharge: HOME | End: 2025-02-26
Payer: MEDICARE

## 2025-02-26 ENCOUNTER — HOSPITAL ENCOUNTER (OUTPATIENT)
Dept: ULTRASOUND IMAGING | Age: 80
Discharge: HOME | End: 2025-02-28
Attending: INTERNAL MEDICINE
Payer: MEDICARE

## 2025-02-26 VITALS
WEIGHT: 150 LBS | RESPIRATION RATE: 18 BRPM | DIASTOLIC BLOOD PRESSURE: 67 MMHG | OXYGEN SATURATION: 98 % | HEIGHT: 66 IN | TEMPERATURE: 98.1 F | HEART RATE: 76 BPM | SYSTOLIC BLOOD PRESSURE: 107 MMHG | BODY MASS INDEX: 24.11 KG/M2

## 2025-02-26 DIAGNOSIS — I34.0 NONRHEUMATIC MITRAL VALVE REGURGITATION: ICD-10-CM

## 2025-02-26 LAB
ABO + RH BLD: NORMAL
ALBUMIN SERPL-MCNC: 4.1 G/DL (ref 3.5–5.2)
ALP SERPL-CCNC: 59 U/L (ref 40–129)
ALT SERPL-CCNC: 14 U/L (ref 0–40)
ANION GAP SERPL CALCULATED.3IONS-SCNC: 12 MMOL/L (ref 7–16)
ARM BAND NUMBER: NORMAL
AST SERPL-CCNC: 18 U/L (ref 0–39)
BACTERIA URNS QL MICRO: ABNORMAL
BILIRUB SERPL-MCNC: 0.6 MG/DL (ref 0–1.2)
BILIRUB UR QL STRIP: NEGATIVE
BLOOD BANK SAMPLE EXPIRATION: NORMAL
BLOOD GROUP ANTIBODIES SERPL: NEGATIVE
BUN SERPL-MCNC: 23 MG/DL (ref 6–23)
CALCIUM SERPL-MCNC: 9.2 MG/DL (ref 8.6–10.2)
CHLORIDE SERPL-SCNC: 99 MMOL/L (ref 98–107)
CLARITY UR: CLEAR
CO2 SERPL-SCNC: 21 MMOL/L (ref 22–29)
COLOR UR: YELLOW
CREAT SERPL-MCNC: 1.1 MG/DL (ref 0.7–1.2)
ERYTHROCYTE [DISTWIDTH] IN BLOOD BY AUTOMATED COUNT: 13.8 % (ref 11.5–15)
GFR, ESTIMATED: 67 ML/MIN/1.73M2
GLUCOSE SERPL-MCNC: 109 MG/DL (ref 74–99)
GLUCOSE UR STRIP-MCNC: NEGATIVE MG/DL
HBA1C MFR BLD: 5.7 % (ref 4–5.6)
HCT VFR BLD AUTO: 32.5 % (ref 37–54)
HGB BLD-MCNC: 10.9 G/DL (ref 12.5–16.5)
HGB UR QL STRIP.AUTO: ABNORMAL
INR PPP: 1.6
KETONES UR STRIP-MCNC: NEGATIVE MG/DL
LEUKOCYTE ESTERASE UR QL STRIP: NEGATIVE
MCH RBC QN AUTO: 36.5 PG (ref 26–35)
MCHC RBC AUTO-ENTMCNC: 33.5 G/DL (ref 32–34.5)
MCV RBC AUTO: 108.7 FL (ref 80–99.9)
NITRITE UR QL STRIP: NEGATIVE
PARTIAL THROMBOPLASTIN TIME: 33.9 SEC (ref 24.5–35.1)
PH UR STRIP: 5.5 [PH] (ref 5–8)
PLATELET # BLD AUTO: 246 K/UL (ref 130–450)
PMV BLD AUTO: 9.7 FL (ref 7–12)
POTASSIUM SERPL-SCNC: 4.5 MMOL/L (ref 3.5–5)
PROT SERPL-MCNC: 7.8 G/DL (ref 6.4–8.3)
PROT UR STRIP-MCNC: NEGATIVE MG/DL
PROTHROMBIN TIME: 17.2 SEC (ref 9.3–12.4)
RBC # BLD AUTO: 2.99 M/UL (ref 3.8–5.8)
RBC #/AREA URNS HPF: ABNORMAL /HPF
SODIUM SERPL-SCNC: 132 MMOL/L (ref 132–146)
SP GR UR STRIP: 1.02 (ref 1–1.03)
T4 FREE SERPL-MCNC: 1.6 NG/DL (ref 0.9–1.7)
TSH SERPL DL<=0.05 MIU/L-ACNC: 2.34 UIU/ML (ref 0.27–4.2)
UROBILINOGEN UR STRIP-ACNC: 0.2 EU/DL (ref 0–1)
WBC #/AREA URNS HPF: ABNORMAL /HPF
WBC OTHER # BLD: 7.8 K/UL (ref 4.5–11.5)

## 2025-02-26 PROCEDURE — 86900 BLOOD TYPING SEROLOGIC ABO: CPT

## 2025-02-26 PROCEDURE — 86850 RBC ANTIBODY SCREEN: CPT

## 2025-02-26 PROCEDURE — 87086 URINE CULTURE/COLONY COUNT: CPT

## 2025-02-26 PROCEDURE — 87081 CULTURE SCREEN ONLY: CPT

## 2025-02-26 PROCEDURE — 85730 THROMBOPLASTIN TIME PARTIAL: CPT

## 2025-02-26 PROCEDURE — 80053 COMPREHEN METABOLIC PANEL: CPT

## 2025-02-26 PROCEDURE — 86901 BLOOD TYPING SEROLOGIC RH(D): CPT

## 2025-02-26 PROCEDURE — 81015 MICROSCOPIC EXAM OF URINE: CPT

## 2025-02-26 PROCEDURE — 83036 HEMOGLOBIN GLYCOSYLATED A1C: CPT

## 2025-02-26 PROCEDURE — 84443 ASSAY THYROID STIM HORMONE: CPT

## 2025-02-26 PROCEDURE — 85610 PROTHROMBIN TIME: CPT

## 2025-02-26 PROCEDURE — 84439 ASSAY OF FREE THYROXINE: CPT

## 2025-02-26 PROCEDURE — 93880 EXTRACRANIAL BILAT STUDY: CPT

## 2025-02-26 PROCEDURE — 85027 COMPLETE CBC AUTOMATED: CPT

## 2025-02-26 PROCEDURE — 36415 COLL VENOUS BLD VENIPUNCTURE: CPT

## 2025-02-27 ENCOUNTER — TELEPHONE (OUTPATIENT)
Dept: NON INVASIVE DIAGNOSTICS | Age: 80
End: 2025-02-27

## 2025-02-27 LAB
MICROORGANISM SPEC CULT: NO GROWTH
MICROORGANISM SPEC CULT: NORMAL
SERVICE CMNT-IMP: NORMAL
SPECIMEN DESCRIPTION: NORMAL
SPECIMEN DESCRIPTION: NORMAL

## 2025-02-27 NOTE — TELEPHONE ENCOUNTER
[facility] pharmacy called to clarify how patient is taking Eliquis.  Patient takes Eliquis 5 mg BID.  Spoke with [facility] pharmacist.

## 2025-02-28 ENCOUNTER — TELEPHONE (OUTPATIENT)
Dept: NON INVASIVE DIAGNOSTICS | Age: 80
End: 2025-02-28

## 2025-02-28 NOTE — TELEPHONE ENCOUNTER
The patient called to request Eliquis samples as his mail delivery from [place] won't arrive until next week. I advised he can stop in the office today before 3:30 PM and  a few weeks worth. The patient verbalized understanding.     Electronically signed by Bere Saenz MA on 2/28/2025 at 10:05 AM

## 2025-03-03 NOTE — H&P
6-minute walk:  S[employer] Score (STS) 30-Day Predicted Mortality Risk:    For Mitral Valve Replacement: 7.8%   For Mitral Valve Repair: 3.5%           2/13/2025     3:00 PM   AMB KCCQ-12   Walking 1 block on level ground 5   Showering/Bathing 1   Hurrying or jogging (as if to catch a bus) 1   Feet/ankle/leg swelling 2   Limited by fatigue 2   Limited by SOB 2   Sleeping in a chair/propped on 3+ pillows 4   Enjoyment of life 2   Rest of your life 1   Hobbies/recreational activities 2   Working/household chores 2   Visiting family & friends out of your home 2     6-minute walk: 480 feet     ASSESSMENT & PLAN:   - Proceed with planned Transcatheter ltly-dp-orsg mitral repair(ADRIENNE):   - Keep NPO for the procedure   -  Anesthesia Planned: General anesthesia   - Intraprocedural Imaging: Transesophageal Echocardiogram (notify echo department)   - Intended Access: Femoral vein (6F)   - Pacemaker Risk: n/a   - Remainder of orders to be placed post-procedure   - Will resume home medications after the procedure as appropriate.   - Admission to the hospital for observation following procedure.       Enrique Gonzalez will be enrolled in approved national registry, STS/TVT Transcatheter Registry, [id number], as mandated by National Coverage Decision for CMS.

## 2025-03-04 ENCOUNTER — ANESTHESIA EVENT (OUTPATIENT)
Dept: OPERATING ROOM | Age: 80
DRG: 267 | End: 2025-03-04
Payer: MEDICARE

## 2025-03-04 ENCOUNTER — HOSPITAL ENCOUNTER (OUTPATIENT)
Dept: OTHER | Age: 80
Setting detail: THERAPIES SERIES
Discharge: HOME | End: 2025-03-04

## 2025-03-04 ASSESSMENT — KANSAS CITY CARDIOMYOPATHY QUESTIONNAIRE (KCCQ12)
8B. OVER THE PAST 2 WEEKS, ON AVERAGE, HOW HAS HEART FAILURE LIMITED YOU ABILITY TO WORK OR DO HOUSEHOLD CHORES: LIMITED QUITE A BIT
1C. OVER THE PAST 2 WEEKS, HOW MUCH WERE YOU LIMITED BY HEART FAILURE SYMPTOMS (SHORTNESS OF BREATH OR FATIGUE) WHEN HURRYING OR JOGGING (AS IF TO CATCH A BUS): EXTREMELY LIMITED
8C. OVER THE PAST 2 WEEKS, ON AVERAGE, HOW HAS HEART FAILURE LIMITED YOU ABILITY TO VISIT FAMILY AND FRIENDS OUR OF YOUR HOME: LIMITED QUITE A BIT
2. OVER THE PAST 2 WEEKS, HOW MANY TIMES DID YOU HAVE SWELLING IN YOUR FEET, ANKLES OR LEGS WHEN YOU WOKE UP IN THE MORNING: 3 OR MORE TIMES PER WEEK BUT NOT EVERY DAY
3. OVER THE PAST 2 WEEKS, ON AVERAGE, HOW MANY TIMES HAS FATIGUE LIMITED YOUR ABILITY TO DO WHAT YOU WANTED: SEVERAL TIMES PER DAY
5. OVER THE PAST 2 WEEKS, ON AVERAGE, HOW MANY TIMES HAVE YOU BEEN FORCED TO SLEEP SITTING UP IN A CHAIR OR WITH AT LEAST 3 PILLOWS TO PROP YOU UP BECAUSE OF SHORTNESS OF BREATH?: LESS THAN ONCE A WEEK
6. OVER THE PAST 2 WEEKS, HOW MUCH HAS YOUR HEART FAILURE LIMITED YOUR ENJOYMENT OF LIFE: IT HAS LIMITED MY ENJOYMENT OF LIFE QUITE A BIT
7. IF YOU HAD TO SPEND THE REST OF YOUR LIFE WITH YOUR HEART FAILURE THE WAY IT IS RIGHT NOW, HOW WOULD YOU FEEL ABOUT THIS?: NOT AT ALL SATISFIED
4. OVER THE PAST 2 WEEKS, ON AVERAGE, HOW MANY TIMES HAS SHORTNESS OF BREATH LIMITED YOUR ABILITY TO DO WHAT YOU WANTED: SEVERAL TIMES PER DAY
1B. OVER THE PAST 2 WEEKS, HOW MUCH WERE YOU LIMITED BY HEART FAILURE SYMPTOMS (SHORTNESS OF BREATH OR FATIGUE) WHEN WALKING 1 BLOCK ON LEVEL GROUND: EXTREMELY LIMITED
1A. OVER THE PAST 2 WEEKS, HOW MUCH WERE YOU LIMITED BY HEART FAILURE SYMPTOMS (SHORTNESS OF BREATH OR FATIGUE) WHEN SHOWERING OR BATHING: NOT AT ALL LIMITED
8A. OVER THE PAST 2 WEEKS, ON AVERAGE, HOW HAS HEART FAILURE LIMITED YOU ABILITY TO DO HOBBIES OR RECREATIONAL ACTIVITIES: LIMITED QUITE A BIT

## 2025-03-04 NOTE — PROGRESS NOTES
Patient having Mitral clip 3-5-25 patient will call to reschedule appt at [facility]

## 2025-03-05 ENCOUNTER — ANESTHESIA (OUTPATIENT)
Dept: OPERATING ROOM | Age: 80
DRG: 267 | End: 2025-03-05
Payer: MEDICARE

## 2025-03-05 ENCOUNTER — APPOINTMENT (OUTPATIENT)
Age: 80
DRG: 267 | End: 2025-03-05
Attending: INTERNAL MEDICINE
Payer: MEDICARE

## 2025-03-05 ENCOUNTER — HOSPITAL ENCOUNTER (INPATIENT)
Age: 80
LOS: 1 days | Discharge: HOME HEALTH | DRG: 267 | End: 2025-03-06
Attending: INTERNAL MEDICINE | Admitting: INTERNAL MEDICINE
Payer: MEDICARE

## 2025-03-05 DIAGNOSIS — I34.0 NONRHEUMATIC MITRAL VALVE REGURGITATION: ICD-10-CM

## 2025-03-05 DIAGNOSIS — I34.0 SEVERE MITRAL REGURGITATION BY PRIOR ECHOCARDIOGRAM: ICD-10-CM

## 2025-03-05 DIAGNOSIS — Z98.890 STATUS POST IMPLANTATION OF MITRAL VALVE LEAFLET CLIP: Primary | ICD-10-CM

## 2025-03-05 DIAGNOSIS — Z95.818 STATUS POST IMPLANTATION OF MITRAL VALVE LEAFLET CLIP: Primary | ICD-10-CM

## 2025-03-05 LAB
ANION GAP SERPL CALCULATED.3IONS-SCNC: 13 MMOL/L (ref 7–16)
BUN SERPL-MCNC: 24 MG/DL (ref 6–23)
CALCIUM SERPL-MCNC: 9.2 MG/DL (ref 8.6–10.2)
CHLORIDE SERPL-SCNC: 105 MMOL/L (ref 98–107)
CO2 SERPL-SCNC: 20 MMOL/L (ref 22–29)
CREAT SERPL-MCNC: 1.1 MG/DL (ref 0.7–1.2)
ECHO BSA: 1.78 M2
ECHO MV EROA PISA: 0.4 CM2
ECHO MV MAX VELOCITY: 1.4 M/S
ECHO MV MEAN GRADIENT: 3 MMHG
ECHO MV MEAN VELOCITY: 0.8 M/S
ECHO MV PEAK GRADIENT: 7 MMHG
ECHO MV REGURGITANT ALIASING (NYQUIST) VELOCITY: 33 CM/S
ECHO MV REGURGITANT RADIUS PISA: 1 CM
ECHO MV REGURGITANT VELOCITY PISA: 5.5 M/S
ECHO MV REGURGITANT VOLUME PISA: 56.33 ML
ECHO MV REGURGITANT VTIA: 149.5 CM
ECHO MV VTI: 34 CM
EKG ATRIAL RATE: 78 BPM
EKG P AXIS: 84 DEGREES
EKG P-R INTERVAL: 180 MS
EKG Q-T INTERVAL: 428 MS
EKG QRS DURATION: 110 MS
EKG QTC CALCULATION (BAZETT): 487 MS
EKG R AXIS: 132 DEGREES
EKG T AXIS: -7 DEGREES
EKG VENTRICULAR RATE: 78 BPM
ERYTHROCYTE [DISTWIDTH] IN BLOOD BY AUTOMATED COUNT: 14.1 % (ref 11.5–15)
GFR, ESTIMATED: 71 ML/MIN/1.73M2
GLUCOSE BLD-MCNC: 105 MG/DL (ref 74–99)
GLUCOSE BLD-MCNC: 129 MG/DL (ref 74–99)
GLUCOSE BLD-MCNC: 151 MG/DL (ref 74–99)
GLUCOSE BLD-MCNC: 184 MG/DL (ref 74–99)
GLUCOSE BLD-MCNC: 209 MG/DL (ref 74–99)
GLUCOSE SERPL-MCNC: 152 MG/DL (ref 74–99)
HCT VFR BLD AUTO: 31.5 % (ref 37–54)
HGB BLD-MCNC: 10.6 G/DL (ref 12.5–16.5)
INR PPP: 1.3
MAGNESIUM SERPL-MCNC: 1.9 MG/DL (ref 1.6–2.6)
MCH RBC QN AUTO: 36.4 PG (ref 26–35)
MCHC RBC AUTO-ENTMCNC: 33.7 G/DL (ref 32–34.5)
MCV RBC AUTO: 108.2 FL (ref 80–99.9)
PLATELET # BLD AUTO: 212 K/UL (ref 130–450)
PMV BLD AUTO: 10.1 FL (ref 7–12)
POTASSIUM SERPL-SCNC: 4.5 MMOL/L (ref 3.5–5)
PROTHROMBIN TIME: 14.1 SEC (ref 9.3–12.4)
RBC # BLD AUTO: 2.91 M/UL (ref 3.8–5.8)
SODIUM SERPL-SCNC: 138 MMOL/L (ref 132–146)
WBC OTHER # BLD: 9.6 K/UL (ref 4.5–11.5)

## 2025-03-05 PROCEDURE — 33418 REPAIR TCAT MITRAL VALVE: CPT | Performed by: THORACIC SURGERY (CARDIOTHORACIC VASCULAR SURGERY)

## 2025-03-05 PROCEDURE — 7100000000 HC PACU RECOVERY - FIRST 15 MIN

## 2025-03-05 PROCEDURE — 85347 COAGULATION TIME ACTIVATED: CPT

## 2025-03-05 PROCEDURE — 37799 UNLISTED PX VASCULAR SURGERY: CPT

## 2025-03-05 PROCEDURE — C1725 CATH, TRANSLUMIN NON-LASER: HCPCS | Performed by: INTERNAL MEDICINE

## 2025-03-05 PROCEDURE — 3600000017 HC SURGERY HYBRID ADDL 15MIN: Performed by: INTERNAL MEDICINE

## 2025-03-05 PROCEDURE — 2500000003 HC RX 250 WO HCPCS: Performed by: NURSE ANESTHETIST, CERTIFIED REGISTERED

## 2025-03-05 PROCEDURE — 6360000002 HC RX W HCPCS: Performed by: INTERNAL MEDICINE

## 2025-03-05 PROCEDURE — 36620 INSERTION CATHETER ARTERY: CPT | Performed by: ANESTHESIOLOGY

## 2025-03-05 PROCEDURE — 3700000001 HC ADD 15 MINUTES (ANESTHESIA): Performed by: INTERNAL MEDICINE

## 2025-03-05 PROCEDURE — 6360000002 HC RX W HCPCS: Performed by: PHYSICIAN ASSISTANT

## 2025-03-05 PROCEDURE — 93355 ECHO TRANSESOPHAGEAL (TEE): CPT

## 2025-03-05 PROCEDURE — C1894 INTRO/SHEATH, NON-LASER: HCPCS | Performed by: INTERNAL MEDICINE

## 2025-03-05 PROCEDURE — 6370000000 HC RX 637 (ALT 250 FOR IP): Performed by: PHYSICIAN ASSISTANT

## 2025-03-05 PROCEDURE — C1760 CLOSURE DEV, VASC: HCPCS | Performed by: INTERNAL MEDICINE

## 2025-03-05 PROCEDURE — 4A023N7 MEASUREMENT OF CARDIAC SAMPLING AND PRESSURE, LEFT HEART, PERCUTANEOUS APPROACH: ICD-10-PCS | Performed by: INTERNAL MEDICINE

## 2025-03-05 PROCEDURE — 7100000001 HC PACU RECOVERY - ADDTL 15 MIN

## 2025-03-05 PROCEDURE — 6360000002 HC RX W HCPCS

## 2025-03-05 PROCEDURE — 2720000010 HC SURG SUPPLY STERILE: Performed by: INTERNAL MEDICINE

## 2025-03-05 PROCEDURE — 2580000003 HC RX 258

## 2025-03-05 PROCEDURE — 85027 COMPLETE CBC AUTOMATED: CPT

## 2025-03-05 PROCEDURE — 3600000007 HC SURGERY HYBRID BASE: Performed by: INTERNAL MEDICINE

## 2025-03-05 PROCEDURE — 93005 ELECTROCARDIOGRAM TRACING: CPT | Performed by: PHYSICIAN ASSISTANT

## 2025-03-05 PROCEDURE — 02UG3JZ SUPPLEMENT MITRAL VALVE WITH SYNTHETIC SUBSTITUTE, PERCUTANEOUS APPROACH: ICD-10-PCS | Performed by: INTERNAL MEDICINE

## 2025-03-05 PROCEDURE — 2500000003 HC RX 250 WO HCPCS

## 2025-03-05 PROCEDURE — 2580000003 HC RX 258: Performed by: PHYSICIAN ASSISTANT

## 2025-03-05 PROCEDURE — 2500000003 HC RX 250 WO HCPCS: Performed by: PHYSICIAN ASSISTANT

## 2025-03-05 PROCEDURE — B24BZZ4 ULTRASONOGRAPHY OF HEART WITH AORTA, TRANSESOPHAGEAL: ICD-10-PCS | Performed by: INTERNAL MEDICINE

## 2025-03-05 PROCEDURE — 6360000002 HC RX W HCPCS: Performed by: NURSE ANESTHETIST, CERTIFIED REGISTERED

## 2025-03-05 PROCEDURE — C1769 GUIDE WIRE: HCPCS | Performed by: INTERNAL MEDICINE

## 2025-03-05 PROCEDURE — 82962 GLUCOSE BLOOD TEST: CPT

## 2025-03-05 PROCEDURE — 3700000000 HC ANESTHESIA ATTENDED CARE: Performed by: INTERNAL MEDICINE

## 2025-03-05 PROCEDURE — 80048 BASIC METABOLIC PNL TOTAL CA: CPT

## 2025-03-05 PROCEDURE — C1893 INTRO/SHEATH, FIXED,NON-PEEL: HCPCS | Performed by: INTERNAL MEDICINE

## 2025-03-05 PROCEDURE — 83735 ASSAY OF MAGNESIUM: CPT

## 2025-03-05 PROCEDURE — 85610 PROTHROMBIN TIME: CPT

## 2025-03-05 PROCEDURE — 2500000003 HC RX 250 WO HCPCS: Performed by: INTERNAL MEDICINE

## 2025-03-05 PROCEDURE — 2709999900 HC NON-CHARGEABLE SUPPLY: Performed by: INTERNAL MEDICINE

## 2025-03-05 PROCEDURE — 2140000000 HC CCU INTERMEDIATE R&B

## 2025-03-05 DEVICE — G4 CLIP DELIVERY SYSTEM
Type: IMPLANTABLE DEVICE | Site: HEART | Status: FUNCTIONAL
Brand: MITRACLIP™

## 2025-03-05 DEVICE — IMPLANTABLE DEVICE: Type: IMPLANTABLE DEVICE | Site: HEART | Status: FUNCTIONAL

## 2025-03-05 RX ORDER — MAG HYDROX/ALUMINUM HYD/SIMETH 400-400-40
450 SUSPENSION, ORAL (FINAL DOSE FORM) ORAL EVERY MORNING
Status: DISCONTINUED | OUTPATIENT
Start: 2025-03-05 | End: 2025-03-05 | Stop reason: CLARIF

## 2025-03-05 RX ORDER — PROTAMINE SULFATE 10 MG/ML
INJECTION, SOLUTION INTRAVENOUS
Status: DISCONTINUED | OUTPATIENT
Start: 2025-03-05 | End: 2025-03-05 | Stop reason: SDUPTHER

## 2025-03-05 RX ORDER — SPIRONOLACTONE 25 MG/1
25 TABLET ORAL 2 TIMES DAILY
Status: DISCONTINUED | OUTPATIENT
Start: 2025-03-05 | End: 2025-03-06 | Stop reason: HOSPADM

## 2025-03-05 RX ORDER — SODIUM CHLORIDE 9 MG/ML
INJECTION, SOLUTION INTRAVENOUS
Status: DISCONTINUED | OUTPATIENT
Start: 2025-03-05 | End: 2025-03-05 | Stop reason: SDUPTHER

## 2025-03-05 RX ORDER — ROCURONIUM BROMIDE 10 MG/ML
INJECTION, SOLUTION INTRAVENOUS
Status: DISCONTINUED | OUTPATIENT
Start: 2025-03-05 | End: 2025-03-05 | Stop reason: SDUPTHER

## 2025-03-05 RX ORDER — FENTANYL CITRATE 50 UG/ML
INJECTION, SOLUTION INTRAMUSCULAR; INTRAVENOUS
Status: DISCONTINUED | OUTPATIENT
Start: 2025-03-05 | End: 2025-03-05 | Stop reason: SDUPTHER

## 2025-03-05 RX ORDER — ETOMIDATE 2 MG/ML
INJECTION INTRAVENOUS
Status: DISCONTINUED | OUTPATIENT
Start: 2025-03-05 | End: 2025-03-05 | Stop reason: SDUPTHER

## 2025-03-05 RX ORDER — POTASSIUM CHLORIDE 7.45 MG/ML
10 INJECTION INTRAVENOUS PRN
Status: DISCONTINUED | OUTPATIENT
Start: 2025-03-05 | End: 2025-03-06 | Stop reason: HOSPADM

## 2025-03-05 RX ORDER — HEPARIN SODIUM 1000 [USP'U]/ML
INJECTION, SOLUTION INTRAVENOUS; SUBCUTANEOUS
Status: DISCONTINUED | OUTPATIENT
Start: 2025-03-05 | End: 2025-03-05 | Stop reason: SDUPTHER

## 2025-03-05 RX ORDER — SODIUM CHLORIDE 0.9 % (FLUSH) 0.9 %
5-40 SYRINGE (ML) INJECTION PRN
Status: DISCONTINUED | OUTPATIENT
Start: 2025-03-05 | End: 2025-03-05 | Stop reason: HOSPADM

## 2025-03-05 RX ORDER — SODIUM CHLORIDE 0.9 % (FLUSH) 0.9 %
5-40 SYRINGE (ML) INJECTION PRN
Status: DISCONTINUED | OUTPATIENT
Start: 2025-03-05 | End: 2025-03-06 | Stop reason: HOSPADM

## 2025-03-05 RX ORDER — SODIUM CHLORIDE 9 MG/ML
INJECTION, SOLUTION INTRAVENOUS PRN
Status: DISCONTINUED | OUTPATIENT
Start: 2025-03-05 | End: 2025-03-05 | Stop reason: HOSPADM

## 2025-03-05 RX ORDER — INSULIN LISPRO 100 [IU]/ML
0-6 INJECTION, SOLUTION INTRAVENOUS; SUBCUTANEOUS NIGHTLY
Status: DISCONTINUED | OUTPATIENT
Start: 2025-03-05 | End: 2025-03-06

## 2025-03-05 RX ORDER — MAGNESIUM SULFATE IN WATER 40 MG/ML
2000 INJECTION, SOLUTION INTRAVENOUS PRN
Status: DISCONTINUED | OUTPATIENT
Start: 2025-03-05 | End: 2025-03-06 | Stop reason: HOSPADM

## 2025-03-05 RX ORDER — DEXAMETHASONE SODIUM PHOSPHATE 10 MG/ML
INJECTION, SOLUTION INTRA-ARTICULAR; INTRALESIONAL; INTRAMUSCULAR; INTRAVENOUS; SOFT TISSUE
Status: DISCONTINUED | OUTPATIENT
Start: 2025-03-05 | End: 2025-03-05 | Stop reason: SDUPTHER

## 2025-03-05 RX ORDER — SODIUM CHLORIDE 9 MG/ML
INJECTION, SOLUTION INTRAVENOUS CONTINUOUS
Status: DISCONTINUED | OUTPATIENT
Start: 2025-03-05 | End: 2025-03-05 | Stop reason: HOSPADM

## 2025-03-05 RX ORDER — METOPROLOL SUCCINATE 25 MG/1
50 TABLET, EXTENDED RELEASE ORAL NIGHTLY
Status: DISCONTINUED | OUTPATIENT
Start: 2025-03-05 | End: 2025-03-06 | Stop reason: HOSPADM

## 2025-03-05 RX ORDER — M-VIT,TX,IRON,MINS/CALC/FOLIC 27MG-0.4MG
1 TABLET ORAL EVERY MORNING
Status: DISCONTINUED | OUTPATIENT
Start: 2025-03-05 | End: 2025-03-06 | Stop reason: HOSPADM

## 2025-03-05 RX ORDER — GLUCAGON 1 MG/ML
1 KIT INJECTION PRN
Status: DISCONTINUED | OUTPATIENT
Start: 2025-03-05 | End: 2025-03-06

## 2025-03-05 RX ORDER — SODIUM CHLORIDE 9 MG/ML
INJECTION, SOLUTION INTRAVENOUS PRN
Status: DISCONTINUED | OUTPATIENT
Start: 2025-03-05 | End: 2025-03-05

## 2025-03-05 RX ORDER — MUPIROCIN 20 MG/G
OINTMENT TOPICAL ONCE
Status: COMPLETED | OUTPATIENT
Start: 2025-03-05 | End: 2025-03-05

## 2025-03-05 RX ORDER — LIDOCAINE HYDROCHLORIDE 10 MG/ML
INJECTION, SOLUTION EPIDURAL; INFILTRATION; INTRACAUDAL; PERINEURAL PRN
Status: DISCONTINUED | OUTPATIENT
Start: 2025-03-05 | End: 2025-03-05 | Stop reason: HOSPADM

## 2025-03-05 RX ORDER — INSULIN LISPRO 100 [IU]/ML
0-12 INJECTION, SOLUTION INTRAVENOUS; SUBCUTANEOUS
Status: DISCONTINUED | OUTPATIENT
Start: 2025-03-05 | End: 2025-03-06

## 2025-03-05 RX ORDER — DEXTROSE MONOHYDRATE 100 MG/ML
INJECTION, SOLUTION INTRAVENOUS CONTINUOUS PRN
Status: DISCONTINUED | OUTPATIENT
Start: 2025-03-05 | End: 2025-03-06

## 2025-03-05 RX ORDER — SODIUM CHLORIDE 0.9 % (FLUSH) 0.9 %
5-40 SYRINGE (ML) INJECTION EVERY 12 HOURS SCHEDULED
Status: DISCONTINUED | OUTPATIENT
Start: 2025-03-05 | End: 2025-03-06 | Stop reason: HOSPADM

## 2025-03-05 RX ORDER — LIDOCAINE HYDROCHLORIDE 20 MG/ML
INJECTION, SOLUTION INTRAVENOUS
Status: DISCONTINUED | OUTPATIENT
Start: 2025-03-05 | End: 2025-03-05 | Stop reason: SDUPTHER

## 2025-03-05 RX ORDER — FUROSEMIDE 20 MG/1
20 TABLET ORAL EVERY OTHER DAY
Status: DISCONTINUED | OUTPATIENT
Start: 2025-03-05 | End: 2025-03-06 | Stop reason: HOSPADM

## 2025-03-05 RX ORDER — ONDANSETRON 2 MG/ML
INJECTION INTRAMUSCULAR; INTRAVENOUS
Status: DISCONTINUED | OUTPATIENT
Start: 2025-03-05 | End: 2025-03-05 | Stop reason: SDUPTHER

## 2025-03-05 RX ORDER — ONDANSETRON 2 MG/ML
4 INJECTION INTRAMUSCULAR; INTRAVENOUS EVERY 6 HOURS PRN
Status: DISCONTINUED | OUTPATIENT
Start: 2025-03-05 | End: 2025-03-06 | Stop reason: HOSPADM

## 2025-03-05 RX ORDER — POTASSIUM CHLORIDE 1500 MG/1
40 TABLET, EXTENDED RELEASE ORAL PRN
Status: DISCONTINUED | OUTPATIENT
Start: 2025-03-05 | End: 2025-03-06 | Stop reason: HOSPADM

## 2025-03-05 RX ORDER — SODIUM CHLORIDE 0.9 % (FLUSH) 0.9 %
5-40 SYRINGE (ML) INJECTION EVERY 12 HOURS SCHEDULED
Status: DISCONTINUED | OUTPATIENT
Start: 2025-03-05 | End: 2025-03-05 | Stop reason: HOSPADM

## 2025-03-05 RX ORDER — SODIUM CHLORIDE, SODIUM LACTATE, POTASSIUM CHLORIDE, CALCIUM CHLORIDE 600; 310; 30; 20 MG/100ML; MG/100ML; MG/100ML; MG/100ML
INJECTION, SOLUTION INTRAVENOUS
Status: DISCONTINUED | OUTPATIENT
Start: 2025-03-05 | End: 2025-03-05 | Stop reason: SDUPTHER

## 2025-03-05 RX ORDER — ONDANSETRON 4 MG/1
4 TABLET, ORALLY DISINTEGRATING ORAL EVERY 8 HOURS PRN
Status: DISCONTINUED | OUTPATIENT
Start: 2025-03-05 | End: 2025-03-06 | Stop reason: HOSPADM

## 2025-03-05 RX ORDER — VALSARTAN 40 MG/1
20 TABLET ORAL 2 TIMES DAILY
Status: DISCONTINUED | OUTPATIENT
Start: 2025-03-05 | End: 2025-03-06 | Stop reason: HOSPADM

## 2025-03-05 RX ORDER — LANOLIN ALCOHOL/MO/W.PET/CERES
500 CREAM (GRAM) TOPICAL EVERY MORNING
Status: DISCONTINUED | OUTPATIENT
Start: 2025-03-05 | End: 2025-03-06 | Stop reason: HOSPADM

## 2025-03-05 RX ORDER — ATORVASTATIN CALCIUM 40 MG/1
40 TABLET, FILM COATED ORAL NIGHTLY
Status: DISCONTINUED | OUTPATIENT
Start: 2025-03-05 | End: 2025-03-06 | Stop reason: HOSPADM

## 2025-03-05 RX ORDER — FUROSEMIDE 10 MG/ML
INJECTION INTRAMUSCULAR; INTRAVENOUS
Status: DISCONTINUED | OUTPATIENT
Start: 2025-03-05 | End: 2025-03-05 | Stop reason: SDUPTHER

## 2025-03-05 RX ORDER — ASPIRIN 325 MG
325 TABLET, DELAYED RELEASE (ENTERIC COATED) ORAL DAILY
Status: DISCONTINUED | OUTPATIENT
Start: 2025-03-05 | End: 2025-03-05 | Stop reason: HOSPADM

## 2025-03-05 RX ORDER — HEPARIN SODIUM 1000 [USP'U]/ML
INJECTION, SOLUTION INTRAVENOUS; SUBCUTANEOUS PRN
Status: DISCONTINUED | OUTPATIENT
Start: 2025-03-05 | End: 2025-03-05 | Stop reason: HOSPADM

## 2025-03-05 RX ORDER — CALCIUM CHLORIDE 100 MG/ML
INJECTION INTRAVENOUS; INTRAVENTRICULAR
Status: DISCONTINUED | OUTPATIENT
Start: 2025-03-05 | End: 2025-03-05 | Stop reason: SDUPTHER

## 2025-03-05 RX ADMIN — ROCURONIUM BROMIDE 50 MG: 10 INJECTION, SOLUTION INTRAVENOUS at 07:33

## 2025-03-05 RX ADMIN — CEFAZOLIN 2000 MG: 2 INJECTION, POWDER, FOR SOLUTION INTRAMUSCULAR; INTRAVENOUS at 07:35

## 2025-03-05 RX ADMIN — SPIRONOLACTONE 25 MG: 25 TABLET ORAL at 20:13

## 2025-03-05 RX ADMIN — FENTANYL CITRATE 50 MCG: 0.05 INJECTION, SOLUTION INTRAMUSCULAR; INTRAVENOUS at 07:25

## 2025-03-05 RX ADMIN — INSULIN LISPRO 2 UNITS: 100 INJECTION, SOLUTION INTRAVENOUS; SUBCUTANEOUS at 17:33

## 2025-03-05 RX ADMIN — ONDANSETRON 4 MG: 2 INJECTION INTRAMUSCULAR; INTRAVENOUS at 07:33

## 2025-03-05 RX ADMIN — SODIUM CHLORIDE: 0.9 INJECTION, SOLUTION INTRAVENOUS at 05:50

## 2025-03-05 RX ADMIN — MUPIROCIN: 20 OINTMENT TOPICAL at 05:49

## 2025-03-05 RX ADMIN — SODIUM CHLORIDE, POTASSIUM CHLORIDE, SODIUM LACTATE AND CALCIUM CHLORIDE: 600; 310; 30; 20 INJECTION, SOLUTION INTRAVENOUS at 07:37

## 2025-03-05 RX ADMIN — CALCIUM CHLORIDE INJECTION 0.3 G: 100 INJECTION, SOLUTION INTRAVENOUS at 08:59

## 2025-03-05 RX ADMIN — SUGAMMADEX 136 MG: 100 INJECTION, SOLUTION INTRAVENOUS at 09:05

## 2025-03-05 RX ADMIN — HEPARIN SODIUM 3000 UNITS: 1000 INJECTION INTRAVENOUS; SUBCUTANEOUS at 08:13

## 2025-03-05 RX ADMIN — SODIUM CHLORIDE, PRESERVATIVE FREE 10 ML: 5 INJECTION INTRAVENOUS at 20:12

## 2025-03-05 RX ADMIN — DEXAMETHASONE SODIUM PHOSPHATE 10 MG: 10 INJECTION INTRAMUSCULAR; INTRAVENOUS at 07:33

## 2025-03-05 RX ADMIN — SODIUM CHLORIDE: 9 INJECTION, SOLUTION INTRAVENOUS at 07:25

## 2025-03-05 RX ADMIN — ATORVASTATIN CALCIUM 40 MG: 40 TABLET, FILM COATED ORAL at 20:12

## 2025-03-05 RX ADMIN — HEPARIN SODIUM 3000 UNITS: 1000 INJECTION INTRAVENOUS; SUBCUTANEOUS at 08:05

## 2025-03-05 RX ADMIN — FUROSEMIDE 40 MG: 10 INJECTION, SOLUTION INTRAMUSCULAR; INTRAVENOUS at 09:05

## 2025-03-05 RX ADMIN — SODIUM CHLORIDE, PRESERVATIVE FREE 10 ML: 5 INJECTION INTRAVENOUS at 13:25

## 2025-03-05 RX ADMIN — INSULIN LISPRO 2 UNITS: 100 INJECTION, SOLUTION INTRAVENOUS; SUBCUTANEOUS at 20:20

## 2025-03-05 RX ADMIN — WATER 2000 MG: 1 INJECTION INTRAMUSCULAR; INTRAVENOUS; SUBCUTANEOUS at 13:25

## 2025-03-05 RX ADMIN — PROTAMINE SULFATE 100 MG: 10 INJECTION, SOLUTION INTRAVENOUS at 08:59

## 2025-03-05 RX ADMIN — WATER 2000 MG: 1 INJECTION INTRAMUSCULAR; INTRAVENOUS; SUBCUTANEOUS at 20:12

## 2025-03-05 RX ADMIN — ASPIRIN 325 MG: 325 TABLET, COATED ORAL at 05:48

## 2025-03-05 RX ADMIN — ETOMIDATE 8 MG: 2 INJECTION, SOLUTION INTRAVENOUS at 07:33

## 2025-03-05 RX ADMIN — LIDOCAINE HYDROCHLORIDE 100 MG: 20 INJECTION, SOLUTION INTRAVENOUS at 07:33

## 2025-03-05 ASSESSMENT — PAIN - FUNCTIONAL ASSESSMENT: PAIN_FUNCTIONAL_ASSESSMENT: NONE - DENIES PAIN

## 2025-03-05 ASSESSMENT — LIFESTYLE VARIABLES: SMOKING_STATUS: 1

## 2025-03-05 ASSESSMENT — PAIN SCALES - GENERAL
PAINLEVEL_OUTOF10: 0

## 2025-03-05 NOTE — PROCEDURES
PROCEDURE NOTE  Date: 3/5/2025   Name: Enrique Gonzalez  YOB: 1945    Procedures:    Preprocedure diagnosis:.  Ischemic cardiomyopathy severe MR    Procedures: Intraoperative JINA to  feso-dw-dsrt repair of the mitral valve    Primary procedure  Written informed consent was obtained, the JINA was performed under stable fasting condition. The patient was set up for monitoring of surface EKG and pulse oximetry continuously. Blood pressure was monitored via an intra-arterial line. The procedure was performed under general anesthesia by anesthesiology. After ensuring adequate anesthesia, JINA probe was intubated without difficulty    Result: Successful transcatheter elpo-ne-lrrh repair of the mitral valve, well-seated MitraClip, mitral regurgitation severity improved from 4+ to 1+.  Full report to follow.    Complication: None    Patient was monitored until awake and vitals remained stable.    Miroslava Smith M.D.  [facility] cardiology

## 2025-03-05 NOTE — PROGRESS NOTES
Updated brother, SUNG Love, of new room, does not want updated if he is moved throughout the night.

## 2025-03-05 NOTE — ANESTHESIA PRE PROCEDURE
Department of Anesthesiology  Preprocedure Note       Name:  Enrique Gonzalez   Age:  79 y.o.  :  1945                                          MRN:  [id number]         Date:  3/5/2025      Surgeon: Surgeon(s):  Serafin Porter MD Henn, Lucas W, MD    Procedure: Procedure(s):  TRANSCATHETER MITRAL VALVE REPAIR  TRANSCATHETER MITRAL VALVE REPAIR    Medications prior to admission:   Prior to Admission medications    Medication Sig Start Date End Date Taking? Authorizing Provider   metoprolol succinate (TOPROL XL) 50 MG extended release tablet Take 1 tablet by mouth daily  Patient taking differently: Take 1 tablet by mouth nightly 7/10/24  Yes Luis Antonio Moreau,    spironolactone (ALDACTONE) 25 MG tablet Take 1 tablet by mouth 2 times daily Morning and evening 7/10/24  Yes Luis Antonio Moreau,    valsartan (DIOVAN) 40 MG tablet Take 0.5 tablets by mouth 2 times daily DIRECTIONS CHANGED, PT REQUESTED NEW SCRIPT 25   Luis Antonio Moreau DO   Multiple Minerals-Vitamins (HOANG MAG ZINC +D3 PO) Take 1 tablet by mouth every morning PRODUCT:   CALCIUM-1000MG  MAGNESIUM-400MG  ZINC-25MG  VITAMIN D3-15MCG    ProviderTessie MD   apixaban (ELIQUIS) 5 MG TABS tablet Take 1 tablet by mouth 2 times daily 25  Adela Gamino, APRN - CNP   furosemide (LASIX) 20 MG tablet Take 1 tablet by mouth every other day 7/10/24   Luis Antonio Moreau, DO   Multiple Vitamins-Minerals (CENTRUM SILVER 50+MEN) TABS Take 1 tablet by mouth every morning    Provider, Historical, MD   Saw [place] 450 MG CAPS Take 450 mg by mouth every morning    Tessie Cornejo MD   niacin (SLO-NIACIN) 500 MG extended release tablet Take 1 tablet by mouth every morning    Tessie Cornejo MD   atorvastatin (LIPITOR) 40 MG tablet Take 1 tablet by mouth nightly 7/3/20   Josie Benítez MD       Current medications:    Current Facility-Administered Medications   Medication Dose Route Frequency Provider Last Rate Last Admin    0.9 % sodium

## 2025-03-05 NOTE — PROGRESS NOTES
TWO BAGS OF PATIENT BELONGINGS PLACED AT END OF PATIENT CART. PATIENT LABELS PLACED ON BAGS. PATIENT BAGS WILL FOLLOW PATIENT TO CVICU

## 2025-03-05 NOTE — PROGRESS NOTES
4 Eyes Skin Assessment     NAME:  Enrique Gonzalez  YOB: 1945  MEDICAL RECORD NUMBER:  [id number]    The patient is being assessed for  Post-Op Surgical    I agree that at least one RN has performed a thorough Head to Toe Skin Assessment on the patient. ALL assessment sites listed below have been assessed.      Areas assessed by both nurses:    Head, Face, Ears, Shoulders, Back, Chest, Arms, Elbows, Hands, Sacrum. Buttock, Coccyx, Ischium, Legs. Feet and Heels, and Under Medical Devices         Does the Patient have a Wound? Yes wound(s) were present on assessment. LDA wound assessment was Initiated and completed by RN  R fem puncture wound       Jacky Prevention initiated by RN: Yes  Wound Care Orders initiated by RN: No    Pressure Injury (Stage 3,4, Unstageable, DTI, NWPT, and Complex wounds) if present, place Wound referral order by RN under : No    New Ostomies, if present place, Ostomy referral order under : No     Nurse 1 eSignature: Electronically signed by Liya Moyer RN on 3/5/25 at 10:39 AM EST    **SHARE this note so that the co-signing nurse can place an eSignature**    Nurse 2 eSignature: {Esignature:[id number]}

## 2025-03-05 NOTE — PROGRESS NOTES
Patient arrived to the PACU from OR with tyler catheter intact and patent. Securing device applied. Tyler bag is hanging below the level of the bladder, safety clip attached to the bed sheet, tamper seal is intact, drainage bag is not on the floor, lack of dependent loop in tubing, and the drainage bag is less than half full. Monitor attached, VSS, following commands.

## 2025-03-05 NOTE — PROGRESS NOTES
Patient admitted to CV with the following belongings:  Pants, Shirt, Socks, Shoes, and Jacket. The following belongings admitted with the patient, Wallet and Cell Phone, are with the patient's family.

## 2025-03-05 NOTE — ANESTHESIA PROCEDURE NOTES
Arterial Line:    An arterial line was placed using surface landmarks, in the procedure area for the following indication(s): continuous blood pressure monitoring and blood sampling needed.    A 20 gauge (size), 4.45 cm (length), Arrow (type) catheter was placed, Seldinger technique used, into the left radial artery, secured by tape and Tegaderm.  Anesthesia type: Local  Local infiltration: Injection    Events:  patient tolerated procedure well with no complications and EBL < 5mL.3/5/2025 7:00 AM3/5/2025 7:05 AM  Anesthesiologist: Peter Denson DO  Resident/CRNA: Tristin Irvin APRN - CRNA  Other anesthesia staff: Elizabeth Stephens RN  Performed: Other anesthesia staff   Preanesthetic Checklist  Completed: patient identified, IV checked, site marked, risks and benefits discussed, surgical/procedural consents, equipment checked, pre-op evaluation, timeout performed, anesthesia consent given, oxygen available and monitors applied/VS acknowledged

## 2025-03-05 NOTE — PLAN OF CARE
Problem: Skin/Tissue Integrity  Goal: Skin integrity remains intact  Description: 1.  Monitor for areas of redness and/or skin breakdown  2.  Assess vascular access sites hourly  3.  Every 4-6 hours minimum:  Change oxygen saturation probe site  4.  Every 4-6 hours:  If on nasal continuous positive airway pressure, respiratory therapy assess nares and determine need for appliance change or resting period  Outcome: Progressing     Problem: Respiratory - Adult  Goal: Achieves optimal ventilation and oxygenation  Outcome: Progressing     Problem: Cardiovascular - Adult  Goal: Maintains optimal cardiac output and hemodynamic stability  Outcome: Progressing     Problem: Cardiovascular - Adult  Goal: Absence of cardiac dysrhythmias or at baseline  Outcome: Progressing     Problem: Skin/Tissue Integrity - Adult  Goal: Incisions, wounds, or drain sites healing without S/S of infection  Outcome: Progressing     Problem: Genitourinary - Adult  Goal: Absence of urinary retention  Outcome: Progressing     Problem: Genitourinary - Adult  Goal: Urinary catheter remains patent  Outcome: Progressing

## 2025-03-05 NOTE — ANESTHESIA POSTPROCEDURE EVALUATION
Department of Anesthesiology  Postprocedure Note    Patient: Enrique Gonzalez  MRN: [id number]  YOB: 1945  Date of evaluation: 3/5/2025    Procedure Summary       Date: 03/05/25 Room / Location: [address]    Anesthesia Start: 0720 Anesthesia Stop: 0930    Procedures:       TRANSCATHETER MITRAL VALVE REPAIR      TRANSCATHETER MITRAL VALVE REPAIR Diagnosis:       Severe mitral regurgitation by prior echocardiogram      (Severe mitral regurgitation by prior echocardiogram [I34.0])    Surgeons: Serafin Porter MD; Thony Gallegos MD Responsible Provider: Peter Denson DO    Anesthesia Type: general ASA Status: 4            Anesthesia Type: No value filed.    Lambert Phase I: Lambert Score: 10    Lambert Phase II:      Anesthesia Post Evaluation    Patient location during evaluation: PACU  Patient participation: complete - patient participated  Level of consciousness: awake and alert  Airway patency: patent  Nausea & Vomiting: no nausea and no vomiting  Cardiovascular status: blood pressure returned to baseline  Respiratory status: acceptable  Hydration status: euvolemic  Multimodal analgesia pain management approach  Pain management: adequate    No notable events documented.

## 2025-03-06 ENCOUNTER — APPOINTMENT (OUTPATIENT)
Age: 80
DRG: 267 | End: 2025-03-06
Attending: INTERNAL MEDICINE
Payer: MEDICARE

## 2025-03-06 VITALS
WEIGHT: 149.2 LBS | HEART RATE: 68 BPM | DIASTOLIC BLOOD PRESSURE: 50 MMHG | RESPIRATION RATE: 18 BRPM | SYSTOLIC BLOOD PRESSURE: 99 MMHG | TEMPERATURE: 97.4 F | HEIGHT: 66 IN | OXYGEN SATURATION: 97 % | BODY MASS INDEX: 23.98 KG/M2

## 2025-03-06 LAB
ACTIVATED CLOTTING TIME, HIGH RANGE: 219 SEC
ACTIVATED CLOTTING TIME, LOW RANGE: 258 SEC
ACTIVATED CLOTTING TIME, LOW RANGE: 284 SEC
ACTIVATED CLOTTING TIME, LOW RANGE: 287 SEC
ECHO BSA: 1.78 M2
ECHO BSA: 1.78 M2
ECHO EST RA PRESSURE: 15 MMHG
ECHO LA DIAMETER INDEX: 2.49 CM/M2
ECHO LA DIAMETER: 4.4 CM
ECHO LA VOL A-L A4C: 106 ML (ref 18–58)
ECHO LA VOL MOD A4C: 100 ML (ref 18–58)
ECHO LA VOLUME INDEX A-L A4C: 60 ML/M2 (ref 16–34)
ECHO LA VOLUME INDEX MOD A4C: 56 ML/M2 (ref 16–34)
ECHO LV EF PHYSICIAN: 25 %
ECHO LV FRACTIONAL SHORTENING: 9 % (ref 28–44)
ECHO LV INTERNAL DIMENSION DIASTOLE INDEX: 3.62 CM/M2
ECHO LV INTERNAL DIMENSION DIASTOLIC: 6.4 CM (ref 4.2–5.9)
ECHO LV INTERNAL DIMENSION SYSTOLIC INDEX: 3.28 CM/M2
ECHO LV INTERNAL DIMENSION SYSTOLIC: 5.8 CM
ECHO LV IVSD: 0.7 CM (ref 0.6–1)
ECHO LV IVSS: 0.7 CM
ECHO LV MASS 2D: 208.5 G (ref 88–224)
ECHO LV MASS INDEX 2D: 117.8 G/M2 (ref 49–115)
ECHO LV POSTERIOR WALL DIASTOLIC: 0.9 CM (ref 0.6–1)
ECHO LV POSTERIOR WALL SYSTOLIC: 0.8 CM
ECHO LV RELATIVE WALL THICKNESS RATIO: 0.28
ECHO LVOT AREA: 3.1 CM2
ECHO LVOT DIAM: 2 CM
ECHO MV EROA PISA: 0.2 CM2
ECHO MV MAX VELOCITY: 1.6 M/S
ECHO MV MEAN GRADIENT: 3 MMHG
ECHO MV MEAN VELOCITY: 0.7 M/S
ECHO MV PEAK GRADIENT: 10 MMHG
ECHO MV REGURGITANT ALIASING (NYQUIST) VELOCITY: 43 CM/S
ECHO MV REGURGITANT RADIUS PISA: 0.53 CM
ECHO MV REGURGITANT VELOCITY PISA: 4.8 M/S
ECHO MV REGURGITANT VOLUME PISA: 19.37 ML
ECHO MV REGURGITANT VTIA: 122.6 CM
ECHO MV VTI: 35.6 CM
ECHO PVEIN PEAK D VELOCITY: 0.6 M/S
ECHO PVEIN PEAK S VELOCITY: 0.2 M/S
ECHO PVEIN S/D RATIO: 0.3
ECHO RIGHT VENTRICULAR SYSTOLIC PRESSURE (RVSP): 50 MMHG
ECHO RV INTERNAL DIMENSION: 3.4 CM
ECHO TV REGURGITANT MAX VELOCITY: 2.94 M/S
ECHO TV REGURGITANT PEAK GRADIENT: 35 MMHG
EKG ATRIAL RATE: 66 BPM
EKG P AXIS: 70 DEGREES
EKG P-R INTERVAL: 150 MS
EKG Q-T INTERVAL: 482 MS
EKG QRS DURATION: 164 MS
EKG QTC CALCULATION (BAZETT): 505 MS
EKG R AXIS: 142 DEGREES
EKG T AXIS: -28 DEGREES
EKG VENTRICULAR RATE: 66 BPM
GLUCOSE BLD-MCNC: 136 MG/DL (ref 74–99)

## 2025-03-06 PROCEDURE — 2500000003 HC RX 250 WO HCPCS: Performed by: PHYSICIAN ASSISTANT

## 2025-03-06 PROCEDURE — 93005 ELECTROCARDIOGRAM TRACING: CPT | Performed by: PHYSICIAN ASSISTANT

## 2025-03-06 PROCEDURE — 82962 GLUCOSE BLOOD TEST: CPT

## 2025-03-06 PROCEDURE — 93321 DOPPLER ECHO F-UP/LMTD STD: CPT

## 2025-03-06 PROCEDURE — 6360000002 HC RX W HCPCS: Performed by: PHYSICIAN ASSISTANT

## 2025-03-06 PROCEDURE — 6370000000 HC RX 637 (ALT 250 FOR IP): Performed by: PHYSICIAN ASSISTANT

## 2025-03-06 RX ADMIN — SPIRONOLACTONE 25 MG: 25 TABLET ORAL at 08:43

## 2025-03-06 RX ADMIN — APIXABAN 5 MG: 5 TABLET, FILM COATED ORAL at 08:43

## 2025-03-06 RX ADMIN — WATER 2000 MG: 1 INJECTION INTRAMUSCULAR; INTRAVENOUS; SUBCUTANEOUS at 04:45

## 2025-03-06 RX ADMIN — VALSARTAN 20 MG: 40 TABLET, FILM COATED ORAL at 08:44

## 2025-03-06 RX ADMIN — WATER 2000 MG: 1 INJECTION INTRAMUSCULAR; INTRAVENOUS; SUBCUTANEOUS at 12:35

## 2025-03-06 RX ADMIN — Medication 1 TABLET: at 08:43

## 2025-03-06 RX ADMIN — SODIUM CHLORIDE, PRESERVATIVE FREE 10 ML: 5 INJECTION INTRAVENOUS at 08:43

## 2025-03-06 NOTE — CONSULTS
Met with patient and discussed that their physician has ordered a referral to our outpatient Phase II Cardiac Rehabilitation program. Reviewed the benefits of cardiac rehabilitation based on their diagnosis and personal risk factors. Patient demonstrates strong interest in Cardiac Rehabilitation at this time.  Cardiac Rehabilitation brochure provided to patient/family. The Cardiac Rehabilitation Program has been provided the patient's referral information and pertinent patient details and history. The patient may call [facility] at [phone] for additional information or questions. Contact information for [facility] and other choices close to the patient's residence have been provided in the discharge instructions so that the patient may call and schedule an appointment when cleared by their physician. Thank you for the referral.

## 2025-03-06 NOTE — CARE COORDINATION
Patient admitted for scheduled Transcatheter mruc-wi-ioqw mitral repair (ADRIENNE).  Patient states he lives at home alone in a 1 story house.  Patient  has a walker that he doesn't use and  a cane that he uses prn.  Per previous admission SW note he has a history with [facility] in addition to CAMILO history at [facility].  PCP is Dr. Campos and pharmacy choice is [facility] in [place]. Patient would like [facility] initially and will be staying with brother Ivan in [place] for a couple of days.  Referral called to Mya from [facility]. Orders in [facility].cm    Case Management Assessment  Initial Evaluation    Date/Time of Evaluation: 3/6/2025 10:31 AM  Assessment Completed by: Jocelin Montalvo RN    If patient is discharged prior to next notation, then this note serves as note for discharge by case management.    Patient Name: Enrique Gonzalez                   YOB: 1945  Diagnosis: Severe mitral regurgitation by prior echocardiogram [I34.0]                   Date / Time: 3/5/2025  4:58 AM    Patient Admission Status: Inpatient   Readmission Risk (Low < 19, Mod (19-27), High > 27): Readmission Risk Score: 18.8    Current PCP: Peter Campos Jr., MD  PCP verified by CM?      Chart Reviewed: Yes      History Provided by:    Patient Orientation:      Patient Cognition:      Hospitalization in the last 30 days (Readmission):  Yes    If yes, Readmission Assessment in  Navigator will be completed.    Advance Directives:      Code Status: Full Code   Patient's Primary Decision Maker is:      Primary Decision Maker: Syeda Gonzalez - Child - [phone]    Discharge Planning:    Patient lives with: Alone Type of Home: House  Primary Care Giver:    Patient Support Systems include: Family Members   Current Financial resources:    Current community resources:    Current services prior to admission: None            Current DME:              Type of Home Care services:  Aide Services, Nursing Services,

## 2025-03-06 NOTE — DISCHARGE INSTRUCTIONS
We wish you a speedy recovery. Here is some important information about caring for yourself when you go home.      Traveling Home    For your safety, a responsible adult must drive you home the day of discharge.?      How Will I Feel?   You may feel discomfort at the incision site during the first week after the procedure. The incision site has been closed with a closure device and you may feel a pea size lump at the site however this will lessen over time. The doctor will tell you what medications you can take for pain relief.?   Please tell your doctor or nurse if your symptoms are prolonged or severe.      Restrictions   No driving for 3 days   Lifting, push, pull restrictions max of 10? lbs. for 1 week   Do not have any dental cleanings (or dental work) for 8 weeks after TAVR procedure. You will need to take an antibiotic one hour before any dental cleaning for life.   How do I care for the wound site?   You may shower after 24 hours   Keep the incision area clean and dry (Do not scrub the area.)?   You need use a Band-Aid over the incision if you wish to, however, change it every day   Do not use creams, lotions, or ointments on the incision site?   Look at the area daily to make sure it is healing properly   If you notice any of the signs of infection please call your doctor   When to Call:   Please call us right away if you have any of these signs of infection:   Increased drainage, bleeding, or oozing from the incision site (If this develops please apply direct pressure to site.)   Increased opening of the incision   Redness, swelling, or warmth around the incision site   Increased body temperature (greater than 101° F or 38.4° C)   Fever, shaking or chills, and/or dizziness   Heart palpitations (sensation of fast or hard beating from your heart)   If you gain more than 2 lbs. overnight or 4 pounds in a week   Also call your provider if you have:   Chest pain, shortness of breath, weight gain, lower

## 2025-03-06 NOTE — OP NOTE
below      Detailed Description of Procedure:   Reason for MitraClip: Prohibitive risk for open MVR due to age and frailty      Procedure date: 12/20/2023      Procedure: [id number] percutaneous mitral valve repair      Serial Number:    Clip #1 LOT [id number] REF [id number]   Steerable guide LOT [id number] REF [id number]      Primary IC: Serafin Porter MD  CT surgeon: Thony Gallegos MD     Anesthesia: Dr. Denson   Echo: Dr. Dr. Smith      Procedures Performed:   1. RFV access (24F)   2. Transeptal puncture   3. MitraClip XTW implant   4. JINA   5. [facility]      Procedure:    Dr. Denson brought patient into the room and sedated (general anesthesia)/intubated and placed radial a-line.    Baseline JINA demonstrated severe secondary MR with low EF and restricted posterior leaflet.     Patient prepped and draped in usual sterile fashion. Right femoral venous access obtained and 6F sheath inserted. I then performed \"Pre-Close\" of the vein with two perclose devices. The 8F Esperance VersaCross transeptal sheath delivered to the SVC over standard J-wire. Under JINA guidance we slowly pulled the sheath back to a superior/posterior position and measured 4-5 cm above the mitral valve. After positioning the sheath, the needle was charged and we ablated across the septum as confirmed by LA pressure and sats. The sheath was advanced without difficulty and LA access confirmed with fluoro and hemodynamics.      The baseline LA pressure mean was 36 mmHg. 53 mmHg v wave. (prior to aggressive hydration)      Serial increment of weight based Heparin were administered and ACT kept >250 sec throughout the case.      Next, we flushed the G4 MitraClip steerable guide catheter and clip delivery system. We then advanced the G4 MitraClip steerable guiding sheath across the septum into the LA. Next, we advanced the XTW Clip Delivery System to the LA and using M knob to flex the Clip perpendicular to the MV. Using 3D and 2D guidance we then advanced the

## 2025-03-06 NOTE — PROGRESS NOTES
Physician Progress Note      PATIENT:               FRANCHESCA LYMAN  CSN #:                  [id number]  :                       1945  ADMIT DATE:       3/5/2025 4:58 AM  DISCH DATE:  RESPONDING  PROVIDER #:        Serafin Porter MD          QUERY TEXT:    Patient admitted with atrial fibrillation and is maintained on Eliquis. If   possible, please document in progress notes and discharge summary if you are   evaluating and/or treating any of the following:?  ?  The medical record reflects the following:  Risk Factors: 79 yrs old, hx HTN, CHF, CVA, MI  Clinical Indicators: afib  Treatment: Eliquis  Options provided:  -- Secondary hypercoagulable state in a patient with atrial fibrillation  -- Other - I will add my own diagnosis  -- Disagree - Not applicable / Not valid  -- Disagree - Clinically unable to determine / Unknown  -- Refer to Clinical Documentation Reviewer    PROVIDER RESPONSE TEXT:    This patient has secondary hypercoagulable state in a patient with atrial   fibrillation.    Query created by: Chrissy Patel on 3/6/2025 1:30 PM      Electronically signed by:  Serafin Porter MD 3/6/2025 2:25 PM

## 2025-03-06 NOTE — PROGRESS NOTES
4 Eyes Skin Assessment     NAME:  Enrique Gonzalez  YOB: 1945  MEDICAL RECORD NUMBER:  [id number]    The patient is being assessed for  Transfer to New Unit    I agree that at least one RN has performed a thorough Head to Toe Skin Assessment on the patient. ALL assessment sites listed below have been assessed.      Areas assessed by both nurses:    Head, Face, Ears, Shoulders, Back, Chest, Arms, Elbows, Hands, Sacrum. Buttock, Coccyx, Ischium, and Legs. Feet and Heels        Does the Patient have a Wound? No noted wound(s)       Jacky Prevention initiated by RN: No  Wound Care Orders initiated by RN: No    Pressure Injury (Stage 3,4, Unstageable, DTI, NWPT, and Complex wounds) if present, place Wound referral order by RN under : No    New Ostomies, if present place, Ostomy referral order under : No     Nurse 1 eSignature: Electronically signed by Adela Griffith RN on 3/5/25 at 11:45 PM EST    **SHARE this note so that the co-signing nurse can place an eSignature**    Nurse 2 eSignature: Electronically signed by Luz Kasper RN on 3/5/25 at 11:46 PM EST

## 2025-03-08 LAB
ABO/RH: NORMAL
ANTIBODY SCREEN: NEGATIVE
ARM BAND NUMBER: NORMAL
BLOOD BANK DISPENSE STATUS: NORMAL
BLOOD BANK SAMPLE EXPIRATION: NORMAL
BPU ID: NORMAL
COMPONENT: NORMAL
CROSSMATCH RESULT: NORMAL
TRANSFUSION STATUS: NORMAL
UNIT DIVISION: 0

## 2025-03-13 ENCOUNTER — TELEPHONE (OUTPATIENT)
Dept: CARDIOLOGY CLINIC | Age: 80
End: 2025-03-13

## 2025-03-13 DIAGNOSIS — M79.89 LEG SWELLING: ICD-10-CM

## 2025-03-13 DIAGNOSIS — I50.20 HFREF (HEART FAILURE WITH REDUCED EJECTION FRACTION) (HCC): Primary | ICD-10-CM

## 2025-03-13 NOTE — TELEPHONE ENCOUNTER
Patient called stating he has been having swelling from his mid calf downward that began a couple of days after his valve surgery.  He is supposed to be taking Lasix 20 mg every other day, he has taken it daily for the last (2) days.

## 2025-03-14 NOTE — TELEPHONE ENCOUNTER
1.  Stat D-dimer.  Go to the ER if it is elevated.  2.  Increase Lasix to 40 mg daily for the next 3 days and then decrease it back to 20 mg every other day.  3.  BMP next Wednesday.

## 2025-03-15 ENCOUNTER — HOSPITAL ENCOUNTER (OUTPATIENT)
Age: 80
Discharge: HOME | End: 2025-03-15
Payer: MEDICARE

## 2025-03-15 DIAGNOSIS — I50.20 HFREF (HEART FAILURE WITH REDUCED EJECTION FRACTION) (HCC): ICD-10-CM

## 2025-03-15 DIAGNOSIS — M79.89 LEG SWELLING: ICD-10-CM

## 2025-03-15 LAB — D-DIMER QUANTITATIVE: 235 NG/ML DDU (ref 0–230)

## 2025-03-15 PROCEDURE — 85379 FIBRIN DEGRADATION QUANT: CPT

## 2025-03-15 PROCEDURE — 36415 COLL VENOUS BLD VENIPUNCTURE: CPT

## 2025-03-17 ENCOUNTER — TELEPHONE (OUTPATIENT)
Dept: CARDIOLOGY CLINIC | Age: 80
End: 2025-03-17

## 2025-03-17 ENCOUNTER — RESULTS FOLLOW-UP (OUTPATIENT)
Dept: CARDIOLOGY CLINIC | Age: 80
End: 2025-03-17

## 2025-03-17 ENCOUNTER — HOSPITAL ENCOUNTER (OUTPATIENT)
Dept: ULTRASOUND IMAGING | Age: 80
Discharge: HOME | End: 2025-03-19
Attending: INTERNAL MEDICINE
Payer: MEDICARE

## 2025-03-17 ENCOUNTER — RESULTS FOLLOW-UP (OUTPATIENT)
Dept: ULTRASOUND IMAGING | Age: 80
End: 2025-03-17

## 2025-03-17 DIAGNOSIS — R79.89 ELEVATED D-DIMER: Primary | ICD-10-CM

## 2025-03-17 DIAGNOSIS — M79.89 LEG SWELLING: ICD-10-CM

## 2025-03-17 DIAGNOSIS — R79.89 ELEVATED D-DIMER: ICD-10-CM

## 2025-03-17 PROCEDURE — 93970 EXTREMITY STUDY: CPT

## 2025-03-17 NOTE — TELEPHONE ENCOUNTER
Discussed 3/15/25 D-dimer (235) with Dr. Moreau.  Per verbal from Dr. Moreau, patient needs LE Duplex U/S re: leg swelling to R/O DVT. Patient notified.  MDCR - no auth required.     Spoke with Mary in [facility]. LE Duplex U/S  scheduled for 2:00 p.m. ([facility]).  Patient to be notified to arrive at 1:30 p.m.; bring photo ID/ins cards.      Patient notified of appt date/time/location/instructions.

## 2025-03-18 DIAGNOSIS — M79.89 LEG SWELLING: ICD-10-CM

## 2025-03-18 DIAGNOSIS — I50.20 HFREF (HEART FAILURE WITH REDUCED EJECTION FRACTION) (HCC): Primary | ICD-10-CM

## 2025-03-19 ENCOUNTER — HOSPITAL ENCOUNTER (OUTPATIENT)
Age: 80
Discharge: HOME | End: 2025-03-19
Payer: MEDICARE

## 2025-03-19 DIAGNOSIS — M79.89 LEG SWELLING: ICD-10-CM

## 2025-03-19 DIAGNOSIS — I50.20 HFREF (HEART FAILURE WITH REDUCED EJECTION FRACTION) (HCC): ICD-10-CM

## 2025-03-19 LAB
ANION GAP SERPL CALCULATED.3IONS-SCNC: 12 MMOL/L (ref 7–16)
BUN SERPL-MCNC: 38 MG/DL (ref 6–23)
CALCIUM SERPL-MCNC: 9.3 MG/DL (ref 8.6–10.2)
CHLORIDE SERPL-SCNC: 99 MMOL/L (ref 98–107)
CO2 SERPL-SCNC: 24 MMOL/L (ref 22–29)
CREAT SERPL-MCNC: 1.1 MG/DL (ref 0.7–1.2)
GFR, ESTIMATED: 68 ML/MIN/1.73M2
GLUCOSE SERPL-MCNC: 97 MG/DL (ref 74–99)
POTASSIUM SERPL-SCNC: 4.4 MMOL/L (ref 3.5–5)
SODIUM SERPL-SCNC: 135 MMOL/L (ref 132–146)

## 2025-03-19 PROCEDURE — 36415 COLL VENOUS BLD VENIPUNCTURE: CPT

## 2025-03-19 PROCEDURE — 80048 BASIC METABOLIC PNL TOTAL CA: CPT

## 2025-03-20 ENCOUNTER — OFFICE VISIT (OUTPATIENT)
Dept: CARDIOLOGY CLINIC | Age: 80
End: 2025-03-20

## 2025-03-20 VITALS
DIASTOLIC BLOOD PRESSURE: 60 MMHG | TEMPERATURE: 96.9 F | WEIGHT: 155.1 LBS | RESPIRATION RATE: 16 BRPM | HEART RATE: 69 BPM | BODY MASS INDEX: 24.93 KG/M2 | HEIGHT: 66 IN | SYSTOLIC BLOOD PRESSURE: 100 MMHG | OXYGEN SATURATION: 96 %

## 2025-03-20 DIAGNOSIS — I35.1 NONRHEUMATIC AORTIC VALVE INSUFFICIENCY: ICD-10-CM

## 2025-03-20 DIAGNOSIS — I10 ESSENTIAL HYPERTENSION: Primary | ICD-10-CM

## 2025-03-20 DIAGNOSIS — I34.0 NONRHEUMATIC MITRAL VALVE REGURGITATION: ICD-10-CM

## 2025-03-20 DIAGNOSIS — I48.0 PAROXYSMAL ATRIAL FIBRILLATION (HCC): ICD-10-CM

## 2025-03-20 DIAGNOSIS — I25.5 ISCHEMIC CARDIOMYOPATHY: ICD-10-CM

## 2025-03-20 RX ORDER — VALSARTAN 40 MG/1
20 TABLET ORAL DAILY
Qty: 90 TABLET | Refills: 3 | Status: SHIPPED | OUTPATIENT
Start: 2025-03-20

## 2025-03-27 ENCOUNTER — TELEPHONE (OUTPATIENT)
Age: 80
End: 2025-03-27

## 2025-03-27 ENCOUNTER — RESULTS FOLLOW-UP (OUTPATIENT)
Dept: CARDIOLOGY CLINIC | Age: 80
End: 2025-03-27

## 2025-03-27 ENCOUNTER — HOSPITAL ENCOUNTER (OUTPATIENT)
Age: 80
Discharge: HOME | End: 2025-03-27
Payer: MEDICARE

## 2025-03-27 DIAGNOSIS — I10 ESSENTIAL HYPERTENSION: ICD-10-CM

## 2025-03-27 LAB
ANION GAP SERPL CALCULATED.3IONS-SCNC: 15 MMOL/L (ref 7–16)
BUN SERPL-MCNC: 38 MG/DL (ref 6–23)
CALCIUM SERPL-MCNC: 9.5 MG/DL (ref 8.6–10.2)
CHLORIDE SERPL-SCNC: 95 MMOL/L (ref 98–107)
CO2 SERPL-SCNC: 20 MMOL/L (ref 22–29)
CREAT SERPL-MCNC: 1.5 MG/DL (ref 0.7–1.2)
GFR, ESTIMATED: 49 ML/MIN/1.73M2
GLUCOSE SERPL-MCNC: 104 MG/DL (ref 74–99)
POTASSIUM SERPL-SCNC: 4.7 MMOL/L (ref 3.5–5)
SODIUM SERPL-SCNC: 130 MMOL/L (ref 132–146)

## 2025-03-27 PROCEDURE — 36415 COLL VENOUS BLD VENIPUNCTURE: CPT

## 2025-03-27 PROCEDURE — 80048 BASIC METABOLIC PNL TOTAL CA: CPT

## 2025-03-27 NOTE — TELEPHONE ENCOUNTER
Reminded patient of appointment on 4/1/25 at [facility].  Echocardiogram at 1000, visit to follow with Charlene Molina.

## 2025-03-28 NOTE — TELEPHONE ENCOUNTER
Patient notified of lab results and Dr. Moreau's recommendations.  Patient does not currently see nephrology.  Note and labs faxed to Dr. Campos [phone] for referral.

## 2025-03-31 DIAGNOSIS — Z98.890 STATUS POST IMPLANTATION OF MITRAL VALVE LEAFLET CLIP: Primary | ICD-10-CM

## 2025-03-31 DIAGNOSIS — Z95.818 STATUS POST IMPLANTATION OF MITRAL VALVE LEAFLET CLIP: Primary | ICD-10-CM

## 2025-04-01 ENCOUNTER — OFFICE VISIT (OUTPATIENT)
Dept: CARDIOLOGY CLINIC | Age: 80
End: 2025-04-01
Payer: MEDICARE

## 2025-04-01 ENCOUNTER — HOSPITAL ENCOUNTER (OUTPATIENT)
Dept: CARDIOLOGY | Age: 80
Discharge: HOME | End: 2025-04-03
Payer: MEDICARE

## 2025-04-01 VITALS
DIASTOLIC BLOOD PRESSURE: 60 MMHG | HEIGHT: 65 IN | HEART RATE: 76 BPM | RESPIRATION RATE: 24 BRPM | WEIGHT: 156.2 LBS | BODY MASS INDEX: 26.02 KG/M2 | SYSTOLIC BLOOD PRESSURE: 100 MMHG

## 2025-04-01 DIAGNOSIS — Z95.818 STATUS POST IMPLANTATION OF MITRAL VALVE LEAFLET CLIP: Primary | ICD-10-CM

## 2025-04-01 DIAGNOSIS — Z98.890 STATUS POST IMPLANTATION OF MITRAL VALVE LEAFLET CLIP: Primary | ICD-10-CM

## 2025-04-01 DIAGNOSIS — Z95.818 STATUS POST IMPLANTATION OF MITRAL VALVE LEAFLET CLIP: ICD-10-CM

## 2025-04-01 DIAGNOSIS — Z98.890 STATUS POST IMPLANTATION OF MITRAL VALVE LEAFLET CLIP: ICD-10-CM

## 2025-04-01 DIAGNOSIS — I34.0 NONRHEUMATIC MITRAL VALVE REGURGITATION: ICD-10-CM

## 2025-04-01 LAB
LEFT VENTRICULAR EJECTION FRACTION HIGH VALUE: 25 %
LEFT VENTRICULAR EJECTION FRACTION MODE: NORMAL
LV EF: 20 %

## 2025-04-01 PROCEDURE — 1111F DSCHRG MED/CURRENT MED MERGE: CPT | Performed by: PHYSICIAN ASSISTANT

## 2025-04-01 PROCEDURE — G8417 CALC BMI ABV UP PARAM F/U: HCPCS | Performed by: PHYSICIAN ASSISTANT

## 2025-04-01 PROCEDURE — 3074F SYST BP LT 130 MM HG: CPT | Performed by: PHYSICIAN ASSISTANT

## 2025-04-01 PROCEDURE — 1159F MED LIST DOCD IN RCRD: CPT | Performed by: PHYSICIAN ASSISTANT

## 2025-04-01 PROCEDURE — 3078F DIAST BP <80 MM HG: CPT | Performed by: PHYSICIAN ASSISTANT

## 2025-04-01 PROCEDURE — 6360000004 HC RX CONTRAST MEDICATION: Performed by: PHYSICIAN ASSISTANT

## 2025-04-01 PROCEDURE — 99213 OFFICE O/P EST LOW 20 MIN: CPT | Performed by: PHYSICIAN ASSISTANT

## 2025-04-01 PROCEDURE — 1123F ACP DISCUSS/DSCN MKR DOCD: CPT | Performed by: PHYSICIAN ASSISTANT

## 2025-04-01 PROCEDURE — C8929 TTE W OR WO FOL WCON,DOPPLER: HCPCS

## 2025-04-01 PROCEDURE — 1126F AMNT PAIN NOTED NONE PRSNT: CPT | Performed by: PHYSICIAN ASSISTANT

## 2025-04-01 PROCEDURE — 4004F PT TOBACCO SCREEN RCVD TLK: CPT | Performed by: PHYSICIAN ASSISTANT

## 2025-04-01 PROCEDURE — G8427 DOCREV CUR MEDS BY ELIG CLIN: HCPCS | Performed by: PHYSICIAN ASSISTANT

## 2025-04-01 RX ADMIN — SULFUR HEXAFLUORIDE 2 ML: 60.7; .19; .19 INJECTION, POWDER, LYOPHILIZED, FOR SUSPENSION INTRAVENOUS; INTRAVESICAL at 10:30

## 2025-04-01 ASSESSMENT — KANSAS CITY CARDIOMYOPATHY QUESTIONNAIRE (KCCQ12)
8C. OVER THE PAST 2 WEEKS, ON AVERAGE, HOW HAS HEART FAILURE LIMITED YOU ABILITY TO VISIT FAMILY AND FRIENDS OUR OF YOUR HOME: SLIGHTLY LIMITED
7. IF YOU HAD TO SPEND THE REST OF YOUR LIFE WITH YOUR HEART FAILURE THE WAY IT IS RIGHT NOW, HOW WOULD YOU FEEL ABOUT THIS?: NOT AT ALL SATISFIED
1C. OVER THE PAST 2 WEEKS, HOW MUCH WERE YOU LIMITED BY HEART FAILURE SYMPTOMS (SHORTNESS OF BREATH OR FATIGUE) WHEN HURRYING OR JOGGING (AS IF TO CATCH A BUS): QUITE A BIT LIMITED
1A. OVER THE PAST 2 WEEKS, HOW MUCH WERE YOU LIMITED BY HEART FAILURE SYMPTOMS (SHORTNESS OF BREATH OR FATIGUE) WHEN SHOWERING OR BATHING: NOT AT ALL LIMITED
5. OVER THE PAST 2 WEEKS, ON AVERAGE, HOW MANY TIMES HAVE YOU BEEN FORCED TO SLEEP SITTING UP IN A CHAIR OR WITH AT LEAST 3 PILLOWS TO PROP YOU UP BECAUSE OF SHORTNESS OF BREATH?: NEVER OVER THE PAST 2 WEEKS
4. OVER THE PAST 2 WEEKS, ON AVERAGE, HOW MANY TIMES HAS SHORTNESS OF BREATH LIMITED YOUR ABILITY TO DO WHAT YOU WANTED: LESS THAN ONCE A WEEK
8B. OVER THE PAST 2 WEEKS, ON AVERAGE, HOW HAS HEART FAILURE LIMITED YOU ABILITY TO WORK OR DO HOUSEHOLD CHORES: MODERATELY LIMITED
3. OVER THE PAST 2 WEEKS, ON AVERAGE, HOW MANY TIMES HAS FATIGUE LIMITED YOUR ABILITY TO DO WHAT YOU WANTED: AT LEAST ONCE A DAY
8A. OVER THE PAST 2 WEEKS, ON AVERAGE, HOW HAS HEART FAILURE LIMITED YOU ABILITY TO DO HOBBIES OR RECREATIONAL ACTIVITIES: DID NOT LIMIT AT ALL
2. OVER THE PAST 2 WEEKS, HOW MANY TIMES DID YOU HAVE SWELLING IN YOUR FEET, ANKLES OR LEGS WHEN YOU WOKE UP IN THE MORNING: EVERY MORNING
6. OVER THE PAST 2 WEEKS, HOW MUCH HAS YOUR HEART FAILURE LIMITED YOUR ENJOYMENT OF LIFE: IT HAS MODERATELY LIMITED MY ENJOYMENT OF LIFE
1B. OVER THE PAST 2 WEEKS, HOW MUCH WERE YOU LIMITED BY HEART FAILURE SYMPTOMS (SHORTNESS OF BREATH OR FATIGUE) WHEN WALKING 1 BLOCK ON LEVEL GROUND: NOT AT ALL LIMITED

## 2025-04-01 NOTE — PATIENT INSTRUCTIONS
Follow up for echocardiogram and visit with Charlene on 3/3/2026 at 11:00 am; call sooner if concerns arise in the meantime    Follow up with Cher Campos and Lizzeth as scheduled    Reminder: antibiotic required prior to dental appointments

## 2025-04-01 NOTE — PROGRESS NOTES
[facility]  Visit Note      Patient name: Enrique Gonzalez    Reason for consult: Mitraclip follow up    Referring Physician: Dr. Moreau    Primary Care Physician: Peter Campos Jr., MD    Date of service: 4/1/2025    Chief Complaint: Mitraclip follow up - 30 day    HPI: Mr. Gonzalez presents for follow up s/p ADRIENNE using Mitraclip on 3/5/25. Overall he is feeling better since the procedure. He notes improvement in his breathing but worsening of LE edema. He saw Dr. Moreau recently who increased diuretic to daily dosing for 6 days. Labs revealed acute renal insufficiency and hyponatremia. He was advised to go back to [facility] diuretic and referral to nephrology. He denies chest pain, palpitations, orthopnea, PND, or syncope.     Allergies:   Allergies   Allergen Reactions    Deltasone [Prednisone] Nausea Only       Home medications:    Current Outpatient Medications   Medication Sig Dispense Refill    valsartan (DIOVAN) 40 MG tablet Take 0.5 tablets by mouth daily DIRECTIONS CHANGED, PT REQUESTED NEW SCRIPT 90 tablet 3    Multiple Minerals-Vitamins (HOANG MAG ZINC +D3 PO) Take 1 tablet by mouth every morning PRODUCT:   CALCIUM-1000MG  MAGNESIUM-400MG  ZINC-25MG  VITAMIN D3-15MCG      apixaban (ELIQUIS) 5 MG TABS tablet Take 1 tablet by mouth 2 times daily 60 tablet 0    furosemide (LASIX) 20 MG tablet Take 1 tablet by mouth every other day (Patient taking differently: Take 1 tablet by mouth daily) 45 tablet 3    metoprolol succinate (TOPROL XL) 50 MG extended release tablet Take 1 tablet by mouth daily 90 tablet 3    spironolactone (ALDACTONE) 25 MG tablet Take 1 tablet by mouth 2 times daily Morning and evening 180 tablet 3    Multiple Vitamins-Minerals (CENTRUM SILVER 50+MEN) TABS Take 1 tablet by mouth every morning      Saw [place] 450 MG CAPS Take 450 mg by mouth every morning      niacin (SLO-NIACIN) 500 MG extended release tablet Take 1 tablet by mouth every morning      atorvastatin

## 2025-04-06 LAB
ECHO AO ASC DIAM: 3.4 CM
ECHO AR MAX VEL PISA: 3.7 M/S
ECHO AV AREA PEAK VELOCITY: 2.9 CM2
ECHO AV AREA VTI: 2.7 CM2
ECHO AV CUSP MM: 2.1 CM
ECHO AV MEAN GRADIENT: 4 MMHG
ECHO AV MEAN VELOCITY: 0.9 M/S
ECHO AV PEAK GRADIENT: 7 MMHG
ECHO AV PEAK VELOCITY: 1.4 M/S
ECHO AV REGURGITANT PHT: 438.4 MS
ECHO AV VELOCITY RATIO: 0.86
ECHO AV VTI: 23.7 CM
ECHO EST RA PRESSURE: 8 MMHG
ECHO LA DIAMETER: 5.1 CM
ECHO LA VOL A-L A2C: 97 ML (ref 18–58)
ECHO LA VOL A-L A4C: 99 ML (ref 18–58)
ECHO LA VOL MOD A2C: 91 ML (ref 18–58)
ECHO LA VOL MOD A4C: 95 ML (ref 18–58)
ECHO LA VOLUME AREA LENGTH: 99 ML
ECHO LV E' LATERAL VELOCITY: 6 CM/S
ECHO LV E' SEPTAL VELOCITY: 4 CM/S
ECHO LV EDV A2C: 306 ML
ECHO LV EDV A4C: 299 ML
ECHO LV EDV BP: 287 ML (ref 67–155)
ECHO LV EF PHYSICIAN: 25 %
ECHO LV EJECTION FRACTION A2C: 21 %
ECHO LV EJECTION FRACTION A4C: 24 %
ECHO LV EJECTION FRACTION BIPLANE: 23 % (ref 55–100)
ECHO LV ESV A2C: 241 ML
ECHO LV ESV A4C: 228 ML
ECHO LV ESV BP: 220 ML (ref 22–58)
ECHO LV FRACTIONAL SHORTENING: 10 % (ref 28–44)
ECHO LV INTERNAL DIMENSION DIASTOLIC: 7 CM (ref 4.2–5.9)
ECHO LV INTERNAL DIMENSION SYSTOLIC: 6.3 CM
ECHO LV IVSD: 0.8 CM (ref 0.6–1)
ECHO LV IVSS: 0.8 CM
ECHO LV MASS 2D: 244.4 G (ref 88–224)
ECHO LV POSTERIOR WALL DIASTOLIC: 0.8 CM (ref 0.6–1)
ECHO LV POSTERIOR WALL SYSTOLIC: 1.3 CM
ECHO LV RELATIVE WALL THICKNESS RATIO: 0.23
ECHO LVOT AREA: 3.5 CM2
ECHO LVOT AV VTI INDEX: 0.78
ECHO LVOT DIAM: 2.1 CM
ECHO LVOT MEAN GRADIENT: 2 MMHG
ECHO LVOT PEAK GRADIENT: 5 MMHG
ECHO LVOT PEAK VELOCITY: 1.2 M/S
ECHO LVOT SV: 64.4 ML
ECHO LVOT VTI: 18.6 CM
ECHO MV "A" WAVE DURATION: 133.8 MSEC
ECHO MV A VELOCITY: 0.7 M/S
ECHO MV AREA PHT: 3 CM2
ECHO MV AREA VTI: 1.9 CM2
ECHO MV E DECELERATION TIME (DT): 301.4 MS
ECHO MV E VELOCITY: 1.26 M/S
ECHO MV E/A RATIO: 1.8
ECHO MV E/E' LATERAL: 21
ECHO MV E/E' RATIO (AVERAGED): 26.25
ECHO MV E/E' SEPTAL: 31.5
ECHO MV EROA PISA: 0.2 CM2
ECHO MV LVOT VTI INDEX: 1.86
ECHO MV MAX VELOCITY: 1.6 M/S
ECHO MV MEAN GRADIENT: 2 MMHG
ECHO MV MEAN VELOCITY: 0.7 M/S
ECHO MV PEAK GRADIENT: 10 MMHG
ECHO MV PRESSURE HALF TIME (PHT): 74.2 MS
ECHO MV REGURGITANT ALIASING (NYQUIST) VELOCITY: 32 CM/S
ECHO MV REGURGITANT RADIUS PISA: 0.72 CM
ECHO MV REGURGITANT VELOCITY PISA: 4.3 M/S
ECHO MV REGURGITANT VOLUME PISA: 31.16 ML
ECHO MV REGURGITANT VTIA: 128.6 CM
ECHO MV VTI: 34.6 CM
ECHO PULMONARY ARTERY END DIASTOLIC PRESSURE: 10 MMHG
ECHO PV MAX VELOCITY: 1.1 M/S
ECHO PV MEAN GRADIENT: 2 MMHG
ECHO PV MEAN VELOCITY: 0.7 M/S
ECHO PV PEAK GRADIENT: 5 MMHG
ECHO PV REGURGITANT MAX VELOCITY: 1.5 M/S
ECHO PV VTI: 20.8 CM
ECHO PVEIN PEAK D VELOCITY: 1 M/S
ECHO PVEIN PEAK S VELOCITY: 0.4 M/S
ECHO PVEIN S/D RATIO: 0.4
ECHO RIGHT VENTRICULAR SYSTOLIC PRESSURE (RVSP): 60 MMHG
ECHO RV INTERNAL DIMENSION: 3.7 CM
ECHO RV TAPSE: 2.1 CM (ref 1.7–?)
ECHO TV REGURGITANT MAX VELOCITY: 3.6 M/S
ECHO TV REGURGITANT PEAK GRADIENT: 52 MMHG

## 2025-04-16 ENCOUNTER — HOSPITAL ENCOUNTER (EMERGENCY)
Age: 80
Discharge: HOME | End: 2025-04-17
Attending: STUDENT IN AN ORGANIZED HEALTH CARE EDUCATION/TRAINING PROGRAM
Payer: MEDICARE

## 2025-04-16 ENCOUNTER — APPOINTMENT (OUTPATIENT)
Dept: GENERAL RADIOLOGY | Age: 80
End: 2025-04-16
Payer: MEDICARE

## 2025-04-16 VITALS
RESPIRATION RATE: 16 BRPM | OXYGEN SATURATION: 96 % | SYSTOLIC BLOOD PRESSURE: 106 MMHG | HEART RATE: 72 BPM | HEIGHT: 65 IN | TEMPERATURE: 98.1 F | BODY MASS INDEX: 24.99 KG/M2 | DIASTOLIC BLOOD PRESSURE: 62 MMHG | WEIGHT: 150 LBS

## 2025-04-16 DIAGNOSIS — R60.0 PERIPHERAL EDEMA: Primary | ICD-10-CM

## 2025-04-16 LAB
ALBUMIN SERPL-MCNC: 3.9 G/DL (ref 3.5–5.2)
ALP SERPL-CCNC: 80 U/L (ref 40–129)
ALT SERPL-CCNC: 19 U/L (ref 0–40)
ANION GAP SERPL CALCULATED.3IONS-SCNC: 14 MMOL/L (ref 7–16)
AST SERPL-CCNC: 21 U/L (ref 0–39)
BASOPHILS # BLD: 0.02 K/UL (ref 0–0.2)
BASOPHILS NFR BLD: 0 % (ref 0–2)
BILIRUB SERPL-MCNC: 0.5 MG/DL (ref 0–1.2)
BNP SERPL-MCNC: 5748 PG/ML (ref 0–450)
BUN SERPL-MCNC: 27 MG/DL (ref 6–23)
CALCIUM SERPL-MCNC: 9.3 MG/DL (ref 8.6–10.2)
CHLORIDE SERPL-SCNC: 98 MMOL/L (ref 98–107)
CO2 SERPL-SCNC: 22 MMOL/L (ref 22–29)
CREAT SERPL-MCNC: 1.3 MG/DL (ref 0.7–1.2)
EOSINOPHIL # BLD: 0.18 K/UL (ref 0.05–0.5)
EOSINOPHILS RELATIVE PERCENT: 2 % (ref 0–6)
ERYTHROCYTE [DISTWIDTH] IN BLOOD BY AUTOMATED COUNT: 13.4 % (ref 11.5–15)
GFR, ESTIMATED: 58 ML/MIN/1.73M2
GLUCOSE SERPL-MCNC: 115 MG/DL (ref 74–99)
HCT VFR BLD AUTO: 30.9 % (ref 37–54)
HGB BLD-MCNC: 10.4 G/DL (ref 12.5–16.5)
IMM GRANULOCYTES # BLD AUTO: <0.03 K/UL (ref 0–0.58)
IMM GRANULOCYTES NFR BLD: 0 % (ref 0–5)
LYMPHOCYTES NFR BLD: 1.41 K/UL (ref 1.5–4)
LYMPHOCYTES RELATIVE PERCENT: 18 % (ref 20–42)
MCH RBC QN AUTO: 35.5 PG (ref 26–35)
MCHC RBC AUTO-ENTMCNC: 33.7 G/DL (ref 32–34.5)
MCV RBC AUTO: 105.5 FL (ref 80–99.9)
MONOCYTES NFR BLD: 0.77 K/UL (ref 0.1–0.95)
MONOCYTES NFR BLD: 10 % (ref 2–12)
NEUTROPHILS NFR BLD: 70 % (ref 43–80)
NEUTS SEG NFR BLD: 5.54 K/UL (ref 1.8–7.3)
PLATELET # BLD AUTO: 311 K/UL (ref 130–450)
PMV BLD AUTO: 8.8 FL (ref 7–12)
POTASSIUM SERPL-SCNC: 4.5 MMOL/L (ref 3.5–5)
PROT SERPL-MCNC: 7.9 G/DL (ref 6.4–8.3)
RBC # BLD AUTO: 2.93 M/UL (ref 3.8–5.8)
SODIUM SERPL-SCNC: 134 MMOL/L (ref 132–146)
TROPONIN I SERPL HS-MCNC: 24 NG/L (ref 0–22)
TROPONIN I SERPL HS-MCNC: 25 NG/L (ref 0–22)
WBC OTHER # BLD: 7.9 K/UL (ref 4.5–11.5)

## 2025-04-16 PROCEDURE — 99285 EMERGENCY DEPT VISIT HI MDM: CPT

## 2025-04-16 PROCEDURE — 80053 COMPREHEN METABOLIC PANEL: CPT

## 2025-04-16 PROCEDURE — 83880 ASSAY OF NATRIURETIC PEPTIDE: CPT

## 2025-04-16 PROCEDURE — 85025 COMPLETE CBC W/AUTO DIFF WBC: CPT

## 2025-04-16 PROCEDURE — 96374 THER/PROPH/DIAG INJ IV PUSH: CPT

## 2025-04-16 PROCEDURE — 71045 X-RAY EXAM CHEST 1 VIEW: CPT

## 2025-04-16 PROCEDURE — 93005 ELECTROCARDIOGRAM TRACING: CPT | Performed by: STUDENT IN AN ORGANIZED HEALTH CARE EDUCATION/TRAINING PROGRAM

## 2025-04-16 PROCEDURE — 84484 ASSAY OF TROPONIN QUANT: CPT

## 2025-04-16 PROCEDURE — 6360000002 HC RX W HCPCS: Performed by: STUDENT IN AN ORGANIZED HEALTH CARE EDUCATION/TRAINING PROGRAM

## 2025-04-16 RX ORDER — BUMETANIDE 0.25 MG/ML
1 INJECTION, SOLUTION INTRAMUSCULAR; INTRAVENOUS ONCE
Status: COMPLETED | OUTPATIENT
Start: 2025-04-16 | End: 2025-04-16

## 2025-04-16 RX ADMIN — BUMETANIDE 1 MG: 0.25 INJECTION INTRAMUSCULAR; INTRAVENOUS at 22:23

## 2025-04-16 ASSESSMENT — ENCOUNTER SYMPTOMS
COUGH: 0
NAUSEA: 0
PHOTOPHOBIA: 0
DIARRHEA: 0
BACK PAIN: 0
SORE THROAT: 0
SHORTNESS OF BREATH: 1
ABDOMINAL PAIN: 0

## 2025-04-16 ASSESSMENT — PAIN - FUNCTIONAL ASSESSMENT: PAIN_FUNCTIONAL_ASSESSMENT: NONE - DENIES PAIN

## 2025-04-17 ENCOUNTER — HOSPITAL ENCOUNTER (OUTPATIENT)
Dept: ULTRASOUND IMAGING | Age: 80
Discharge: HOME | End: 2025-04-19
Payer: MEDICARE

## 2025-04-17 DIAGNOSIS — N28.9 DISORDER OF KIDNEY AND URETER: ICD-10-CM

## 2025-04-17 LAB
EKG ATRIAL RATE: 72 BPM
EKG P AXIS: 77 DEGREES
EKG P-R INTERVAL: 180 MS
EKG Q-T INTERVAL: 430 MS
EKG QRS DURATION: 116 MS
EKG QTC CALCULATION (BAZETT): 470 MS
EKG R AXIS: 138 DEGREES
EKG T AXIS: -5 DEGREES
EKG VENTRICULAR RATE: 72 BPM

## 2025-04-17 PROCEDURE — 76770 US EXAM ABDO BACK WALL COMP: CPT

## 2025-04-17 PROCEDURE — 93975 VASCULAR STUDY: CPT

## 2025-04-17 PROCEDURE — 93010 ELECTROCARDIOGRAM REPORT: CPT | Performed by: INTERNAL MEDICINE

## 2025-04-17 NOTE — ED PROVIDER NOTES
[facility] EMERGENCY DEPARTMENT  EMERGENCY DEPARTMENT ENCOUNTER        Pt Name: Enrique Gonzalez  MRN: [id number]  Birthdate 1945  Date of evaluation: 4/16/2025  Provider: Kenzie Carrera MD  PCP: Peter Campos Jr., MD  Note Started: 8:27 PM EDT 4/16/25    HPI  80 y.o. male presenting for shortness of breath, leg swelling.  Patient has had leg swelling for the past few weeks off-and-on.  He takes Lasix 3 days a week.  He complains of chest heaviness and having difficulty taking a deep breath started last night.  He states he tried to get through the night to go to the bathroom and felt as though he were going to pass out.  He denies fever.  He is on Eliquis.      --------------------------------------------- PAST HISTORY ---------------------------------------------  Past Medical History:  has a past medical history of Acute on chronic systolic congestive heart failure (HCC), Aortic regurgitation, Arthritis, Atrial fibrillation ([facility]), CAD (coronary artery disease), Cerebral artery occlusion with cerebral infarction ([facility]), Hypercholesteremia, Hypertension, Ischemic cardiomyopathy, Mitral regurgitation, and STEMI (ST elevation myocardial infarction) ([facility]).    Past Surgical History:  has a past surgical history that includes Tonsillectomy; Coronary angioplasty (12/12/2012); ECHO Compl W Dop Color Flow (12/12/2012); Diagnostic Cardiac Cath Lab Procedure (12/12/2012); Coronary artery bypass graft (12/13/2012); CT BIOPSY BONE MARROW (12/21/2022); Cardiac defibrillator placement (Left, 04/14/2023); Hip fracture surgery (Right, 05/02/2023); Cardiac catheterization (10/09/2023); Cardiac procedure (N/A, 3/5/2025); and Cardiac procedure (N/A, 3/5/2025).    Social History:  reports that he has been smoking pipe. He has been exposed to tobacco smoke. He has never used smokeless tobacco. He reports that he does not drink alcohol and does not use drugs.    Family History: family history is not  visit on 04/16/25.    RADIOLOGY:  No orders to display         ------------------------- NURSING NOTES AND VITALS REVIEWED ---------------------------  Date / Time Roomed:  4/16/2025  7:45 PM  ED Bed Assignment:  13/13    The nursing notes within the ED encounter and vital signs as below have been reviewed.     Patient Vitals for the past 24 hrs:   BP Temp Temp src Pulse Resp SpO2 Height Weight   04/16/25 1949 116/67 98.1 °F (36.7 °C) Oral 73 16 96 % 1.651 m (5' 5\") 68 kg (150 lb)       Oxygen Saturation Interpretation: Normal      Medical Decision Making  80-year-old male with history of CHF status post recent MitraClip placement presenting for leg swelling, shortness of breath, decreased urination.  Patient with bilateral lower extremity edema on exam.  No signs of cellulitis and compartment soft and compressible.  Labs showed elevated BNP, but otherwise, unremarkable.  Chest x-ray unremarkable.  Patient given IV Bumex in the ED and endorsed relief of chest discomfort.  He ambulated in the ED without hypoxia.  He is stable for discharge with outpatient follow-up.  He is agreeable to plan.  Patient discharged home in stable condition with instructions to follow-up with PCP and his cardiologist and to return to the ED if symptoms worsen.    Amount and/or Complexity of Data Reviewed  External Data Reviewed: notes.     Details: Progress note by MARIO Farr with cardiovascular disease,/1/25-patient following up for MitraClip  Labs: ordered. Decision-making details documented in ED Course.     Details: All labs interpreted by me  Radiology: ordered and independent interpretation performed.     Details: Chest x ray-cardiomegaly appreciated, no focal consolidation or pneumothorax  ECG/medicine tests: ordered and independent interpretation performed.     Details: Rate 72, paced rhythm, no STEMI, within the limitation of artifact, stable compared to most recent EKG    Risk  Prescription drug management.        Is

## 2025-04-17 NOTE — ED NOTES
Patient ambulated around unit with this RN. Patient's SpO2 remained between 95-96% on room air. No complaints of dizziness or SOB.

## 2025-05-06 ENCOUNTER — TELEPHONE (OUTPATIENT)
Dept: CARDIOLOGY CLINIC | Age: 80
End: 2025-05-06

## 2025-05-06 NOTE — TELEPHONE ENCOUNTER
Called [employer] to verify eligibility.  Per Titus pt has active coverage from 3/1/2011 to present.  Ref# for call is Titus 5/6/25

## 2025-05-08 PROCEDURE — 93295 DEV INTERROG REMOTE 1/2/MLT: CPT | Performed by: STUDENT IN AN ORGANIZED HEALTH CARE EDUCATION/TRAINING PROGRAM

## 2025-05-08 PROCEDURE — 93297 REM INTERROG DEV EVAL ICPMS: CPT | Performed by: STUDENT IN AN ORGANIZED HEALTH CARE EDUCATION/TRAINING PROGRAM

## 2025-05-08 PROCEDURE — 93296 REM INTERROG EVL PM/IDS: CPT | Performed by: STUDENT IN AN ORGANIZED HEALTH CARE EDUCATION/TRAINING PROGRAM

## 2025-05-13 ENCOUNTER — OFFICE VISIT (OUTPATIENT)
Dept: NON INVASIVE DIAGNOSTICS | Age: 80
End: 2025-05-13
Payer: MEDICARE

## 2025-05-13 ENCOUNTER — CLINICAL SUPPORT (OUTPATIENT)
Dept: NON INVASIVE DIAGNOSTICS | Age: 80
End: 2025-05-13

## 2025-05-13 VITALS
DIASTOLIC BLOOD PRESSURE: 64 MMHG | SYSTOLIC BLOOD PRESSURE: 118 MMHG | HEIGHT: 66 IN | RESPIRATION RATE: 16 BRPM | WEIGHT: 150 LBS | BODY MASS INDEX: 24.11 KG/M2 | TEMPERATURE: 97.1 F | OXYGEN SATURATION: 98 % | HEART RATE: 67 BPM

## 2025-05-13 DIAGNOSIS — I48.91 ATRIAL FIBRILLATION, UNSPECIFIED TYPE (HCC): Primary | ICD-10-CM

## 2025-05-13 DIAGNOSIS — Z95.810 CARDIAC RESYNCHRONIZATION THERAPY DEFIBRILLATOR (CRT-D) IN PLACE: Primary | ICD-10-CM

## 2025-05-13 PROCEDURE — 4004F PT TOBACCO SCREEN RCVD TLK: CPT | Performed by: STUDENT IN AN ORGANIZED HEALTH CARE EDUCATION/TRAINING PROGRAM

## 2025-05-13 PROCEDURE — 93000 ELECTROCARDIOGRAM COMPLETE: CPT | Performed by: STUDENT IN AN ORGANIZED HEALTH CARE EDUCATION/TRAINING PROGRAM

## 2025-05-13 PROCEDURE — G8420 CALC BMI NORM PARAMETERS: HCPCS | Performed by: STUDENT IN AN ORGANIZED HEALTH CARE EDUCATION/TRAINING PROGRAM

## 2025-05-13 PROCEDURE — 1159F MED LIST DOCD IN RCRD: CPT | Performed by: STUDENT IN AN ORGANIZED HEALTH CARE EDUCATION/TRAINING PROGRAM

## 2025-05-13 PROCEDURE — 3078F DIAST BP <80 MM HG: CPT | Performed by: STUDENT IN AN ORGANIZED HEALTH CARE EDUCATION/TRAINING PROGRAM

## 2025-05-13 PROCEDURE — G8427 DOCREV CUR MEDS BY ELIG CLIN: HCPCS | Performed by: STUDENT IN AN ORGANIZED HEALTH CARE EDUCATION/TRAINING PROGRAM

## 2025-05-13 PROCEDURE — 1123F ACP DISCUSS/DSCN MKR DOCD: CPT | Performed by: STUDENT IN AN ORGANIZED HEALTH CARE EDUCATION/TRAINING PROGRAM

## 2025-05-13 PROCEDURE — 3074F SYST BP LT 130 MM HG: CPT | Performed by: STUDENT IN AN ORGANIZED HEALTH CARE EDUCATION/TRAINING PROGRAM

## 2025-05-13 PROCEDURE — 99215 OFFICE O/P EST HI 40 MIN: CPT | Performed by: STUDENT IN AN ORGANIZED HEALTH CARE EDUCATION/TRAINING PROGRAM

## 2025-05-13 NOTE — PATIENT INSTRUCTIONS
No medication changes at this time.  Continue remote monitoring of ICD every 91 days.  Please contact your Nephrologist and Cardiologist for further recommendations related to diuretic therapy.  Follow-up with his office in 1 year.

## 2025-05-13 NOTE — PROGRESS NOTES
[facility] CARDIOLOGY  CARDIAC ELECTROPHYSIOLOGY DEPARTMENT/DIVISION OF CARDIOLOGY  Outpatient Progress Report  PATIENT: Enrique Gonzalez  MEDICAL RECORD NUMBER: [id number]  DATE OF SERVICE:  5/13/2025  ATTENDING ELECTROPHYSIOLOGIST:  Noble Ponce D.O.  REFERRING PHYSICIAN:  Peter Campos Jr., MD  CHIEF COMPLAINT: syncope    HPI: Enrique Gonzalez is a 80 y.o. male with a history of NYHA class II HFrEF-ischemic sp CABG x4 (12/13/12: LIMA-LAD, EL-D1, SVG-PLB, SVG-OM - Dr Bruno) and Medtronic CRT-D (DOI: 4/14/2023-Dr. Ponce), severe secondary MR sp MitraClip (12/20/2023, HTN, CVA (2020), and noncompliance. He is managed by Dr Moreau with apixaban 5 mg BID, furosemide 20 mg QOD, metolazone 2.5 mg PRN, metoprolol XL 50 mg daily, spironolactone 25 mg twice daily, had valsartan 20 mg daily. In 12/2012, patient was diagnosed with multivessel CAD, which was treated with PTCA of LAD, then CABG x 4. Since that time, his LVEF has remained </= 35% despite > 3 months of GDMT. 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 In 2013, he was evaluated by EP (Dr Celis), who recommend primary prevention ICD, but patient declined. In 2022, he was noted to have moderate-severe MR, as well as IVCD (~150 msec) and 1* AV block. In 1/2023, an event monitor reported frequent brief episodes of SVT, as well as episodes of NSVT. In 4/2023, Medtronic CRT-D implant with the LV lead placed in a lateral branch of the posterolateral CS branch.  Later that month, he had sustained VT at 182 bpm (below tachy therapy zone).  His device programming was adjusted and amiodarone initiated.  In 5/2023, he had a fall resulting in hip fracture requiring ORIF.  In 10/2023, he was noted to have moderate MR.  He was evaluated by structural heart team.  [facility] reported progression of CAD (occluded SVG-PDA).  JINA reported moderate MR.  Around this time, patient reportedly stopped taking amiodarone on his own due to intolerance (fatigue). In

## 2025-05-17 ENCOUNTER — APPOINTMENT (OUTPATIENT)
Dept: GENERAL RADIOLOGY | Age: 80
End: 2025-05-17
Payer: MEDICARE

## 2025-05-17 ENCOUNTER — HOSPITAL ENCOUNTER (EMERGENCY)
Age: 80
Discharge: HOME | End: 2025-05-17
Attending: STUDENT IN AN ORGANIZED HEALTH CARE EDUCATION/TRAINING PROGRAM
Payer: MEDICARE

## 2025-05-17 VITALS
TEMPERATURE: 97.4 F | HEART RATE: 82 BPM | DIASTOLIC BLOOD PRESSURE: 74 MMHG | RESPIRATION RATE: 20 BRPM | SYSTOLIC BLOOD PRESSURE: 110 MMHG | OXYGEN SATURATION: 94 %

## 2025-05-17 DIAGNOSIS — M79.89 LEG SWELLING: Primary | ICD-10-CM

## 2025-05-17 DIAGNOSIS — Z86.79 HISTORY OF CHF (CONGESTIVE HEART FAILURE): ICD-10-CM

## 2025-05-17 LAB
ALBUMIN SERPL-MCNC: 4 G/DL (ref 3.5–5.2)
ALP SERPL-CCNC: 77 U/L (ref 40–129)
ALT SERPL-CCNC: 23 U/L (ref 0–40)
ANION GAP SERPL CALCULATED.3IONS-SCNC: 11 MMOL/L (ref 7–16)
AST SERPL-CCNC: 30 U/L (ref 0–39)
BASOPHILS # BLD: 0.02 K/UL (ref 0–0.2)
BASOPHILS NFR BLD: 0 % (ref 0–2)
BILIRUB SERPL-MCNC: 0.5 MG/DL (ref 0–1.2)
BNP SERPL-MCNC: 2577 PG/ML (ref 0–450)
BUN SERPL-MCNC: 33 MG/DL (ref 6–23)
CALCIUM SERPL-MCNC: 9.6 MG/DL (ref 8.6–10.2)
CHLORIDE SERPL-SCNC: 95 MMOL/L (ref 98–107)
CO2 SERPL-SCNC: 25 MMOL/L (ref 22–29)
CREAT SERPL-MCNC: 1.3 MG/DL (ref 0.7–1.2)
EOSINOPHIL # BLD: 0.19 K/UL (ref 0.05–0.5)
EOSINOPHILS RELATIVE PERCENT: 3 % (ref 0–6)
ERYTHROCYTE [DISTWIDTH] IN BLOOD BY AUTOMATED COUNT: 14.4 % (ref 11.5–15)
GFR, ESTIMATED: 57 ML/MIN/1.73M2
GLUCOSE SERPL-MCNC: 102 MG/DL (ref 74–99)
HCT VFR BLD AUTO: 34.5 % (ref 37–54)
HGB BLD-MCNC: 12 G/DL (ref 12.5–16.5)
IMM GRANULOCYTES # BLD AUTO: <0.03 K/UL (ref 0–0.58)
IMM GRANULOCYTES NFR BLD: 0 % (ref 0–5)
LYMPHOCYTES NFR BLD: 1.49 K/UL (ref 1.5–4)
LYMPHOCYTES RELATIVE PERCENT: 21 % (ref 20–42)
MCH RBC QN AUTO: 35.4 PG (ref 26–35)
MCHC RBC AUTO-ENTMCNC: 34.8 G/DL (ref 32–34.5)
MCV RBC AUTO: 101.8 FL (ref 80–99.9)
MONOCYTES NFR BLD: 0.7 K/UL (ref 0.1–0.95)
MONOCYTES NFR BLD: 10 % (ref 2–12)
NEUTROPHILS NFR BLD: 66 % (ref 43–80)
NEUTS SEG NFR BLD: 4.78 K/UL (ref 1.8–7.3)
PLATELET # BLD AUTO: 249 K/UL (ref 130–450)
PMV BLD AUTO: 8.8 FL (ref 7–12)
POTASSIUM SERPL-SCNC: 4.9 MMOL/L (ref 3.5–5)
PROT SERPL-MCNC: 8.4 G/DL (ref 6.4–8.3)
RBC # BLD AUTO: 3.39 M/UL (ref 3.8–5.8)
SODIUM SERPL-SCNC: 131 MMOL/L (ref 132–146)
TROPONIN I SERPL HS-MCNC: 21 NG/L (ref 0–22)
TROPONIN I SERPL HS-MCNC: 24 NG/L (ref 0–22)
WBC OTHER # BLD: 7.2 K/UL (ref 4.5–11.5)

## 2025-05-17 PROCEDURE — 83880 ASSAY OF NATRIURETIC PEPTIDE: CPT

## 2025-05-17 PROCEDURE — 71045 X-RAY EXAM CHEST 1 VIEW: CPT

## 2025-05-17 PROCEDURE — 80053 COMPREHEN METABOLIC PANEL: CPT

## 2025-05-17 PROCEDURE — 84484 ASSAY OF TROPONIN QUANT: CPT

## 2025-05-17 PROCEDURE — 85025 COMPLETE CBC W/AUTO DIFF WBC: CPT

## 2025-05-17 PROCEDURE — 6360000002 HC RX W HCPCS: Performed by: STUDENT IN AN ORGANIZED HEALTH CARE EDUCATION/TRAINING PROGRAM

## 2025-05-17 PROCEDURE — 96374 THER/PROPH/DIAG INJ IV PUSH: CPT

## 2025-05-17 PROCEDURE — 99284 EMERGENCY DEPT VISIT MOD MDM: CPT

## 2025-05-17 RX ORDER — METOLAZONE 2.5 MG/1
2.5 TABLET ORAL PRN
COMMUNITY

## 2025-05-17 RX ORDER — BUMETANIDE 0.25 MG/ML
1 INJECTION, SOLUTION INTRAMUSCULAR; INTRAVENOUS ONCE
Status: COMPLETED | OUTPATIENT
Start: 2025-05-17 | End: 2025-05-17

## 2025-05-17 RX ADMIN — BUMETANIDE 1 MG: 0.25 INJECTION, SOLUTION INTRAMUSCULAR; INTRAVENOUS at 14:35

## 2025-05-17 ASSESSMENT — PAIN - FUNCTIONAL ASSESSMENT
PAIN_FUNCTIONAL_ASSESSMENT: NONE - DENIES PAIN
PAIN_FUNCTIONAL_ASSESSMENT: NONE - DENIES PAIN

## 2025-05-17 ASSESSMENT — ENCOUNTER SYMPTOMS
SINUS PRESSURE: 0
SHORTNESS OF BREATH: 1
EYE REDNESS: 0
VOMITING: 0
DIARRHEA: 0
WHEEZING: 0
EYE DISCHARGE: 0
BACK PAIN: 0
COUGH: 0
ABDOMINAL PAIN: 0
EYE PAIN: 0
NAUSEA: 0
SORE THROAT: 0

## 2025-05-17 NOTE — DISCHARGE INSTRUCTIONS
XR CHEST PORTABLE   Final Result   No acute process.      Stable cardiomegaly.           Follow-up family physician.  If symptoms worsen or concern rises please return for reevaluation.

## 2025-05-17 NOTE — ED NOTES
Patient ambulated with pulse oximetry. SpO2 100 with HR of 66. SpO2 did decrease to 94% briefly  but recovered quickly back to 97%. HR increased to 82 during ambulation. Patient had no complaints during ambulation but stated if he had to walk much further he would be struggling.

## 2025-05-17 NOTE — ED PROVIDER NOTES
Department of Emergency Medicine   ED  Provider Note  Admit Date/RoomTime: 5/17/2025 11:45 AM  ED Room: [facility]     Enrique Gonzalez is a 80 y.o. male with a PMHx significant for  HLD, CAD, cardiomyopathy, CHF, proximal A-fib, CKD  who presents for evaluation of leg swelling, chest pressure, beginning prior to arrival. The complaint has been persistent, moderate in severity, and worsened by nothing.   The patient states that he has longstanding history of cardiac issues, follows with Dr. Moreau as well as he sees electrophysiology.  Has history of CHF.  She is on Lasix.  Notes that he recent has been having kidney issues that have been weaning him off of his Eliquis.  Notes about 1 month prior to arrival he had to be seen at [place] for similar symptoms, was sent to be volume overloaded and was given Bumex.  States that that helped improve things.  States that since then he has been feeling more short of breath and tired.      Review of Systems   Constitutional:  Negative for chills and fever.   HENT:  Negative for ear pain, sinus pressure and sore throat.    Eyes:  Negative for pain, discharge and redness.   Respiratory:  Positive for shortness of breath. Negative for cough and wheezing.    Cardiovascular:  Positive for leg swelling. Negative for chest pain.   Gastrointestinal:  Negative for abdominal pain, diarrhea, nausea and vomiting.   Genitourinary:  Negative for dysuria and frequency.   Musculoskeletal:  Negative for arthralgias and back pain.   Skin:  Negative for rash and wound.   Neurological:  Negative for weakness and headaches.   Hematological:  Negative for adenopathy.   All other systems reviewed and are negative.       Physical Exam  Vitals and nursing note reviewed.   Constitutional:       General: He is not in acute distress.     Appearance: Normal appearance. He is well-developed. He is not ill-appearing.   HENT:      Head: Normocephalic and atraumatic.      Right Ear:

## 2025-05-29 ENCOUNTER — TELEPHONE (OUTPATIENT)
Dept: CARDIOLOGY CLINIC | Age: 80
End: 2025-05-29

## 2025-05-29 ENCOUNTER — OFFICE VISIT (OUTPATIENT)
Dept: CARDIOLOGY CLINIC | Age: 80
End: 2025-05-29
Payer: MEDICARE

## 2025-05-29 VITALS
TEMPERATURE: 97.1 F | OXYGEN SATURATION: 96 % | RESPIRATION RATE: 18 BRPM | BODY MASS INDEX: 25.97 KG/M2 | DIASTOLIC BLOOD PRESSURE: 64 MMHG | HEIGHT: 66 IN | WEIGHT: 161.6 LBS | HEART RATE: 63 BPM | SYSTOLIC BLOOD PRESSURE: 116 MMHG

## 2025-05-29 DIAGNOSIS — I25.5 ISCHEMIC CARDIOMYOPATHY: ICD-10-CM

## 2025-05-29 DIAGNOSIS — I25.10 CORONARY ARTERY DISEASE DUE TO LIPID RICH PLAQUE: ICD-10-CM

## 2025-05-29 DIAGNOSIS — I25.83 CORONARY ARTERY DISEASE DUE TO LIPID RICH PLAQUE: ICD-10-CM

## 2025-05-29 DIAGNOSIS — I34.0 NONRHEUMATIC MITRAL VALVE REGURGITATION: Primary | ICD-10-CM

## 2025-05-29 PROCEDURE — 4004F PT TOBACCO SCREEN RCVD TLK: CPT | Performed by: INTERNAL MEDICINE

## 2025-05-29 PROCEDURE — 93000 ELECTROCARDIOGRAM COMPLETE: CPT | Performed by: INTERNAL MEDICINE

## 2025-05-29 PROCEDURE — G8417 CALC BMI ABV UP PARAM F/U: HCPCS | Performed by: INTERNAL MEDICINE

## 2025-05-29 PROCEDURE — G2211 COMPLEX E/M VISIT ADD ON: HCPCS | Performed by: INTERNAL MEDICINE

## 2025-05-29 PROCEDURE — G8427 DOCREV CUR MEDS BY ELIG CLIN: HCPCS | Performed by: INTERNAL MEDICINE

## 2025-05-29 PROCEDURE — 99214 OFFICE O/P EST MOD 30 MIN: CPT | Performed by: INTERNAL MEDICINE

## 2025-05-29 PROCEDURE — 3078F DIAST BP <80 MM HG: CPT | Performed by: INTERNAL MEDICINE

## 2025-05-29 PROCEDURE — 3074F SYST BP LT 130 MM HG: CPT | Performed by: INTERNAL MEDICINE

## 2025-05-29 PROCEDURE — 1123F ACP DISCUSS/DSCN MKR DOCD: CPT | Performed by: INTERNAL MEDICINE

## 2025-05-29 PROCEDURE — 1159F MED LIST DOCD IN RCRD: CPT | Performed by: INTERNAL MEDICINE

## 2025-05-29 RX ORDER — ASPIRIN 81 MG/1
81 TABLET, CHEWABLE ORAL DAILY
COMMUNITY

## 2025-05-29 NOTE — PROGRESS NOTES
Enrique Gonzalez  1945  Date of Service: 5/29/2025    Patient Active Problem List    Diagnosis Date Noted    Status post implantation of mitral valve leaflet clip 03/05/2025    Severe mitral regurgitation by prior echocardiogram 02/21/2025    Acute congestive heart failure, unspecified heart failure type ([facility]) 02/05/2025    Paroxysmal atrial fibrillation ([facility]) 02/05/2025    Ventricular tachycardia ([facility]) 02/05/2025    Stage 3a chronic kidney disease ([facility]) 02/05/2025    History of cerebrovascular accident 02/05/2025    Fluid overload 01/09/2025    Acute on chronic congestive heart failure ([facility]) 10/07/2023    Mitral valve disease 10/07/2023    Acute on chronic systolic (congestive) heart failure ([facility]) 10/06/2023    Closed right hip fracture, initial encounter ([facility]) 05/02/2023    Falls, initial encounter 05/01/2023    Cardiac resynchronization therapy defibrillator (CRT-D) in place 04/14/2023    Noncompliance 07/05/2020    TIA (transient ischemic attack) 07/05/2020    Acute CVA (cerebrovascular accident) ([facility]) 07/01/2020    NICM (nonischemic cardiomyopathy) ([facility]) 04/29/2019     Overview Note:     Echo 10-18  EF 25%    He refuses an ICD      Acute respiratory failure ([facility]) 10/18/2018    Hyperlipidemia LDL goal <100 10/18/2018    Primary hypertension 10/18/2018    Pure hypercholesterolemia     Nonrheumatic mitral valve regurgitation      Overview Note:     Severe  Percutaneous mitral valve repair with JINA are (MitraClip) 3/5/2025      Ischemic cardiomyopathy      Overview Note:     EF 35 - 40%    Seen by Dr. Celis.  Defers ICD.      Aortic regurgitation      Overview Note:     trace      CAD (coronary artery disease) 12/12/2012     Overview Note:     12-13-12 anterior STEMI  POBO LAD  CABG [place]-LAD   Free EL-diag   SVG-PLB   SVG-OM    Cardiac catheterization 10/9/2023 shows the LIMA to the LAD, SVG to diagonal, and SVG to OM 2 to be patent.  The SVG to the PLB is occluded but goes to a small vessel.

## 2025-05-29 NOTE — TELEPHONE ENCOUNTER
Per Dr. Moreau, patient needs F/U with Dr. Porter re: MR. Spoke with Charlene Molina PA-C.  She will get patient scheduled for F/U.   none

## 2025-06-04 ENCOUNTER — RESULTS FOLLOW-UP (OUTPATIENT)
Age: 80
End: 2025-06-04

## 2025-06-04 ENCOUNTER — HOSPITAL ENCOUNTER (OUTPATIENT)
Dept: OTHER | Age: 80
Setting detail: THERAPIES SERIES
Discharge: HOME | End: 2025-06-04
Payer: MEDICARE

## 2025-06-04 ENCOUNTER — TELEPHONE (OUTPATIENT)
Dept: OTHER | Age: 80
End: 2025-06-04

## 2025-06-04 VITALS
WEIGHT: 163 LBS | OXYGEN SATURATION: 98 % | SYSTOLIC BLOOD PRESSURE: 112 MMHG | DIASTOLIC BLOOD PRESSURE: 65 MMHG | RESPIRATION RATE: 18 BRPM | BODY MASS INDEX: 26.31 KG/M2 | HEART RATE: 72 BPM

## 2025-06-04 LAB
ANION GAP SERPL CALCULATED.3IONS-SCNC: 12 MMOL/L (ref 7–16)
BNP SERPL-MCNC: 2745 PG/ML (ref 0–450)
BUN SERPL-MCNC: 37 MG/DL (ref 6–23)
CALCIUM SERPL-MCNC: 9 MG/DL (ref 8.6–10.2)
CHLORIDE SERPL-SCNC: 94 MMOL/L (ref 98–107)
CO2 SERPL-SCNC: 23 MMOL/L (ref 22–29)
CREAT SERPL-MCNC: 1.2 MG/DL (ref 0.7–1.2)
GFR, ESTIMATED: 61 ML/MIN/1.73M2
GLUCOSE SERPL-MCNC: 94 MG/DL (ref 74–99)
POTASSIUM SERPL-SCNC: 4.6 MMOL/L (ref 3.5–5)
SODIUM SERPL-SCNC: 129 MMOL/L (ref 132–146)

## 2025-06-04 PROCEDURE — 96374 THER/PROPH/DIAG INJ IV PUSH: CPT

## 2025-06-04 PROCEDURE — 80048 BASIC METABOLIC PNL TOTAL CA: CPT

## 2025-06-04 PROCEDURE — 83880 ASSAY OF NATRIURETIC PEPTIDE: CPT

## 2025-06-04 PROCEDURE — 99214 OFFICE O/P EST MOD 30 MIN: CPT

## 2025-06-04 PROCEDURE — 6360000002 HC RX W HCPCS

## 2025-06-04 PROCEDURE — 2500000003 HC RX 250 WO HCPCS: Performed by: INTERNAL MEDICINE

## 2025-06-04 PROCEDURE — 36415 COLL VENOUS BLD VENIPUNCTURE: CPT

## 2025-06-04 RX ORDER — SPIRONOLACTONE 25 MG/1
25 TABLET ORAL DAILY
Qty: 90 TABLET | Refills: 3 | Status: ON HOLD | OUTPATIENT
Start: 2025-06-04 | End: 2025-07-13 | Stop reason: HOSPADM

## 2025-06-04 RX ORDER — FUROSEMIDE 10 MG/ML
40 INJECTION INTRAMUSCULAR; INTRAVENOUS ONCE
Status: COMPLETED | OUTPATIENT
Start: 2025-06-04 | End: 2025-06-04

## 2025-06-04 RX ORDER — FUROSEMIDE 10 MG/ML
INJECTION INTRAMUSCULAR; INTRAVENOUS
Status: COMPLETED
Start: 2025-06-04 | End: 2025-06-04

## 2025-06-04 RX ORDER — FUROSEMIDE 20 MG/1
20 TABLET ORAL DAILY
Qty: 45 TABLET | Refills: 3 | Status: SHIPPED | OUTPATIENT
Start: 2025-06-04 | End: 2025-06-19 | Stop reason: SDUPTHER

## 2025-06-04 RX ORDER — SODIUM CHLORIDE 0.9 % (FLUSH) 0.9 %
10 SYRINGE (ML) INJECTION ONCE
Status: COMPLETED | OUTPATIENT
Start: 2025-06-04 | End: 2025-06-04

## 2025-06-04 RX ADMIN — FUROSEMIDE 40 MG: 10 INJECTION, SOLUTION INTRAMUSCULAR; INTRAVENOUS at 12:48

## 2025-06-04 RX ADMIN — FUROSEMIDE 40 MG: 10 INJECTION INTRAMUSCULAR; INTRAVENOUS at 12:48

## 2025-06-04 RX ADMIN — SODIUM CHLORIDE, PRESERVATIVE FREE 10 ML: 5 INJECTION INTRAVENOUS at 12:50

## 2025-06-04 NOTE — PROGRESS NOTES
Congestive Heart Failure Clinic   [facility]      Referring Provider: Dr Moreau  Primary Care Physician: Peter Campos Jr., MD   Cardiologist: Dr Moreau  Nephrologist: N/A      HISTORY OF PRESENT ILLNESS:     Enrique Gonzalez is a 80 y.o. (1945) male with a history of HFrEF (EF < 40%), most recent EF:  Lab Results   Component Value Date    LVEF 20 04/01/2025    LVEFMODE Echo 04/01/2025       He presents to the CHF clinic for ongoing evaluation and monitoring of heart failure.    In the CHF clinic today he denies any adverse symptoms except:  [] Dizziness or lightheadedness   [] Syncope or near syncope  [] Recent Fall  [] Shortness of breath at rest   [x] Dyspnea with exertion  [] Decline in functional capacity (unable to perform activities they had previously been able to do)  [] Fatigue   [] Orthopnea  [] PND  [] Nocturnal cough  [] Hemoptysis  [] Chest pain, pressure, or discomfort  [] Palpitations  [] Abdominal distention  [] Abdominal bloating  [] Early satiety  [] Blood in stool   [] Diarrhea  [] Constipation  [] Nausea/Vomiting  [] Decreased urinary response to oral diuretic   [] Scrotal swelling   [x] Lower extremity edema   [] Used PRN doses of oral diuretic   [] Weight gain    Wt Readings from Last 3 Encounters:   06/04/25 73.9 kg (163 lb)   05/29/25 73.3 kg (161 lb 9.6 oz)   05/13/25 68 kg (150 lb)       SOCIAL HISTORY:  [] Denies tobacco, alcohol or illicit drug abuse  [x] Tobacco use:pipe  [] ETOH use:  [] Illicit drug use:        MEDICATIONS:    Allergies   Allergen Reactions    Deltasone [Prednisone] Nausea Only     Prior to Visit Medications    Medication Sig Taking? Authorizing Provider   aspirin 81 MG chewable tablet Take 1 tablet by mouth in the morning and at bedtime Yes Provider, MD Tessie   metOLazone (ZAROXOLYN) 2.5 MG tablet Take 1 tablet by mouth as needed Takes as needed with Lasix. Yes Provider, MD Tessie   valsartan

## 2025-06-04 NOTE — TELEPHONE ENCOUNTER
Spoke to patient regarding medication instructions Per SKYLER Guaman     Decrease spironolactone to 25 mg daily   Take lasix 20 mg daily   Follow up in CHF clinic in 7 days as scheduled     Per patient he understands these instructions.

## 2025-06-11 ENCOUNTER — HOSPITAL ENCOUNTER (OUTPATIENT)
Dept: OTHER | Age: 80
Setting detail: THERAPIES SERIES
Discharge: HOME | End: 2025-06-11
Payer: MEDICARE

## 2025-06-11 ENCOUNTER — RESULTS FOLLOW-UP (OUTPATIENT)
Age: 80
End: 2025-06-11

## 2025-06-11 ENCOUNTER — TELEPHONE (OUTPATIENT)
Dept: OTHER | Age: 80
End: 2025-06-11

## 2025-06-11 VITALS
OXYGEN SATURATION: 97 % | RESPIRATION RATE: 18 BRPM | BODY MASS INDEX: 26.31 KG/M2 | SYSTOLIC BLOOD PRESSURE: 108 MMHG | DIASTOLIC BLOOD PRESSURE: 71 MMHG | WEIGHT: 163 LBS | HEART RATE: 69 BPM

## 2025-06-11 DIAGNOSIS — I50.22 CHRONIC HFREF (HEART FAILURE WITH REDUCED EJECTION FRACTION) (HCC): Primary | ICD-10-CM

## 2025-06-11 DIAGNOSIS — E87.1 HYPONATREMIA: ICD-10-CM

## 2025-06-11 LAB
ANION GAP SERPL CALCULATED.3IONS-SCNC: 14 MMOL/L (ref 7–16)
BNP SERPL-MCNC: 2961 PG/ML (ref 0–450)
BUN SERPL-MCNC: 37 MG/DL (ref 6–23)
CALCIUM SERPL-MCNC: 9.2 MG/DL (ref 8.6–10.2)
CHLORIDE SERPL-SCNC: 96 MMOL/L (ref 98–107)
CO2 SERPL-SCNC: 19 MMOL/L (ref 22–29)
CREAT SERPL-MCNC: 1.2 MG/DL (ref 0.7–1.2)
GFR, ESTIMATED: 65 ML/MIN/1.73M2
GLUCOSE SERPL-MCNC: 108 MG/DL (ref 74–99)
POTASSIUM SERPL-SCNC: 4.7 MMOL/L (ref 3.5–5)
SODIUM SERPL-SCNC: 129 MMOL/L (ref 132–146)

## 2025-06-11 PROCEDURE — 99214 OFFICE O/P EST MOD 30 MIN: CPT

## 2025-06-11 PROCEDURE — 6360000002 HC RX W HCPCS: Performed by: INTERNAL MEDICINE

## 2025-06-11 PROCEDURE — 96374 THER/PROPH/DIAG INJ IV PUSH: CPT

## 2025-06-11 PROCEDURE — 2500000003 HC RX 250 WO HCPCS: Performed by: INTERNAL MEDICINE

## 2025-06-11 PROCEDURE — 36415 COLL VENOUS BLD VENIPUNCTURE: CPT

## 2025-06-11 PROCEDURE — 80048 BASIC METABOLIC PNL TOTAL CA: CPT

## 2025-06-11 PROCEDURE — 83880 ASSAY OF NATRIURETIC PEPTIDE: CPT

## 2025-06-11 RX ORDER — FUROSEMIDE 10 MG/ML
40 INJECTION INTRAMUSCULAR; INTRAVENOUS ONCE
Status: COMPLETED | OUTPATIENT
Start: 2025-06-11 | End: 2025-06-11

## 2025-06-11 RX ORDER — FUROSEMIDE 10 MG/ML
INJECTION INTRAMUSCULAR; INTRAVENOUS
Status: DISPENSED
Start: 2025-06-11 | End: 2025-06-11

## 2025-06-11 RX ORDER — SODIUM CHLORIDE 0.9 % (FLUSH) 0.9 %
10 SYRINGE (ML) INJECTION ONCE
Status: COMPLETED | OUTPATIENT
Start: 2025-06-11 | End: 2025-06-11

## 2025-06-11 RX ADMIN — FUROSEMIDE 40 MG: 10 INJECTION, SOLUTION INTRAMUSCULAR; INTRAVENOUS at 08:31

## 2025-06-11 RX ADMIN — SODIUM CHLORIDE, PRESERVATIVE FREE 10 ML: 5 INJECTION INTRAVENOUS at 08:31

## 2025-06-11 NOTE — TELEPHONE ENCOUNTER
Called patient 3x and was unable to leave VM to give medication instructions per MARIO Allen.     1.  Recommend taking Lasix 20 mg daily as prescribed at visit on 6/4/2025  2.  Check BMP in 1 week to avoid reevaluate renal function electrolytes after taking Lasix 20 mg daily  3.  Can you please try to get patient in for follow-up in the next 2 weeks instead of 7/1/2025.

## 2025-06-11 NOTE — RESULT ENCOUNTER NOTE
Labs and CHF Clinic note reviewed    HFrEF, LVEF 20 to 25% per TTE 4/1/2025.  Has CRT-D.    Hypervolemic on exam in clinic today with crackles and lower extremity edema on exam.  Weight 163 pounds today which is the same weight he was at last visit on 6/4/2025.  Treated with IV Lasix.    Received IV Lasix at last visit on 6/4/2025 as well.  Following last visit, patient's Lasix was increased to 20 mg daily from every other day and his spironolactone was decreased to 25 mg daily from twice daily    Wt Readings from Last 3 Encounters:  06/11/25 : 73.9 kg (163 lb)  06/04/25 : 73.9 kg (163 lb)  05/29/25 : 73.3 kg (161 lb 9.6 oz)    BP Readings from Last 1 Encounters:  06/11/25 : 108/71    Pulse Readings from Last 1 Encounters:  06/11/25 : 69      Current GDMT:  Lasix 20 mg daily--increased from every other day on 6/4/2025.  At visit today, patient reports going back to every other day after 4 days of taking daily  Aldactone 25 mg p.o. daily  Diovan 40 mg tablet--half tablet p.o. daily  Toprol-XL 50 mg p.o. daily    Was on Jardiance in the past but this was later discontinued as patient reportedly declined continuing  Entresto previously discontinued due to hypotension and patient switched to valsartan    NT proBNP 2961 on 6/11/2025 compared to 2745 on 6/4/2025 and 2577 on 5/17/2025 and 5748 on 4/16/2025  BMP shows hyponatremia with sodium of 129 which is stable from visit on 6/4/2025.  Renal function stable with creatinine 1.2, BUN of 37 and GFR of 65.      1.  Recommend taking Lasix 20 mg daily as prescribed at visit on 6/4/2025  2.  Check BMP in 1 week to avoid reevaluate renal function electrolytes after taking Lasix 20 mg daily  3.  Can you please try to get patient in for follow-up in the next 2 weeks instead of 7/1/2025.

## 2025-06-11 NOTE — TELEPHONE ENCOUNTER
Spoke to patient regarding medication instructions per MARIO Hernandez.    1.  Recommend taking Lasix 20 mg daily as prescribed at visit on 6/4/2025  2.  Check BMP in 1 week to avoid reevaluate renal function electrolytes after taking Lasix 20 mg daily  3.  Can you please try to get patient in for follow-up in the next 2 weeks instead of 7/1/2025.    Per patient he understands these instructions.  Appointment moved up.

## 2025-06-11 NOTE — PROGRESS NOTES
Congestive Heart Failure Clinic   [facility]      Referring Provider: Dr Moreau  Primary Care Physician: Peter Campos Jr., MD   Cardiologist: Dr Moreau  Nephrologist: N/A      HISTORY OF PRESENT ILLNESS:     Enrique Gonzalez is a 80 y.o. (1945) male with a history of HFrEF (EF < 40%), most recent EF:  Lab Results   Component Value Date    LVEF 20 04/01/2025    LVEFMODE Echo 04/01/2025       He presents to the CHF clinic for ongoing evaluation and monitoring of heart failure.    In the CHF clinic today he denies any adverse symptoms except:  [] Dizziness or lightheadedness   [] Syncope or near syncope  [] Recent Fall  [] Shortness of breath at rest   [x] Dyspnea with exertion  [] Decline in functional capacity (unable to perform activities they had previously been able to do)  [] Fatigue   [] Orthopnea  [] PND  [] Nocturnal cough  [] Hemoptysis  [] Chest pain, pressure, or discomfort  [] Palpitations  [] Abdominal distention  [] Abdominal bloating  [] Early satiety  [] Blood in stool   [] Diarrhea  [] Constipation  [] Nausea/Vomiting  [] Decreased urinary response to oral diuretic   [] Scrotal swelling   [x] Lower extremity edema   [] Used PRN doses of oral diuretic   [] Weight gain    Wt Readings from Last 3 Encounters:   06/11/25 73.9 kg (163 lb)   06/04/25 73.9 kg (163 lb)   05/29/25 73.3 kg (161 lb 9.6 oz)       SOCIAL HISTORY:  [] Denies tobacco, alcohol or illicit drug abuse  [x] Tobacco use:pipe  [] ETOH use:  [] Illicit drug use:        MEDICATIONS:    Allergies   Allergen Reactions    Deltasone [Prednisone] Nausea Only     Prior to Visit Medications    Medication Sig Taking? Authorizing Provider   spironolactone (ALDACTONE) 25 MG tablet Take 1 tablet by mouth daily Yes Precious Botello, APRN - CNP   aspirin 81 MG chewable tablet Take 1 tablet by mouth in the morning and at bedtime Yes Provider, MD Tessie   metOLazone (ZAROXOLYN) 2.5 MG tablet

## 2025-06-18 ENCOUNTER — HOSPITAL ENCOUNTER (OUTPATIENT)
Dept: OTHER | Age: 80
Setting detail: THERAPIES SERIES
Discharge: HOME | End: 2025-06-18
Payer: MEDICARE

## 2025-06-18 ENCOUNTER — RESULTS FOLLOW-UP (OUTPATIENT)
Age: 80
End: 2025-06-18

## 2025-06-18 VITALS
BODY MASS INDEX: 25.82 KG/M2 | OXYGEN SATURATION: 98 % | RESPIRATION RATE: 18 BRPM | WEIGHT: 160 LBS | SYSTOLIC BLOOD PRESSURE: 114 MMHG | HEART RATE: 71 BPM | DIASTOLIC BLOOD PRESSURE: 72 MMHG

## 2025-06-18 DIAGNOSIS — I50.20 HFREF (HEART FAILURE WITH REDUCED EJECTION FRACTION) (HCC): Primary | ICD-10-CM

## 2025-06-18 LAB
ANION GAP SERPL CALCULATED.3IONS-SCNC: 12 MMOL/L (ref 7–16)
BNP SERPL-MCNC: 3027 PG/ML (ref 0–450)
BUN SERPL-MCNC: 38 MG/DL (ref 6–23)
CALCIUM SERPL-MCNC: 9.3 MG/DL (ref 8.6–10.2)
CHLORIDE SERPL-SCNC: 95 MMOL/L (ref 98–107)
CO2 SERPL-SCNC: 24 MMOL/L (ref 22–29)
CREAT SERPL-MCNC: 1.3 MG/DL (ref 0.7–1.2)
GFR, ESTIMATED: 54 ML/MIN/1.73M2
GLUCOSE SERPL-MCNC: 93 MG/DL (ref 74–99)
POTASSIUM SERPL-SCNC: 5.1 MMOL/L (ref 3.5–5)
SODIUM SERPL-SCNC: 131 MMOL/L (ref 132–146)

## 2025-06-18 PROCEDURE — 83880 ASSAY OF NATRIURETIC PEPTIDE: CPT

## 2025-06-18 PROCEDURE — 36415 COLL VENOUS BLD VENIPUNCTURE: CPT

## 2025-06-18 PROCEDURE — 99214 OFFICE O/P EST MOD 30 MIN: CPT

## 2025-06-18 PROCEDURE — 96374 THER/PROPH/DIAG INJ IV PUSH: CPT

## 2025-06-18 PROCEDURE — 80048 BASIC METABOLIC PNL TOTAL CA: CPT

## 2025-06-18 PROCEDURE — 2500000003 HC RX 250 WO HCPCS: Performed by: INTERNAL MEDICINE

## 2025-06-18 PROCEDURE — 6360000002 HC RX W HCPCS: Performed by: INTERNAL MEDICINE

## 2025-06-18 RX ORDER — SODIUM CHLORIDE 0.9 % (FLUSH) 0.9 %
10 SYRINGE (ML) INJECTION ONCE
Status: COMPLETED | OUTPATIENT
Start: 2025-06-18 | End: 2025-06-18

## 2025-06-18 RX ORDER — FUROSEMIDE 10 MG/ML
INJECTION INTRAMUSCULAR; INTRAVENOUS
Status: DISCONTINUED
Start: 2025-06-18 | End: 2025-06-19 | Stop reason: HOSPADM

## 2025-06-18 RX ORDER — FUROSEMIDE 10 MG/ML
40 INJECTION INTRAMUSCULAR; INTRAVENOUS ONCE
Status: COMPLETED | OUTPATIENT
Start: 2025-06-18 | End: 2025-06-18

## 2025-06-18 RX ADMIN — SODIUM CHLORIDE, PRESERVATIVE FREE 10 ML: 5 INJECTION INTRAVENOUS at 12:58

## 2025-06-18 RX ADMIN — FUROSEMIDE 40 MG: 10 INJECTION, SOLUTION INTRAMUSCULAR; INTRAVENOUS at 12:58

## 2025-06-18 NOTE — PROGRESS NOTES
Congestive Heart Failure Clinic   [facility]      Referring Provider: Dr Moreau  Primary Care Physician: Peter Campos Jr., MD   Cardiologist: Dr Moreau  Nephrologist: N/A      HISTORY OF PRESENT ILLNESS:     Enrique Gonzalez is a 80 y.o. (1945) male with a history of HFrEF (EF < 40%), most recent EF:  Lab Results   Component Value Date    LVEF 20 04/01/2025    LVEFMODE Echo 04/01/2025       He presents to the CHF clinic for ongoing evaluation and monitoring of heart failure.    In the CHF clinic today he denies any adverse symptoms except:  [] Dizziness or lightheadedness   [] Syncope or near syncope  [] Recent Fall  [] Shortness of breath at rest   [x] Dyspnea with exertion  [] Decline in functional capacity (unable to perform activities they had previously been able to do)  [] Fatigue   [] Orthopnea  [] PND  [] Nocturnal cough  [] Hemoptysis  [] Chest pain, pressure, or discomfort  [] Palpitations  [] Abdominal distention  [] Abdominal bloating  [] Early satiety  [] Blood in stool   [] Diarrhea  [] Constipation  [] Nausea/Vomiting  [] Decreased urinary response to oral diuretic   [] Scrotal swelling   [x] Lower extremity edema   [] Used PRN doses of oral diuretic   [] Weight gain    Wt Readings from Last 3 Encounters:   06/18/25 72.6 kg (160 lb)   06/11/25 73.9 kg (163 lb)   06/04/25 73.9 kg (163 lb)       SOCIAL HISTORY:  [] Denies tobacco, alcohol or illicit drug abuse  [x] Tobacco use:pipe  [] ETOH use:  [] Illicit drug use:        MEDICATIONS:    Allergies   Allergen Reactions    Deltasone [Prednisone] Nausea Only     Prior to Visit Medications    Medication Sig Taking? Authorizing Provider   spironolactone (ALDACTONE) 25 MG tablet Take 1 tablet by mouth daily Yes Precious Botello APRN - CNP   furosemide (LASIX) 20 MG tablet Take 1 tablet by mouth daily  Patient taking differently: Take 1 tablet by mouth daily Everyday Yes Precious Botello APRN - HALINA

## 2025-06-19 ENCOUNTER — TELEPHONE (OUTPATIENT)
Dept: OTHER | Age: 80
End: 2025-06-19

## 2025-06-19 RX ORDER — FUROSEMIDE 40 MG/1
40 TABLET ORAL DAILY
Qty: 90 TABLET | Refills: 3 | Status: SHIPPED | OUTPATIENT
Start: 2025-06-19 | End: 2025-07-03 | Stop reason: ALTCHOICE

## 2025-06-19 NOTE — TELEPHONE ENCOUNTER
Spoke to patient regarding medication instructions Per SKYLER Guaman.    Labs and CHF clinic note reviewed  Increase lasix to 40 mg daily   Follow up BMP in 1 week   Follow up in CHF clinic as scheduled      Per patient he is refusing to take 40 mg of lasix QD.  States his body can't handle it.  Notified Per SKYLER Guaman.

## 2025-07-01 ENCOUNTER — RESULTS FOLLOW-UP (OUTPATIENT)
Age: 80
End: 2025-07-01

## 2025-07-01 ENCOUNTER — HOSPITAL ENCOUNTER (OUTPATIENT)
Dept: OTHER | Age: 80
Setting detail: THERAPIES SERIES
Discharge: HOME | End: 2025-07-01
Payer: MEDICARE

## 2025-07-01 VITALS
WEIGHT: 155.4 LBS | DIASTOLIC BLOOD PRESSURE: 69 MMHG | HEART RATE: 75 BPM | OXYGEN SATURATION: 100 % | SYSTOLIC BLOOD PRESSURE: 114 MMHG | RESPIRATION RATE: 16 BRPM | BODY MASS INDEX: 25.08 KG/M2

## 2025-07-01 DIAGNOSIS — I50.22 CHRONIC HFREF (HEART FAILURE WITH REDUCED EJECTION FRACTION) (HCC): Primary | ICD-10-CM

## 2025-07-01 LAB
ANION GAP SERPL CALCULATED.3IONS-SCNC: 13 MMOL/L (ref 7–16)
BNP SERPL-MCNC: 2865 PG/ML (ref 0–450)
CALCIUM SERPL-MCNC: 9.6 MG/DL (ref 8.8–10.2)
CHLORIDE SERPL-SCNC: 95 MMOL/L (ref 98–107)
CO2 SERPL-SCNC: 24 MMOL/L (ref 22–29)
CREAT SERPL-MCNC: 1.3 MG/DL (ref 0.7–1.2)
GFR, ESTIMATED: 59 ML/MIN/1.73M2
GLUCOSE SERPL-MCNC: 97 MG/DL (ref 74–99)
POTASSIUM SERPL-SCNC: 4.2 MMOL/L (ref 3.5–5.1)

## 2025-07-01 PROCEDURE — 80048 BASIC METABOLIC PNL TOTAL CA: CPT

## 2025-07-01 PROCEDURE — 2500000003 HC RX 250 WO HCPCS: Performed by: PHYSICIAN ASSISTANT

## 2025-07-01 PROCEDURE — 83880 ASSAY OF NATRIURETIC PEPTIDE: CPT

## 2025-07-01 PROCEDURE — 96374 THER/PROPH/DIAG INJ IV PUSH: CPT

## 2025-07-01 PROCEDURE — 99214 OFFICE O/P EST MOD 30 MIN: CPT

## 2025-07-01 PROCEDURE — 6360000002 HC RX W HCPCS

## 2025-07-01 PROCEDURE — 36415 COLL VENOUS BLD VENIPUNCTURE: CPT

## 2025-07-01 RX ORDER — FUROSEMIDE 10 MG/ML
40 INJECTION INTRAMUSCULAR; INTRAVENOUS ONCE
Status: COMPLETED | OUTPATIENT
Start: 2025-07-01 | End: 2025-07-01

## 2025-07-01 RX ORDER — SODIUM CHLORIDE 0.9 % (FLUSH) 0.9 %
10 SYRINGE (ML) INJECTION 2 TIMES DAILY
Status: DISCONTINUED | OUTPATIENT
Start: 2025-07-01 | End: 2025-07-02 | Stop reason: HOSPADM

## 2025-07-01 RX ORDER — FUROSEMIDE 10 MG/ML
INJECTION INTRAMUSCULAR; INTRAVENOUS
Status: COMPLETED
Start: 2025-07-01 | End: 2025-07-01

## 2025-07-01 RX ORDER — METOLAZONE 2.5 MG/1
2.5 TABLET ORAL
Qty: 24 TABLET | Refills: 1 | Status: SHIPPED | OUTPATIENT
Start: 2025-07-03

## 2025-07-01 RX ADMIN — SODIUM CHLORIDE, PRESERVATIVE FREE 10 ML: 5 INJECTION INTRAVENOUS at 11:30

## 2025-07-01 RX ADMIN — FUROSEMIDE 40 MG: 10 INJECTION, SOLUTION INTRAMUSCULAR; INTRAVENOUS at 11:30

## 2025-07-01 RX ADMIN — FUROSEMIDE 40 MG: 10 INJECTION INTRAMUSCULAR; INTRAVENOUS at 11:30

## 2025-07-01 NOTE — PROGRESS NOTES
Congestive Heart Failure Clinic   [facility]      Referring Provider: Dr Moreau  Primary Care Physician: Peter Campos Jr., MD   Cardiologist: Dr Moreau  Nephrologist: N/A      HISTORY OF PRESENT ILLNESS:     Enrique Gonzalez is a 80 y.o. (1945) male with a history of HFrEF (EF < 40%), most recent EF:  Lab Results   Component Value Date    LVEF 20 04/01/2025    LVEFMODE Echo 04/01/2025       He presents to the CHF clinic for ongoing evaluation and monitoring of heart failure.    In the CHF clinic today he denies any adverse symptoms except:  [] Dizziness or lightheadedness   [] Syncope or near syncope  [] Recent Fall  [] Shortness of breath at rest   [x] Dyspnea with exertion-recovers with rest  [] Decline in functional capacity (unable to perform activities they had previously been able to do)  [x] Fatigue   [] Orthopnea  [] PND  [] Nocturnal cough  [] Hemoptysis  [] Chest pain, pressure, or discomfort  [] Palpitations  [] Abdominal distention  [] Abdominal bloating  [] Early satiety  [] Blood in stool   [] Diarrhea  [] Constipation  [] Nausea/Vomiting  [] Decreased urinary response to oral diuretic   [] Scrotal swelling   [x] Lower extremity edema -3+ pitting BLE  [] Used PRN doses of oral diuretic   [] Weight gain    Wt Readings from Last 3 Encounters:   07/01/25 70.5 kg (155 lb 6.4 oz)   06/18/25 72.6 kg (160 lb)   06/11/25 73.9 kg (163 lb)       SOCIAL HISTORY:  [] Denies tobacco, alcohol or illicit drug abuse  [x] Tobacco use:pipe  [] ETOH use:  [] Illicit drug use:        MEDICATIONS:    Allergies   Allergen Reactions    Deltasone [Prednisone] Nausea Only     Prior to Visit Medications    Medication Sig Taking? Authorizing Provider   furosemide (LASIX) 40 MG tablet Take 1 tablet by mouth daily Yes Precious Botello APRN - CNP   spironolactone (ALDACTONE) 25 MG tablet Take 1 tablet by mouth daily Yes Precious Botello APRN - CNP   aspirin 81 MG chewable

## 2025-07-01 NOTE — RESULT ENCOUNTER NOTE
Labs and CHF Clinic note reviewed    HFrEF, LVEF 20 to 25% per TTE on 4/1/2025 which is unchanged from limited TTE on 3/6/2025 and LVEF 30% per TTE on 7/6/2023.  Has CRT-D    Hypervolemic on exam in clinic today.  Still with 3+ pitting bilateral lower extremity edema, JVD/HJR, worsening FOWLER and crackles in bilateral bases.  He received IV diuretic in clinic today and this is his fourth consecutive IV diuretic dose.  Weight 155 pounds and 6 ounces today compared to 160 pounds at visit on 6/18/2025    Wt Readings from Last 3 Encounters:  07/01/25 : 70.5 kg (155 lb 6.4 oz)  06/18/25 : 72.6 kg (160 lb)  06/11/25 : 73.9 kg (163 lb)    BP Readings from Last 1 Encounters:  07/01/25 : 114/69    Pulse Readings from Last 1 Encounters:  07/01/25 : 75      Current GDMT:  Lasix 40 mg daily--increased from 20 mg daily following visit on 6/18/2025  Metolazone 2.5 mg daily as needed  Toprol-XL 50 mg p.o. daily  Aldactone 25 mg p.o. daily  Diovan 40 mg tablet--half tablet p.o. daily    Was on Jardiance in the past but this was later discontinued as patient reportedly declined continuing  Entresto previously discontinued due to hypotension and patient switched to valsartan    NT proBNP 2865 compared to 3027 on 6/18/2025 and 2961 on 6/11/2025  BMP shows improving hyponatremia with sodium 133 compared to 131 on 6/18/2025 and 129 on 6/11/2025.  Renal function stable compared to prior with creatinine 1.3, BUN of 32 and GFR of 59.  Potassium stable at 4.2    1.  Take metolazone 2.5 mg x 1 on Wed 7/2/25 THEN change to 2.5 mg on Monday and Friday only  2.  Check BMP in 1 week to reevaluate renal function and electrolytes  3.  May take additional Lasix 40 mg tablet as needed for rapid weight gain, worsening lower extremity edema or shortness of breath  4.  Can you please asked patient why Jardiance was previously stopped and if he would consider restarting in the future  5.  Follow-up with CHF clinic on 7/24/2025 as scheduled

## 2025-07-01 NOTE — TELEPHONE ENCOUNTER
Call placed to patient and provider instructions given to patient    Patient verbalized understanding and all questions answered at this time.        Electronically signed by Osvaldo Agosto RN on 7/1/2025 at 3:44 PM

## 2025-07-02 ASSESSMENT — ENCOUNTER SYMPTOMS
CHEST TIGHTNESS: 0
BACK PAIN: 0
ABDOMINAL PAIN: 0
COUGH: 0
NAUSEA: 0
VOMITING: 0
SHORTNESS OF BREATH: 1
BLOOD IN STOOL: 0

## 2025-07-02 NOTE — PROGRESS NOTES
[facility]  Visit Note      Patient name: Enrique Gonzalez    Reason for consult: recurrent mitral regurgitation    Referring Physician: Dr. Moreau    Primary Care Physician: Peter Campos Jr., MD    Date of service: 7/2/2025    Chief Complaint: recurrent mitral regurgitation    HPI: Enrique Gonzalez is a 78 y.o. male referred by Dr. Moreau for mitral regurgitation. Past medical history of CAD s/p CABG, ICM, HFrEF, CRT-D, HTN, HLD, CVA, former smoker, NSVT, severe MR s/p ADRIENNE using Mitraclip 3/5/25     He was seen in valve clinic 2/2025 and recommended for Mitraclip due to severe MR/severe LVSD with associated FOWLER and LE edema. Procedure was completed on 3/5/25 with mild MR noted on echo POD1.     At his 30 day visit, he noted significant improvement in dyspnea but continued/worsening LE edema. His diuretic was increased to daily from MWF but had to be decreased again due to GREER and hyponatremia. Echo showed mod to severe recurrent MR and optimization of GDMT was recommended with close follow up in CHF clinic.    He had follow up with Dr. Moreau on 5/29/25 where he reported recurrence of FOWLER and continued LE edema despite current medical regimen. Diuretic doses were again increased to daily for 4 days, and he was referred back to valve clinic to discuss possible repeat Mitraclip.    He received IV lasix at CHF clinic visit 7/1/25.    TTE 2/13/25:    Left Ventricle: Normal left ventricular systolic function with a visually estimated EF of 25 - 30%. Left ventricle is severely dilated. Normal wall thickness. Severe global hypokinesis present. E/e' ratio >11, suggesting increased LAP.    Right Ventricle: Right ventricle is mildly dilated. Lead present in the right ventricle. Normal systolic function. TAPSE is 2.0 cm.    Aortic Valve: Mild sclerosis of the aortic valve cusps. Mild regurgitation with a centrally directed jet. No stenosis.    Mitral Valve: Mild annular calcification. Moderate

## 2025-07-02 NOTE — PROGRESS NOTES
OFFICE VISIT    NAME: Enrique Gonzalez     YOB: 1945   AGE: 80 y.o.   MEDICAL RECORD NUMBER: [id number]       CONSULTATION INFORMATION:   DATE OF CLINIC CONSULTATION: 7/3/2025   PRINCIPLE DIAGNOSIS / reason for visit: MR    CARE TEAM MEMBERS    PCP : Peter Campos Jr., MD     PRIMARY CARDIOLOGIST: Dr. Moreau   REFERRING PROVIDER: Dr. Moreau    HISTORY OF PRESENT ILLNESS:   Enrique Gonzalez is a 78 y.o. male referred by Dr. Moreau for mitral regurgitation. Past medical history of CAD s/p CABG, ICM, HFrEF, CRT-D, HTN, HLD, CVA, former smoker, NSVT, severe MR s/p ADRIENNE using Mitraclip 3/5/25     He was seen in valve clinic 2/2025 and recommended for Mitraclip due to severe MR/severe LVSD with associated FOWLER and LE edema. Procedure was completed on 3/5/25 with mild MR noted on echo POD1.     At his 30 day visit, he noted significant improvement in dyspnea but continued/worsening LE edema. His diuretic was increased to daily from MWF but had to be decreased again due to GREER and hyponatremia. Echo showed mod to severe recurrent MR and optimization of GDMT was recommended with close follow up in CHF clinic.    He had follow up with Dr. Moreau on 5/29/25 where he reported recurrence of FOWLER and continued LE edema despite current medical regimen. Diuretic doses were again increased to daily for 4 days, and he was referred back to valve clinic to discuss possible repeat Mitraclip.    He received IV lasix at CHF clinic visit 7/1/25.    Today he presents for follow-up.  He reports that he is having symptoms of shortness of breath along with significant lower extremity edema.  He is taking Lasix about once a day and taking metolazone twice a week.  His TTE shows severe mitral regurgitation    TTE 2/13/25:    Left Ventricle: Normal left ventricular systolic function with a visually estimated EF of 25 - 30%. Left ventricle is severely dilated. Normal wall thickness. Severe global hypokinesis present. E/e'

## 2025-07-03 ENCOUNTER — OFFICE VISIT (OUTPATIENT)
Dept: CARDIOTHORACIC SURGERY | Age: 80
End: 2025-07-03
Payer: MEDICARE

## 2025-07-03 ENCOUNTER — OFFICE VISIT (OUTPATIENT)
Dept: CARDIOLOGY CLINIC | Age: 80
End: 2025-07-03
Payer: MEDICARE

## 2025-07-03 VITALS — SYSTOLIC BLOOD PRESSURE: 102 MMHG | HEART RATE: 77 BPM | DIASTOLIC BLOOD PRESSURE: 72 MMHG

## 2025-07-03 DIAGNOSIS — I34.0 SEVERE MITRAL REGURGITATION: Primary | ICD-10-CM

## 2025-07-03 DIAGNOSIS — I34.0 NONRHEUMATIC MITRAL VALVE REGURGITATION: Primary | ICD-10-CM

## 2025-07-03 PROCEDURE — 1159F MED LIST DOCD IN RCRD: CPT | Performed by: THORACIC SURGERY (CARDIOTHORACIC VASCULAR SURGERY)

## 2025-07-03 PROCEDURE — 99214 OFFICE O/P EST MOD 30 MIN: CPT | Performed by: THORACIC SURGERY (CARDIOTHORACIC VASCULAR SURGERY)

## 2025-07-03 PROCEDURE — G8428 CUR MEDS NOT DOCUMENT: HCPCS | Performed by: THORACIC SURGERY (CARDIOTHORACIC VASCULAR SURGERY)

## 2025-07-03 PROCEDURE — 4004F PT TOBACCO SCREEN RCVD TLK: CPT | Performed by: INTERNAL MEDICINE

## 2025-07-03 PROCEDURE — 1159F MED LIST DOCD IN RCRD: CPT | Performed by: INTERNAL MEDICINE

## 2025-07-03 PROCEDURE — 99215 OFFICE O/P EST HI 40 MIN: CPT | Performed by: INTERNAL MEDICINE

## 2025-07-03 PROCEDURE — G2211 COMPLEX E/M VISIT ADD ON: HCPCS | Performed by: INTERNAL MEDICINE

## 2025-07-03 PROCEDURE — G8427 DOCREV CUR MEDS BY ELIG CLIN: HCPCS | Performed by: INTERNAL MEDICINE

## 2025-07-03 PROCEDURE — 3078F DIAST BP <80 MM HG: CPT | Performed by: INTERNAL MEDICINE

## 2025-07-03 PROCEDURE — 1123F ACP DISCUSS/DSCN MKR DOCD: CPT | Performed by: INTERNAL MEDICINE

## 2025-07-03 PROCEDURE — 1123F ACP DISCUSS/DSCN MKR DOCD: CPT | Performed by: THORACIC SURGERY (CARDIOTHORACIC VASCULAR SURGERY)

## 2025-07-03 PROCEDURE — G8417 CALC BMI ABV UP PARAM F/U: HCPCS | Performed by: THORACIC SURGERY (CARDIOTHORACIC VASCULAR SURGERY)

## 2025-07-03 PROCEDURE — 3074F SYST BP LT 130 MM HG: CPT | Performed by: INTERNAL MEDICINE

## 2025-07-03 PROCEDURE — 4004F PT TOBACCO SCREEN RCVD TLK: CPT | Performed by: THORACIC SURGERY (CARDIOTHORACIC VASCULAR SURGERY)

## 2025-07-03 PROCEDURE — G8417 CALC BMI ABV UP PARAM F/U: HCPCS | Performed by: INTERNAL MEDICINE

## 2025-07-03 RX ORDER — BUMETANIDE 1 MG/1
1 TABLET ORAL 2 TIMES DAILY
Qty: 60 TABLET | Refills: 3 | Status: SHIPPED | OUTPATIENT
Start: 2025-07-03

## 2025-07-09 ENCOUNTER — RESULTS FOLLOW-UP (OUTPATIENT)
Age: 80
End: 2025-07-09

## 2025-07-09 ENCOUNTER — HOSPITAL ENCOUNTER (OUTPATIENT)
Age: 80
Discharge: HOME | End: 2025-07-09
Payer: MEDICARE

## 2025-07-09 DIAGNOSIS — I50.22 CHRONIC HFREF (HEART FAILURE WITH REDUCED EJECTION FRACTION) (HCC): ICD-10-CM

## 2025-07-09 DIAGNOSIS — I50.22 CHRONIC HFREF (HEART FAILURE WITH REDUCED EJECTION FRACTION) (HCC): Primary | ICD-10-CM

## 2025-07-09 LAB
ANION GAP SERPL CALCULATED.3IONS-SCNC: 9 MMOL/L (ref 7–16)
BUN SERPL-MCNC: 42 MG/DL (ref 8–23)
CALCIUM SERPL-MCNC: 9.5 MG/DL (ref 8.8–10.2)
CHLORIDE SERPL-SCNC: 86 MMOL/L (ref 98–107)
CO2 SERPL-SCNC: 30 MMOL/L (ref 22–29)
CREAT SERPL-MCNC: 1.3 MG/DL (ref 0.7–1.2)
GFR, ESTIMATED: 55 ML/MIN/1.73M2
GLUCOSE SERPL-MCNC: 103 MG/DL (ref 74–99)
POTASSIUM SERPL-SCNC: 4.2 MMOL/L (ref 3.5–5.1)
SODIUM SERPL-SCNC: 125 MMOL/L (ref 136–145)

## 2025-07-09 PROCEDURE — 36415 COLL VENOUS BLD VENIPUNCTURE: CPT

## 2025-07-09 PROCEDURE — 80048 BASIC METABOLIC PNL TOTAL CA: CPT

## 2025-07-09 NOTE — TELEPHONE ENCOUNTER
Spoke with patient informing of instructions below per MARIO Hernandez    Patient verbalized understanding.

## 2025-07-09 NOTE — RESULT ENCOUNTER NOTE
Labs reviewed    HFrEF, LVEF 20 to 25% per TTE 4/1/2025 which is unchanged from limited TTE on 3/6/2025.  Has CRT-D.    At CHF clinic visit on 7/1/2025, patient was hypervolemic and treated with IV diuretic.  He had received IV diuretic for 4 consecutive CHF clinic visits.  Following that visit, patient was advised to take metolazone 2.5 mg daily on 7/2/2025 then changed to 2.5 mg daily on Monday and Friday only.  Recommended continue Lasix 40 mg daily.  Follow-up BMP in 1 week ordered    At visit with Dr. Porter on 7/3/2025, patient's Lasix 40 mg daily was discontinued and he was switched to Bumex 1 mg p.o. twice daily.    Current GDMT:  Metolazone 2.5 mg twice weekly on Monday and Friday  Bumex 1 mg p.o. twice daily  Toprol-XL 50 mg p.o. daily  Diovan 20 mg p.o. daily    Patient on Jardiance in the past but this was later discontinued as patient declined continuing  Entresto previously discontinued due to hypotension and switch to valsartan    BMP shows worsening hyponatremia with sodium of 125 compared to 133 on 7/1/2025.  Worsening BUN at 42 compared to 32 on 7/1/2025.  Creatinine stable at 1.3.  Potassium stable at 4.2.    1. Advise ER for significant hyponatremia with sodium 125  2. Please follow-up with patient in AM to see if admitted or not.    Thanks

## 2025-07-09 NOTE — TELEPHONE ENCOUNTER
----- Message from MARIO Hernandez sent at 7/9/2025  2:28 PM EDT -----  Labs reviewed    HFrEF, LVEF 20 to 25% per TTE 4/1/2025 which is unchanged from limited TTE on 3/6/2025.  Has CRT-D.    At CHF clinic visit on 7/1/2025, patient was hypervolemic and treated with IV diuretic.  He had received IV diuretic for 4 consecutive CHF clinic visits.  Following that visit, patient was advised to take metolazone 2.5 mg daily on 7/2/2025 then changed to 2.5 mg daily on Monday and Friday only.  Recommended continue Lasix 40 mg daily.  Follow-up BMP in 1 week ordered    At visit with Dr. Porter on 7/3/2025, patient's Lasix 40 mg daily was discontinued and he was switched to Bumex 1 mg p.o. twice daily.    Current GDMT:  Metolazone 2.5 mg twice weekly on Monday and Friday  Bumex 1 mg p.o. twice daily  Toprol-XL 50 mg p.o. daily  Diovan 20 mg p.o. daily    Patient on Jardiance in the past but this was later discontinued as patient declined continuing  Entresto previously discontinued due to hypotension and switch to valsartan    BMP shows worsening hyponatremia with sodium of 125 compared to 133 on 7/1/2025.  Worsening BUN at 42 compared to 32 on 7/1/2025.  Creatinine stable at 1.3.  Potassium stable at 4.2.    1. Advise ER for significant hyponatremia with sodium 125  2. Please follow-up with patient in AM to see if admitted or not.    Thanks

## 2025-07-10 NOTE — TELEPHONE ENCOUNTER
Patient instructed per MARIO Mcginnis to hold metolazone until he hears back from us.Instructed to get follow up BMP tomorrow. Patient refuses to go the the Emergency Room.   Patient verbalized understanding of holding metolazone and repeating BMP tomorrow.

## 2025-07-10 NOTE — TELEPHONE ENCOUNTER
----- Message from MARIO Hernadnez sent at 7/10/2025  2:05 PM EDT -----  Can you please call patient to see if he ever went to the ER for hyponatremia?  If he did not, can you please call patient and advise ER again but if he is unwilling can you please order BMP to be completed tomorrow 7/10/2025 under [facility] to reevaluate the hyponatremia.    Thanks

## 2025-07-11 ENCOUNTER — HOSPITAL ENCOUNTER (EMERGENCY)
Age: 80
Discharge: ANOTHER HOSPITAL | End: 2025-07-12
Attending: EMERGENCY MEDICINE | Admitting: STUDENT IN AN ORGANIZED HEALTH CARE EDUCATION/TRAINING PROGRAM
Payer: MEDICARE

## 2025-07-11 ENCOUNTER — RESULTS FOLLOW-UP (OUTPATIENT)
Age: 80
End: 2025-07-11

## 2025-07-11 ENCOUNTER — HOSPITAL ENCOUNTER (OUTPATIENT)
Age: 80
Discharge: HOME | End: 2025-07-11
Payer: MEDICARE

## 2025-07-11 ENCOUNTER — APPOINTMENT (OUTPATIENT)
Dept: GENERAL RADIOLOGY | Age: 80
End: 2025-07-11
Payer: MEDICARE

## 2025-07-11 DIAGNOSIS — E87.1 HYPONATREMIA: Primary | ICD-10-CM

## 2025-07-11 DIAGNOSIS — I50.22 CHRONIC HFREF (HEART FAILURE WITH REDUCED EJECTION FRACTION) (HCC): ICD-10-CM

## 2025-07-11 DIAGNOSIS — I50.9 CONGESTIVE HEART FAILURE, UNSPECIFIED HF CHRONICITY, UNSPECIFIED HEART FAILURE TYPE (HCC): ICD-10-CM

## 2025-07-11 LAB
ALBUMIN SERPL-MCNC: 4.1 G/DL (ref 3.5–5.2)
ALBUMIN SERPL-MCNC: NORMAL G/DL (ref 3.5–5.2)
ALBUMIN/GLOB SERPL: NORMAL {RATIO} (ref 1–2.5)
ALP SERPL-CCNC: 71 U/L (ref 40–129)
ALP SERPL-CCNC: NORMAL U/L (ref 40–129)
ALT SERPL-CCNC: 21 U/L (ref 0–50)
ALT SERPL-CCNC: NORMAL U/L (ref 5–41)
AMYLASE SERPL-CCNC: 47 U/L (ref 28–100)
ANION GAP SERPL CALCULATED.3IONS-SCNC: 10 MMOL/L (ref 7–16)
ANION GAP SERPL CALCULATED.3IONS-SCNC: 13 MMOL/L (ref 7–16)
ANION GAP SERPL CALCULATED.3IONS-SCNC: NORMAL MMOL/L (ref 9–17)
AST SERPL-CCNC: 29 U/L (ref 0–50)
AST SERPL-CCNC: NORMAL U/L
BASOPHILS # BLD: 0.03 K/UL (ref 0–0.2)
BASOPHILS NFR BLD: 0 % (ref 0–2)
BILIRUB SERPL-MCNC: 0.8 MG/DL (ref 0–1.2)
BILIRUB SERPL-MCNC: NORMAL MG/DL (ref 0.3–1.2)
BILIRUB UR QL STRIP: NEGATIVE
BNP SERPL-MCNC: 2788 PG/ML (ref 0–450)
BUN SERPL-MCNC: 32 MG/DL (ref 8–23)
BUN SERPL-MCNC: 36 MG/DL (ref 8–23)
BUN SERPL-MCNC: NORMAL MG/DL (ref 8–23)
BUN/CREAT SERPL: NORMAL (ref 9–20)
CALCIUM SERPL-MCNC: 8.8 MG/DL (ref 8.8–10.2)
CALCIUM SERPL-MCNC: 8.9 MG/DL (ref 8.8–10.2)
CALCIUM SERPL-MCNC: NORMAL MG/DL (ref 8.6–10.4)
CHLORIDE SERPL-SCNC: 86 MMOL/L (ref 98–107)
CHLORIDE SERPL-SCNC: 88 MMOL/L (ref 98–107)
CHLORIDE SERPL-SCNC: NORMAL MMOL/L (ref 98–107)
CK SERPL-CCNC: 172 U/L (ref 0–190)
CLARITY UR: CLEAR
CO2 SERPL-SCNC: 22 MMOL/L (ref 22–29)
CO2 SERPL-SCNC: 27 MMOL/L (ref 22–29)
CO2 SERPL-SCNC: NORMAL MMOL/L (ref 20–31)
COLOR UR: YELLOW
CREAT SERPL-MCNC: 1.1 MG/DL (ref 0.7–1.2)
CREAT SERPL-MCNC: 1.2 MG/DL (ref 0.7–1.2)
CREAT SERPL-MCNC: NORMAL MG/DL (ref 0.7–1.2)
EOSINOPHIL # BLD: 0.48 K/UL (ref 0.05–0.5)
EOSINOPHILS RELATIVE PERCENT: 6 % (ref 0–6)
ERYTHROCYTE [DISTWIDTH] IN BLOOD BY AUTOMATED COUNT: 15.2 % (ref 11.5–15)
GFR, ESTIMATED: 64 ML/MIN/1.73M2
GFR, ESTIMATED: 70 ML/MIN/1.73M2
GFR, ESTIMATED: NORMAL ML/MIN/1.73M2
GLUCOSE SERPL-MCNC: 110 MG/DL (ref 74–99)
GLUCOSE SERPL-MCNC: 98 MG/DL (ref 74–99)
GLUCOSE SERPL-MCNC: NORMAL MG/DL (ref 70–99)
GLUCOSE UR STRIP-MCNC: NEGATIVE MG/DL
HCT VFR BLD AUTO: 36.3 % (ref 37–54)
HGB BLD-MCNC: 13 G/DL (ref 12.5–16.5)
HGB UR QL STRIP.AUTO: ABNORMAL
IMM GRANULOCYTES # BLD AUTO: <0.03 K/UL (ref 0–0.58)
IMM GRANULOCYTES NFR BLD: 0 % (ref 0–5)
KETONES UR STRIP-MCNC: NEGATIVE MG/DL
LEUKOCYTE ESTERASE UR QL STRIP: NEGATIVE
LIPASE SERPL-CCNC: 23 U/L (ref 13–60)
LYMPHOCYTES NFR BLD: 1.77 K/UL (ref 1.5–4)
LYMPHOCYTES RELATIVE PERCENT: 22 % (ref 20–42)
MAGNESIUM SERPL-MCNC: 2.2 MG/DL (ref 1.6–2.4)
MCH RBC QN AUTO: 35.3 PG (ref 26–35)
MCHC RBC AUTO-ENTMCNC: 35.8 G/DL (ref 32–34.5)
MCV RBC AUTO: 98.6 FL (ref 80–99.9)
MONOCYTES NFR BLD: 0.84 K/UL (ref 0.1–0.95)
MONOCYTES NFR BLD: 11 % (ref 2–12)
NEUTROPHILS NFR BLD: 61 % (ref 43–80)
NEUTS SEG NFR BLD: 4.89 K/UL (ref 1.8–7.3)
NITRITE UR QL STRIP: NEGATIVE
PH UR STRIP: 6.5 [PH] (ref 5–8)
PLATELET # BLD AUTO: 231 K/UL (ref 130–450)
PMV BLD AUTO: 8.9 FL (ref 7–12)
POTASSIUM SERPL-SCNC: 4 MMOL/L (ref 3.5–5.1)
POTASSIUM SERPL-SCNC: 4.5 MMOL/L (ref 3.5–5.1)
POTASSIUM SERPL-SCNC: NORMAL MMOL/L (ref 3.7–5.3)
PROT SERPL-MCNC: 8 G/DL (ref 6.4–8.3)
PROT SERPL-MCNC: NORMAL G/DL (ref 6.4–8.3)
PROT UR STRIP-MCNC: NEGATIVE MG/DL
RBC # BLD AUTO: 3.68 M/UL (ref 3.8–5.8)
RBC #/AREA URNS HPF: ABNORMAL /HPF
SODIUM SERPL-SCNC: 122 MMOL/L (ref 136–145)
SODIUM SERPL-SCNC: 124 MMOL/L (ref 136–145)
SODIUM SERPL-SCNC: NORMAL MMOL/L (ref 135–144)
SP GR UR STRIP: <1.005 (ref 1–1.03)
TROPONIN I SERPL HS-MCNC: 24 NG/L (ref 0–22)
UROBILINOGEN UR STRIP-ACNC: 0.2 EU/DL (ref 0–1)
WBC #/AREA URNS HPF: ABNORMAL /HPF
WBC OTHER # BLD: 8 K/UL (ref 4.5–11.5)

## 2025-07-11 PROCEDURE — 87086 URINE CULTURE/COLONY COUNT: CPT

## 2025-07-11 PROCEDURE — 83690 ASSAY OF LIPASE: CPT

## 2025-07-11 PROCEDURE — 80053 COMPREHEN METABOLIC PANEL: CPT

## 2025-07-11 PROCEDURE — 83935 ASSAY OF URINE OSMOLALITY: CPT

## 2025-07-11 PROCEDURE — 83930 ASSAY OF BLOOD OSMOLALITY: CPT

## 2025-07-11 PROCEDURE — 84300 ASSAY OF URINE SODIUM: CPT

## 2025-07-11 PROCEDURE — 80048 BASIC METABOLIC PNL TOTAL CA: CPT

## 2025-07-11 PROCEDURE — 85025 COMPLETE CBC W/AUTO DIFF WBC: CPT

## 2025-07-11 PROCEDURE — 83880 ASSAY OF NATRIURETIC PEPTIDE: CPT

## 2025-07-11 PROCEDURE — 93005 ELECTROCARDIOGRAM TRACING: CPT | Performed by: NURSE PRACTITIONER

## 2025-07-11 PROCEDURE — 99285 EMERGENCY DEPT VISIT HI MDM: CPT

## 2025-07-11 PROCEDURE — 83735 ASSAY OF MAGNESIUM: CPT

## 2025-07-11 PROCEDURE — 84484 ASSAY OF TROPONIN QUANT: CPT

## 2025-07-11 PROCEDURE — 71045 X-RAY EXAM CHEST 1 VIEW: CPT

## 2025-07-11 PROCEDURE — 36415 COLL VENOUS BLD VENIPUNCTURE: CPT

## 2025-07-11 PROCEDURE — 82150 ASSAY OF AMYLASE: CPT

## 2025-07-11 PROCEDURE — 81001 URINALYSIS AUTO W/SCOPE: CPT

## 2025-07-11 PROCEDURE — 82550 ASSAY OF CK (CPK): CPT

## 2025-07-11 ASSESSMENT — PAIN - FUNCTIONAL ASSESSMENT: PAIN_FUNCTIONAL_ASSESSMENT: NONE - DENIES PAIN

## 2025-07-11 NOTE — TELEPHONE ENCOUNTER
Call placed to patient and provider instructions given to patient    Patient verbalized understanding and all questions answered at this time. Agreeable to go to the ER, he will drop his groceries off at home and then go.

## 2025-07-11 NOTE — ED NOTES
Department of Emergency Medicine  FIRST PROVIDER TRIAGE NOTE             Independent MLP           7/11/25  4:47 PM EDT    Date of Encounter: 7/11/25   MRN: [id number]      HPI: Enrique Gonzalez is a 80 y.o. male who presents to the ED for Abnormal Lab (Low sodium, sent in by CHF clinic to be evaluated )   Recently taken off of lasix and put on bumex. Denies beer drinking      ROS: Negative for cp, sob, headache, or dizziness.    PE: Gen Appearance/Constitutional: alert  Musculoskeletal: moves all extremities x 4    Initial Plan of Care: All treatment areas with department are currently occupied.      Plan to order/Initiate the following while awaiting opening in ED: Triage evaluation  .     Provider-Patient relationship only established for Provider In Triage (PIT).  Full assessment, HPI and examination not performed, therefore, it is not yet possible to state whether or not an emergency medical condition exists     Initial Plan of Care: Initiate Treatment-Testing, Proceed toTreatment Area When Bed Available for ED Attending/MLP to Continue Care  Secondary to high volume, low staffing, and/or boarding- patient to await bed availability.     This ends my PIT-Patient relationship.  Care of patient relinquished after triage         Electronically signed by YANETH Colvin CNP   DD: 7/11/25

## 2025-07-12 ENCOUNTER — HOSPITAL ENCOUNTER (INPATIENT)
Age: 80
LOS: 1 days | Discharge: HOME | DRG: 640 | End: 2025-07-13
Attending: STUDENT IN AN ORGANIZED HEALTH CARE EDUCATION/TRAINING PROGRAM | Admitting: STUDENT IN AN ORGANIZED HEALTH CARE EDUCATION/TRAINING PROGRAM
Payer: MEDICARE

## 2025-07-12 VITALS
DIASTOLIC BLOOD PRESSURE: 62 MMHG | HEART RATE: 73 BPM | RESPIRATION RATE: 16 BRPM | TEMPERATURE: 97.8 F | SYSTOLIC BLOOD PRESSURE: 104 MMHG | OXYGEN SATURATION: 99 %

## 2025-07-12 PROBLEM — I50.22 CHRONIC HFREF (HEART FAILURE WITH REDUCED EJECTION FRACTION) (HCC): Status: ACTIVE | Noted: 2023-10-06

## 2025-07-12 LAB
EKG ATRIAL RATE: 72 BPM
EKG P AXIS: 72 DEGREES
EKG P-R INTERVAL: 154 MS
EKG Q-T INTERVAL: 498 MS
EKG QRS DURATION: 184 MS
EKG QTC CALCULATION (BAZETT): 545 MS
EKG R AXIS: 140 DEGREES
EKG T AXIS: 29 DEGREES
EKG VENTRICULAR RATE: 72 BPM
OSMOLALITY SERPL: 281 MOSM/KG (ref 285–310)
OSMOLALITY UR: 287 MOSM/KG (ref 300–900)
SODIUM UR-SCNC: 33 MMOL/L
TROPONIN I SERPL HS-MCNC: 26 NG/L (ref 0–22)

## 2025-07-12 PROCEDURE — 99223 1ST HOSP IP/OBS HIGH 75: CPT | Performed by: INTERNAL MEDICINE

## 2025-07-12 PROCEDURE — 96361 HYDRATE IV INFUSION ADD-ON: CPT

## 2025-07-12 PROCEDURE — APPSS180 APP SPLIT SHARED TIME > 60 MINUTES: Performed by: NURSE PRACTITIONER

## 2025-07-12 PROCEDURE — 6370000000 HC RX 637 (ALT 250 FOR IP): Performed by: NURSE PRACTITIONER

## 2025-07-12 PROCEDURE — 2580000003 HC RX 258: Performed by: EMERGENCY MEDICINE

## 2025-07-12 PROCEDURE — 84484 ASSAY OF TROPONIN QUANT: CPT

## 2025-07-12 PROCEDURE — 93010 ELECTROCARDIOGRAM REPORT: CPT | Performed by: INTERNAL MEDICINE

## 2025-07-12 PROCEDURE — 96360 HYDRATION IV INFUSION INIT: CPT

## 2025-07-12 PROCEDURE — 2060000000 HC ICU INTERMEDIATE R&B

## 2025-07-12 PROCEDURE — 2500000003 HC RX 250 WO HCPCS: Performed by: NURSE PRACTITIONER

## 2025-07-12 RX ORDER — ACETAMINOPHEN 325 MG/1
650 TABLET ORAL EVERY 6 HOURS PRN
Status: CANCELLED | OUTPATIENT
Start: 2025-07-12

## 2025-07-12 RX ORDER — BISACODYL 5 MG/1
5 TABLET, DELAYED RELEASE ORAL DAILY PRN
Status: CANCELLED | OUTPATIENT
Start: 2025-07-12

## 2025-07-12 RX ORDER — ASPIRIN 81 MG/1
81 TABLET, CHEWABLE ORAL 2 TIMES DAILY
Status: CANCELLED | OUTPATIENT
Start: 2025-07-12

## 2025-07-12 RX ORDER — SODIUM CHLORIDE 9 MG/ML
INJECTION, SOLUTION INTRAVENOUS CONTINUOUS
Status: CANCELLED | OUTPATIENT
Start: 2025-07-12

## 2025-07-12 RX ORDER — SPIRONOLACTONE 25 MG/1
25 TABLET ORAL DAILY
Status: DISCONTINUED | OUTPATIENT
Start: 2025-07-12 | End: 2025-07-13

## 2025-07-12 RX ORDER — VITS A,C,E/LUTEIN/MINERALS 300MCG-200
1 TABLET ORAL EVERY MORNING
Status: DISCONTINUED | OUTPATIENT
Start: 2025-07-12 | End: 2025-07-13 | Stop reason: HOSPADM

## 2025-07-12 RX ORDER — METOPROLOL SUCCINATE 50 MG/1
50 TABLET, EXTENDED RELEASE ORAL DAILY
Status: DISCONTINUED | OUTPATIENT
Start: 2025-07-12 | End: 2025-07-13 | Stop reason: HOSPADM

## 2025-07-12 RX ORDER — SPIRONOLACTONE 25 MG/1
25 TABLET ORAL DAILY
Status: CANCELLED | OUTPATIENT
Start: 2025-07-12

## 2025-07-12 RX ORDER — BUMETANIDE 1 MG/1
1 TABLET ORAL 2 TIMES DAILY
Status: CANCELLED | OUTPATIENT
Start: 2025-07-12

## 2025-07-12 RX ORDER — SODIUM CHLORIDE 0.9 % (FLUSH) 0.9 %
5-40 SYRINGE (ML) INJECTION EVERY 12 HOURS SCHEDULED
Status: CANCELLED | OUTPATIENT
Start: 2025-07-12

## 2025-07-12 RX ORDER — SODIUM CHLORIDE 0.9 % (FLUSH) 0.9 %
5-40 SYRINGE (ML) INJECTION EVERY 12 HOURS SCHEDULED
Status: DISCONTINUED | OUTPATIENT
Start: 2025-07-12 | End: 2025-07-13 | Stop reason: HOSPADM

## 2025-07-12 RX ORDER — PROCHLORPERAZINE EDISYLATE 5 MG/ML
5 INJECTION INTRAMUSCULAR; INTRAVENOUS EVERY 6 HOURS PRN
Status: DISCONTINUED | OUTPATIENT
Start: 2025-07-12 | End: 2025-07-13 | Stop reason: HOSPADM

## 2025-07-12 RX ORDER — SODIUM CHLORIDE 9 MG/ML
INJECTION, SOLUTION INTRAVENOUS CONTINUOUS
Status: DISCONTINUED | OUTPATIENT
Start: 2025-07-12 | End: 2025-07-12

## 2025-07-12 RX ORDER — SODIUM CHLORIDE 0.9 % (FLUSH) 0.9 %
5-40 SYRINGE (ML) INJECTION PRN
Status: CANCELLED | OUTPATIENT
Start: 2025-07-12

## 2025-07-12 RX ORDER — METOLAZONE 2.5 MG/1
2.5 TABLET ORAL
Status: CANCELLED | OUTPATIENT
Start: 2025-07-14

## 2025-07-12 RX ORDER — SODIUM CHLORIDE 9 MG/ML
INJECTION, SOLUTION INTRAVENOUS PRN
Status: DISCONTINUED | OUTPATIENT
Start: 2025-07-12 | End: 2025-07-13 | Stop reason: HOSPADM

## 2025-07-12 RX ORDER — ACETAMINOPHEN 650 MG/1
650 SUPPOSITORY RECTAL EVERY 6 HOURS PRN
Status: DISCONTINUED | OUTPATIENT
Start: 2025-07-12 | End: 2025-07-13 | Stop reason: HOSPADM

## 2025-07-12 RX ORDER — BUMETANIDE 1 MG/1
1 TABLET ORAL 2 TIMES DAILY
Status: DISCONTINUED | OUTPATIENT
Start: 2025-07-12 | End: 2025-07-13

## 2025-07-12 RX ORDER — ACETAMINOPHEN 650 MG/1
650 SUPPOSITORY RECTAL EVERY 6 HOURS PRN
Status: CANCELLED | OUTPATIENT
Start: 2025-07-12

## 2025-07-12 RX ORDER — LANOLIN ALCOHOL/MO/W.PET/CERES
500 CREAM (GRAM) TOPICAL EVERY MORNING
Status: CANCELLED | OUTPATIENT
Start: 2025-07-12

## 2025-07-12 RX ORDER — ASPIRIN 81 MG/1
81 TABLET, CHEWABLE ORAL 2 TIMES DAILY
Status: DISCONTINUED | OUTPATIENT
Start: 2025-07-12 | End: 2025-07-13 | Stop reason: HOSPADM

## 2025-07-12 RX ORDER — SODIUM CHLORIDE 9 MG/ML
INJECTION, SOLUTION INTRAVENOUS PRN
Status: CANCELLED | OUTPATIENT
Start: 2025-07-12

## 2025-07-12 RX ORDER — LANOLIN ALCOHOL/MO/W.PET/CERES
500 CREAM (GRAM) TOPICAL EVERY MORNING
Status: DISCONTINUED | OUTPATIENT
Start: 2025-07-12 | End: 2025-07-13 | Stop reason: HOSPADM

## 2025-07-12 RX ORDER — VALSARTAN 40 MG/1
20 TABLET ORAL DAILY
Status: DISCONTINUED | OUTPATIENT
Start: 2025-07-12 | End: 2025-07-13 | Stop reason: HOSPADM

## 2025-07-12 RX ORDER — ACETAMINOPHEN 325 MG/1
650 TABLET ORAL EVERY 6 HOURS PRN
Status: DISCONTINUED | OUTPATIENT
Start: 2025-07-12 | End: 2025-07-13 | Stop reason: HOSPADM

## 2025-07-12 RX ORDER — PROCHLORPERAZINE EDISYLATE 5 MG/ML
5 INJECTION INTRAMUSCULAR; INTRAVENOUS EVERY 6 HOURS PRN
Status: CANCELLED | OUTPATIENT
Start: 2025-07-12

## 2025-07-12 RX ORDER — SODIUM CHLORIDE 9 MG/ML
INJECTION, SOLUTION INTRAVENOUS CONTINUOUS
Status: DISCONTINUED | OUTPATIENT
Start: 2025-07-12 | End: 2025-07-12 | Stop reason: HOSPADM

## 2025-07-12 RX ORDER — METOLAZONE 2.5 MG/1
2.5 TABLET ORAL
Status: DISCONTINUED | OUTPATIENT
Start: 2025-07-14 | End: 2025-07-13

## 2025-07-12 RX ORDER — VALSARTAN 40 MG/1
20 TABLET ORAL DAILY
Status: CANCELLED | OUTPATIENT
Start: 2025-07-12

## 2025-07-12 RX ORDER — SODIUM CHLORIDE 0.9 % (FLUSH) 0.9 %
5-40 SYRINGE (ML) INJECTION PRN
Status: DISCONTINUED | OUTPATIENT
Start: 2025-07-12 | End: 2025-07-13 | Stop reason: HOSPADM

## 2025-07-12 RX ORDER — BISACODYL 5 MG/1
5 TABLET, DELAYED RELEASE ORAL DAILY PRN
Status: DISCONTINUED | OUTPATIENT
Start: 2025-07-12 | End: 2025-07-13 | Stop reason: HOSPADM

## 2025-07-12 RX ORDER — METOPROLOL SUCCINATE 25 MG/1
50 TABLET, EXTENDED RELEASE ORAL DAILY
Status: CANCELLED | OUTPATIENT
Start: 2025-07-12

## 2025-07-12 RX ADMIN — ASPIRIN 81 MG: 81 TABLET, CHEWABLE ORAL at 20:16

## 2025-07-12 RX ADMIN — SODIUM CHLORIDE, PRESERVATIVE FREE 10 ML: 5 INJECTION INTRAVENOUS at 20:16

## 2025-07-12 RX ADMIN — SODIUM CHLORIDE: 9 INJECTION, SOLUTION INTRAVENOUS at 00:47

## 2025-07-12 RX ADMIN — Medication 500 MG: at 13:02

## 2025-07-12 RX ADMIN — VALSARTAN 20 MG: 40 TABLET ORAL at 13:01

## 2025-07-12 RX ADMIN — SODIUM CHLORIDE, PRESERVATIVE FREE 10 ML: 5 INJECTION INTRAVENOUS at 13:03

## 2025-07-12 RX ADMIN — ASPIRIN 81 MG: 81 TABLET, CHEWABLE ORAL at 13:01

## 2025-07-12 RX ADMIN — I-VITE, TAB 1000-60-2MG (60/BT) 1 TABLET: TAB at 13:01

## 2025-07-12 ASSESSMENT — PAIN SCALES - GENERAL: PAINLEVEL_OUTOF10: 0

## 2025-07-12 NOTE — CONSULTS
Department of Internal Medicine  Nephrology Attending Consult Note      Reason for Consult:  Hyponatremia  Requesting Physician:  Dr Payne    CHIEF COMPLAINT:  None    History Obtained From:  patient, electronic medical record    HISTORY OF PRESENT ILLNESS:  H/O of CHF. End stage CHF. Had lab done recently. Showed hyponatremia. So sent to ER. Now admitted. Patient is asymptomatic.  Is on 3 diuretics and low salt diet.  Saw him in his room.    Past Medical History:        Diagnosis Date    Acute on chronic systolic congestive heart failure (HCC) 10/18/2018    Aortic regurgitation     trace    Arthritis     Atrial fibrillation ([facility])     CAD (coronary artery disease)     Cerebral artery occlusion with cerebral infarction ([facility]) 2020    no deficits    Hypercholesteremia     Hypertension     Ischemic cardiomyopathy     Mitral regurgitation     mild to moderate    STEMI (ST elevation myocardial infarction) ([facility]) 12/12/2012    anteriolateral     Past Surgical History:        Procedure Laterality Date    CARDIAC CATHETERIZATION  10/09/2023    Left and Right heart cath with Dr. Porter    CARDIAC DEFIBRILLATOR PLACEMENT Left 04/14/2023    [employer] Dual ICD  Dr. Ponce    CARDIAC PROCEDURE N/A 3/5/2025    TRANSCATHETER MITRAL VALVE REPA[place] [facility] [place]    CARDIAC PROCEDURE N/A 3/5/2025    TRANSCATHETER MITRAL VALVE REPAIR [place]legos [place] [facility] [place]    CORONARY ANGIOPLASTY  12/12/2012    Balloon only to LAD    CORONARY ARTERY BYPASS GRAFT  12/13/2012    cabg x 4- Dr. Bruno    CT BIOPSY BONE MARROW  12/21/2022    CT BONE MARROW BIOPSY 12/21/2022 [facility] CT    DIAGNOSTIC CARDIAC CATH LAB PROCEDURE  12/12/2012    [facility] cath lab- Dr. Moreau    ECHO COMPL W DOP COLOR FLOW  12/12/2012         HIP FRACTURE SURGERY Right 05/02/2023    RIGHT HIP OPEN REDUCTION INTERNAL FIXATION [place]ervin Davidson [place] [facility] [place]    TONSILLECTOMY       Current Medications:    Current Facility-Administered

## 2025-07-12 NOTE — ED PROVIDER NOTES
HPI:  7/12/25, Time: 12:00 AM EDT         Enrique Gonzalez is a 80 y.o. male presenting to the ED for fatigue tired and low sodium, beginning days ago.  The complaint has been persistent, moderate in severity, and worsened by nothing.  Patient has congestive heart failure and they have been changing his Lasix and Bumex he has no focal neurological deficit he is alert and oriented he has no focal neurological deficit patient only has a 20% ejection fraction    ROS:   Pertinent positives and negatives are stated within HPI, all other systems reviewed and are negative.  --------------------------------------------- PAST HISTORY ---------------------------------------------  Past Medical History:  has a past medical history of Acute on chronic systolic congestive heart failure (HCC), Aortic regurgitation, Arthritis, Atrial fibrillation (HCC), CAD (coronary artery disease), Cerebral artery occlusion with cerebral infarction (HCC), Hypercholesteremia, Hypertension, Ischemic cardiomyopathy, Mitral regurgitation, and STEMI (ST elevation myocardial infarction) ([facility]).    Past Surgical History:  has a past surgical history that includes Tonsillectomy; Coronary angioplasty (12/12/2012); ECHO Compl W Dop Color Flow (12/12/2012); Diagnostic Cardiac Cath Lab Procedure (12/12/2012); Coronary artery bypass graft (12/13/2012); CT BIOPSY BONE MARROW (12/21/2022); Cardiac defibrillator placement (Left, 04/14/2023); Hip fracture surgery (Right, 05/02/2023); Cardiac catheterization (10/09/2023); Cardiac procedure (N/A, 3/5/2025); and Cardiac procedure (N/A, 3/5/2025).    Social History:  reports that he has been smoking pipe. He has been exposed to tobacco smoke. He has never used smokeless tobacco. He reports that he does not drink alcohol and does not use drugs.    Family History: family history is not on file.     The patient’s home medications have been reviewed.    Allergies: Deltasone

## 2025-07-12 NOTE — CONSULTS
INPATIENT CARDIOLOGY CONSULT     Reason for Consult:  CHF medication management / Presenting with hyponatremia / Renal consulted     Cardiologist: Dr. Ward    Requesting Physician:  Dr. Payne    Date of Consultation: 7/12/2025    HISTORY OF PRESENT ILLNESS:   Enrique Gonzalez is a 80 year old male who is known to Dr. Moreau (last seen in OP 5/29/2025), Dr. Chavez (last seen in OP 5/13/2025), Dr. Porter (OP 7/3/2025) and CHF clinic.     Recent Encounters:   Mod-Severe MR s/p ADRIENNE using Mitraclip on 3/5/25 (Dr. Gallegos / Dr. Porter)   Dr. Ponce OP visit 5/13/2025 patient had lower extremity edema and shortness of breath --advised to call primary cardiologist.  Continued on same medications  Outpatient visit Dr. Moreau 5/29/2025 worsening shortness of breath and lower extremity edema.  He had recently been placed on Lasix and Zaroxolyn and now no improvement.  Patient was instructed to increase his Lasix and Zaroxolyn to every day for the next 4 days.  TTE 4/1/2025 showed moderately severe MR again --referred back to valve clinic  CHF clinic: 6/4/2025: Spironolactone was decreased to 25 mg daily and advised to take Lasix 20 mg daily.  + FOWLER.  Weight 163 pounds, 73.9 kg.  Creatinine 1.20 (of note creatinine was 1.3 on 5/17/2025).  Patient was hypervolemic and was given Lasix IV.  CHF clinic: 7/1/2025 hypervolemic given IV Lasix He was instructed to take metolazone 2.5 mg x 1 on 7/2/2025 and then changed to 2.5 mg on Monday and Friday only.  He was advised to take an additional Lasix 40 mg tablet as needed for rapid weight gain or worsening lower extremity edema. 7/3/2025 OP Dr. Porter / Dr. Gallegos --> JINA scheduled on 8/27/2025 for severe secondary MR with persistent symptoms despite GDMT and status post MitraClip. Lasix 40 mg daily was discontinued and he was switched back to Bumex 1 mg p.o. twice daily.  He was continued on metolazone 2.5 mg on Monday and Friday.  Toprol-XL 50 mg daily and Diovan.  Patient is not

## 2025-07-12 NOTE — H&P
[facility]   History and Physical      CHIEF COMPLAINT:  No chief complaint on file.      Reason for Admission:  Hyponatremia [E87.1]    History Obtained From:  patient, electronic medical record    HISTORY OF PRESENT ILLNESS:      The patient is a 80 y.o. male of Peter Campos Jr., MD  has a past medical history of Acute on chronic systolic congestive heart failure (HCC), Aortic regurgitation, Arthritis, Atrial fibrillation (HCC), CAD (coronary artery disease), Cerebral artery occlusion with cerebral infarction (HCC), Hypercholesteremia, Hypertension, Ischemic cardiomyopathy, Mitral regurgitation, and STEMI (ST elevation myocardial infarction) ([facility]) who presents with abnormal lab.  Initially presented at [place] ER with sodium of 125.    Patient was transferred to [place] for further evaluation    On exam, patient appeared not in acute distress.  Denies chest pain or shortness of breath.  States that since 2012 after CABG his EF has remained low.  Per chart review, last EF was 20-25%.    All labs personally reviewed   All imaging personally reviewed     Past Medical History:        Diagnosis Date    Acute on chronic systolic congestive heart failure (HCC) 10/18/2018    Aortic regurgitation     trace    Arthritis     Atrial fibrillation (HCC)     CAD (coronary artery disease)     Cerebral artery occlusion with cerebral infarction (HCC) 2020    no deficits    Hypercholesteremia     Hypertension     Ischemic cardiomyopathy     Mitral regurgitation     mild to moderate    STEMI (ST elevation myocardial infarction) ([facility]) 12/12/2012    anteriolateral     Past Surgical History:        Procedure Laterality Date    CARDIAC CATHETERIZATION  10/09/2023    Left and Right heart cath with Dr. Porter    CARDIAC DEFIBRILLATOR PLACEMENT Left 04/14/2023    [employer] Dual ICD  Dr. Ponce    CARDIAC PROCEDURE N/A 3/5/2025    TRANSCATHETER MITRAL VALVE REPAIR performed by Serafin Porter MD at [facility] [place]    CARDIAC

## 2025-07-12 NOTE — PROGRESS NOTES
4 Eyes Skin Assessment     NAME:  Enrique Gonzalez  YOB: 1945  MEDICAL RECORD NUMBER:  [id number]    The patient is being assessed for  Admission    I agree that at least one RN has performed a thorough Head to Toe Skin Assessment on the patient. ALL assessment sites listed below have been assessed.      Areas assessed by both nurses:yes        Does the Patient have a Wound?      no  Jacky Prevention initiated by RN: Yes  Wound Care Orders initiated by RN: No    Pressure Injury (Stage 1,2,3,4, Unstageable, DTI, NWPT, and Complex wounds) if present, place Wound referral order by RN under : No    New Ostomies, if present place, Ostomy referral order under : No     Nurse 1 eSignature: Electronically signed by Rosmery Duggan RN on 7/12/25 at 3:53 PM EDT    **SHARE this note so that the co-signing nurse can place an eSignature**    Nurse 2 eSignature: Electronically signed by DARIA LUA RN on 7/12/25 at 4:26 PM EDT

## 2025-07-13 VITALS
HEIGHT: 66 IN | SYSTOLIC BLOOD PRESSURE: 105 MMHG | DIASTOLIC BLOOD PRESSURE: 74 MMHG | WEIGHT: 154.1 LBS | RESPIRATION RATE: 18 BRPM | HEART RATE: 68 BPM | BODY MASS INDEX: 24.77 KG/M2 | OXYGEN SATURATION: 99 % | TEMPERATURE: 97.9 F

## 2025-07-13 LAB
ANION GAP SERPL CALCULATED.3IONS-SCNC: 13 MMOL/L (ref 7–16)
BASOPHILS # BLD: 0.05 K/UL (ref 0–0.2)
BASOPHILS NFR BLD: 1 % (ref 0–2)
BUN SERPL-MCNC: 31 MG/DL (ref 8–23)
CALCIUM SERPL-MCNC: 8.8 MG/DL (ref 8.8–10.2)
CHLORIDE SERPL-SCNC: 95 MMOL/L (ref 98–107)
CO2 SERPL-SCNC: 20 MMOL/L (ref 22–29)
CREAT SERPL-MCNC: 1.1 MG/DL (ref 0.7–1.2)
EOSINOPHIL # BLD: 0.37 K/UL (ref 0.05–0.5)
EOSINOPHILS RELATIVE PERCENT: 5 % (ref 0–6)
ERYTHROCYTE [DISTWIDTH] IN BLOOD BY AUTOMATED COUNT: 15.7 % (ref 11.5–15)
GFR, ESTIMATED: 67 ML/MIN/1.73M2
GLUCOSE SERPL-MCNC: 93 MG/DL (ref 74–99)
HCT VFR BLD AUTO: 36 % (ref 37–54)
HGB BLD-MCNC: 12.1 G/DL (ref 12.5–16.5)
IMM GRANULOCYTES # BLD AUTO: <0.03 K/UL (ref 0–0.58)
IMM GRANULOCYTES NFR BLD: 0 % (ref 0–5)
LYMPHOCYTES NFR BLD: 1.5 K/UL (ref 1.5–4)
LYMPHOCYTES RELATIVE PERCENT: 20 % (ref 20–42)
MCH RBC QN AUTO: 34.8 PG (ref 26–35)
MCHC RBC AUTO-ENTMCNC: 33.6 G/DL (ref 32–34.5)
MCV RBC AUTO: 103.4 FL (ref 80–99.9)
MICROORGANISM SPEC CULT: ABNORMAL
MONOCYTES NFR BLD: 0.85 K/UL (ref 0.1–0.95)
MONOCYTES NFR BLD: 11 % (ref 2–12)
NEUTROPHILS NFR BLD: 63 % (ref 43–80)
NEUTS SEG NFR BLD: 4.73 K/UL (ref 1.8–7.3)
PLATELET # BLD AUTO: 204 K/UL (ref 130–450)
PMV BLD AUTO: 9.1 FL (ref 7–12)
POTASSIUM SERPL-SCNC: 4.1 MMOL/L (ref 3.5–5.1)
RBC # BLD AUTO: 3.48 M/UL (ref 3.8–5.8)
SERVICE CMNT-IMP: ABNORMAL
SODIUM SERPL-SCNC: 128 MMOL/L (ref 136–145)
SODIUM SERPL-SCNC: 130 MMOL/L (ref 136–145)
SPECIMEN DESCRIPTION: ABNORMAL
WBC OTHER # BLD: 7.5 K/UL (ref 4.5–11.5)

## 2025-07-13 PROCEDURE — 80048 BASIC METABOLIC PNL TOTAL CA: CPT

## 2025-07-13 PROCEDURE — 6370000000 HC RX 637 (ALT 250 FOR IP): Performed by: NURSE PRACTITIONER

## 2025-07-13 PROCEDURE — 84295 ASSAY OF SERUM SODIUM: CPT

## 2025-07-13 PROCEDURE — 36415 COLL VENOUS BLD VENIPUNCTURE: CPT

## 2025-07-13 PROCEDURE — 99232 SBSQ HOSP IP/OBS MODERATE 35: CPT | Performed by: INTERNAL MEDICINE

## 2025-07-13 PROCEDURE — 2500000003 HC RX 250 WO HCPCS: Performed by: NURSE PRACTITIONER

## 2025-07-13 PROCEDURE — 85025 COMPLETE CBC W/AUTO DIFF WBC: CPT

## 2025-07-13 RX ORDER — FUROSEMIDE 40 MG/1
40 TABLET ORAL DAILY
Status: DISCONTINUED | OUTPATIENT
Start: 2025-07-14 | End: 2025-07-13 | Stop reason: HOSPADM

## 2025-07-13 RX ORDER — SPIRONOLACTONE 25 MG/1
12.5 TABLET ORAL DAILY
Qty: 15 TABLET | Refills: 3 | Status: SHIPPED | OUTPATIENT
Start: 2025-07-14

## 2025-07-13 RX ORDER — SPIRONOLACTONE 25 MG/1
12.5 TABLET ORAL DAILY
Status: DISCONTINUED | OUTPATIENT
Start: 2025-07-14 | End: 2025-07-13 | Stop reason: HOSPADM

## 2025-07-13 RX ORDER — FUROSEMIDE 40 MG/1
40 TABLET ORAL DAILY
Qty: 30 TABLET | Refills: 3 | Status: SHIPPED | OUTPATIENT
Start: 2025-07-14 | End: 2025-07-15

## 2025-07-13 RX ADMIN — I-VITE, TAB 1000-60-2MG (60/BT) 1 TABLET: TAB at 09:18

## 2025-07-13 RX ADMIN — Medication 500 MG: at 09:18

## 2025-07-13 RX ADMIN — SODIUM CHLORIDE, PRESERVATIVE FREE 10 ML: 5 INJECTION INTRAVENOUS at 09:18

## 2025-07-13 RX ADMIN — ASPIRIN 81 MG: 81 TABLET, CHEWABLE ORAL at 09:18

## 2025-07-13 RX ADMIN — VALSARTAN 20 MG: 40 TABLET ORAL at 09:17

## 2025-07-13 RX ADMIN — METOPROLOL SUCCINATE 50 MG: 50 TABLET, EXTENDED RELEASE ORAL at 09:17

## 2025-07-13 NOTE — PROGRESS NOTES
Repeat Sodium 130  Dr. Belcher updated and states, \"Renal stand point, ok for discharge... Diuretics as per cardio. No salt restrictions.\"  Xochilt RENE from cardio notified to see if pt is cleared for discharge from cardio standpoint.

## 2025-07-13 NOTE — PLAN OF CARE
Problem: Chronic Conditions and Co-morbidities  Goal: Patient's chronic conditions and co-morbidity symptoms are monitored and maintained or improved  Outcome: Completed     Problem: Discharge Planning  Goal: Discharge to home or other facility with appropriate resources  Outcome: Completed     Problem: Cardiovascular - Adult  Goal: Maintains optimal cardiac output and hemodynamic stability  Outcome: Completed  Goal: Absence of cardiac dysrhythmias or at baseline  Outcome: Completed     Problem: Skin/Tissue Integrity - Adult  Goal: Skin integrity remains intact  Outcome: Completed     Problem: Musculoskeletal - Adult  Goal: Return mobility to safest level of function  Outcome: Completed     Problem: Genitourinary - Adult  Goal: Absence of urinary retention  Outcome: Completed     Problem: Metabolic/Fluid and Electrolytes - Adult  Goal: Electrolytes maintained within normal limits  Outcome: Completed  Goal: Hemodynamic stability and optimal renal function maintained  Outcome: Completed     Problem: Safety - Adult  Goal: Free from fall injury  Outcome: Completed     Problem: Pain  Goal: Verbalizes/displays adequate comfort level or baseline comfort level  Outcome: Completed

## 2025-07-13 NOTE — PROGRESS NOTES
Department of Internal Medicine  Nephrology Attending progress Note      Reason for Consult:  Hyponatremia  Requesting Physician:  Dr Payen    CHIEF COMPLAINT:  None    History Obtained From:  patient, electronic medical record    HISTORY OF PRESENT ILLNESS:  H/O of CHF. End stage CHF. Had lab done recently. Showed hyponatremia. So sent to ER. Now admitted. Patient is asymptomatic.  Is on 3 diuretics and low salt diet.  Saw him in his room.    Past Medical History:        Diagnosis Date    Acute on chronic systolic congestive heart failure (HCC) 10/18/2018    Aortic regurgitation     trace    Arthritis     Atrial fibrillation ([facility])     CAD (coronary artery disease)     Cerebral artery occlusion with cerebral infarction ([facility]) 2020    no deficits    Hypercholesteremia     Hypertension     Ischemic cardiomyopathy     Mitral regurgitation     mild to moderate    STEMI (ST elevation myocardial infarction) ([facility]) 12/12/2012    anteriolateral     Past Surgical History:        Procedure Laterality Date    CARDIAC CATHETERIZATION  10/09/2023    Left and Right heart cath with Dr. Porter    CARDIAC DEFIBRILLATOR PLACEMENT Left 04/14/2023    [employer] Dual ICD  Dr. Ponce    CARDIAC PROCEDURE N/A 3/5/2025    TRANSCATHETER MITRAL VALVE REPAIR performed by Serafin Porter MD at [facility] [place]    CARDIAC PROCEDURE N/A 3/5/2025    TRANSCATHETER MITRAL VALVE REPAIR performed by Thony Gallegos MD at [facility] [place]    CORONARY ANGIOPLASTY  12/12/2012    Balloon only to LAD    CORONARY ARTERY BYPASS GRAFT  12/13/2012    cabg x 4- Dr. Bruno    CT BIOPSY BONE MARROW  12/21/2022    CT BONE MARROW BIOPSY 12/21/2022 REYMUNDO CT    DIAGNOSTIC CARDIAC CATH LAB PROCEDURE  12/12/2012    [facility] cath lab- Dr. Moreau    ECHO COMPL W DOP COLOR FLOW  12/12/2012         HIP FRACTURE SURGERY Right 05/02/2023    RIGHT HIP OPEN REDUCTION INTERNAL FIXATION performed by Kervin Davidson DO at [facility] [place]    TONSILLECTOMY       Current Medications:    Current Facility-Administered    Net -350 ml       Constitutional:  Well built. Well nourished. No distress  HEENT:  BEERLA. JVD not seen  Respiratory:  Bilateral rales   Cardiovascular/Edema:  HS muffled. SM  Gastrointestinal:  BS normal. No organomegaly  Neurologic:  A/Ox 3. No focal deficit.  Skin:  Dry skin. Normal turgor.  Other:  No edema.    DATA:    CBC:   Lab Results   Component Value Date/Time    WBC 7.5 07/13/2025 05:02 AM    RBC 3.48 07/13/2025 05:02 AM    HGB 12.1 07/13/2025 05:02 AM    HCT 36.0 07/13/2025 05:02 AM    .4 07/13/2025 05:02 AM    MCH 34.8 07/13/2025 05:02 AM    MCHC 33.6 07/13/2025 05:02 AM    RDW 15.7 07/13/2025 05:02 AM     07/13/2025 05:02 AM    MPV 9.1 07/13/2025 05:02 AM     CMP:    Lab Results   Component Value Date/Time     07/13/2025 05:02 AM    K 4.1 07/13/2025 05:02 AM    K 4.1 05/05/2023 05:59 AM    CL 95 07/13/2025 05:02 AM    CO2 20 07/13/2025 05:02 AM    BUN 31 07/13/2025 05:02 AM    CREATININE 1.1 07/13/2025 05:02 AM    GFRAA >60 11/30/2021 04:19 PM    LABGLOM 67 07/13/2025 05:02 AM    LABGLOM 68 04/08/2024 10:46 AM    LABGLOM 68 04/05/2023 12:00 AM    GLUCOSE 93 07/13/2025 05:02 AM    CALCIUM 8.8 07/13/2025 05:02 AM    BILITOT 0.8 07/11/2025 10:37 PM    ALKPHOS 71 07/11/2025 10:37 PM    AST 29 07/11/2025 10:37 PM    ALT 21 07/11/2025 10:37 PM       IMPRESSION/RECOMMENDATIONS:      Hyponatremia. Euvolemic. Normal ormolality.On threee diuretics and salt restriction.  CHG. Valvular heart disease. Low EF.   Pre renal azotemia.  - Hold diuretics.  - 4 gram salt diet.  - Check na in evening.  - Ok for discharge if na more then 129.  - Check Na at noon.  Discussed with RN.

## 2025-07-13 NOTE — FLOWSHEET NOTE
Patient refusing vital signs at this time stating \"I said I did not want to be waken up after 8pm\". This nurse did educate the patient about the importance of obtaining vital signs. Patient still refusing at this time.

## 2025-07-13 NOTE — PLAN OF CARE
Problem: Chronic Conditions and Co-morbidities  Goal: Patient's chronic conditions and co-morbidity symptoms are monitored and maintained or improved  Outcome: Progressing  Flowsheets (Taken 7/12/2025 2113)  Care Plan - Patient's Chronic Conditions and Co-Morbidity Symptoms are Monitored and Maintained or Improved:   Monitor and assess patient's chronic conditions and comorbid symptoms for stability, deterioration, or improvement   Collaborate with multidisciplinary team to address chronic and comorbid conditions and prevent exacerbation or deterioration   Update acute care plan with appropriate goals if chronic or comorbid symptoms are exacerbated and prevent overall improvement and discharge     Problem: Discharge Planning  Goal: Discharge to home or other facility with appropriate resources  Outcome: Progressing  Flowsheets (Taken 7/12/2025 2113)  Discharge to home or other facility with appropriate resources:   Identify barriers to discharge with patient and caregiver   Arrange for needed discharge resources and transportation as appropriate   Identify discharge learning needs (meds, wound care, etc)     Problem: Cardiovascular - Adult  Goal: Maintains optimal cardiac output and hemodynamic stability  Outcome: Progressing  Flowsheets (Taken 7/12/2025 2113)  Maintains optimal cardiac output and hemodynamic stability:   Monitor blood pressure and heart rate   Monitor urine output and notify Licensed Independent Practitioner for values outside of normal range  Goal: Absence of cardiac dysrhythmias or at baseline  Outcome: Progressing  Flowsheets (Taken 7/12/2025 2113)  Absence of cardiac dysrhythmias or at baseline:   Monitor cardiac rate and rhythm   Assess for signs of decreased cardiac output     Problem: Skin/Tissue Integrity - Adult  Goal: Skin integrity remains intact  Outcome: Progressing  Flowsheets (Taken 7/12/2025 2113)  Skin Integrity Remains Intact:   Monitor for areas of redness and/or skin

## 2025-07-13 NOTE — DISCHARGE INSTRUCTIONS
Hyponatremia: Care Instructions  Your Care Instructions     Hyponatremia (say \"sn-xk-koe-TREE-sameer-uh\") means that you don't have enough sodium in your blood. It can cause nausea, vomiting, and headaches. Or you may not feel hungry. In serious cases, it can cause seizures, a coma, or even death.  Hyponatremia is not a disease. It is a problem caused by something else, such as medicines or exercising for a long time in hot weather.  You can get hyponatremia if you lose a lot of fluids and then you drink a lot of water or other liquids that don't have much sodium. You can also get it if you have kidney, liver, heart, or other health problems.  Treatment is focused on getting your sodium levels back to normal.  Follow-up care is a key part of your treatment and safety. Be sure to make and go to all appointments, and call your doctor if you are having problems. It's also a good idea to know your test results and keep a list of the medicines you take.  How can you care for yourself at home?  If your doctor recommends it, drink fluids that have sodium. Sports drinks are a good choice. Or you can eat salty foods.  If your doctor recommends it, limit the amount of water you drink. And limit fluids that are mostly water. These include tea, coffee, and juice.  Take your medicines exactly as prescribed. Call your doctor if you have any problems with your medicine.  Get your sodium levels tested when your doctor tells you to.  When should you call for help?   Call 911 anytime you think you may need emergency care. For example, call if:    You have a seizure.     You passed out (lost consciousness).   Call your doctor now or seek immediate medical care if:    You are confused or it is hard to focus.     You have little or no appetite.     You feel sick to your stomach or you vomit.     You have a headache.     You have mood changes.     You feel more tired than usual.   Watch closely for changes in your health, and be sure to

## 2025-07-13 NOTE — PROGRESS NOTES
[facility]        CARDIOLOGY                 INPATIENT PROGRESS NOTE          PATIENT SEEN IN FOLLOW UP FOR: CHF, Hyponatremia    Hospital Day: 2     Enrique Gonzalez is a 80 year old patient known to Dr. Moreau      SUBJECTIVE: Denies any Cp, breathing better, No dizzy. No distress.    ROS: Review of rest of 10 systems negative except as mentioned above    OBJECTIVE: No acute distress.  See Assessment     Diagnostics:       Telemetry: Reviewed    TTE: 4/6/2025:   Left Ventricle: Severel reduced left ventricular systolic function with a visually estimated EF of 20 - 25%. Left ventricle is severely dilated. Normal wall thickness. Severe global hypokinesis present with some regional variation. Abnormal diastolic function. Right Ventricle: Right ventricle is severely dilated. Lead present in the right ventricle. Reduced systolic function. Aortic Valve: Mild regurgitation. Mitral Valve: Valve repaired by a single XTW MitraClip 3/2025. Moderate to severe regurgitation with a posterior directed jet. No stenosis noted. MV mean gradient is 2 mmHg. Tricuspid Valve: Mild regurgitation. The estimated RVSP is 60 mmHg Left Atrium: Left atrium is severely dilated.  Interatrial Septum: ASD present, (Iatrogenic ASD) with a left to right shunt. Right Atrium: Multiple leads present in the right atrium. Right atrium is severely dilated.  Image quality is adequate. Contrast used: Lumason.        Intake/Output Summary (Last 24 hours) at 7/13/2025 1441  Last data filed at 7/13/2025 1423  Gross per 24 hour   Intake 1350 ml   Output 1730 ml   Net -380 ml       Labs:   CBC:   Recent Labs     07/11/25  2119 07/13/25  0502   WBC 8.0 7.5   HGB 13.0 12.1*   HCT 36.3* 36.0*    204     BMP:   Recent Labs     07/11/25  2237 07/13/25  0502 07/13/25  1215   * 128* 130*   K 4.0 4.1  --    CO2 22 20*  --    BUN 32* 31*  --    CREATININE 1.1 1.1  --    LABGLOM 70 67  --    CALCIUM 8.8 8.8  --      Mag:   Recent  sounds present.    - Deferred  SKIN: Warm and dry   NEURO: Oriented to person, place            IMPRESSION / RECOMMENDATIONS:     Hyponatremia - Better. Seen by Nephrology. Resume diuretics and water restriction discussed.     Acute on chronic HFrEF (HCC) - Resume diuretics - Lasix 40mg daily. Monitor daily weights, I/Os, BP, electrolytes and Renal function. Low salt diet and medication compliance discussed. He goes to CHF clinic     CAD - s/p CABG - Continue ASA, BB, Statin. No CP.     VHD - s/p MV clip - Echo April 2025 showed mod to severe MR. Seen by Valve clinic 7/3/25 - Out-pt [facility] scheduled next month.     Ischemic CMP - GDMT (BB, ARB, MRA) as his BP, renal Fn tolerates. Spironolactone held, resume at 12.5mg daily                OK to discharge home      D/w Nephrology, Dr Belcher, yesterday    F/u with Dr Moreau 2-3 weeks    Above d/w him      Electronically signed by Lakisha Ward MD on 7/13/2025 at 2:41 PM  [facility]

## 2025-07-13 NOTE — PLAN OF CARE
Problem: Chronic Conditions and Co-morbidities  Goal: Patient's chronic conditions and co-morbidity symptoms are monitored and maintained or improved  7/13/2025 0111 by Huong Beard RN  Outcome: Progressing  Flowsheets (Taken 7/12/2025 2345)  Care Plan - Patient's Chronic Conditions and Co-Morbidity Symptoms are Monitored and Maintained or Improved: Monitor and assess patient's chronic conditions and comorbid symptoms for stability, deterioration, or improvement  7/12/2025 2113 by Peter Flores, RN  Outcome: Progressing  Flowsheets (Taken 7/12/2025 2113)  Care Plan - Patient's Chronic Conditions and Co-Morbidity Symptoms are Monitored and Maintained or Improved:   Monitor and assess patient's chronic conditions and comorbid symptoms for stability, deterioration, or improvement   Collaborate with multidisciplinary team to address chronic and comorbid conditions and prevent exacerbation or deterioration   Update acute care plan with appropriate goals if chronic or comorbid symptoms are exacerbated and prevent overall improvement and discharge     Problem: Discharge Planning  Goal: Discharge to home or other facility with appropriate resources  7/13/2025 0111 by Huong Beard RN  Outcome: Progressing  Flowsheets (Taken 7/12/2025 2345)  Discharge to home or other facility with appropriate resources: Identify barriers to discharge with patient and caregiver  7/12/2025 2113 by Peter Flores RN  Outcome: Progressing  Flowsheets (Taken 7/12/2025 2113)  Discharge to home or other facility with appropriate resources:   Identify barriers to discharge with patient and caregiver   Arrange for needed discharge resources and transportation as appropriate   Identify discharge learning needs (meds, wound care, etc)     Problem: Cardiovascular - Adult  Goal: Maintains optimal cardiac output and hemodynamic stability  7/13/2025 0111 by Huong Beard RN  Outcome: Progressing  Flowsheets (Taken 7/12/2025 2345)  Maintains

## 2025-07-13 NOTE — DISCHARGE SUMMARY
[facility]   Discharge summary   Patient ID:  Enrique Gonzalez  [id number]  80 y.o.  1945    Admit date: 7/12/2025    Discharge date and time: 7/13/2025    Admission Diagnoses:   Patient Active Problem List   Diagnosis    CAD (coronary artery disease)    Ischemic cardiomyopathy    Aortic regurgitation    Pure hypercholesterolemia    Nonrheumatic mitral valve regurgitation    Acute respiratory failure (HCC)    Hyperlipidemia LDL goal <100    Primary hypertension    NICM (nonischemic cardiomyopathy) (HCC)    Acute CVA (cerebrovascular accident) (HCC)    Noncompliance    TIA (transient ischemic attack)    Cardiac resynchronization therapy defibrillator (CRT-D) in place    Falls, initial encounter    Closed right hip fracture, initial encounter (HCC)    Chronic HFrEF (heart failure with reduced ejection fraction) (HCC)    Acute on chronic congestive heart failure (HCC)    Mitral valve disease    Fluid overload    Congestive heart failure (HCC)    Paroxysmal atrial fibrillation (HCC)    Ventricular tachycardia (HCC)    Stage 3a chronic kidney disease (HCC)    History of cerebrovascular accident    Severe mitral regurgitation by prior echocardiogram    Status post implantation of mitral valve leaflet clip    Hyponatremia       Discharge Diagnoses: Hyponatremia [E87.1]    Consults: IP CONSULT TO NEPHROLOGY  IP CONSULT TO CARDIOLOGY    Procedures: None    Hospital Course: The patient is a 80 y.o. male of Peter Campos Jr., MD  has a past medical history of Acute on chronic systolic congestive heart failure (HCC), Aortic regurgitation, Arthritis, Atrial fibrillation (HCC), CAD (coronary artery disease), Cerebral artery occlusion with cerebral infarction (HCC), Hypercholesteremia, Hypertension, Ischemic cardiomyopathy, Mitral regurgitation, and STEMI (ST elevation myocardial infarction) (HCC) who presents with abnormal lab.  Initially presented at [place] ER with sodium of 125.     Patient was  review and correction

## 2025-07-15 RX ORDER — FUROSEMIDE 20 MG/1
40 TABLET ORAL DAILY
COMMUNITY
End: 2025-07-15 | Stop reason: SDUPTHER

## 2025-07-15 RX ORDER — METOPROLOL SUCCINATE 50 MG/1
50 TABLET, EXTENDED RELEASE ORAL DAILY
Qty: 90 TABLET | Refills: 3 | Status: SHIPPED | OUTPATIENT
Start: 2025-07-15

## 2025-07-15 RX ORDER — FUROSEMIDE 20 MG/1
40 TABLET ORAL DAILY
Qty: 180 TABLET | Refills: 3 | Status: SHIPPED | OUTPATIENT
Start: 2025-07-15

## 2025-07-24 ENCOUNTER — TELEPHONE (OUTPATIENT)
Dept: CARDIOLOGY CLINIC | Age: 80
End: 2025-07-24

## 2025-07-24 ENCOUNTER — HOSPITAL ENCOUNTER (OUTPATIENT)
Dept: OTHER | Age: 80
Setting detail: THERAPIES SERIES
Discharge: HOME | End: 2025-07-24
Payer: MEDICARE

## 2025-07-24 VITALS
OXYGEN SATURATION: 99 % | DIASTOLIC BLOOD PRESSURE: 66 MMHG | WEIGHT: 158 LBS | SYSTOLIC BLOOD PRESSURE: 109 MMHG | RESPIRATION RATE: 18 BRPM | HEART RATE: 74 BPM | BODY MASS INDEX: 25.5 KG/M2

## 2025-07-24 LAB
ANION GAP SERPL CALCULATED.3IONS-SCNC: 11 MMOL/L (ref 7–16)
BNP SERPL-MCNC: 3346 PG/ML (ref 0–450)
BUN SERPL-MCNC: 27 MG/DL (ref 8–23)
CALCIUM SERPL-MCNC: 9.2 MG/DL (ref 8.8–10.2)
CHLORIDE SERPL-SCNC: 98 MMOL/L (ref 98–107)
CO2 SERPL-SCNC: 24 MMOL/L (ref 22–29)
CREAT SERPL-MCNC: 1.3 MG/DL (ref 0.7–1.2)
GFR, ESTIMATED: 57 ML/MIN/1.73M2
GLUCOSE SERPL-MCNC: 101 MG/DL (ref 74–99)
POTASSIUM SERPL-SCNC: 4.7 MMOL/L (ref 3.5–5.1)
SODIUM SERPL-SCNC: 133 MMOL/L (ref 136–145)

## 2025-07-24 PROCEDURE — 80048 BASIC METABOLIC PNL TOTAL CA: CPT

## 2025-07-24 PROCEDURE — 36415 COLL VENOUS BLD VENIPUNCTURE: CPT

## 2025-07-24 PROCEDURE — 99214 OFFICE O/P EST MOD 30 MIN: CPT

## 2025-07-24 PROCEDURE — 83880 ASSAY OF NATRIURETIC PEPTIDE: CPT

## 2025-07-24 PROCEDURE — 96374 THER/PROPH/DIAG INJ IV PUSH: CPT

## 2025-07-24 RX ORDER — SODIUM CHLORIDE 0.9 % (FLUSH) 0.9 %
10 SYRINGE (ML) INJECTION ONCE
Status: DISCONTINUED | OUTPATIENT
Start: 2025-07-24 | End: 2025-07-25 | Stop reason: HOSPADM

## 2025-07-24 RX ORDER — FUROSEMIDE 10 MG/ML
40 INJECTION INTRAMUSCULAR; INTRAVENOUS ONCE
Status: DISCONTINUED | OUTPATIENT
Start: 2025-07-24 | End: 2025-07-25 | Stop reason: HOSPADM

## 2025-07-24 RX ORDER — FUROSEMIDE 10 MG/ML
INJECTION INTRAMUSCULAR; INTRAVENOUS
Status: DISPENSED
Start: 2025-07-24 | End: 2025-07-24

## 2025-07-24 NOTE — TELEPHONE ENCOUNTER
----- Message from Dr. Luis Antonio Moreau DO sent at 7/24/2025 12:31 PM EDT -----  Regarding: FW: Med ques  Should check with nephrology.  ----- Message -----  From: Lynne Maloney RN  Sent: 7/24/2025  12:17 PM EDT  To: Luis Antonio Moreau DO; Bianka Hernandez  Subject: Med ques                                         Dr. Moreau patient recently discharge from hosp last wk with hyponatremia. Metolazone stopped and spironolactone was decreased to 12.5 mg QD.       Seen in clinic today. Hypervolemic. Patient reports he was unaware of change of spironolactone and has been taking 25 mg QD.       He wanted me to check with you: (here are today's labs)        07/24/25 09:20  Sodium: 133 (L)  Potassium: 4.7  Chloride: 98  CARBON DIOXIDE: 24  BUN,BUNPL: 27 (H)  Creatinine: 1.3 (H)  Anion Gap: 11  Est, Glom Filt Rate: 57 (L)  Glucose: 101 (H)  Calcium: 9.2  NT Pro-BNP: 3,346 (H)      (L): Data is abnormally low  (H): Data is abnormally high    Please advise..  TY!     Lynne Maloney RN

## 2025-07-24 NOTE — PROGRESS NOTES
hospital at 12.5 mg QD.       [x]Lab work obtained    [x] Patient/Family Educated On:  [] HF zones (Green, Yellow, Red) and aware of when to take action   [x] Daily weights  [] Scale provided   [x] Low sodium diet (2000 mg)  Barriers to compliance  [] Refuses to monitor diet  [] Socioeconomic difficulties  [] Unable to cook for self (use of frozen meals, can goods, etc)  [] CHF CHW consulted  [] Low sodium meal delivery options given to patient  [] Dietician consulted   [] Low sodium recipes provided  [] Sodium free seasoning provided   [x] Fluid intake 6-8 cups (around 64 oz)  [x] Reviewed currently prescribed medications with patient, educated on importance of compliance and answered any questions regarding their medication  [] Pill box provided to patient  [] Patient using pill packing pharmacy   [] CPAP/BiPAP use  [] Low impact exercise / cardiac rehab   [] LifeVest use  [x] Patient aware of signs and symptoms of worsening HF, CHF clinic phone number provided and made aware to call clinic for sooner if evaluation if needed     [x] Difficulty affording medications   [x] CHF   [x] Prescription assistance information   [] [facility] medication assistance program information given   [] Sample medications provided to patient to help bridge gap until affordability N/A            Scheduled to follow up in CHF clinic on:   Future Appointments   Date Time Provider Department Center   8/7/2025  8:45 AM REYMUNDO [facility] ROOM 4 [facility]   8/27/2025 11:30 AM SEY ECHO 2 SEYZ SINDY [facility] Rad/Car   8/28/2025 10:20 AM Luis Antonio Moreau DO [place] [facility]   3/3/2026 11:00 AM SEY YNG ECHO 1 SEYZ CARDIO [facility] Rad/Car   3/3/2026 12:00 [facility]

## 2025-07-27 NOTE — PROGRESS NOTES
Physician Progress Note      PATIENT:               FRANCHESCA LYMAN  [facility] #:                  [id number]  :                       1945  ADMIT DATE:       2025 10:18 AM  DISCH DATE:        2025 6:21 PM  RESPONDING  PROVIDER #:        Ivan Payne DO          QUERY TEXT:    Acute on chronic CHF is documented in the Consults by Lakisha Ward MD   at 2025.  Please provide additional clinical indicators supportive of   your documentation. Or please document if the diagnosis of acute on chronic   CHF has been ruled out after study.    The clinical indicators include:  -Chest x-ray: 2025: No acute process. Stable cardiomegaly.  -\"Has a known history of acute on chronic CHF with EF of 20 to 25%.\" ...\"On   exam, patient appeared not in acute distress.  Denies chest pain or shortness   of breath\" (H&P by Ivan Payne DO at 2025 10:27 AM)  -\"Neck- No stridor, +jugular venous distention. RESPIRATORY: Lung sounds - Few   rhonchi, diminished at bases. CARDIOVASCULAR: Regular rate, 2-3/6 systolic   murmur. EXT: Trace lower extremity edema.\" ...\"Acute on chronic HFrEF (HCC) -   Resume 1 diuretics once Sodium >128. Monitor daily weights, I/Os, BP,   electrolytes and Renal function. Low salt diet and medication compliance   discussed.\" (Consults by Lakisha Ward MD at 2025)  Options provided:  -- Acute on chronic systolic CHF currently as evidenced by, Please document   evidence.  -- Acute systolic CHF ruled out after study and chronic systolic CHF confirmed  -- Other - I will add my own diagnosis  -- Disagree - Not applicable / Not valid  -- Disagree - Clinically unable to determine / Unknown  -- Refer to Clinical Documentation Reviewer    PROVIDER RESPONSE TEXT:    Acute systolic CHF has been ruled out after study and chronic systolic CHF   confirmed.    Query created by: Hesham Bettencourt on 2025 3:08 PM      Electronically signed by:  Ivan Payne DO 2025 2:05 PM

## 2025-07-31 ENCOUNTER — TELEPHONE (OUTPATIENT)
Dept: FAMILY MEDICINE CLINIC | Age: 80
End: 2025-07-31

## 2025-08-07 ENCOUNTER — HOSPITAL ENCOUNTER (OUTPATIENT)
Dept: OTHER | Age: 80
Setting detail: THERAPIES SERIES
Discharge: HOME | End: 2025-08-07
Payer: MEDICARE

## 2025-08-07 VITALS
RESPIRATION RATE: 16 BRPM | BODY MASS INDEX: 25.82 KG/M2 | OXYGEN SATURATION: 98 % | WEIGHT: 160 LBS | SYSTOLIC BLOOD PRESSURE: 111 MMHG | DIASTOLIC BLOOD PRESSURE: 71 MMHG | HEART RATE: 75 BPM

## 2025-08-07 LAB
ANION GAP SERPL CALCULATED.3IONS-SCNC: 14 MMOL/L (ref 7–16)
BNP SERPL-MCNC: 3635 PG/ML (ref 0–450)
BUN SERPL-MCNC: 28 MG/DL (ref 8–23)
CALCIUM SERPL-MCNC: 9.1 MG/DL (ref 8.8–10.2)
CHLORIDE SERPL-SCNC: 101 MMOL/L (ref 98–107)
CO2 SERPL-SCNC: 21 MMOL/L (ref 22–29)
CREAT SERPL-MCNC: 1.3 MG/DL (ref 0.7–1.2)
GFR, ESTIMATED: 54 ML/MIN/1.73M2
GLUCOSE SERPL-MCNC: 119 MG/DL (ref 74–99)
POTASSIUM SERPL-SCNC: 4.2 MMOL/L (ref 3.5–5.1)
SODIUM SERPL-SCNC: 136 MMOL/L (ref 136–145)

## 2025-08-07 PROCEDURE — 6360000002 HC RX W HCPCS: Performed by: INTERNAL MEDICINE

## 2025-08-07 PROCEDURE — 83880 ASSAY OF NATRIURETIC PEPTIDE: CPT

## 2025-08-07 PROCEDURE — 99214 OFFICE O/P EST MOD 30 MIN: CPT

## 2025-08-07 PROCEDURE — 80048 BASIC METABOLIC PNL TOTAL CA: CPT

## 2025-08-07 PROCEDURE — 36415 COLL VENOUS BLD VENIPUNCTURE: CPT

## 2025-08-07 PROCEDURE — 2500000003 HC RX 250 WO HCPCS: Performed by: INTERNAL MEDICINE

## 2025-08-07 PROCEDURE — 96374 THER/PROPH/DIAG INJ IV PUSH: CPT

## 2025-08-07 RX ORDER — FUROSEMIDE 10 MG/ML
INJECTION INTRAMUSCULAR; INTRAVENOUS
Status: DISPENSED
Start: 2025-08-07 | End: 2025-08-07

## 2025-08-07 RX ORDER — SODIUM CHLORIDE 0.9 % (FLUSH) 0.9 %
10 SYRINGE (ML) INJECTION ONCE
Status: COMPLETED | OUTPATIENT
Start: 2025-08-07 | End: 2025-08-07

## 2025-08-07 RX ORDER — FUROSEMIDE 10 MG/ML
40 INJECTION INTRAMUSCULAR; INTRAVENOUS ONCE
Status: COMPLETED | OUTPATIENT
Start: 2025-08-07 | End: 2025-08-07

## 2025-08-07 RX ADMIN — FUROSEMIDE 40 MG: 10 INJECTION, SOLUTION INTRAMUSCULAR; INTRAVENOUS at 09:07

## 2025-08-07 RX ADMIN — SODIUM CHLORIDE, PRESERVATIVE FREE 10 ML: 5 INJECTION INTRAVENOUS at 09:08

## 2025-08-18 ENCOUNTER — TELEPHONE (OUTPATIENT)
Dept: CARDIOLOGY CLINIC | Age: 80
End: 2025-08-18

## 2025-08-26 ENCOUNTER — TELEPHONE (OUTPATIENT)
Age: 80
End: 2025-08-26

## 2025-08-26 ENCOUNTER — HOSPITAL ENCOUNTER (OUTPATIENT)
Dept: OTHER | Age: 80
Setting detail: THERAPIES SERIES
Discharge: HOME | End: 2025-08-26
Payer: MEDICARE

## 2025-08-26 VITALS
OXYGEN SATURATION: 100 % | RESPIRATION RATE: 16 BRPM | HEART RATE: 77 BPM | BODY MASS INDEX: 25.66 KG/M2 | SYSTOLIC BLOOD PRESSURE: 115 MMHG | DIASTOLIC BLOOD PRESSURE: 57 MMHG | WEIGHT: 159 LBS

## 2025-08-26 LAB
ANION GAP SERPL CALCULATED.3IONS-SCNC: 12 MMOL/L (ref 7–16)
BNP SERPL-MCNC: 2688 PG/ML (ref 0–450)
BUN SERPL-MCNC: 30 MG/DL (ref 8–23)
CALCIUM SERPL-MCNC: 9.1 MG/DL (ref 8.8–10.2)
CHLORIDE SERPL-SCNC: 100 MMOL/L (ref 98–107)
CO2 SERPL-SCNC: 23 MMOL/L (ref 22–29)
CREAT SERPL-MCNC: 1.2 MG/DL (ref 0.7–1.2)
GFR, ESTIMATED: 63 ML/MIN/1.73M2
GLUCOSE SERPL-MCNC: 112 MG/DL (ref 74–99)
POTASSIUM SERPL-SCNC: 4.2 MMOL/L (ref 3.5–5.1)
SODIUM SERPL-SCNC: 135 MMOL/L (ref 136–145)

## 2025-08-26 PROCEDURE — 2500000003 HC RX 250 WO HCPCS: Performed by: NURSE PRACTITIONER

## 2025-08-26 PROCEDURE — 36415 COLL VENOUS BLD VENIPUNCTURE: CPT

## 2025-08-26 PROCEDURE — 96374 THER/PROPH/DIAG INJ IV PUSH: CPT

## 2025-08-26 PROCEDURE — 6360000002 HC RX W HCPCS

## 2025-08-26 PROCEDURE — 80048 BASIC METABOLIC PNL TOTAL CA: CPT

## 2025-08-26 PROCEDURE — 83880 ASSAY OF NATRIURETIC PEPTIDE: CPT

## 2025-08-26 PROCEDURE — 99214 OFFICE O/P EST MOD 30 MIN: CPT

## 2025-08-26 RX ORDER — SODIUM CHLORIDE 0.9 % (FLUSH) 0.9 %
10 SYRINGE (ML) INJECTION ONCE
Status: COMPLETED | OUTPATIENT
Start: 2025-08-26 | End: 2025-08-26

## 2025-08-26 RX ORDER — FUROSEMIDE 10 MG/ML
40 INJECTION INTRAMUSCULAR; INTRAVENOUS ONCE
Status: COMPLETED | OUTPATIENT
Start: 2025-08-26 | End: 2025-08-26

## 2025-08-26 RX ORDER — FUROSEMIDE 10 MG/ML
INJECTION INTRAMUSCULAR; INTRAVENOUS
Status: COMPLETED
Start: 2025-08-26 | End: 2025-08-26

## 2025-08-26 RX ADMIN — SODIUM CHLORIDE, PRESERVATIVE FREE 10 ML: 5 INJECTION INTRAVENOUS at 09:46

## 2025-08-26 RX ADMIN — FUROSEMIDE 40 MG: 10 INJECTION INTRAMUSCULAR; INTRAVENOUS at 09:45

## 2025-08-26 RX ADMIN — FUROSEMIDE 40 MG: 10 INJECTION, SOLUTION INTRAMUSCULAR; INTRAVENOUS at 09:45

## 2025-08-27 ENCOUNTER — ANESTHESIA (OUTPATIENT)
Age: 80
End: 2025-08-27
Payer: MEDICARE

## 2025-08-27 ENCOUNTER — HOSPITAL ENCOUNTER (OUTPATIENT)
Age: 80
Discharge: HOME | End: 2025-08-29
Payer: MEDICARE

## 2025-08-27 ENCOUNTER — ANESTHESIA EVENT (OUTPATIENT)
Age: 80
End: 2025-08-27
Payer: MEDICARE

## 2025-08-27 VITALS
TEMPERATURE: 97 F | DIASTOLIC BLOOD PRESSURE: 73 MMHG | SYSTOLIC BLOOD PRESSURE: 106 MMHG | BODY MASS INDEX: 23.14 KG/M2 | WEIGHT: 144 LBS | OXYGEN SATURATION: 99 % | RESPIRATION RATE: 18 BRPM | HEIGHT: 66 IN | HEART RATE: 86 BPM

## 2025-08-27 DIAGNOSIS — I34.0 NONRHEUMATIC MITRAL VALVE REGURGITATION: ICD-10-CM

## 2025-08-27 LAB
ECHO BSA: 1.74 M2
ECHO MV AREA PHT: 2.2 CM2
ECHO MV EROA PISA: 0.2 CM2
ECHO MV MAX VELOCITY: 1.4 M/S
ECHO MV MEAN GRADIENT: 4 MMHG
ECHO MV MEAN VELOCITY: 1 M/S
ECHO MV PEAK GRADIENT: 8 MMHG
ECHO MV PRESSURE HALF TIME (PHT): 98.7 MS
ECHO MV REGURGITANT ALIASING (NYQUIST) VELOCITY: 39 CM/S
ECHO MV REGURGITANT RADIUS PISA: 0.58 CM
ECHO MV REGURGITANT VELOCITY PISA: 4.4 M/S
ECHO MV REGURGITANT VOLUME PISA: 28.14 ML
ECHO MV REGURGITANT VTIA: 150.3 CM
ECHO MV VTI: 40.4 CM

## 2025-08-27 PROCEDURE — 2580000003 HC RX 258: Performed by: INTERNAL MEDICINE

## 2025-08-27 PROCEDURE — 3700000001 HC ADD 15 MINUTES (ANESTHESIA)

## 2025-08-27 PROCEDURE — 7100000011 HC PHASE II RECOVERY - ADDTL 15 MIN

## 2025-08-27 PROCEDURE — 2500000003 HC RX 250 WO HCPCS

## 2025-08-27 PROCEDURE — 6360000002 HC RX W HCPCS

## 2025-08-27 PROCEDURE — 3700000000 HC ANESTHESIA ATTENDED CARE

## 2025-08-27 PROCEDURE — 7100000010 HC PHASE II RECOVERY - FIRST 15 MIN

## 2025-08-27 PROCEDURE — 93312 ECHO TRANSESOPHAGEAL: CPT

## 2025-08-27 RX ORDER — SODIUM CHLORIDE 9 MG/ML
INJECTION, SOLUTION INTRAVENOUS CONTINUOUS
Status: DISCONTINUED | OUTPATIENT
Start: 2025-08-27 | End: 2025-08-30 | Stop reason: HOSPADM

## 2025-08-27 RX ORDER — ETOMIDATE 2 MG/ML
INJECTION INTRAVENOUS
Status: DISCONTINUED | OUTPATIENT
Start: 2025-08-27 | End: 2025-08-27 | Stop reason: SDUPTHER

## 2025-08-27 RX ORDER — GLYCOPYRROLATE 1 MG/5 ML
SYRINGE (ML) INTRAVENOUS
Status: DISCONTINUED | OUTPATIENT
Start: 2025-08-27 | End: 2025-08-27 | Stop reason: SDUPTHER

## 2025-08-27 RX ORDER — PROPOFOL 10 MG/ML
INJECTION, EMULSION INTRAVENOUS
Status: DISCONTINUED | OUTPATIENT
Start: 2025-08-27 | End: 2025-08-27 | Stop reason: SDUPTHER

## 2025-08-27 RX ADMIN — ETOMIDATE 2 MG: 2 INJECTION, SOLUTION INTRAVENOUS at 11:58

## 2025-08-27 RX ADMIN — ETOMIDATE 8 MG: 2 INJECTION, SOLUTION INTRAVENOUS at 11:53

## 2025-08-27 RX ADMIN — ETOMIDATE 2 MG: 2 INJECTION, SOLUTION INTRAVENOUS at 12:07

## 2025-08-27 RX ADMIN — SODIUM CHLORIDE: 9 INJECTION, SOLUTION INTRAVENOUS at 11:22

## 2025-08-27 RX ADMIN — PROPOFOL 20 MG: 10 INJECTION, EMULSION INTRAVENOUS at 12:07

## 2025-08-27 RX ADMIN — PROPOFOL 20 MG: 10 INJECTION, EMULSION INTRAVENOUS at 11:53

## 2025-08-27 RX ADMIN — ETOMIDATE 2 MG: 2 INJECTION, SOLUTION INTRAVENOUS at 12:16

## 2025-08-27 RX ADMIN — ETOMIDATE 2 MG: 2 INJECTION, SOLUTION INTRAVENOUS at 12:11

## 2025-08-27 RX ADMIN — PROPOFOL 30 MG: 10 INJECTION, EMULSION INTRAVENOUS at 12:16

## 2025-08-27 RX ADMIN — Medication 0.2 MG: at 11:53

## 2025-08-27 ASSESSMENT — PAIN SCALES - GENERAL: PAINLEVEL_OUTOF10: 0

## 2025-08-28 ENCOUNTER — OFFICE VISIT (OUTPATIENT)
Dept: CARDIOLOGY CLINIC | Age: 80
End: 2025-08-28
Payer: MEDICARE

## 2025-08-28 VITALS
TEMPERATURE: 96.9 F | HEIGHT: 66 IN | RESPIRATION RATE: 18 BRPM | OXYGEN SATURATION: 98 % | DIASTOLIC BLOOD PRESSURE: 68 MMHG | HEART RATE: 72 BPM | WEIGHT: 157.4 LBS | SYSTOLIC BLOOD PRESSURE: 100 MMHG | BODY MASS INDEX: 25.3 KG/M2

## 2025-08-28 DIAGNOSIS — I25.83 CORONARY ARTERY DISEASE DUE TO LIPID RICH PLAQUE: ICD-10-CM

## 2025-08-28 DIAGNOSIS — I25.5 ISCHEMIC CARDIOMYOPATHY: ICD-10-CM

## 2025-08-28 DIAGNOSIS — I34.0 NONRHEUMATIC MITRAL VALVE REGURGITATION: ICD-10-CM

## 2025-08-28 DIAGNOSIS — I25.10 CORONARY ARTERY DISEASE DUE TO LIPID RICH PLAQUE: ICD-10-CM

## 2025-08-28 DIAGNOSIS — I48.0 PAROXYSMAL ATRIAL FIBRILLATION (HCC): Primary | ICD-10-CM

## 2025-08-28 PROCEDURE — 1123F ACP DISCUSS/DSCN MKR DOCD: CPT | Performed by: INTERNAL MEDICINE

## 2025-08-28 PROCEDURE — 99214 OFFICE O/P EST MOD 30 MIN: CPT | Performed by: INTERNAL MEDICINE

## 2025-08-28 PROCEDURE — 3078F DIAST BP <80 MM HG: CPT | Performed by: INTERNAL MEDICINE

## 2025-08-28 PROCEDURE — G8427 DOCREV CUR MEDS BY ELIG CLIN: HCPCS | Performed by: INTERNAL MEDICINE

## 2025-08-28 PROCEDURE — G2211 COMPLEX E/M VISIT ADD ON: HCPCS | Performed by: INTERNAL MEDICINE

## 2025-08-28 PROCEDURE — 4004F PT TOBACCO SCREEN RCVD TLK: CPT | Performed by: INTERNAL MEDICINE

## 2025-08-28 PROCEDURE — 3074F SYST BP LT 130 MM HG: CPT | Performed by: INTERNAL MEDICINE

## 2025-08-28 PROCEDURE — G8417 CALC BMI ABV UP PARAM F/U: HCPCS | Performed by: INTERNAL MEDICINE

## 2025-08-28 PROCEDURE — 1159F MED LIST DOCD IN RCRD: CPT | Performed by: INTERNAL MEDICINE

## 2025-08-28 PROCEDURE — 93000 ELECTROCARDIOGRAM COMPLETE: CPT | Performed by: INTERNAL MEDICINE

## 2025-09-04 ENCOUNTER — OFFICE VISIT (OUTPATIENT)
Dept: CARDIOTHORACIC SURGERY | Age: 80
End: 2025-09-04
Payer: MEDICARE

## 2025-09-04 ENCOUNTER — OFFICE VISIT (OUTPATIENT)
Dept: CARDIOLOGY CLINIC | Age: 80
End: 2025-09-04
Payer: MEDICARE

## 2025-09-04 VITALS — OXYGEN SATURATION: 97 % | DIASTOLIC BLOOD PRESSURE: 65 MMHG | SYSTOLIC BLOOD PRESSURE: 118 MMHG | HEART RATE: 62 BPM

## 2025-09-04 DIAGNOSIS — I34.0 SEVERE MITRAL REGURGITATION: Primary | ICD-10-CM

## 2025-09-04 DIAGNOSIS — I34.0 NONRHEUMATIC MITRAL VALVE REGURGITATION: Primary | ICD-10-CM

## 2025-09-04 PROCEDURE — G8428 CUR MEDS NOT DOCUMENT: HCPCS | Performed by: THORACIC SURGERY (CARDIOTHORACIC VASCULAR SURGERY)

## 2025-09-04 PROCEDURE — 99214 OFFICE O/P EST MOD 30 MIN: CPT | Performed by: THORACIC SURGERY (CARDIOTHORACIC VASCULAR SURGERY)

## 2025-09-04 PROCEDURE — G2211 COMPLEX E/M VISIT ADD ON: HCPCS | Performed by: INTERNAL MEDICINE

## 2025-09-04 PROCEDURE — 3074F SYST BP LT 130 MM HG: CPT | Performed by: INTERNAL MEDICINE

## 2025-09-04 PROCEDURE — 99214 OFFICE O/P EST MOD 30 MIN: CPT | Performed by: INTERNAL MEDICINE

## 2025-09-04 PROCEDURE — 1159F MED LIST DOCD IN RCRD: CPT | Performed by: THORACIC SURGERY (CARDIOTHORACIC VASCULAR SURGERY)

## 2025-09-04 PROCEDURE — G8417 CALC BMI ABV UP PARAM F/U: HCPCS | Performed by: THORACIC SURGERY (CARDIOTHORACIC VASCULAR SURGERY)

## 2025-09-04 PROCEDURE — 1123F ACP DISCUSS/DSCN MKR DOCD: CPT | Performed by: THORACIC SURGERY (CARDIOTHORACIC VASCULAR SURGERY)

## 2025-09-04 PROCEDURE — 4004F PT TOBACCO SCREEN RCVD TLK: CPT | Performed by: THORACIC SURGERY (CARDIOTHORACIC VASCULAR SURGERY)

## 2025-09-04 PROCEDURE — 4004F PT TOBACCO SCREEN RCVD TLK: CPT | Performed by: INTERNAL MEDICINE

## 2025-09-04 PROCEDURE — 3078F DIAST BP <80 MM HG: CPT | Performed by: INTERNAL MEDICINE

## 2025-09-04 PROCEDURE — 1159F MED LIST DOCD IN RCRD: CPT | Performed by: INTERNAL MEDICINE

## 2025-09-04 PROCEDURE — G8427 DOCREV CUR MEDS BY ELIG CLIN: HCPCS | Performed by: INTERNAL MEDICINE

## 2025-09-04 PROCEDURE — 1123F ACP DISCUSS/DSCN MKR DOCD: CPT | Performed by: INTERNAL MEDICINE

## 2025-09-04 PROCEDURE — G8417 CALC BMI ABV UP PARAM F/U: HCPCS | Performed by: INTERNAL MEDICINE

## 2025-09-04 RX ORDER — LISINOPRIL 5 MG/1
5 TABLET ORAL DAILY
Qty: 30 TABLET | Refills: 0 | Status: SHIPPED | OUTPATIENT
Start: 2025-09-04

## 2025-09-04 RX ORDER — SPIRONOLACTONE 25 MG/1
25 TABLET ORAL DAILY
Qty: 15 TABLET | Refills: 3 | Status: SHIPPED | OUTPATIENT
Start: 2025-09-04

## 2025-09-04 RX ORDER — METOPROLOL SUCCINATE 50 MG/1
75 TABLET, EXTENDED RELEASE ORAL DAILY
Qty: 135 TABLET | Refills: 3 | Status: SHIPPED | OUTPATIENT
Start: 2025-09-04

## 2025-09-04 ASSESSMENT — ENCOUNTER SYMPTOMS
COUGH: 0
BLOOD IN STOOL: 0
SHORTNESS OF BREATH: 1
ABDOMINAL PAIN: 0
VOMITING: 0
CHEST TIGHTNESS: 0
BACK PAIN: 0
NAUSEA: 0

## (undated) DEVICE — SYRINGE MED 20ML STD CLR PLAS LUERLOCK TIP N CTRL DISP

## (undated) DEVICE — GUIDEWIRE ORTH L400MM DIA3.2MM FOR TFN

## (undated) DEVICE — GAUZE,SPONGE,4"X4",8PLY,STRL,LF,10/TRAY: Brand: MEDLINE

## (undated) DEVICE — 1 X VERSACROSS TRANSSEPTAL SHEATH (INCLUDING  1 X J-TIP GUIDEWIRE); 1 X VERSACROSS RF WIRE (INCLUDING 1 X CONNECTOR CABLE (SINGLE USE)); 1 X DISPERSIVE ELECTRODE: Brand: VERSACROSS ACCESS SOLUTION

## (undated) DEVICE — GUIDEWIRE VASCULAR L145CM 0.035IN J TIP L3MM PTFE FIX COR NAMIC

## (undated) DEVICE — MEDI-TRACE CADENCE ADULT, PRE-CONNECT  DEFIBRILLATION ELECTRODE (10 PR/PK) - PHYSIO-CONTROL: Brand: MEDI-TRACE CADENCE

## (undated) DEVICE — SET TRNQT W/ STD 7IN 12FR 5 TB 1 SNR DLP

## (undated) DEVICE — DRAPE EQUIP BANDED BG 36X28 IN W/ROUNDED CORNER SNAPKOVER

## (undated) DEVICE — GLOVE ORANGE PI 7   MSG9070

## (undated) DEVICE — BLADE CLIPPER GEN PURP NS

## (undated) DEVICE — BOWL MED L 32OZ PLAS W/ MOLD GRAD EZ OPN PEEL PCH

## (undated) DEVICE — INTRODUCER SHTH 6FR L12CM DIA0.038IN HEMSTAS CLOSE TOL

## (undated) DEVICE — SOLUTION IV 1000ML 0.9% SOD CHL PH 5 INJ USP VIAFLX PLAS

## (undated) DEVICE — PERCUTANEOUS ENTRY THINWALL NEEDLE  ONE-PART: Brand: COOK

## (undated) DEVICE — DOUBLE BASIN SET: Brand: MEDLINE INDUSTRIES, INC.

## (undated) DEVICE — SOLUTION IV MULT ELECLYT 1000 ML PH 7.4 INJ ISOLYTE S

## (undated) DEVICE — TAPE ADH W2INXL10YD PLAS TRNSPAR H2O RESIST HYPOALRG CURAD

## (undated) DEVICE — 3M™ IOBAN™ 2 ANTIMICROBIAL INCISE DRAPE 6650EZ: Brand: IOBAN™ 2

## (undated) DEVICE — GLOVE SURG SZ 7.5 L11.73IN FNGR THK9.8MIL STRW LTX POLYMER

## (undated) DEVICE — SYRINGE MED 10ML LUERLOCK TIP W/O SFTY DISP

## (undated) DEVICE — ANGIOPLASTY ADD-ON PACK: Brand: MEDLINE INDUSTRIES, INC.

## (undated) DEVICE — DRAPE SHEET: Brand: UNBRANDED

## (undated) DEVICE — LABEL MED CARD SURG 4 IN PANEL STRL

## (undated) DEVICE — FRACTURE TABLE: Brand: MEDLINE INDUSTRIES, INC.

## (undated) DEVICE — CATHETER PACE 5FR L110CM 6FR INTRO D10CM 1MM SPACE POLYUR

## (undated) DEVICE — GUIDEWIRE VASC L260CM DIA0.035IN BRECKER FOR COREVLV

## (undated) DEVICE — COVER,TABLE,60X90,STERILE: Brand: MEDLINE

## (undated) DEVICE — ELECTRODE ES AD PED L2.5IN TEF INSUL MOD NONCORDED BLDE TIP

## (undated) DEVICE — GUIDEWIRE VASC L260CM DIA0.035IN TAPR L11CM FLPY TIP L4CM

## (undated) DEVICE — BLADE,STAINLESS-STEEL,15,STRL,DISPOSABLE: Brand: MEDLINE

## (undated) DEVICE — BLADE,STAINLESS-STEEL,10,STRL,DISPOSABLE: Brand: MEDLINE

## (undated) DEVICE — GLOVE ORANGE PI 7 1/2   MSG9075

## (undated) DEVICE — GUIDEWIRE VASC L260CM 0.035IN J TIP L3MM PTFE FIX COR NAMIC

## (undated) DEVICE — SURGICAL PROCEDURE KIT 3 W

## (undated) DEVICE — GLOVE SURG 7 PF POLYMER COAT WHT STRL SIGN LTX ESSENTIAL LTX

## (undated) DEVICE — GLOVE SURG SZ 85 L12IN FNGR ORTHO 126MIL CRM LTX FREE

## (undated) DEVICE — BLADE,STAINLESS-STEEL,11,STRL,DISPOSABLE: Brand: MEDLINE

## (undated) DEVICE — COVER PRB W14XL147CM TELESCOPICALLY FLD EXT LEN CIV-FLEX

## (undated) DEVICE — LOOP VES W25MM THK1MM MAXI RED SIL FLD REPELLENT 100 PER

## (undated) DEVICE — ANGIOGRAPHIC CATHETER: Brand: EXPO™

## (undated) DEVICE — GOWN,SIRUS,FABRNF,XL,20/CS: Brand: MEDLINE

## (undated) DEVICE — KIT HND CTRL 3 W STPCOCK ROT END 54IN PREM HI PRSS TBNG AT

## (undated) DEVICE — BIT DRL L L266MM DIA16MM FEM FLX CANN QUIK CPL

## (undated) DEVICE — GLOVE SURG SZ 65 THK91MIL LTX FREE SYN POLYISOPRENE

## (undated) DEVICE — DRAPE, MULTI FENESTRATED, HYBRID OR, STE: Brand: MEDLINE

## (undated) DEVICE — WIPES SKIN CLOTH READYPREP 9 X 10.5 IN 2% CHLORHEX GLUCONATE CHG PREOP

## (undated) DEVICE — Device

## (undated) DEVICE — AMPLATZ EXTRA STIFF WIRE GUIDE: Brand: AMPLATZ

## (undated) DEVICE — BENTSON GUIDEWIRE ANGIO L150CM OD0.035IN STR FIX PTFE SLD SUPP

## (undated) DEVICE — OPTIFOAM GENTLE EX, SACRUM, 9X9: Brand: MEDLINE

## (undated) DEVICE — TOWEL,OR,DSP,ST,WHITE,DLX,4/PK,20PK/CS: Brand: MEDLINE

## (undated) DEVICE — GOWN,SIRUS,FABRNF,L,20/CS: Brand: MEDLINE

## (undated) DEVICE — KIT MFLD ISOLATN NACL CNTRST PRT TBNG SPIK W/ PRSS TRNSDUC

## (undated) DEVICE — DRAPE,REIN 53X77,STERILE: Brand: MEDLINE

## (undated) DEVICE — NEEDLE SPNL 20GA L3.5IN YEL HUB S STL REG WALL FIT STYL

## (undated) DEVICE — SET INTRO SHTH 5FR L45CM GRY INTEGR SIDE PRT HAEMOSTASIS

## (undated) DEVICE — CATHETER DIAG 5FR L100CM LUMN ID0.047IN JR4 CRV 0 SIDE H

## (undated) DEVICE — BIT DRL L500MM DIA6X9MM CANN STP L QUIK CPL FOR DH DC TFN

## (undated) DEVICE — KIT MED IMAG CNTRST AGNT W/ IOPAMIDOL REUSE

## (undated) DEVICE — CATHETER ANGIO 5FR L100CM ID0.045IN AL1 CRV ROBUST SHFT

## (undated) DEVICE — BIT DRL L145MM DIA4.2MM ST 3 FLUT NDL PNT QUIK CPL FOR FEM

## (undated) DEVICE — BLADE,STAINLESS-STEEL,20,STRL,DISPOSABLE: Brand: MEDLINE

## (undated) DEVICE — DRESSING TRNSPAR W4XL55IN ACRYL SUP FLM W ADH WTRPRF OPSITE

## (undated) DEVICE — BASIC: Brand: MEDLINE INDUSTRIES, INC.

## (undated) DEVICE — GLOVE ORANGE PI 8 1/2   MSG9085

## (undated) DEVICE — APPLICATOR MEDICATED 26 CC SOLUTION HI LT ORNG CHLORAPREP

## (undated) DEVICE — SYRINGE MED 50ML LUERLOCK TIP

## (undated) DEVICE — BIT DRL L145MM DIA4.2MM NONSTERILE 3 FLUT NDL PNT QUIK CPL

## (undated) DEVICE — LARGE BORE STOPCOCK WITH ROTATING MALE LUER LOCK

## (undated) DEVICE — ELECTRODE PT RET AD L9FT HI MOIST COND ADH HYDRGEL CORDED

## (undated) DEVICE — RADIFOCUS GLIDEWIRE: Brand: GLIDEWIRE

## (undated) DEVICE — MEDIA CONTRAST ISOVUE 300 100ML

## (undated) DEVICE — NDLHOLDER F/P WEBSTER FN8064 S: Brand: CENTURION MEDICAL PRODUCTS CORP

## (undated) DEVICE — TOWEL,OR,DSP,ST,BLUE,STD,6/PK,12PK/CS: Brand: MEDLINE

## (undated) DEVICE — PICO 7 10CM X 30CM: Brand: PICO™ 7

## (undated) DEVICE — PERCLOSE™ PROSTYLE™ SUTURE-MEDIATED CLOSURE AND REPAIR SYSTEM: Brand: PERCLOSE™ PROSTYLE™

## (undated) DEVICE — COVER,LIGHT HANDLE,FLX,2/PK: Brand: MEDLINE INDUSTRIES, INC.

## (undated) DEVICE — PINNACLE INTRODUCER SHEATH: Brand: PINNACLE

## (undated) DEVICE — ROD RMR L950MM DIA2.5MM W/ EXTN BALL TIP

## (undated) DEVICE — DRESSING HYDROFIBER AQUACEL AG ADVANTAGE 3.5X6 IN